# Patient Record
Sex: MALE | Race: WHITE | Employment: UNEMPLOYED | URBAN - METROPOLITAN AREA
[De-identification: names, ages, dates, MRNs, and addresses within clinical notes are randomized per-mention and may not be internally consistent; named-entity substitution may affect disease eponyms.]

---

## 2020-07-05 ENCOUNTER — HOSPITAL ENCOUNTER (EMERGENCY)
Age: 45
Discharge: HOME | End: 2020-07-05
Attending: EMERGENCY MEDICINE

## 2020-07-05 ENCOUNTER — APPOINTMENT (OUTPATIENT)
Dept: CT IMAGING | Age: 45
End: 2020-07-05

## 2020-07-05 VITALS
DIASTOLIC BLOOD PRESSURE: 94 MMHG | SYSTOLIC BLOOD PRESSURE: 149 MMHG | RESPIRATION RATE: 19 BRPM | OXYGEN SATURATION: 99 % | TEMPERATURE: 98.1 F | HEART RATE: 94 BPM

## 2020-07-05 LAB
ABSOLUTE EOS #: 0.28 K/UL (ref 0–0.44)
ABSOLUTE IMMATURE GRANULOCYTE: 0.07 K/UL (ref 0–0.3)
ABSOLUTE LYMPH #: 3.55 K/UL (ref 1.1–3.7)
ABSOLUTE MONO #: 0.68 K/UL (ref 0.1–1.2)
ACETAMINOPHEN LEVEL: <5 UG/ML (ref 10–30)
ANION GAP SERPL CALCULATED.3IONS-SCNC: 17 MMOL/L (ref 9–17)
BASOPHILS # BLD: 1 % (ref 0–2)
BASOPHILS ABSOLUTE: 0.07 K/UL (ref 0–0.2)
BUN BLDV-MCNC: 6 MG/DL (ref 6–20)
BUN/CREAT BLD: ABNORMAL (ref 9–20)
CALCIUM SERPL-MCNC: 8.1 MG/DL (ref 8.6–10.4)
CHLORIDE BLD-SCNC: 91 MMOL/L (ref 98–107)
CO2: 23 MMOL/L (ref 20–31)
CREAT SERPL-MCNC: 0.56 MG/DL (ref 0.7–1.2)
DIFFERENTIAL TYPE: ABNORMAL
EOSINOPHILS RELATIVE PERCENT: 3 % (ref 1–4)
ETHANOL PERCENT: 0.32 %
ETHANOL: 322 MG/DL
GFR AFRICAN AMERICAN: ABNORMAL ML/MIN
GFR NON-AFRICAN AMERICAN: ABNORMAL ML/MIN
GFR SERPL CREATININE-BSD FRML MDRD: ABNORMAL ML/MIN/{1.73_M2}
GFR SERPL CREATININE-BSD FRML MDRD: ABNORMAL ML/MIN/{1.73_M2}
GLUCOSE BLD-MCNC: 111 MG/DL (ref 70–99)
GLUCOSE BLD-MCNC: 119 MG/DL (ref 75–110)
HCT VFR BLD CALC: 49.2 % (ref 40.7–50.3)
HEMOGLOBIN: 17 G/DL (ref 13–17)
IMMATURE GRANULOCYTES: 1 %
LYMPHOCYTES # BLD: 35 % (ref 24–43)
MCH RBC QN AUTO: 33.2 PG (ref 25.2–33.5)
MCHC RBC AUTO-ENTMCNC: 34.6 G/DL (ref 28.4–34.8)
MCV RBC AUTO: 96.1 FL (ref 82.6–102.9)
MONOCYTES # BLD: 7 % (ref 3–12)
NRBC AUTOMATED: 0 PER 100 WBC
PDW BLD-RTO: 12.1 % (ref 11.8–14.4)
PLATELET # BLD: ABNORMAL K/UL (ref 138–453)
PLATELET ESTIMATE: ABNORMAL
PLATELET, FLUORESCENCE: NORMAL K/UL (ref 138–453)
PLATELET, IMMATURE FRACTION: NORMAL % (ref 1.1–10.3)
PMV BLD AUTO: ABNORMAL FL (ref 8.1–13.5)
POTASSIUM SERPL-SCNC: 4.1 MMOL/L (ref 3.7–5.3)
RBC # BLD: 5.12 M/UL (ref 4.21–5.77)
RBC # BLD: ABNORMAL 10*6/UL
SALICYLATE LEVEL: <1 MG/DL (ref 3–10)
SEG NEUTROPHILS: 54 % (ref 36–65)
SEGMENTED NEUTROPHILS ABSOLUTE COUNT: 5.44 K/UL (ref 1.5–8.1)
SODIUM BLD-SCNC: 131 MMOL/L (ref 135–144)
TOXIC TRICYCLIC SC,BLOOD: NEGATIVE
WBC # BLD: 10.1 K/UL (ref 3.5–11.3)
WBC # BLD: ABNORMAL 10*3/UL

## 2020-07-05 PROCEDURE — 72125 CT NECK SPINE W/O DYE: CPT

## 2020-07-05 PROCEDURE — 96375 TX/PRO/DX INJ NEW DRUG ADDON: CPT

## 2020-07-05 PROCEDURE — 80307 DRUG TEST PRSMV CHEM ANLYZR: CPT

## 2020-07-05 PROCEDURE — 70450 CT HEAD/BRAIN W/O DYE: CPT

## 2020-07-05 PROCEDURE — 6360000002 HC RX W HCPCS

## 2020-07-05 PROCEDURE — G0480 DRUG TEST DEF 1-7 CLASSES: HCPCS

## 2020-07-05 PROCEDURE — 2500000003 HC RX 250 WO HCPCS: Performed by: EMERGENCY MEDICINE

## 2020-07-05 PROCEDURE — 6360000002 HC RX W HCPCS: Performed by: EMERGENCY MEDICINE

## 2020-07-05 PROCEDURE — 99285 EMERGENCY DEPT VISIT HI MDM: CPT

## 2020-07-05 PROCEDURE — 85055 RETICULATED PLATELET ASSAY: CPT

## 2020-07-05 PROCEDURE — 96374 THER/PROPH/DIAG INJ IV PUSH: CPT

## 2020-07-05 PROCEDURE — 2580000003 HC RX 258: Performed by: EMERGENCY MEDICINE

## 2020-07-05 PROCEDURE — 80048 BASIC METABOLIC PNL TOTAL CA: CPT

## 2020-07-05 PROCEDURE — 82947 ASSAY GLUCOSE BLOOD QUANT: CPT

## 2020-07-05 PROCEDURE — 85025 COMPLETE CBC W/AUTO DIFF WBC: CPT

## 2020-07-05 RX ORDER — HYDRALAZINE HYDROCHLORIDE 20 MG/ML
20 INJECTION INTRAMUSCULAR; INTRAVENOUS ONCE
Status: COMPLETED | OUTPATIENT
Start: 2020-07-05 | End: 2020-07-05

## 2020-07-05 RX ORDER — DIPHENHYDRAMINE HYDROCHLORIDE 50 MG/ML
INJECTION INTRAMUSCULAR; INTRAVENOUS
Status: COMPLETED
Start: 2020-07-05 | End: 2020-07-05

## 2020-07-05 RX ORDER — DIPHENHYDRAMINE HYDROCHLORIDE 50 MG/ML
50 INJECTION INTRAMUSCULAR; INTRAVENOUS ONCE
Status: COMPLETED | OUTPATIENT
Start: 2020-07-05 | End: 2020-07-05

## 2020-07-05 RX ORDER — 0.9 % SODIUM CHLORIDE 0.9 %
1000 INTRAVENOUS SOLUTION INTRAVENOUS ONCE
Status: COMPLETED | OUTPATIENT
Start: 2020-07-05 | End: 2020-07-05

## 2020-07-05 RX ORDER — METOPROLOL TARTRATE 5 MG/5ML
5 INJECTION INTRAVENOUS ONCE
Status: COMPLETED | OUTPATIENT
Start: 2020-07-05 | End: 2020-07-05

## 2020-07-05 RX ORDER — LORAZEPAM 2 MG/ML
2 INJECTION INTRAMUSCULAR ONCE
Status: COMPLETED | OUTPATIENT
Start: 2020-07-05 | End: 2020-07-05

## 2020-07-05 RX ORDER — LORAZEPAM 2 MG/ML
INJECTION INTRAMUSCULAR
Status: COMPLETED
Start: 2020-07-05 | End: 2020-07-05

## 2020-07-05 RX ADMIN — HYDRALAZINE HYDROCHLORIDE 20 MG: 20 INJECTION INTRAMUSCULAR; INTRAVENOUS at 08:27

## 2020-07-05 RX ADMIN — SODIUM CHLORIDE 1000 ML: 9 INJECTION, SOLUTION INTRAVENOUS at 08:27

## 2020-07-05 RX ADMIN — LORAZEPAM 2 MG: 2 INJECTION INTRAMUSCULAR at 06:23

## 2020-07-05 RX ADMIN — DIPHENHYDRAMINE HYDROCHLORIDE 50 MG: 50 INJECTION INTRAMUSCULAR; INTRAVENOUS at 06:23

## 2020-07-05 RX ADMIN — METOPROLOL TARTRATE 5 MG: 5 INJECTION, SOLUTION INTRAVENOUS at 08:02

## 2020-07-05 NOTE — ED PROVIDER NOTES
[facility]  Emergency Department  Faculty Sign-Out Addendum     Care of Merlinda Copier was assumed from previous attending and is being seen for Alcohol Intoxication  . The patient's initial evaluation and plan have been discussed with the prior provider who initially evaluated the patient. Handoff taken on the following patient from prior Attending Physician:    Saskia Welch    I was available and discussed any additional care issues that arose and coordinated the management plans with the resident(s) caring for the patient during my duty period. Any areas of disagreement with residents documentation of care or procedures are noted on the chart. I was personally present for the key portions of any/all procedures during my duty period. I have documented in the chart those procedures where I was not present during the key portions.       EMERGENCY DEPARTMENT COURSE / MEDICAL DECISION MAKING:       MEDICATIONS GIVEN:  Orders Placed This Encounter   Medications    LORazepam (ATIVAN) 2 MG/ML injection     Gladieux, Jaimie: cabinet override    diphenhydrAMINE (BENADRYL) 50 MG/ML injection     Gladieux, Jaimie: cabinet override    LORazepam (ATIVAN) injection 2 mg    diphenhydrAMINE (BENADRYL) injection 50 mg    metoprolol (LOPRESSOR) injection 5 mg    0.9 % sodium chloride bolus    hydrALAZINE (APRESOLINE) injection 20 mg       LABS / RADIOLOGY:     Labs Reviewed   TOX SCR, BLD, ED - Abnormal; Notable for the following components:       Result Value    Acetaminophen Level <5 (*)     Ethanol 322 (*)     Ethanol percent 6.973 (*)     Salicylate Lvl <1 (*)     All other components within normal limits   BASIC METABOLIC PANEL W/ REFLEX TO MG FOR LOW K - Abnormal; Notable for the following components:    Glucose 111 (*)     BUN 6 (*)     CREATININE 0.56 (*)     Calcium 8.1 (*)     Sodium 131 (*)     Chloride 91 (*)     All other components within normal limits   CBC WITH AUTO DIFFERENTIAL

## 2020-07-05 NOTE — ED NOTES
Bed: 16  Expected date: 7/5/20  Expected time: 3:56 AM  Means of arrival:   Comments:  [id number] [facility] [address], RN  07/05/20 6209

## 2020-07-05 NOTE — ED NOTES
Pt remains on NRB mask, BP decreased at this time after IV hydralizine. Continue to monitor until sober time.       Priyank Cosby RN  07/05/20 9344

## 2020-07-05 NOTE — ED NOTES
met with patient at bedside. Patient reported he is not from this area. Patient was given information on substance abuse treatment programs. Patient continued to fall asleep during interactions; had to wake several times.        CARLOS ALBERTO Flowers, CHARBEL     [facility]  07/05/20 5077

## 2020-07-05 NOTE — ED PROVIDER NOTES
[facility]  Emergency Department  Emergency Medicine Resident Sign-out     Care of Richelle Boeck was assumed from Dr. Heaven Greene and is being seen for Alcohol Intoxication  . The patient's initial evaluation and plan have been discussed with the prior provider who initially evaluated the patient.      EMERGENCY DEPARTMENT COURSE / MEDICAL DECISION MAKING:       MEDICATIONS GIVEN:  Orders Placed This Encounter   Medications    LORazepam (ATIVAN) 2 MG/ML injection     Gladieux, Jaimie: cabinet override    diphenhydrAMINE (BENADRYL) 50 MG/ML injection     Gladieux, Jaimie: cabinet override    LORazepam (ATIVAN) injection 2 mg    diphenhydrAMINE (BENADRYL) injection 50 mg    metoprolol (LOPRESSOR) injection 5 mg    0.9 % sodium chloride bolus    hydrALAZINE (APRESOLINE) injection 20 mg       LABS / RADIOLOGY:     Labs Reviewed   TOX SCR, BLD, ED - Abnormal; Notable for the following components:       Result Value    Acetaminophen Level <5 (*)     Ethanol 322 (*)     Ethanol percent 5.051 (*)     Salicylate Lvl <1 (*)     All other components within normal limits   BASIC METABOLIC PANEL W/ REFLEX TO MG FOR LOW K - Abnormal; Notable for the following components:    Glucose 111 (*)     CREATININE 0.56 (*)     Calcium 8.1 (*)     Sodium 131 (*)     Chloride 91 (*)     All other components within normal limits   CBC WITH AUTO DIFFERENTIAL - Abnormal; Notable for the following components:    Immature Granulocytes 1 (*)     All other components within normal limits   POC GLUCOSE FINGERSTICK - Abnormal; Notable for the following components:    POC Glucose 119 (*)     All other components within normal limits   IMMATURE PLATELET FRACTION       Ct Head Wo Contrast    Result Date: 7/5/2020  EXAMINATION: CT OF THE HEAD WITHOUT CONTRAST  7/5/2020 5:10 am TECHNIQUE: CT of the head was performed without the administration of intravenous contrast. Dose modulation, iterative reconstruction, and/or weight based adjustment of the mA/kV was utilized to reduce the radiation dose to as low as reasonably achievable. COMPARISON: None. HISTORY: ORDERING SYSTEM PROVIDED HISTORY: found down TECHNOLOGIST PROVIDED HISTORY: found down Reason for Exam: found down Acuity: Unknown Type of Exam: Unknown FINDINGS: Image quality is degraded by motion and streak artifact. BRAIN/VENTRICLES: There is no acute intracranial hemorrhage, mass effect or midline shift. No abnormal extra-axial fluid collection. The gray-white differentiation is maintained without evidence of an acute infarct. There is no evidence of hydrocephalus. ORBITS: The visualized portion of the orbits demonstrate no acute abnormality. SINUSES: Mild scattered paranasal sinus mucosal thickening. The bilateral mastoid air cells are clear. SOFT TISSUES/SKULL:  No acute abnormality of the visualized skull or soft tissues. No acute intracranial abnormality given motion and streak artifact. Ct Cervical Spine Wo Contrast    Result Date: 7/5/2020  EXAMINATION: CT OF THE CERVICAL SPINE WITHOUT CONTRAST 7/5/2020 5:10 am TECHNIQUE: CT of the cervical spine was performed without the administration of intravenous contrast. Multiplanar reformatted images are provided for review. Dose modulation, iterative reconstruction, and/or weight based adjustment of the mA/kV was utilized to reduce the radiation dose to as low as reasonably achievable. COMPARISON: None. HISTORY: ORDERING SYSTEM PROVIDED HISTORY: found down TECHNOLOGIST PROVIDED HISTORY: found down Reason for Exam: found down Acuity: Unknown Type of Exam: Unknown FINDINGS: BONES/ALIGNMENT: There is no acute fracture or traumatic malalignment. DEGENERATIVE CHANGES: Mild multilevel degenerative disc disease. SOFT TISSUES: There is no prevertebral soft tissue swelling. No acute abnormality of the cervical spine.        RECENT VITALS:     Temp: 98.1 °F (36.7 °C),  Pulse: 94, Resp: 19, BP: (!) 149/94, SpO2: 99 %    This

## 2020-07-05 NOTE — ED PROVIDER NOTES
[address] [facility]     Emergency Department     Faculty Attestation    I performed a history and physical examination of the patient and discussed management with the resident. I have reviewed and agree with the residents findings including all diagnostic interpretations, and treatment plans as written at the time of my review. Any areas of disagreement are noted on the chart. I was personally present for the key portions of any procedures. I have documented in the chart those procedures where I was not present during the key portions. For Physician Assistant/ Nurse Practitioner cases/documentation I have personally evaluated this patient and have completed at least one if not all key elements of the E/M (history, physical exam, and MDM). Additional findings are as noted. This patient was evaluated in the Emergency Department for symptoms described in the history of present illness. The patient was evaluated in the context of the global COVID-19 pandemic, which necessitated consideration that the patient might be at risk for infection with the SARS-CoV-2 virus that causes COVID-19. Institutional protocols and algorithms that pertain to the evaluation of patients at risk for COVID-19 are in a state of rapid change based on information released by regulatory bodies including the CDC and federal and state organizations. These policies and algorithms were followed during the patient's care in the ED. Primary Care Physician: No primary care provider on file. History: This is a 78 y.o. male who presents to the Emergency Department with complaint of cute alcohol intoxication. The patient was found down and brought in by EMS. His glucose was over 100. Physical:   temperature is 98.1 °F (36.7 °C). His blood pressure is 135/97 (abnormal) and his pulse is 93. His respiration is 19 and oxygen saturation is 97%.   Is a very somnolent difficult to arise alcohol smell on his breath. No obvious evidence of trauma. Lungs are clear to auscultation, heart regular rate and rhythm    Impression: Acute alcohol intoxication    Plan: CT scan ethanol level and reassess      (Please note that portions of this note were completed with a voice recognition program.  Efforts were made to edit the dictations but occasionally words are mis-transcribed.)    Claudene Guernsey.  Frandy Vidal MD, [facility]  Attending Emergency Medicine Physician        Abril Shoemaker MD  07/05/20 3212

## 2020-07-05 NOTE — ED PROVIDER NOTES
[facility] ED  Emergency Department Encounter  EmergencyMedicine Resident     Pt Balaji Harris  MRN: [id number]  [place] 1975  Date of evaluation: 7/5/20  PCP:  No primary care provider on file. CHIEF COMPLAINT       Chief Complaint   Patient presents with    Alcohol Intoxication       HISTORY OF PRESENT ILLNESS  (Location/Symptom, Timing/Onset, Context/Setting, Quality, Duration, Modifying Factors, Severity.)      Dennis Pisano is a 50 y.o. male who presents with altered mental status, patient was found outside of a local bar, according EMS he been drinking a large amount of pitchers of beer. No signs of trauma, patient did have desaturations was given a nasal trumpet and started on O2 satting 98% on 2 L nasal cannula. PAST MEDICAL / SURGICAL / SOCIAL / FAMILY HISTORY      has no past medical history on file. has no past surgical history on file.     Social History     Socioeconomic History    Marital status: Single     Spouse name: Not on file    Number of children: Not on file    Years of education: Not on file    Highest education level: Not on file   Occupational History    Not on file   Social Needs    Financial resource strain: Not on file    Food insecurity     Worry: Not on file     Inability: Not on file    Transportation needs     Medical: Not on file     Non-medical: Not on file   Tobacco Use    Smoking status: Not on file   Substance and Sexual Activity    Alcohol use: Not on file    Drug use: Not on file    Sexual activity: Not on file   Lifestyle    Physical activity     Days per week: Not on file     Minutes per session: Not on file    Stress: Not on file   Relationships    Social connections     Talks on phone: Not on file     Gets together: Not on file     Attends Zoroastrianism service: Not on file     Active member of club or organization: Not on file     Attends meetings of clubs or organizations: Not on file     Relationship status: Not on file    Intimate partner violence     Fear of current or ex partner: Not on file     Emotionally abused: Not on file     Physically abused: Not on file     Forced sexual activity: Not on file   Other Topics Concern    Not on file   Social History Narrative    Not on file       No family history on file. Allergies:  Patient has no allergy information on record. Home Medications:  Prior to Admission medications    Not on File       REVIEW OF SYSTEMS    (2-9 systems for level 4, 10 or more for level 5)      Review of Systems   Unable to perform ROS: Mental status change   Constitutional:        Patient appears intoxicated smells of alcohol       PHYSICAL EXAM   (up to 7 for level 4, 8 or more for level 5)      INITIAL VITALS:   BP (!) 149/94   Pulse 94   Temp 98.1 °F (36.7 °C)   Resp 19   SpO2 99%     Physical Exam  Constitutional:       Comments: Patient is somnolent, responds to noxious stimuli   HENT:      Head: Normocephalic and atraumatic. Nose: Nose normal.      Mouth/Throat:      Mouth: Mucous membranes are moist.   Eyes:      Pupils: Pupils are equal, round, and reactive to light. Comments: Sclerae injected bilaterally   Neck:      Musculoskeletal: No neck rigidity. Cardiovascular:      Rate and Rhythm: Normal rate. Pulses: Normal pulses. Heart sounds: Normal heart sounds. Pulmonary:      Effort: Pulmonary effort is normal.      Breath sounds: Normal breath sounds. Comments: Patient is resting comfortably on his back, does have periods of sleep apnea  Abdominal:      General: Abdomen is flat. Palpations: Abdomen is soft. Tenderness: There is no abdominal tenderness. There is no guarding. Skin:     General: Skin is warm and dry.          DIFFERENTIAL  DIAGNOSIS     PLAN (LABS / IMAGING / EKG):  Orders Placed This Encounter   Procedures    CT HEAD WO CONTRAST    CT Cervical Spine WO Contrast    TOX SCR, BLD, ED    Basic Metabolic Panel w/ Reflex to MG    CBC Auto Differential    Immature Platelet Fraction    POC Glucose Fingerstick       MEDICATIONS ORDERED:  Orders Placed This Encounter   Medications    LORazepam (ATIVAN) 2 MG/ML injection     Gladieux, Jaimie: cabinet override    diphenhydrAMINE (BENADRYL) 50 MG/ML injection     Gladieux, Jaimie: cabinet override    LORazepam (ATIVAN) injection 2 mg    diphenhydrAMINE (BENADRYL) injection 50 mg    metoprolol (LOPRESSOR) injection 5 mg    0.9 % sodium chloride bolus    hydrALAZINE (APRESOLINE) injection 20 mg       DDX: Intoxication    DIAGNOSTIC RESULTS / EMERGENCY DEPARTMENT COURSE / MDM   :  Results for orders placed or performed during the hospital encounter of 07/05/20   TOX SCR, BLD, ED   Result Value Ref Range    Acetaminophen Level <5 (L) 10 - 30 ug/mL    Ethanol 322 (HH) <10 mg/dL    Ethanol percent 0.322 (H) <8.301 %    Salicylate Lvl <1 (L) 3 - 10 mg/dL    Toxic Tricyclic Sc,Blood NEGATIVE NEGATIVE   Basic Metabolic Panel w/ Reflex to MG   Result Value Ref Range    Glucose 111 (H) 70 - 99 mg/dL    BUN 6 6 - 20 mg/dL    CREATININE 0.56 (L) 0.70 - 1.20 mg/dL    Bun/Cre Ratio NOT REPORTED 9 - 20    Calcium 8.1 (L) 8.6 - 10.4 mg/dL    Sodium 131 (L) 135 - 144 mmol/L    Potassium 4.1 3.7 - 5.3 mmol/L    Chloride 91 (L) 98 - 107 mmol/L    CO2 23 20 - 31 mmol/L    Anion Gap 17 9 - 17 mmol/L    GFR Non- NOT REPORTED >60 mL/min    GFR  NOT REPORTED >60 mL/min    GFR Comment NOT REPORTED     GFR Staging NOT REPORTED    CBC Auto Differential   Result Value Ref Range    WBC 10.1 3.5 - 11.3 k/uL    RBC 5.12 4.21 - 5.77 m/uL    Hemoglobin 17.0 13.0 - 17.0 g/dL    Hematocrit 49.2 40.7 - 50.3 %    MCV 96.1 82.6 - 102.9 fL    MCH 33.2 25.2 - 33.5 pg    MCHC 34.6 28.4 - 34.8 g/dL    RDW 12.1 11.8 - 14.4 %    Platelets See Reflexed IPF Result 138 - 453 k/uL    MPV NOT REPORTED 8.1 - 13.5 fL    NRBC Automated 0.0 0.0 per 100 WBC    Differential Type NOT REPORTED     WBC Morphology NOT REPORTED     RBC Morphology NOT REPORTED     Platelet Estimate NOT REPORTED     Seg Neutrophils 54 36 - 65 %    Lymphocytes 35 24 - 43 %    Monocytes 7 3 - 12 %    Eosinophils % 3 1 - 4 %    Basophils 1 0 - 2 %    Immature Granulocytes 1 (H) 0 %    Segs Absolute 5.44 1.50 - 8.10 k/uL    Absolute Lymph # 3.55 1.10 - 3.70 k/uL    Absolute Mono # 0.68 0.10 - 1.20 k/uL    Absolute Eos # 0.28 0.00 - 0.44 k/uL    Basophils Absolute 0.07 0.00 - 0.20 k/uL    Absolute Immature Granulocyte 0.07 0.00 - 0.30 k/uL   Immature Platelet Fraction   Result Value Ref Range    Platelet, Immature Fraction NOT REPORTED 1.1 - 10.3 %    Platelet, Fluorescence Platelet clumps present, count appears adequate. 138 - 453 k/uL   POC Glucose Fingerstick   Result Value Ref Range    POC Glucose 119 (H) 75 - 110 mg/dL       IMPRESSION:    RADIOLOGY:  No acute intracranial or cervical abnormality on CT    EKG  None    All EKG's are interpreted by the Emergency Department Physician who either signs or Co-signs this chart in the absence of a cardiologist.    EMERGENCY DEPARTMENT COURSE:  Patient brought in via EMS after being found at a local bar, concern for acute alcohol intoxication, patient's alcohol level was 322, nasal trumpet was placed, due to patient not being responsive and being found down no signs of trauma however unsure if intracranial pathology was taking place CT of head neck was obtained which was negative. Patient urinated on himself during the stay woke up and was belligerent yelling at nurses walked out of his room threatening staff. Patient was instructed to go back to his room attempted to fight myself, security was called, patient was given 50 of Benadryl and 2 mg of Ativan. Patient was signed out to Dr. Tia Carmona awaiting sober time and reevaluation    PROCEDURES:  None    CONSULTS:  None    CRITICAL CARE:  None    FINAL IMPRESSION      1. Acute alcoholic intoxication without complication ([address]    2.  Combative behavior 3. Chronic alcohol abuse    4. Sleep apnea, unspecified type          DISPOSITION / PLAN     DISPOSITION Decision To Discharge 07/05/2020 12:47:34 PM      PATIENT REFERRED TO:  [facility] [address]  [phone]  Call in 2 days  To establish a new PCP and follow-up to have an outpatient sleep study performed to confirm likely obstructive sleep apnea. DISCHARGE MEDICATIONS:  There are no discharge medications for this patient.       Sharon Marks DO  Emergency Medicine Resident    (Please note that portions of thisnote were completed with a voice recognition program.  Efforts were made to edit the dictations but occasionally words are mis-transcribed.)     Sharon Marks DO  Resident  07/05/20 8298

## 2020-07-05 NOTE — ED NOTES
Pt presented to ED via [facility] following being found passed outside [address] bar. Pt was seen earlier today chugging pitchers of beers. Pt only responsive to painful stimuli. Pt presents with no ID, $15, a lottery ticket and an uncharged phone. Pt presents with no injuries noted. Pt placed on cardiac monitor.       Valerie Georges RN  07/05/20 8018

## 2020-07-05 NOTE — ED NOTES
Pt ambulated to restroom with steady gait. Pt provided paper pants to wear. Pt is alert, oriented, speaking in full, complete sentences.       Mayda Frank RN  07/05/20 4924

## 2021-10-15 ENCOUNTER — APPOINTMENT (OUTPATIENT)
Dept: GENERAL RADIOLOGY | Age: 46
DRG: 246 | End: 2021-10-15

## 2021-10-15 ENCOUNTER — HOSPITAL ENCOUNTER (INPATIENT)
Age: 46
LOS: 3 days | Discharge: HOME | DRG: 246 | End: 2021-10-18
Attending: EMERGENCY MEDICINE | Admitting: INTERNAL MEDICINE

## 2021-10-15 DIAGNOSIS — I21.3 ST ELEVATION MYOCARDIAL INFARCTION (STEMI), UNSPECIFIED ARTERY (HCC): Primary | ICD-10-CM

## 2021-10-15 DIAGNOSIS — I50.42 CHRONIC COMBINED SYSTOLIC AND DIASTOLIC CHF (CONGESTIVE HEART FAILURE) (HCC): ICD-10-CM

## 2021-10-15 LAB
ABSOLUTE EOS #: 0.09 K/UL (ref 0–0.44)
ABSOLUTE IMMATURE GRANULOCYTE: 0.09 K/UL (ref 0–0.3)
ABSOLUTE LYMPH #: 3.24 K/UL (ref 1.1–3.7)
ABSOLUTE MONO #: 1.09 K/UL (ref 0.1–1.2)
ANION GAP SERPL CALCULATED.3IONS-SCNC: 14 MMOL/L (ref 9–17)
ANION GAP SERPL CALCULATED.3IONS-SCNC: 15 MMOL/L (ref 9–17)
BASOPHILS # BLD: 1 % (ref 0–2)
BASOPHILS ABSOLUTE: 0.1 K/UL (ref 0–0.2)
BNP INTERPRETATION: ABNORMAL
BUN BLDV-MCNC: 4 MG/DL (ref 6–20)
BUN BLDV-MCNC: 5 MG/DL (ref 6–20)
BUN/CREAT BLD: ABNORMAL (ref 9–20)
BUN/CREAT BLD: ABNORMAL (ref 9–20)
CALCIUM IONIZED: 0.98 MMOL/L (ref 1.13–1.33)
CALCIUM SERPL-MCNC: 8.7 MG/DL (ref 8.6–10.4)
CALCIUM SERPL-MCNC: 8.7 MG/DL (ref 8.6–10.4)
CHLORIDE BLD-SCNC: 88 MMOL/L (ref 98–107)
CHLORIDE BLD-SCNC: 88 MMOL/L (ref 98–107)
CO2: 20 MMOL/L (ref 20–31)
CO2: 23 MMOL/L (ref 20–31)
CREAT SERPL-MCNC: 0.47 MG/DL (ref 0.7–1.2)
CREAT SERPL-MCNC: 0.62 MG/DL (ref 0.7–1.2)
DIFFERENTIAL TYPE: ABNORMAL
EOSINOPHILS RELATIVE PERCENT: 1 % (ref 1–4)
GFR AFRICAN AMERICAN: >60 ML/MIN
GFR AFRICAN AMERICAN: >60 ML/MIN
GFR NON-AFRICAN AMERICAN: >60 ML/MIN
GFR NON-AFRICAN AMERICAN: >60 ML/MIN
GFR SERPL CREATININE-BSD FRML MDRD: ABNORMAL ML/MIN/{1.73_M2}
GLUCOSE BLD-MCNC: 110 MG/DL (ref 70–99)
GLUCOSE BLD-MCNC: 120 MG/DL (ref 70–99)
HCT VFR BLD CALC: 48.7 % (ref 40.7–50.3)
HEMOGLOBIN: 17 G/DL (ref 13–17)
IMMATURE GRANULOCYTES: 1 %
LV EF: 25 %
LVEF MODALITY: NORMAL
LYMPHOCYTES # BLD: 16 % (ref 24–43)
MAGNESIUM: 1.7 MG/DL (ref 1.6–2.6)
MCH RBC QN AUTO: 33.7 PG (ref 25.2–33.5)
MCHC RBC AUTO-ENTMCNC: 34.9 G/DL (ref 28.4–34.8)
MCV RBC AUTO: 96.4 FL (ref 82.6–102.9)
MONOCYTES # BLD: 6 % (ref 3–12)
NRBC AUTOMATED: 0 PER 100 WBC
PDW BLD-RTO: 12.1 % (ref 11.8–14.4)
PLATELET # BLD: 314 K/UL (ref 138–453)
PLATELET ESTIMATE: ABNORMAL
PMV BLD AUTO: 10.3 FL (ref 8.1–13.5)
POTASSIUM SERPL-SCNC: 3.6 MMOL/L (ref 3.7–5.3)
POTASSIUM SERPL-SCNC: 3.8 MMOL/L (ref 3.7–5.3)
PRO-BNP: 6545 PG/ML
RBC # BLD: 5.05 M/UL (ref 4.21–5.77)
RBC # BLD: ABNORMAL 10*6/UL
SARS-COV-2, RAPID: NOT DETECTED
SEG NEUTROPHILS: 75 % (ref 36–65)
SEGMENTED NEUTROPHILS ABSOLUTE COUNT: 15.14 K/UL (ref 1.5–8.1)
SODIUM BLD-SCNC: 123 MMOL/L (ref 135–144)
SODIUM BLD-SCNC: 125 MMOL/L (ref 135–144)
SPECIMEN DESCRIPTION: NORMAL
TROPONIN INTERP: ABNORMAL
TROPONIN T: ABNORMAL NG/ML
TROPONIN, HIGH SENSITIVITY: 206 NG/L (ref 0–22)
WBC # BLD: 19.8 K/UL (ref 3.5–11.3)
WBC # BLD: ABNORMAL 10*3/UL

## 2021-10-15 PROCEDURE — 6360000002 HC RX W HCPCS: Performed by: STUDENT IN AN ORGANIZED HEALTH CARE EDUCATION/TRAINING PROGRAM

## 2021-10-15 PROCEDURE — B2151ZZ FLUOROSCOPY OF LEFT HEART USING LOW OSMOLAR CONTRAST: ICD-10-PCS | Performed by: INTERNAL MEDICINE

## 2021-10-15 PROCEDURE — B2111ZZ FLUOROSCOPY OF MULTIPLE CORONARY ARTERIES USING LOW OSMOLAR CONTRAST: ICD-10-PCS | Performed by: INTERNAL MEDICINE

## 2021-10-15 PROCEDURE — 71045 X-RAY EXAM CHEST 1 VIEW: CPT

## 2021-10-15 PROCEDURE — C1874 STENT, COATED/COV W/DEL SYS: HCPCS

## 2021-10-15 PROCEDURE — 83735 ASSAY OF MAGNESIUM: CPT

## 2021-10-15 PROCEDURE — 93005 ELECTROCARDIOGRAM TRACING: CPT | Performed by: STUDENT IN AN ORGANIZED HEALTH CARE EDUCATION/TRAINING PROGRAM

## 2021-10-15 PROCEDURE — 2709999900 HC NON-CHARGEABLE SUPPLY

## 2021-10-15 PROCEDURE — 87635 SARS-COV-2 COVID-19 AMP PRB: CPT

## 2021-10-15 PROCEDURE — 82330 ASSAY OF CALCIUM: CPT

## 2021-10-15 PROCEDURE — B2131ZZ FLUOROSCOPY OF MULTIPLE CORONARY ARTERY BYPASS GRAFTS USING LOW OSMOLAR CONTRAST: ICD-10-PCS | Performed by: INTERNAL MEDICINE

## 2021-10-15 PROCEDURE — 99284 EMERGENCY DEPT VISIT MOD MDM: CPT

## 2021-10-15 PROCEDURE — 6360000002 HC RX W HCPCS

## 2021-10-15 PROCEDURE — 6370000000 HC RX 637 (ALT 250 FOR IP): Performed by: STUDENT IN AN ORGANIZED HEALTH CARE EDUCATION/TRAINING PROGRAM

## 2021-10-15 PROCEDURE — C1725 CATH, TRANSLUMIN NON-LASER: HCPCS

## 2021-10-15 PROCEDURE — 83880 ASSAY OF NATRIURETIC PEPTIDE: CPT

## 2021-10-15 PROCEDURE — C1760 CLOSURE DEV, VASC: HCPCS

## 2021-10-15 PROCEDURE — 93458 L HRT ARTERY/VENTRICLE ANGIO: CPT | Performed by: INTERNAL MEDICINE

## 2021-10-15 PROCEDURE — 85025 COMPLETE CBC W/AUTO DIFF WBC: CPT

## 2021-10-15 PROCEDURE — 4A023N7 MEASUREMENT OF CARDIAC SAMPLING AND PRESSURE, LEFT HEART, PERCUTANEOUS APPROACH: ICD-10-PCS | Performed by: INTERNAL MEDICINE

## 2021-10-15 PROCEDURE — 2000000000 HC ICU R&B

## 2021-10-15 PROCEDURE — 93005 ELECTROCARDIOGRAM TRACING: CPT | Performed by: INTERNAL MEDICINE

## 2021-10-15 PROCEDURE — 2500000003 HC RX 250 WO HCPCS: Performed by: STUDENT IN AN ORGANIZED HEALTH CARE EDUCATION/TRAINING PROGRAM

## 2021-10-15 PROCEDURE — 6360000004 HC RX CONTRAST MEDICATION

## 2021-10-15 PROCEDURE — 36415 COLL VENOUS BLD VENIPUNCTURE: CPT

## 2021-10-15 PROCEDURE — 027035Z DILATION OF CORONARY ARTERY, ONE ARTERY WITH TWO DRUG-ELUTING INTRALUMINAL DEVICES, PERCUTANEOUS APPROACH: ICD-10-PCS | Performed by: INTERNAL MEDICINE

## 2021-10-15 PROCEDURE — 84484 ASSAY OF TROPONIN QUANT: CPT

## 2021-10-15 PROCEDURE — C1894 INTRO/SHEATH, NON-LASER: HCPCS

## 2021-10-15 PROCEDURE — 80048 BASIC METABOLIC PNL TOTAL CA: CPT

## 2021-10-15 PROCEDURE — C1887 CATHETER, GUIDING: HCPCS

## 2021-10-15 PROCEDURE — 92941 PRQ TRLML REVSC TOT OCCL AMI: CPT | Performed by: INTERNAL MEDICINE

## 2021-10-15 PROCEDURE — 96374 THER/PROPH/DIAG INJ IV PUSH: CPT

## 2021-10-15 PROCEDURE — C1769 GUIDE WIRE: HCPCS

## 2021-10-15 RX ORDER — LABETALOL HYDROCHLORIDE 5 MG/ML
10 INJECTION, SOLUTION INTRAVENOUS EVERY 30 MIN PRN
Status: DISCONTINUED | OUTPATIENT
Start: 2021-10-15 | End: 2021-10-18 | Stop reason: HOSPADM

## 2021-10-15 RX ORDER — SODIUM CHLORIDE 0.9 % (FLUSH) 0.9 %
5-40 SYRINGE (ML) INJECTION PRN
Status: DISCONTINUED | OUTPATIENT
Start: 2021-10-15 | End: 2021-10-18 | Stop reason: HOSPADM

## 2021-10-15 RX ORDER — CLOPIDOGREL BISULFATE 75 MG/1
75 TABLET ORAL DAILY
Status: DISCONTINUED | OUTPATIENT
Start: 2021-10-16 | End: 2021-10-15

## 2021-10-15 RX ORDER — POTASSIUM CHLORIDE 20 MEQ/1
20 TABLET, EXTENDED RELEASE ORAL ONCE
Status: COMPLETED | OUTPATIENT
Start: 2021-10-15 | End: 2021-10-15

## 2021-10-15 RX ORDER — ASPIRIN 81 MG/1
81 TABLET, CHEWABLE ORAL DAILY
Status: DISCONTINUED | OUTPATIENT
Start: 2021-10-16 | End: 2021-10-18 | Stop reason: HOSPADM

## 2021-10-15 RX ORDER — ACETAMINOPHEN 325 MG/1
650 TABLET ORAL EVERY 4 HOURS PRN
Status: DISCONTINUED | OUTPATIENT
Start: 2021-10-15 | End: 2021-10-18 | Stop reason: HOSPADM

## 2021-10-15 RX ORDER — HEPARIN SODIUM 1000 [USP'U]/ML
4000 INJECTION, SOLUTION INTRAVENOUS; SUBCUTANEOUS ONCE
Status: COMPLETED | OUTPATIENT
Start: 2021-10-15 | End: 2021-10-15

## 2021-10-15 RX ORDER — SODIUM CHLORIDE 0.9 % (FLUSH) 0.9 %
5-40 SYRINGE (ML) INJECTION EVERY 12 HOURS SCHEDULED
Status: DISCONTINUED | OUTPATIENT
Start: 2021-10-15 | End: 2021-10-18 | Stop reason: HOSPADM

## 2021-10-15 RX ORDER — ATORVASTATIN CALCIUM 80 MG/1
80 TABLET, FILM COATED ORAL NIGHTLY
Status: DISCONTINUED | OUTPATIENT
Start: 2021-10-15 | End: 2021-10-18 | Stop reason: HOSPADM

## 2021-10-15 RX ORDER — METOPROLOL TARTRATE 50 MG/1
25 TABLET, FILM COATED ORAL 2 TIMES DAILY
Status: DISCONTINUED | OUTPATIENT
Start: 2021-10-15 | End: 2021-10-15

## 2021-10-15 RX ORDER — ENALAPRIL MALEATE 10 MG/1
10 TABLET ORAL 2 TIMES DAILY
Status: ON HOLD | COMMUNITY
End: 2021-10-18 | Stop reason: HOSPADM

## 2021-10-15 RX ORDER — FUROSEMIDE 10 MG/ML
20 INJECTION INTRAMUSCULAR; INTRAVENOUS 2 TIMES DAILY
Status: DISCONTINUED | OUTPATIENT
Start: 2021-10-15 | End: 2021-10-18 | Stop reason: HOSPADM

## 2021-10-15 RX ORDER — FUROSEMIDE 10 MG/ML
40 INJECTION INTRAMUSCULAR; INTRAVENOUS 2 TIMES DAILY
Status: DISCONTINUED | OUTPATIENT
Start: 2021-10-15 | End: 2021-10-15

## 2021-10-15 RX ORDER — POTASSIUM CHLORIDE 7.45 MG/ML
10 INJECTION INTRAVENOUS PRN
Status: DISCONTINUED | OUTPATIENT
Start: 2021-10-15 | End: 2021-10-18 | Stop reason: HOSPADM

## 2021-10-15 RX ORDER — LISINOPRIL 10 MG/1
5 TABLET ORAL DAILY
Status: DISCONTINUED | OUTPATIENT
Start: 2021-10-15 | End: 2021-10-16

## 2021-10-15 RX ORDER — POTASSIUM CHLORIDE 20 MEQ/1
40 TABLET, EXTENDED RELEASE ORAL PRN
Status: DISCONTINUED | OUTPATIENT
Start: 2021-10-15 | End: 2021-10-18 | Stop reason: HOSPADM

## 2021-10-15 RX ORDER — HYDRALAZINE HYDROCHLORIDE 20 MG/ML
10 INJECTION INTRAMUSCULAR; INTRAVENOUS EVERY 10 MIN PRN
Status: DISCONTINUED | OUTPATIENT
Start: 2021-10-15 | End: 2021-10-18 | Stop reason: HOSPADM

## 2021-10-15 RX ORDER — ONDANSETRON 2 MG/ML
4 INJECTION INTRAMUSCULAR; INTRAVENOUS EVERY 6 HOURS PRN
Status: DISCONTINUED | OUTPATIENT
Start: 2021-10-15 | End: 2021-10-18 | Stop reason: HOSPADM

## 2021-10-15 RX ORDER — NICOTINE 21 MG/24HR
1 PATCH, TRANSDERMAL 24 HOURS TRANSDERMAL DAILY
Status: DISCONTINUED | OUTPATIENT
Start: 2021-10-15 | End: 2021-10-18 | Stop reason: HOSPADM

## 2021-10-15 RX ORDER — CALCIUM GLUCONATE 20 MG/ML
1000 INJECTION, SOLUTION INTRAVENOUS ONCE
Status: COMPLETED | OUTPATIENT
Start: 2021-10-15 | End: 2021-10-15

## 2021-10-15 RX ORDER — SODIUM CHLORIDE 9 MG/ML
25 INJECTION, SOLUTION INTRAVENOUS PRN
Status: DISCONTINUED | OUTPATIENT
Start: 2021-10-15 | End: 2021-10-18 | Stop reason: HOSPADM

## 2021-10-15 RX ORDER — MAGNESIUM SULFATE IN WATER 40 MG/ML
2000 INJECTION, SOLUTION INTRAVENOUS ONCE
Status: COMPLETED | OUTPATIENT
Start: 2021-10-15 | End: 2021-10-15

## 2021-10-15 RX ORDER — CARVEDILOL 3.12 MG/1
3.12 TABLET ORAL 2 TIMES DAILY WITH MEALS
Status: DISCONTINUED | OUTPATIENT
Start: 2021-10-15 | End: 2021-10-18

## 2021-10-15 RX ADMIN — HYDRALAZINE HYDROCHLORIDE 10 MG: 20 INJECTION INTRAMUSCULAR; INTRAVENOUS at 21:48

## 2021-10-15 RX ADMIN — FUROSEMIDE 20 MG: 10 INJECTION, SOLUTION INTRAMUSCULAR; INTRAVENOUS at 20:24

## 2021-10-15 RX ADMIN — POTASSIUM CHLORIDE 20 MEQ: 1500 TABLET, EXTENDED RELEASE ORAL at 20:58

## 2021-10-15 RX ADMIN — ONDANSETRON 4 MG: 2 INJECTION INTRAMUSCULAR; INTRAVENOUS at 19:12

## 2021-10-15 RX ADMIN — LISINOPRIL 5 MG: 10 TABLET ORAL at 20:24

## 2021-10-15 RX ADMIN — HEPARIN SODIUM 4000 UNITS: 1000 INJECTION, SOLUTION INTRAVENOUS; SUBCUTANEOUS at 17:42

## 2021-10-15 RX ADMIN — CALCIUM GLUCONATE 1000 MG: 20 INJECTION, SOLUTION INTRAVENOUS at 20:58

## 2021-10-15 RX ADMIN — ATORVASTATIN CALCIUM 80 MG: 80 TABLET, FILM COATED ORAL at 20:23

## 2021-10-15 RX ADMIN — CARVEDILOL 3.12 MG: 3.12 TABLET, FILM COATED ORAL at 20:24

## 2021-10-15 RX ADMIN — MAGNESIUM SULFATE 2000 MG: 2 INJECTION INTRAVENOUS at 20:58

## 2021-10-15 ASSESSMENT — ENCOUNTER SYMPTOMS
CHEST TIGHTNESS: 0
VOMITING: 0
COLOR CHANGE: 0
BACK PAIN: 0
CONSTIPATION: 0
PHOTOPHOBIA: 0
ABDOMINAL PAIN: 0
COUGH: 0
DIARRHEA: 0
RHINORRHEA: 0
WHEEZING: 0
NAUSEA: 0
SHORTNESS OF BREATH: 1

## 2021-10-15 ASSESSMENT — PAIN SCALES - GENERAL: PAINLEVEL_OUTOF10: 0

## 2021-10-15 NOTE — FLOWSHEET NOTE
Completed Yes   Crisis   Type Trauma   Assessment Approachable; Anxious; Hopeful;Coping   Intervention Active listening;Prayer;[place];Sustaining presence/ Ministry of presence; Explored feelings, thoughts, concerns   Outcome Expressed gratitude;Comfort

## 2021-10-15 NOTE — ED NOTES
Pt to the ER via [facility] with possible STEMI. Pt reports sudden onset midsternal chest pressure/tightness that started this morning. Pt stated he took 2 325mg of ASA and was given 1 Nitro SL PTA. Pt denies SOB, nausea and did not become diaphoretic when chest pain started. Pt stated he took half of his girlfriend's Metoprolol to relief the pain. Placed on full cardiac monitor, no acute distress noted. Fast patches  Applied to pt.  Dr. Michael Reddy and Dr. Helen Lozada at the bedside to evaluate the pt      Berna Salgado RN  10/15/21 2574

## 2021-10-15 NOTE — ED NOTES
Bed: 19  Expected date:   Expected time:   Means of arrival:   Comments:  [address] Edi Villela RN  10/15/21 3107

## 2021-10-15 NOTE — OP NOTE
[facility]    CARDIAC CATHETERIZATION    Date:   10/15/2021  Patient name:  Meenakshi Marcos  Date of admission:  10/15/2021  5:20 PM  MRN:   [id number]  YOB: 1975    Operators:    Primary:   Dr Mary Pyle (Attending Physician)      Procedure performed:     [x] Left Heart Catheterization. [] Graft Angiography. [x] Left Ventriculography. [] Right Heart Catheterization. [x] Coronary Angiography. [] Aortic Valve Studies. [x] PCI: LAD     [] Other:       Pre Procedure Conscious Sedation Data:  ASA Class:    [] I [] II [] III [x] IV    Mallampati Class:  [] I [x] II [] III [] IV      Indication:  [] STEMI      [] + Stress test  [x] ACS      [] + EKG Changes  [] Non Q MI       [] Significant Risk Factors  [] Recurrent Angina             [] Diabetes Mellitus    [] New LBBB      [] Other.  [] CHF / Low EF changes     [] Abnormal CTA / Ca Score      Procedure:  Access:  [x] Femoral  [] Radial  artery       [x] Right  [] Left    Procedure: After informed consent was obtained with explanation of the risks and benefits, patient was brought to the cath lab. The access area was prepped and draped in sterile fashion. 1% lidocaine was used for local block. The artery was cannulated with 6  Fr sheath with brisk arterial blood return. The side port was frequently flushed and aspirated with normal saline. Estimated Blood Loss:  [x] Minimal < 25 cc [] Moderate 25-50 cc  [] >50 cc    Findings:          LMCA: Normal 0% stenosis. LAD: has mid 50% stenosis. The distal LAD is occluded. PCI was performed. The D1 has proximal 60% stenosis. The D2 has proximal 60% stenosis.       Lesion on Dist LAD: Distal subsection. 100% stenosis 38 mm length reduced     to 0%. Pre procedure LAINEY 0 flow was noted. Post Procedure LAINEY III flow     was present. Good runoff was present. The lesion was diagnosed as High     Risk (C). The lesion was diffuse and eccentric. The lesion showed with     irregular contour, mild angulation and mild tortuosity. Lesion plaque is     ruptured.         Comments:Two stents were placed in overlapping fashion due to segment     size mismatch     Devices used         - Resultly Prowater Flex 180 cm. Number of passes: 1.         - Mini Trek Balloon 2.0mm x 12mm. 4 inflation(s) to a max pressure of:     12 kelsea.         - Sapphire ll PRO 1.0mm x 15mm. 7 inflation(s) to a max pressure of: 15     kelsea.         - Resolute Beverly 2.0 x 26 MARY KATE. 1 inflation(s) to a max pressure of: 13     kelsea.         - Resolute Rafat 2.5 x 15 MARY KATE. 2 inflation(s) to a max pressure of: 13     kelsea.       LCx: has mid 50% stenosis. The OM1 has proximal 30% stenosis. The OM2 is   small and has 50% stenosis. RCA: has proximal 85% stenosis. The RPL is ostially occluded with left to   right collaterals. The LV gram was performed in the PAL 30 position. LVEF: 25 %. Conclusions:     Three vessel coronary artery disease.    Severe LV systolic function; LVEF 00%. LVEDP 35 mm Hg.    Occluded RPL with Left to right collaterals.    Proximal RCA 85% stenosis.    50% stenosis of LCX.    Distal LAD occlusion- culprit lesion. Successful PTCA/MARY KATE of distal LAD. Recommendations:     Medical therapy as needed.    Risk factor modification.    Routine Post Stent Orders.    Obtain TTE.    Staged PCI of proximal RCA in 4-6 weeks    Lifevest if LVEF < 35%. ____________________________________________________________________    History and Risk Factors    [x] Hypertension     [] Family history of CAD  [] Hyperlipidemia     [] Cerebrovascular Disease   [] Prior MI       [] Peripheral Vascular disease   [] Prior PCI              [] Diabetes Mellitus    [] Left Main PCI. [] Currently on Dialysis. [] Prior CABG. [x] Currently smoker. [] Cardiac Arrest outside of healthcare facility.  [] Yes    [] No        Witnessed     [] Yes   [] No     Arrest after arrival of EMS  [] Yes   [] No     [] Cardiac Arrest at other Facility. [] Yes   [] No    Pre-Procedure Information. Heart Failure       [] Yes    [x] No        Class  [] I      [] II  [] III    [] IV. New Diagnosis    [] Yes  [] No    HF Type      [] Systolic   [] Diastolic          [] Unknown. Diagnostic Test:   EKG       [] Normal   [x] Abnormal    New antiarrhythmia medications:    [] Yes   [] No   New onset atrial fibrillation / Flutter     [] Yes   [] No   ECG Abnormalities:      [] V. Fib   [] Alisa V. Tach           [] NS V. T   [] New LBBB           [] T. Inv  []  ST dev > 0.5 mm         [] PVC's freq  [] PVC's infrequent    Stress Test Performed:      [] Yes    [x] No     Type:     [] Stress Echo   [] Exercise Stress Test (no imaging)      [] Stress Nuclear  [] Stress Imaging     Results   [] Negative   [] Positive        [] Indeterminate  [] Unavailable     If Positive/ Risk / Extent of Ischemia:       [] Low  [] Intermediate         [] High  [] Unavailable      Cardiac CTA Performed:     [] Yes    [x] No      Results   [] CAD   [] Non obstructive CAD      [] No CAD   [] Uncertain      [] Unknown   [] Structural Disease. Pre Procedure Medications:   [x] Yes    [] No         [x] ASA   [x] Beta Blockers      [] Nitrate   [] Ca Channel Blockers      [] Ranolazine   [] Statin       [] Plavix/Others antiplatelets      Electronically signed on 10/15/2021 at 6:13 PM by:    Lizette Trinidad MD  Fellow, [address]    I was present during entire procedure and performed all critical elements of the procedure.     Junior Tasha MD

## 2021-10-15 NOTE — ED PROVIDER NOTES
[facility]     Emergency Department     Faculty Attestation    I performed a history and physical examination of the patient and discussed management with the resident. I reviewed the residents note and agree with the documented findings including all diagnostic interpretations and plan of care. Any areas of disagreement are noted on the chart. I was personally present for the key portions of any procedures. I have documented in the chart those procedures where I was not present during the key portions. I have reviewed the emergency nurses triage note. I agree with the chief complaint, past medical history, past surgical history, allergies, medications, social and family history as documented unless otherwise noted below. Documentation of the HPI, Physical Exam and Medical Decision Making performed by scribsilvana is based on my personal performance of the HPI, PE and MDM. For Physician Assistant/ Nurse Practitioner cases/documentation I have personally evaluated this patient and have completed at least one if not all key elements of the E/M (history, physical exam, and MDM). Additional findings are as noted. This patient was evaluated in the Emergency Department for symptoms described in the history of present illness. He/she was evaluated in the context of the global COVID-19 pandemic, which necessitated consideration that the patient might be at risk for infection with the SARS-CoV-2 virus that causes COVID-19. Institutional protocols and algorithms that pertain to the evaluation of patients at risk for COVID-19 are in a state of rapid change based on information released by regulatory bodies including the CDC and federal and state organizations. These policies and algorithms were followed during the patient's care in the ED. Primary Care Physician: No primary care provider on file. History:  This is a 48 y.o. male who presents to the Emergency Department with complaint of chest pain. Started by 5 hours ago. History of smoking and high blood pressure. Does not take any medications. No prior history of heart disease. EKG by EMS was concerning for STEMI. Physical:     height is 5' 11\" (1.803 m) and weight is 203 lb (92.1 kg). His blood pressure is 150/100 (abnormal) and his pulse is 74. His respiration is 16.    48 y.o. male ill but not acutely distressed appears uncomfortable, cardiac exam regular rate and rhythm no murmurs rubs gallops, pulmonary clear bilaterally abdomen soft nontender calves nontender nonswollen. Impression: STEMI    Plan: STEMI alert paged EKG transmitted via perfect serve to interventional cardiologist.  Shortly thereafter STEMI alert was confirmed by cardiologist and patient transported to Cath Lab for PCI    EKG Interpretation  EKG Interpretation    Interpreted by emergency department physician    Rhythm: normal sinus   Rate: normal  Axis: normal  Ectopy: none  Conduction: normal  ST Segments: elevation in  v2, v3 and v4  T Waves: Inverted in anterior leads and lateral leads  Q Waves: none    EKG  Impression: myocardial infarction  anterior    Briseida Anderson MD      Interpreted by me      CRITICAL CARE: There was a high probability of clinically significant/life threatening deterioration in this patient's condition which required my urgent intervention. Total critical care time was 32 minutes. This excludes any time for separately reportable procedures.      Adamaris Hernández MD, [facility] [place]  Attending Emergency Physician         Briseida Anderson MD  10/15/21 [id number]

## 2021-10-15 NOTE — H&P
[facility]   History & Physical               Today's Date: 10/15/2021  Patient Name: Elizabeth Sánchez  Date of admission: 10/15/2021  5:20 PM  Patient's age: 48 y.o., 1975  Admission Dx: No admission diagnoses are documented for this encounter. Reason for Admission:  Chest pain    CHIEF COMPLAINT:    Chief Complaint   Patient presents with    Chest Pain       History Obtained From:  patient, electronic medical record    HISTORY OF PRESENT ILLNESS:      The patient is a 48 y.o.  male who is admitted to the hospital for chest pain. He presented to the ER with Chest pain. On admission EKG showed ST elevation in the anterior leads, thus patient patient was transferred to cath lab directly. Past Medical History:   has no past medical history on file. Past Surgical History:   has no past surgical history on file. Home Medications:    Prior to Admission medications    Not on File        No current facility-administered medications for this encounter. Allergies:  Patient has no allergy information on record. Social History:   reports that he has been smoking. He does not have any smokeless tobacco history on file. Family History: family history is not on file. REVIEW OF SYSTEMS:      · Constitutional: there has been no unanticipated weight loss. · Eyes: No visual changes or diplopia. · ENT: No Headaches  · Cardiovascular:  Remaining as above  · Respiratory: No cough  · Gastrointestinal: No abdominal pain. No change in bowel or bladder habits. · Genitourinary: No dysuria, trouble voiding, or hematuria. · Neurological: No headache.         PHYSICAL EXAM:      BP (!) 150/100   Pulse 74   Temp 98.1 °F (36.7 °C)   Resp 17   Ht 5' 11\" (1.803 m)   Wt 203 lb (92.1 kg)   SpO2 92%   BMI 28.31 kg/m²    No intake or output data in the 24 hours ending 10/15/21 1809        Constitutional and General Appearance:   alert, cooperative, no distress and appears stated age  [de-identified]:  · PERRL, EOMI  Respiratory:  · Normal excursion and expansion without use of accessory muscles  · Resp Auscultation:  Good respiratory effort. No for increased work of breathing. On auscultation: clear to auscultation bilaterally  Cardiovascular:  · Regular rate and rhythm  · S1/S2  · No murmurs  · The apical impulse is not displaced  Abdomen:  · Soft  · Bowel sounds present  · Non-tender to palpation  Extremities:  · No cyanosis or clubbing  · Lower extremity edema: No  Skin:  · Warm and dry  Neurological:  · Alert and oriented. · Moves all extremities well        DATA:    Diagnostics:      ECHO:  Pending. Labs:     CBC:   Recent Labs     10/15/21  1733   WBC 19.8*   HGB 17.0   HCT 48.7        BMP:   Recent Labs     10/15/21  1733   *   K 3.6*   CO2 23   BUN 5*   CREATININE 0.62*   LABGLOM >60   GLUCOSE 120*     Pro-BNP:    Recent Labs     10/15/21  1733   PROBNP 6,545*       Recent Labs     10/15/21  1733   TROPONINT NOT REPORTED       Patient's Active Problem List  Active Problems:    * No active hospital problems. *  Resolved Problems:    * No resolved hospital problems. *        IMPRESSION:      1. Anterior STEMI  2. Acute heart failure. Pending echocardiogram.  3. Smoker   4. Hypertension  5. Hyponatremia  6. Hypokalemia    RECOMMENDATIONS:    1. Emergent Left heart cath       Discussed with patient and Nurse. Carie Charles MD, M.D. Fellow, [address]       Please note that part of this chart were generated using voice recognition  dictation software. Although every effort was made to ensure the accuracy of this automated transcription, some errors in transcription may have occurred. Attestation signed by      Attending Physician Statement:    I have discussed the care of  Mitesh Treadwell , including pertinent history and exam findings, with the Cardiology fellow/resident.      I have seen and examined the patient and the key elements of all parts of the encounter have been performed by me. I agree with the assessment, plan and orders as documented by the fellow/resident, after I modified exam findings and plan of treatments, and the final version is my approved version of the assessment. Additional Comments: Anterior STEMI.  Proceed with emergent cath    Ples MD Handy

## 2021-10-15 NOTE — ED PROVIDER NOTES
[facility]  Emergency Department Encounter  EmergencyMedicine Resident     Pt Sisi Piña  MRN: [id number]  [place] 1975  Date of evaluation: 10/15/21  PCP:  No primary care provider on file. CHIEF COMPLAINT       Chief Complaint   Patient presents with    Chest Pain       HISTORY OF PRESENT ILLNESS  (Location/Symptom, Timing/Onset, Context/Setting, Quality, Duration, Modifying Factors, Severity.)      Meenakshi Marcos is a 48 y.o. male who presents with pain that started at noon today. Patient states he woke up feeling a bit off, start having chest pain at noon today and started having worsening shortness of breath as well. Patient has a history, as well as hypertension but not on any medications at this time. Patient states he took 1 325 mg tablet of aspirin, and split a second 1-to 2 doses but then eventually today. Patient also took a half of his girlfriends Lopressor around that time as well. Total of 650mg aspirin taken at home. States the pain radiated to his left shoulder. It was pressure-like, severe, somewhat improving after nitro by EMS, but continues to have chest pain. PAST MEDICAL / SURGICAL / SOCIAL / FAMILY HISTORY      has a past medical history of Hypertension. has no past surgical history on file. Social History     Socioeconomic History    Marital status: Single     Spouse name: Not on file    Number of children: Not on file    Years of education: Not on file    Highest education level: Not on file   Occupational History    Not on file   Tobacco Use    Smoking status: Current Every Day Smoker     Packs/day: 1.00     Types: Cigarettes    Smokeless tobacco: Never Used   Substance and Sexual Activity    Alcohol use:  Yes     Alcohol/week: 2.0 standard drinks     Types: 2 Cans of beer per week     Comment: 2 beers per day    Drug use: Yes     Types: Marijuana    Sexual activity: Not on file   Other Topics Concern    Not on file   Social History Narrative    Not on file     Social Determinants of Health     Financial Resource Strain:     Difficulty of Paying Living Expenses:    Food Insecurity:     Worried About Running Out of Food in the Last Year:     [address] in the Last Year:    Transportation Needs:     Lack of Transportation (Medical):  Lack of Transportation (Non-Medical):    Physical Activity:     Days of Exercise per Week:     Minutes of Exercise per Session:    Stress:     Feeling of Stress :    Social Connections:     Frequency of Communication with Friends and Family:     Frequency of Social Gatherings with Friends and Family:     Attends Congregation Services:     Active Member of Clubs or Organizations:     Attends Club or Organization Meetings:     Marital Status:    Intimate Partner Violence:     Fear of Current or Ex-Partner:     Emotionally Abused:     Physically Abused:     Sexually Abused:        Family History   Problem Relation Age of Onset    High Blood Pressure Mother     High Blood Pressure Father     Heart Attack Father     High Blood Pressure Paternal Uncle     Heart Attack Paternal Uncle        Allergies:  Patient has no known allergies. Home Medications:  Prior to Admission medications    Medication Sig Start Date End Date Taking? Authorizing Provider   enalapril (VASOTEC) 10 MG tablet Take 10 mg by mouth 2 times daily    Historical Provider, MD       REVIEW OF SYSTEMS    (2-9 systems for level 4, 10 or more for level 5)      Review of Systems   Constitutional: Negative for chills, diaphoresis, fatigue and fever. HENT: Negative for congestion and rhinorrhea. Eyes: Negative for photophobia and visual disturbance. Respiratory: Positive for shortness of breath. Negative for cough, chest tightness and wheezing. Cardiovascular: Positive for chest pain. Negative for palpitations and leg swelling. Gastrointestinal: Negative for abdominal pain, constipation, diarrhea, nausea and vomiting. Endocrine: Negative for polydipsia, polyphagia and polyuria. Genitourinary: Negative for difficulty urinating, dysuria, flank pain and hematuria. Musculoskeletal: Negative for arthralgias, back pain, neck pain and neck stiffness. Skin: Negative for color change and rash. Neurological: Negative for dizziness, weakness, light-headedness, numbness and headaches. PHYSICAL EXAM   (up to 7 for level 4, 8 or more for level 5)      INITIAL VITALS:   BP (!) 146/78   Pulse 78   Temp 97.4 °F (36.3 °C) (Oral)   Resp 18   Ht 5' 11\" (1.803 m)   Wt 203 lb (92.1 kg)   SpO2 93%   BMI 28.31 kg/m²     Physical Exam  Vitals and nursing note reviewed. Constitutional:       General: He is in acute distress. Appearance: He is well-developed. He is ill-appearing. He is not diaphoretic. Comments: Skin cool to touch   HENT:      Head: Normocephalic and atraumatic. Nose: Nose normal. No congestion. Eyes:      Conjunctiva/sclera: Conjunctivae normal.      Pupils: Pupils are equal, round, and reactive to light. Neck:      Vascular: No JVD. Trachea: No tracheal deviation. Cardiovascular:      Rate and Rhythm: Normal rate and regular rhythm. Pulses: Normal pulses. Pulmonary:      Effort: Pulmonary effort is normal. No respiratory distress. Breath sounds: Normal breath sounds. No wheezing or rales. Chest:      Chest wall: No tenderness. Abdominal:      General: Bowel sounds are normal. There is no distension. Palpations: Abdomen is soft. Tenderness: There is no abdominal tenderness. There is no guarding. Musculoskeletal:      Cervical back: Normal range of motion and neck supple. Skin:     General: Skin is warm and dry. Capillary Refill: Capillary refill takes less than 2 seconds. Coloration: Skin is not pale. Findings: No erythema or rash. Neurological:      General: No focal deficit present.       Mental Status: He is alert and oriented to person, place, and time. Cranial Nerves: No cranial nerve deficit. Motor: No weakness.    Psychiatric:         Mood and Affect: Mood normal.         Behavior: Behavior normal.         DIFFERENTIAL  DIAGNOSIS     PLAN (LABS / IMAGING / EKG):  Orders Placed This Encounter   Procedures    COVID-19, Rapid    XR CHEST PORTABLE    CBC Auto Differential    Basic Metabolic Panel w/ Reflex to MG    Troponin    Brain Natriuretic Peptide    BASIC METABOLIC PANEL    MAGNESIUM    CALCIUM, IONIZED    CBC    Comprehensive Metabolic Panel w/ Reflex to MG    BASIC METABOLIC PANEL    MAGNESIUM    CALCIUM, IONIZED    MAGNESIUM    CALCIUM, IONIZED    Vital signs    Telemetry monitoring - continuous duration    Notify physician for   [employer] physician - hemoglobin    Strict Bedrest    Daily weights    Intake and output    Wound care    Tobacco cessation education    Check Cath Site and Distal Pulses    Puncture site care    Verify metformin (GLUCOPHAGE) Discontinued    Nursing communication for POCT - activated clotting time    If any sign of bleeding or hematoma at puncture site do the following:    Maintain Dry Sterile Dressing    Remove venous sheath from extremity as follows:    Remove arterial sheath from extremity as follows:    Remove Radial Band as follows:    Femoral Compression Device    Adv Diet as Tolerated (nurse communication)    Full code    Inpatient consult to Cardiac Rehab    Inpatient consult to Social Work    Inpatient Consult to Heart Failure Nurse/Coordinator    Initiate Oxygen Therapy Protocol    EKG 12 Lead    EKG 12 Lead    ECHO Complete 2D W Doppler W Color    PATIENT STATUS (FROM ED OR OR/PROCEDURAL) Inpatient       MEDICATIONS ORDERED:  Orders Placed This Encounter   Medications    heparin (porcine) injection 4,000 Units    sodium chloride flush 0.9 % injection 5-40 mL    sodium chloride flush 0.9 % injection 5-40 mL    0.9 % sodium chloride infusion    acetaminophen (TYLENOL) tablet 650 mg    ondansetron (ZOFRAN) injection 4 mg    hydrALAZINE (APRESOLINE) injection 10 mg    labetalol (NORMODYNE;TRANDATE) injection 10 mg    atorvastatin (LIPITOR) tablet 80 mg    aspirin chewable tablet 81 mg    DISCONTD: metoprolol tartrate (LOPRESSOR) tablet 25 mg    DISCONTD: clopidogrel (PLAVIX) tablet 75 mg    lisinopril (PRINIVIL;ZESTRIL) tablet 5 mg    OR Linked Order Group     potassium chloride (KLOR-CON M) extended release tablet 40 mEq     potassium bicarb-citric acid (EFFER-K) effervescent tablet 40 mEq     potassium chloride 10 mEq/100 mL IVPB (Peripheral Line)    DISCONTD: furosemide (LASIX) injection 40 mg    ticagrelor (BRILINTA) tablet 90 mg    furosemide (LASIX) injection 20 mg    nicotine (NICODERM CQ) 14 MG/24HR 1 patch    carvedilol (COREG) tablet 3.125 mg    COVID-19 Ad26 Vaccine ([employer]) injection 0.5 mL    potassium chloride (KLOR-CON M) extended release tablet 20 mEq    magnesium sulfate 2000 mg in 50 mL IVPB premix    calcium gluconate 1000 mg in sodium chloride 50 mL       DDX: ACS, CHF, COPD, pneumonia, COVID-19    MDM/IMPRESSION: This is a 43-year-old male presenting with chest pain that started around noon today. Has taken 650 mg of aspirin, half of his girlfriends Lopressor, and did receive nitro by EMS that somewhat improved his symptoms. Patient's pain is now a 3-4 out of 10. EKG concerning for LAD STEMI in addition to Wellens criteria. Plan for cardiac evaluation, admission    DIAGNOSTIC RESULTS / EMERGENCY DEPARTMENT COURSE / MDM   LAB RESULTS:  Results for orders placed or performed during the hospital encounter of 10/15/21   COVID-19, Rapid    Specimen: Nasopharyngeal Swab   Result Value Ref Range    Specimen Description . NASOPHARYNGEAL SWAB     SARS-CoV-2, Rapid Not Detected Not Detected   CBC Auto Differential   Result Value Ref Range    WBC 19.8 (H) 3.5 - 11.3 k/uL    RBC 5.05 4.21 - 5.77 m/uL    Hemoglobin 17.0 13.0 - 17.0 g/dL    Hematocrit 48.7 40.7 - 50.3 %    MCV 96.4 82.6 - 102.9 fL    MCH 33.7 (H) 25.2 - 33.5 pg    MCHC 34.9 (H) 28.4 - 34.8 g/dL    RDW 12.1 11.8 - 14.4 %    Platelets 137 050 - 015 k/uL    MPV 10.3 8.1 - 13.5 fL    NRBC Automated 0.0 0.0 per 100 WBC    Differential Type NOT REPORTED     Seg Neutrophils 75 (H) 36 - 65 %    Lymphocytes 16 (L) 24 - 43 %    Monocytes 6 3 - 12 %    Eosinophils % 1 1 - 4 %    Basophils 1 0 - 2 %    Immature Granulocytes 1 (H) 0 %    Segs Absolute 15.14 (H) 1.50 - 8.10 k/uL    Absolute Lymph # 3.24 1.10 - 3.70 k/uL    Absolute Mono # 1.09 0.10 - 1.20 k/uL    Absolute Eos # 0.09 0.00 - 0.44 k/uL    Basophils Absolute 0.10 0.00 - 0.20 k/uL    Absolute Immature Granulocyte 0.09 0.00 - 0.30 k/uL    WBC Morphology NOT REPORTED     RBC Morphology NOT REPORTED     Platelet Estimate NOT REPORTED    Basic Metabolic Panel w/ Reflex to MG   Result Value Ref Range    Glucose 120 (H) 70 - 99 mg/dL    BUN 5 (L) 6 - 20 mg/dL    CREATININE 0.62 (L) 0.70 - 1.20 mg/dL    Bun/Cre Ratio NOT REPORTED 9 - 20    Calcium 8.7 8.6 - 10.4 mg/dL    Sodium 125 (L) 135 - 144 mmol/L    Potassium 3.6 (L) 3.7 - 5.3 mmol/L    Chloride 88 (L) 98 - 107 mmol/L    CO2 23 20 - 31 mmol/L    Anion Gap 14 9 - 17 mmol/L    GFR Non-African American >60 >60 mL/min    GFR African American >60 >60 mL/min    GFR Comment          GFR Staging NOT REPORTED    Troponin   Result Value Ref Range    Troponin, High Sensitivity 206 (HH) 0 - 22 ng/L    Troponin T NOT REPORTED <0.03 ng/mL    Troponin Interp NOT REPORTED    Brain Natriuretic Peptide   Result Value Ref Range    Pro-BNP 6,545 (H) <300 pg/mL    BNP Interpretation Pro-BNP Reference Range:    BASIC METABOLIC PANEL   Result Value Ref Range    Glucose 110 (H) 70 - 99 mg/dL    BUN 4 (L) 6 - 20 mg/dL    CREATININE 0.47 (L) 0.70 - 1.20 mg/dL    Bun/Cre Ratio NOT REPORTED 9 - 20    Calcium 8.7 8.6 - 10.4 mg/dL    Sodium 123 (L) 135 - 144 mmol/L    Potassium 3.8 3.7 - 5.3 mmol/L Chloride 88 (L) 98 - 107 mmol/L    CO2 20 20 - 31 mmol/L    Anion Gap 15 9 - 17 mmol/L    GFR Non-African American >60 >60 mL/min    GFR African American >60 >60 mL/min    GFR Comment          GFR Staging NOT REPORTED    MAGNESIUM   Result Value Ref Range    Magnesium 1.7 1.6 - 2.6 mg/dL   CALCIUM, IONIZED   Result Value Ref Range    Calcium, Ion 0.98 (L) 1.13 - 1.33 mmol/L   EKG 12 Lead   Result Value Ref Range    Ventricular Rate 90 BPM    Atrial Rate 90 BPM    P-R Interval 148 ms    QRS Duration 120 ms    Q-T Interval 418 ms    QTc Calculation (Bazett) 511 ms    P Axis 34 degrees    R Axis 7 degrees    T Axis 115 degrees         RADIOLOGY:  XR CHEST PORTABLE   Final Result   Volume overload with right perihilar airspace opacities that may represent   developing alveolar edema, atelectasis, or infection. EKG  EKG Interpretation    Interpreted by emergency department physician    Rhythm: normal sinus   Rate: normal  Axis: normal  Ectopy: none  Conduction: normal  ST Segments: elevation in  v2, v3, v4 and v5, concerning for LAD lesion  T Waves: inversion in  anterior leads and lateral leads  Q Waves: none    Clinical Impression: myocardial infarction LAD and Wellens pattern V2 V3    Mehul Patel DO    All EKG's are interpreted by the Emergency Department Physician who either signs or Co-signs this chart in the absence of a cardiologist.    EMERGENCY DEPARTMENT COURSE:  ED Course as of Oct 15 1822   Fri Oct 15, 2021   1741 Initial EKG 17:21 STEMI   Cardiology paged 17:26 by Dr. Helen Lozada  17:27 returned page  Cath lab activated  Given 4000u heparin      [JG]   1812 Pro-BNP(!): 6,545 [JG]   1813 Troponin, High Sensitivity(!!): 206 [JG]   1813 Patient transported to Cath Lab.     [JG]   1818 SARS-CoV-2, Rapid: Not Detected [JG]      ED Course User Index  [JG] Mehul Patel DO        PROCEDURES:      CONSULTS:  IP CONSULT TO CARDIAC REHAB  IP CONSULT TO SOCIAL WORK  IP CONSULT TO HEART FAILURE

## 2021-10-16 LAB
ALBUMIN SERPL-MCNC: 3.8 G/DL (ref 3.5–5.2)
ALBUMIN/GLOBULIN RATIO: 1.3 (ref 1–2.5)
ALP BLD-CCNC: 74 U/L (ref 40–129)
ALT SERPL-CCNC: 44 U/L (ref 5–41)
ANION GAP SERPL CALCULATED.3IONS-SCNC: 13 MMOL/L (ref 9–17)
ANION GAP SERPL CALCULATED.3IONS-SCNC: 15 MMOL/L (ref 9–17)
AST SERPL-CCNC: 217 U/L
BILIRUB SERPL-MCNC: 1.07 MG/DL (ref 0.3–1.2)
BUN BLDV-MCNC: 3 MG/DL (ref 6–20)
BUN BLDV-MCNC: 5 MG/DL (ref 6–20)
BUN/CREAT BLD: ABNORMAL (ref 9–20)
BUN/CREAT BLD: ABNORMAL (ref 9–20)
CALCIUM IONIZED: 1.12 MMOL/L (ref 1.13–1.33)
CALCIUM IONIZED: 1.13 MMOL/L (ref 1.13–1.33)
CALCIUM SERPL-MCNC: 9 MG/DL (ref 8.6–10.4)
CALCIUM SERPL-MCNC: 9.1 MG/DL (ref 8.6–10.4)
CHLORIDE BLD-SCNC: 92 MMOL/L (ref 98–107)
CHLORIDE BLD-SCNC: 93 MMOL/L (ref 98–107)
CO2: 20 MMOL/L (ref 20–31)
CO2: 21 MMOL/L (ref 20–31)
CREAT SERPL-MCNC: 0.53 MG/DL (ref 0.7–1.2)
CREAT SERPL-MCNC: 0.56 MG/DL (ref 0.7–1.2)
EKG ATRIAL RATE: 80 BPM
EKG ATRIAL RATE: 90 BPM
EKG P AXIS: 34 DEGREES
EKG P AXIS: 34 DEGREES
EKG P-R INTERVAL: 148 MS
EKG P-R INTERVAL: 148 MS
EKG Q-T INTERVAL: 418 MS
EKG Q-T INTERVAL: 448 MS
EKG QRS DURATION: 116 MS
EKG QRS DURATION: 120 MS
EKG QTC CALCULATION (BAZETT): 511 MS
EKG QTC CALCULATION (BAZETT): 516 MS
EKG R AXIS: 2 DEGREES
EKG R AXIS: 7 DEGREES
EKG T AXIS: 115 DEGREES
EKG T AXIS: 118 DEGREES
EKG VENTRICULAR RATE: 80 BPM
EKG VENTRICULAR RATE: 90 BPM
GFR AFRICAN AMERICAN: >60 ML/MIN
GFR AFRICAN AMERICAN: >60 ML/MIN
GFR NON-AFRICAN AMERICAN: >60 ML/MIN
GFR NON-AFRICAN AMERICAN: >60 ML/MIN
GFR SERPL CREATININE-BSD FRML MDRD: ABNORMAL ML/MIN/{1.73_M2}
GLUCOSE BLD-MCNC: 107 MG/DL (ref 70–99)
GLUCOSE BLD-MCNC: 120 MG/DL (ref 70–99)
HCT VFR BLD CALC: 47.7 % (ref 40.7–50.3)
HEMOGLOBIN: 16.7 G/DL (ref 13–17)
MAGNESIUM: 2.2 MG/DL (ref 1.6–2.6)
MAGNESIUM: 2.4 MG/DL (ref 1.6–2.6)
MCH RBC QN AUTO: 33.7 PG (ref 25.2–33.5)
MCHC RBC AUTO-ENTMCNC: 35 G/DL (ref 28.4–34.8)
MCV RBC AUTO: 96.4 FL (ref 82.6–102.9)
NRBC AUTOMATED: 0 PER 100 WBC
PDW BLD-RTO: 12.2 % (ref 11.8–14.4)
PLATELET # BLD: 285 K/UL (ref 138–453)
PMV BLD AUTO: 10.4 FL (ref 8.1–13.5)
POTASSIUM SERPL-SCNC: 3.8 MMOL/L (ref 3.7–5.3)
POTASSIUM SERPL-SCNC: 4.1 MMOL/L (ref 3.7–5.3)
RBC # BLD: 4.95 M/UL (ref 4.21–5.77)
SODIUM BLD-SCNC: 127 MMOL/L (ref 135–144)
SODIUM BLD-SCNC: 127 MMOL/L (ref 135–144)
TOTAL PROTEIN: 6.8 G/DL (ref 6.4–8.3)
TROPONIN INTERP: ABNORMAL
TROPONIN T: ABNORMAL NG/ML
TROPONIN, HIGH SENSITIVITY: 3176 NG/L (ref 0–22)
WBC # BLD: 14.1 K/UL (ref 3.5–11.3)

## 2021-10-16 PROCEDURE — 80053 COMPREHEN METABOLIC PANEL: CPT

## 2021-10-16 PROCEDURE — 83735 ASSAY OF MAGNESIUM: CPT

## 2021-10-16 PROCEDURE — 2700000000 HC OXYGEN THERAPY PER DAY

## 2021-10-16 PROCEDURE — 36415 COLL VENOUS BLD VENIPUNCTURE: CPT

## 2021-10-16 PROCEDURE — 2060000000 HC ICU INTERMEDIATE R&B

## 2021-10-16 PROCEDURE — 2580000003 HC RX 258: Performed by: STUDENT IN AN ORGANIZED HEALTH CARE EDUCATION/TRAINING PROGRAM

## 2021-10-16 PROCEDURE — 6370000000 HC RX 637 (ALT 250 FOR IP): Performed by: STUDENT IN AN ORGANIZED HEALTH CARE EDUCATION/TRAINING PROGRAM

## 2021-10-16 PROCEDURE — 84484 ASSAY OF TROPONIN QUANT: CPT

## 2021-10-16 PROCEDURE — 82330 ASSAY OF CALCIUM: CPT

## 2021-10-16 PROCEDURE — 80048 BASIC METABOLIC PNL TOTAL CA: CPT

## 2021-10-16 PROCEDURE — 94761 N-INVAS EAR/PLS OXIMETRY MLT: CPT

## 2021-10-16 PROCEDURE — 6360000002 HC RX W HCPCS: Performed by: STUDENT IN AN ORGANIZED HEALTH CARE EDUCATION/TRAINING PROGRAM

## 2021-10-16 PROCEDURE — 93005 ELECTROCARDIOGRAM TRACING: CPT | Performed by: STUDENT IN AN ORGANIZED HEALTH CARE EDUCATION/TRAINING PROGRAM

## 2021-10-16 PROCEDURE — 85027 COMPLETE CBC AUTOMATED: CPT

## 2021-10-16 RX ADMIN — FUROSEMIDE 20 MG: 10 INJECTION, SOLUTION INTRAMUSCULAR; INTRAVENOUS at 18:23

## 2021-10-16 RX ADMIN — SODIUM CHLORIDE, PRESERVATIVE FREE 5 ML: 5 INJECTION INTRAVENOUS at 08:20

## 2021-10-16 RX ADMIN — CARVEDILOL 3.12 MG: 3.12 TABLET, FILM COATED ORAL at 18:23

## 2021-10-16 RX ADMIN — TICAGRELOR 90 MG: 90 TABLET ORAL at 20:19

## 2021-10-16 RX ADMIN — ATORVASTATIN CALCIUM 80 MG: 80 TABLET, FILM COATED ORAL at 20:19

## 2021-10-16 RX ADMIN — CARVEDILOL 3.12 MG: 3.12 TABLET, FILM COATED ORAL at 08:20

## 2021-10-16 RX ADMIN — TICAGRELOR 90 MG: 90 TABLET ORAL at 08:20

## 2021-10-16 RX ADMIN — FUROSEMIDE 20 MG: 10 INJECTION, SOLUTION INTRAMUSCULAR; INTRAVENOUS at 08:20

## 2021-10-16 RX ADMIN — ASPIRIN 81 MG: 81 TABLET, CHEWABLE ORAL at 08:20

## 2021-10-16 ASSESSMENT — PAIN SCALES - GENERAL
PAINLEVEL_OUTOF10: 0
PAINLEVEL_OUTOF10: 0

## 2021-10-16 NOTE — PROGRESS NOTES
Pt arrived to room 1029 via bed and cath lab staff. Pt connected to monitor and left groin assessed. Left groin soft to touch, no bruising or oozing present, band-aid left intact. Pt educated on the importance of laying flat and not using left leg. Pt understands, all questions answered.
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
## 2021-10-16 NOTE — PLAN OF CARE
Problem: Falls - Risk of:  Goal: Will remain free from falls  Description: Will remain free from falls  Outcome: Ongoing  Goal: Absence of physical injury  Description: Absence of physical injury  10/16/2021 1854 by Isaura Ham RN  Outcome: Ongoing  10/16/2021 0740 by Enma Quintanilla RN  Outcome: Ongoing     Problem: Discharge Planning:  Goal: Discharged to appropriate level of care  Description: Discharged to appropriate level of care  Outcome: Ongoing     Problem: Pain - Acute:  Goal: Pain level will decrease  Description: Pain level will decrease  10/16/2021 1854 by Isaura Ham RN  Outcome: Ongoing  10/16/2021 0740 by Enma Quintanilla RN  Outcome: Ongoing     Problem: Fluid Volume - Imbalance:  Goal: Will show no signs and symptoms of excessive bleeding  Description: Will show no signs and symptoms of excessive bleeding  Outcome: Ongoing  Goal: Absence of imbalanced fluid volume signs and symptoms  Description: Absence of imbalanced fluid volume signs and symptoms  Outcome: Ongoing     Problem: Anxiety:  Goal: Level of anxiety will decrease  Description: Level of anxiety will decrease  Outcome: Ongoing     Problem: Cardiac Output - Decreased:  Goal: Cardiac output within specified parameters  Description: Cardiac output within specified parameters  Outcome: Ongoing     Problem: Serum Glucose Level - Abnormal:  Goal: Ability to maintain appropriate glucose levels will improve  Description: Ability to maintain appropriate glucose levels will improve  Outcome: Ongoing     Problem: Tissue Perfusion - Cardiopulmonary, Altered:  Goal: Circulatory function within specified parameters  Description: Circulatory function within specified parameters  Outcome: Ongoing  Goal: Absence of angina  Description: Absence of angina  Outcome: Ongoing  Goal: Hemodynamic stability will improve  Description: Hemodynamic stability will improve  Outcome: Ongoing     Problem: Tissue Perfusion - Peripheral, Altered:  Goal: Absence of hematoma at arterial access site  Description: Absence of hematoma at arterial access site  Outcome: Ongoing  Goal: Circulatory function of lower extremities is within specified parameters  Description: Circulatory function of lower extremities is within specified parameters  Outcome: Ongoing     Problem: Tissue Perfusion - Renal, Altered:  Goal: Electrolytes within specified parameters  Description: Electrolytes within specified parameters  Outcome: Ongoing  Goal: Urine creatinine clearance will be within specified parameters  Description: Urine creatinine clearance will be within specified parameters  Outcome: Ongoing  Goal: Serum creatinine will be within specified parameters  Description: Serum creatinine will be within specified parameters  Outcome: Ongoing  Goal: Ability to achieve a balanced intake and output will improve  Description: Ability to achieve a balanced intake and output will improve  Outcome: Ongoing     Problem: Tobacco Use:  Goal: Will participate in inpatient tobacco-use cessation counseling  Description: Will participate in inpatient tobacco-use cessation counseling  Outcome: Ongoing

## 2021-10-16 NOTE — PLAN OF CARE
Problem: Falls - Risk of:  Goal: Will remain free from falls  Description: Will remain free from falls  Outcome: Ongoing  Goal: Absence of physical injury  Description: Absence of physical injury  Outcome: Ongoing     Problem: Discharge Planning:  Goal: Discharged to appropriate level of care  Description: Discharged to appropriate level of care  Outcome: Ongoing     Problem: Pain - Acute:  Goal: Pain level will decrease  Description: Pain level will decrease  Outcome: Ongoing     Problem: Fluid Volume - Imbalance:  Goal: Will show no signs and symptoms of excessive bleeding  Description: Will show no signs and symptoms of excessive bleeding  Outcome: Ongoing  Goal: Absence of imbalanced fluid volume signs and symptoms  Description: Absence of imbalanced fluid volume signs and symptoms  Outcome: Ongoing     Problem: Anxiety:  Goal: Level of anxiety will decrease  Description: Level of anxiety will decrease  Outcome: Ongoing     Problem: Cardiac Output - Decreased:  Goal: Cardiac output within specified parameters  Description: Cardiac output within specified parameters  Outcome: Ongoing     Problem: Serum Glucose Level - Abnormal:  Goal: Ability to maintain appropriate glucose levels will improve  Description: Ability to maintain appropriate glucose levels will improve  Outcome: Ongoing     Problem: Tissue Perfusion - Cardiopulmonary, Altered:  Goal: Circulatory function within specified parameters  Description: Circulatory function within specified parameters  Outcome: Ongoing  Goal: Absence of angina  Description: Absence of angina  Outcome: Ongoing  Goal: Hemodynamic stability will improve  Description: Hemodynamic stability will improve  Outcome: Ongoing     Problem: Tissue Perfusion - Peripheral, Altered:  Goal: Absence of hematoma at arterial access site  Description: Absence of hematoma at arterial access site  Outcome: Ongoing  Goal: Circulatory function of lower extremities is within specified parameters  Description: Circulatory function of lower extremities is within specified parameters  Outcome: Ongoing     Problem: Tissue Perfusion - Renal, Altered:  Goal: Electrolytes within specified parameters  Description: Electrolytes within specified parameters  Outcome: Ongoing  Goal: Urine creatinine clearance will be within specified parameters  Description: Urine creatinine clearance will be within specified parameters  Outcome: Ongoing  Goal: Serum creatinine will be within specified parameters  Description: Serum creatinine will be within specified parameters  Outcome: Ongoing  Goal: Ability to achieve a balanced intake and output will improve  Description: Ability to achieve a balanced intake and output will improve  Outcome: Ongoing     Problem: Tobacco Use:  Goal: Will participate in inpatient tobacco-use cessation counseling  Description: Will participate in inpatient tobacco-use cessation counseling  Outcome: Ongoing

## 2021-10-16 NOTE — PROGRESS NOTES
dose Entresto. TTE. Staged PCI of RCA in 4-6 weeks. Life vest based on results of echo. CHF education. IV lasix 20mg BID. Fluid restriction 1.2L. Counseled to quit smoking.     Washington Burnham MD

## 2021-10-16 NOTE — CARE COORDINATION
Case Management Initial Discharge Plan  Kit Bon,             Met with:patient to discuss discharge plans. Information verified: address, contacts, phone number, , insurance Yes  Insurance Provider: [place] Medicaid    Emergency Contact/Next of Kin name & number: Osorio Ruiz (mother) [phone]  Who are involved in patient's support system? family    PCP: No primary care provider on file. Date of last visit:       Discharge Planning    Living Arrangements:  Spouse/Significant Other     Home has 1 stories  4 stairs to climb to get into front door, stairs to climb to reach second floor  Location of bedroom/bathroom in home     Patient able to perform ADL's:Independent    Current Services (outpatient & in home)   DME equipment:   DME provider:     Is patient receiving oral anticoagulation therapy? No    If indicated:   Physician managing anticoagulation treatment:   Where does patient obtain lab work for ATC treatment? Potential Assistance Needed:  N/A    Patient agreeable to home care: No  [place] of choice provided:  n/a    Prior SNF/Rehab Placement and Facility:   Agreeable to SNF/Rehab: No  [place] of choice provided: n/a     Evaluation: no    Expected Discharge date:       Patient expects to be discharged to: If home: is the family and/or caregiver wiling & able to provide support at home? N/a, independent  Who will be providing this support? Follow Up Appointment: Best Day/ Time:      Transportation provider:   Transportation arrangements needed for discharge: Yes pt states he will walk or take bus    Readmission Risk              Risk of Unplanned Readmission:  8             Does patient have a readmission risk score greater than 14?: No  If yes, follow-up appointment must be made within 7 days of discharge. Goals of Care: comfort      Educated patient on transitional options, provided freedom of choice and are agreeable with plan      Discharge Plan: home independently.  Pt is current with [place] Medicaid.  Voicemail left for Abena Owens in HELP to assist pt in applying for [facility]          Electronically signed by Nura Long RN on 10/16/21 at 1:24 PM EDT

## 2021-10-16 NOTE — PROGRESS NOTES
Patient napping after morning breakfast. RN observed patient sleeping and noted episodes of apnea for about 15 seconds at a time. Patient also snoring. During these episodes of apnea patient desats on the monitor to 85%. Patient was placed on 2L NC while sleeping. Notified Dr. Ezra Ayala.

## 2021-10-17 LAB
-: NORMAL
ABSOLUTE EOS #: 0.18 K/UL (ref 0–0.44)
ABSOLUTE IMMATURE GRANULOCYTE: 0.05 K/UL (ref 0–0.3)
ABSOLUTE LYMPH #: 2.25 K/UL (ref 1.1–3.7)
ABSOLUTE MONO #: 0.93 K/UL (ref 0.1–1.2)
ANION GAP SERPL CALCULATED.3IONS-SCNC: 10 MMOL/L (ref 9–17)
BASOPHILS # BLD: 1 % (ref 0–2)
BASOPHILS ABSOLUTE: 0.06 K/UL (ref 0–0.2)
BUN BLDV-MCNC: 11 MG/DL (ref 6–20)
BUN/CREAT BLD: ABNORMAL (ref 9–20)
CALCIUM SERPL-MCNC: 8.7 MG/DL (ref 8.6–10.4)
CHLORIDE BLD-SCNC: 92 MMOL/L (ref 98–107)
CO2: 23 MMOL/L (ref 20–31)
CREAT SERPL-MCNC: 0.64 MG/DL (ref 0.7–1.2)
DIFFERENTIAL TYPE: ABNORMAL
EOSINOPHILS RELATIVE PERCENT: 2 % (ref 1–4)
GFR AFRICAN AMERICAN: >60 ML/MIN
GFR NON-AFRICAN AMERICAN: >60 ML/MIN
GFR SERPL CREATININE-BSD FRML MDRD: ABNORMAL ML/MIN/{1.73_M2}
GFR SERPL CREATININE-BSD FRML MDRD: ABNORMAL ML/MIN/{1.73_M2}
GLUCOSE BLD-MCNC: 107 MG/DL (ref 70–99)
HCT VFR BLD CALC: 49.8 % (ref 40.7–50.3)
HEMOGLOBIN: 17 G/DL (ref 13–17)
IMMATURE GRANULOCYTES: 0 %
LYMPHOCYTES # BLD: 19 % (ref 24–43)
MCH RBC QN AUTO: 33.9 PG (ref 25.2–33.5)
MCHC RBC AUTO-ENTMCNC: 34.1 G/DL (ref 28.4–34.8)
MCV RBC AUTO: 99.2 FL (ref 82.6–102.9)
MONOCYTES # BLD: 8 % (ref 3–12)
NRBC AUTOMATED: 0 PER 100 WBC
PDW BLD-RTO: 12.5 % (ref 11.8–14.4)
PLATELET # BLD: 265 K/UL (ref 138–453)
PLATELET ESTIMATE: ABNORMAL
PMV BLD AUTO: 10.6 FL (ref 8.1–13.5)
POTASSIUM SERPL-SCNC: 4.4 MMOL/L (ref 3.7–5.3)
RBC # BLD: 5.02 M/UL (ref 4.21–5.77)
RBC # BLD: ABNORMAL 10*6/UL
REASON FOR REJECTION: NORMAL
SEG NEUTROPHILS: 70 % (ref 36–65)
SEGMENTED NEUTROPHILS ABSOLUTE COUNT: 8.35 K/UL (ref 1.5–8.1)
SODIUM BLD-SCNC: 125 MMOL/L (ref 135–144)
TROPONIN INTERP: ABNORMAL
TROPONIN T: ABNORMAL NG/ML
TROPONIN, HIGH SENSITIVITY: 1543 NG/L (ref 0–22)
WBC # BLD: 11.8 K/UL (ref 3.5–11.3)
WBC # BLD: ABNORMAL 10*3/UL
ZZ NTE CLEAN UP: ORDERED TEST: NORMAL
ZZ NTE WITH NAME CLEAN UP: SPECIMEN SOURCE: NORMAL

## 2021-10-17 PROCEDURE — 36415 COLL VENOUS BLD VENIPUNCTURE: CPT

## 2021-10-17 PROCEDURE — 6370000000 HC RX 637 (ALT 250 FOR IP): Performed by: STUDENT IN AN ORGANIZED HEALTH CARE EDUCATION/TRAINING PROGRAM

## 2021-10-17 PROCEDURE — 80048 BASIC METABOLIC PNL TOTAL CA: CPT

## 2021-10-17 PROCEDURE — 2060000000 HC ICU INTERMEDIATE R&B

## 2021-10-17 PROCEDURE — 6360000002 HC RX W HCPCS: Performed by: STUDENT IN AN ORGANIZED HEALTH CARE EDUCATION/TRAINING PROGRAM

## 2021-10-17 PROCEDURE — 84484 ASSAY OF TROPONIN QUANT: CPT

## 2021-10-17 PROCEDURE — 2580000003 HC RX 258: Performed by: STUDENT IN AN ORGANIZED HEALTH CARE EDUCATION/TRAINING PROGRAM

## 2021-10-17 PROCEDURE — 85025 COMPLETE CBC W/AUTO DIFF WBC: CPT

## 2021-10-17 PROCEDURE — 6370000000 HC RX 637 (ALT 250 FOR IP): Performed by: INTERNAL MEDICINE

## 2021-10-17 RX ORDER — SPIRONOLACTONE 25 MG/1
25 TABLET ORAL DAILY
Status: DISCONTINUED | OUTPATIENT
Start: 2021-10-17 | End: 2021-10-18

## 2021-10-17 RX ORDER — FUROSEMIDE 10 MG/ML
20 INJECTION INTRAMUSCULAR; INTRAVENOUS ONCE
Status: COMPLETED | OUTPATIENT
Start: 2021-10-17 | End: 2021-10-17

## 2021-10-17 RX ADMIN — CARVEDILOL 3.12 MG: 3.12 TABLET, FILM COATED ORAL at 17:05

## 2021-10-17 RX ADMIN — SODIUM CHLORIDE, PRESERVATIVE FREE 10 ML: 5 INJECTION INTRAVENOUS at 08:27

## 2021-10-17 RX ADMIN — ATORVASTATIN CALCIUM 80 MG: 80 TABLET, FILM COATED ORAL at 21:05

## 2021-10-17 RX ADMIN — SPIRONOLACTONE 25 MG: 25 TABLET ORAL at 11:52

## 2021-10-17 RX ADMIN — ASPIRIN 81 MG: 81 TABLET, CHEWABLE ORAL at 08:27

## 2021-10-17 RX ADMIN — SACUBITRIL AND VALSARTAN 0.5 TABLET: 24; 26 TABLET, FILM COATED ORAL at 21:05

## 2021-10-17 RX ADMIN — TICAGRELOR 90 MG: 90 TABLET ORAL at 21:05

## 2021-10-17 RX ADMIN — TICAGRELOR 90 MG: 90 TABLET ORAL at 08:27

## 2021-10-17 RX ADMIN — CARVEDILOL 3.12 MG: 3.12 TABLET, FILM COATED ORAL at 08:27

## 2021-10-17 RX ADMIN — FUROSEMIDE 20 MG: 10 INJECTION, SOLUTION INTRAMUSCULAR; INTRAVENOUS at 17:05

## 2021-10-17 RX ADMIN — FUROSEMIDE 20 MG: 10 INJECTION, SOLUTION INTRAMUSCULAR; INTRAVENOUS at 11:52

## 2021-10-17 RX ADMIN — SODIUM CHLORIDE, PRESERVATIVE FREE 10 ML: 5 INJECTION INTRAVENOUS at 21:07

## 2021-10-17 RX ADMIN — SACUBITRIL AND VALSARTAN 0.5 TABLET: 24; 26 TABLET, FILM COATED ORAL at 08:27

## 2021-10-17 RX ADMIN — FUROSEMIDE 20 MG: 10 INJECTION, SOLUTION INTRAMUSCULAR; INTRAVENOUS at 08:27

## 2021-10-17 ASSESSMENT — PAIN SCALES - GENERAL
PAINLEVEL_OUTOF10: 0

## 2021-10-17 NOTE — PLAN OF CARE
Problem: Falls - Risk of:  Goal: Will remain free from falls  Description: Will remain free from falls  Outcome: Met This Shift  Goal: Absence of physical injury  Description: Absence of physical injury  Outcome: Met This Shift     Problem: Discharge Planning:  Goal: Discharged to appropriate level of care  Description: Discharged to appropriate level of care  Outcome: Ongoing     Problem: Pain - Acute:  Goal: Pain level will decrease  Description: Pain level will decrease  Outcome: Ongoing     Problem: Fluid Volume - Imbalance:  Goal: Will show no signs and symptoms of excessive bleeding  Description: Will show no signs and symptoms of excessive bleeding  Outcome: Ongoing  Goal: Absence of imbalanced fluid volume signs and symptoms  Description: Absence of imbalanced fluid volume signs and symptoms  Outcome: Ongoing     Problem: Anxiety:  Goal: Level of anxiety will decrease  Description: Level of anxiety will decrease  Outcome: Ongoing     Problem: Cardiac Output - Decreased:  Goal: Cardiac output within specified parameters  Description: Cardiac output within specified parameters  Outcome: Ongoing     Problem: Serum Glucose Level - Abnormal:  Goal: Ability to maintain appropriate glucose levels will improve  Description: Ability to maintain appropriate glucose levels will improve  Outcome: Ongoing     Problem: Tissue Perfusion - Cardiopulmonary, Altered:  Goal: Circulatory function within specified parameters  Description: Circulatory function within specified parameters  Outcome: Ongoing  Goal: Absence of angina  Description: Absence of angina  Outcome: Ongoing  Goal: Hemodynamic stability will improve  Description: Hemodynamic stability will improve  Outcome: Ongoing     Problem: Tissue Perfusion - Peripheral, Altered:  Goal: Absence of hematoma at arterial access site  Description: Absence of hematoma at arterial access site  Outcome: Met This Shift  Goal: Circulatory function of lower extremities is within specified parameters  Description: Circulatory function of lower extremities is within specified parameters  Outcome: Met This Shift     Problem: Tissue Perfusion - Renal, Altered:  Goal: Electrolytes within specified parameters  Description: Electrolytes within specified parameters  Outcome: Ongoing  Goal: Urine creatinine clearance will be within specified parameters  Description: Urine creatinine clearance will be within specified parameters  Outcome: Ongoing  Goal: Serum creatinine will be within specified parameters  Description: Serum creatinine will be within specified parameters  Outcome: Ongoing  Goal: Ability to achieve a balanced intake and output will improve  Description: Ability to achieve a balanced intake and output will improve  Outcome: Ongoing     Problem: Tobacco Use:  Goal: Will participate in inpatient tobacco-use cessation counseling  Description: Will participate in inpatient tobacco-use cessation counseling  Outcome: Ongoing

## 2021-10-17 NOTE — PROGRESS NOTES
[facility]   Progress Note                 Date:   10/17/2021  Patient name:  Meenakshi Marcos  Date of admission:  10/15/2021  5:20 PM  MRN:   [id number]  YOB: 1975  PCP:    No primary care provider on file. Reason for Admission: STEMI (ST elevation myocardial infarction) ([facility] [address] [I21.3]  ST elevation myocardial infarction (STEMI), unspecified artery ([facility] [address] [I21.3]      Subjective:       Clinical Changes / Abnormalities:     Patient seen and examined. No acute events overnight. Remained hemodynamically stable and afebrile. I/O last 3 completed shifts: In: 1200 [P.O.:1200]  Out: 2600 [Urine:2600]  No intake/output data recorded. Medications:   Scheduled Meds:    sacubitril-valsartan  0.5 tablet Oral BID    sodium chloride flush  5-40 mL IntraVENous 2 times per day    atorvastatin  80 mg Oral Nightly    aspirin  81 mg Oral Daily    ticagrelor  90 mg Oral BID    furosemide  20 mg IntraVENous BID    nicotine  1 patch TransDERmal Daily    carvedilol  3.125 mg Oral BID     covid-19 vaccine  1 Dose IntraMUSCular Prior to discharge     Continuous Infusions:    sodium chloride       CBC:   Recent Labs     10/15/21  1733 10/16/21  0409 10/17/21  0327   WBC 19.8* 14.1* 11.8*   HGB 17.0 16.7 17.0    285 265     BMP:    Recent Labs     10/16/21  0006 10/16/21  0409 10/17/21  0507   * 127* 125*   K 3.8 4.1 4.4   CL 92* 93* 92*   CO2 20 21 23   BUN 3* 5* 11   CREATININE 0.53* 0.56* 0.64*   GLUCOSE 120* 107* 107*     Hepatic:   Recent Labs     10/16/21  0409   *   ALT 44*   BILITOT 1.07   ALKPHOS 74     Troponin:   Recent Labs     10/15/21  1733 10/16/21  0409   TROPHS 206* 3,176*     BNP: No results for input(s): BNP in the last 72 hours. Lipids: No results for input(s): CHOL, HDL in the last 72 hours. Invalid input(s): LDLCALCU  INR: No results for input(s): INR in the last 72 hours.     Objective:   Vitals: BP (!) 120/97   Pulse 72   Temp 98.2 °F (36.8 °C) (Oral)   Resp 18   Ht 5' 11\" (1.803 m)   Wt 228 lb 11.2 oz (103.7 kg)   SpO2 98%   BMI 31.90 kg/m²   General appearance: Alert. No acute distress. HEENT: Head: Normal, normocephalic, atraumatic. Neck: no JVD, trachea midline  Lungs: Clear to auscultation bilaterally, no wheeze or crackles  Heart: Regular rate and rhythm, S1, S2 normal, no murmur  Abdomen: Soft, non-tender  Extremities: No edema  Neurologic: No focal neurologic deficits        ECHO: pending    CATH 10/15: Three vessel coronary artery disease. Severe LV systolic function; LVEF 16%. LVEDP 35 mm Hg. Occluded RPL with Left to right collaterals. Proximal RCA 85% stenosis. 50% stenosis of LCX. Distal LAD occlusion- culprit lesion. Successful PTCA/MARY KATE of distal LAD. Assessment:   1. Anterior STEMI s/p PTCA/MARY KATE to distal LAD  2. HTN  3. HL  4. LVEF 25%  5. Hyponatremia  6. Acute systolic CHF        Plan / Recommendations:   1. Continue ASA and brilinta  2. Continue high intensity statin  3. Continue coreg  4. Start entresto today  5. Follow up troponin today  6. Continue lasix 20 mg IV bid  7. Follow up TTE. Life Vest if EF < 35%  8. Staged PCI of proximal RCA in 4-6 weeks        Thank you for allowing us to participate in [facility] care. Electronically signed on 10/17/21 at 7:11 AM by:    Akshat Moralez MD, MD   Fellow, [address]    I performed a history and physical examination of the patient and discussed management with the resident. I reviewed the residents note and agree with the documented findings and plan of care. Any areas of disagreement are noted on the chart. I was personally present for the key portions of any procedures. I have documented in the chart those procedures where I was not present during the key portions.  I have personally evaluated this patient and have completed at least one if not all key elements of the E/M (history, physical exam, and MDM). Additional findings are as noted. Fluid restriction 1.2L. Increase IV lasix 40mg BID. Aldactone 25mg po qday.    Shaina Dowling MD

## 2021-10-18 VITALS
SYSTOLIC BLOOD PRESSURE: 131 MMHG | RESPIRATION RATE: 7 BRPM | BODY MASS INDEX: 29.97 KG/M2 | WEIGHT: 214.07 LBS | HEIGHT: 71 IN | DIASTOLIC BLOOD PRESSURE: 91 MMHG | TEMPERATURE: 97.7 F | OXYGEN SATURATION: 96 % | HEART RATE: 93 BPM

## 2021-10-18 PROBLEM — I50.42 CHRONIC COMBINED SYSTOLIC AND DIASTOLIC CHF (CONGESTIVE HEART FAILURE) (HCC): Status: ACTIVE | Noted: 2021-10-18

## 2021-10-18 LAB
ABSOLUTE EOS #: 0.22 K/UL (ref 0–0.44)
ABSOLUTE IMMATURE GRANULOCYTE: 0.1 K/UL (ref 0–0.3)
ABSOLUTE LYMPH #: 2.48 K/UL (ref 1.1–3.7)
ABSOLUTE MONO #: 0.94 K/UL (ref 0.1–1.2)
ANION GAP SERPL CALCULATED.3IONS-SCNC: 12 MMOL/L (ref 9–17)
BASOPHILS # BLD: 1 % (ref 0–2)
BASOPHILS ABSOLUTE: 0.1 K/UL (ref 0–0.2)
BUN BLDV-MCNC: 11 MG/DL (ref 6–20)
BUN/CREAT BLD: ABNORMAL (ref 9–20)
CALCIUM SERPL-MCNC: 9.5 MG/DL (ref 8.6–10.4)
CHLORIDE BLD-SCNC: 95 MMOL/L (ref 98–107)
CO2: 23 MMOL/L (ref 20–31)
CREAT SERPL-MCNC: 0.67 MG/DL (ref 0.7–1.2)
DIFFERENTIAL TYPE: ABNORMAL
EOSINOPHILS RELATIVE PERCENT: 2 % (ref 1–4)
GFR AFRICAN AMERICAN: >60 ML/MIN
GFR NON-AFRICAN AMERICAN: >60 ML/MIN
GFR SERPL CREATININE-BSD FRML MDRD: ABNORMAL ML/MIN/{1.73_M2}
GFR SERPL CREATININE-BSD FRML MDRD: ABNORMAL ML/MIN/{1.73_M2}
GLUCOSE BLD-MCNC: 88 MG/DL (ref 70–99)
HCT VFR BLD CALC: 55.6 % (ref 40.7–50.3)
HEMOGLOBIN: 19 G/DL (ref 13–17)
IMMATURE GRANULOCYTES: 1 %
LV EF: 38 %
LVEF MODALITY: NORMAL
LYMPHOCYTES # BLD: 18 % (ref 24–43)
MCH RBC QN AUTO: 33.3 PG (ref 25.2–33.5)
MCHC RBC AUTO-ENTMCNC: 34.2 G/DL (ref 28.4–34.8)
MCV RBC AUTO: 97.4 FL (ref 82.6–102.9)
MONOCYTES # BLD: 7 % (ref 3–12)
NRBC AUTOMATED: 0 PER 100 WBC
PDW BLD-RTO: 12.3 % (ref 11.8–14.4)
PLATELET # BLD: 373 K/UL (ref 138–453)
PLATELET ESTIMATE: ABNORMAL
PMV BLD AUTO: 10.1 FL (ref 8.1–13.5)
POTASSIUM SERPL-SCNC: 5.4 MMOL/L (ref 3.7–5.3)
RBC # BLD: 5.71 M/UL (ref 4.21–5.77)
RBC # BLD: ABNORMAL 10*6/UL
SEG NEUTROPHILS: 71 % (ref 36–65)
SEGMENTED NEUTROPHILS ABSOLUTE COUNT: 9.68 K/UL (ref 1.5–8.1)
SODIUM BLD-SCNC: 130 MMOL/L (ref 135–144)
WBC # BLD: 13.5 K/UL (ref 3.5–11.3)
WBC # BLD: ABNORMAL 10*3/UL

## 2021-10-18 PROCEDURE — 93306 TTE W/DOPPLER COMPLETE: CPT

## 2021-10-18 PROCEDURE — 6370000000 HC RX 637 (ALT 250 FOR IP): Performed by: STUDENT IN AN ORGANIZED HEALTH CARE EDUCATION/TRAINING PROGRAM

## 2021-10-18 PROCEDURE — 94761 N-INVAS EAR/PLS OXIMETRY MLT: CPT

## 2021-10-18 PROCEDURE — 80048 BASIC METABOLIC PNL TOTAL CA: CPT

## 2021-10-18 PROCEDURE — 6370000000 HC RX 637 (ALT 250 FOR IP): Performed by: NURSE PRACTITIONER

## 2021-10-18 PROCEDURE — 6360000002 HC RX W HCPCS: Performed by: STUDENT IN AN ORGANIZED HEALTH CARE EDUCATION/TRAINING PROGRAM

## 2021-10-18 PROCEDURE — 6370000000 HC RX 637 (ALT 250 FOR IP): Performed by: INTERNAL MEDICINE

## 2021-10-18 PROCEDURE — 85025 COMPLETE CBC W/AUTO DIFF WBC: CPT

## 2021-10-18 PROCEDURE — 36415 COLL VENOUS BLD VENIPUNCTURE: CPT

## 2021-10-18 PROCEDURE — 2580000003 HC RX 258: Performed by: STUDENT IN AN ORGANIZED HEALTH CARE EDUCATION/TRAINING PROGRAM

## 2021-10-18 RX ORDER — SPIRONOLACTONE 25 MG/1
12.5 TABLET ORAL DAILY
Status: DISCONTINUED | OUTPATIENT
Start: 2021-10-19 | End: 2021-10-18 | Stop reason: HOSPADM

## 2021-10-18 RX ORDER — ASPIRIN 81 MG/1
81 TABLET, CHEWABLE ORAL DAILY
Qty: 30 TABLET | Refills: 3 | Status: SHIPPED | OUTPATIENT
Start: 2021-10-19

## 2021-10-18 RX ORDER — ATORVASTATIN CALCIUM 80 MG/1
80 TABLET, FILM COATED ORAL NIGHTLY
Qty: 30 TABLET | Refills: 3 | Status: SHIPPED | OUTPATIENT
Start: 2021-10-18

## 2021-10-18 RX ORDER — SPIRONOLACTONE 25 MG/1
12.5 TABLET ORAL DAILY
Qty: 30 TABLET | Refills: 3 | Status: SHIPPED | OUTPATIENT
Start: 2021-10-19

## 2021-10-18 RX ORDER — CARVEDILOL 6.25 MG/1
6.25 TABLET ORAL 2 TIMES DAILY WITH MEALS
Qty: 60 TABLET | Refills: 3 | Status: SHIPPED | OUTPATIENT
Start: 2021-10-18

## 2021-10-18 RX ORDER — CARVEDILOL 6.25 MG/1
6.25 TABLET ORAL 2 TIMES DAILY WITH MEALS
Status: DISCONTINUED | OUTPATIENT
Start: 2021-10-18 | End: 2021-10-18 | Stop reason: HOSPADM

## 2021-10-18 RX ORDER — CARVEDILOL 3.12 MG/1
3.12 TABLET ORAL ONCE
Status: COMPLETED | OUTPATIENT
Start: 2021-10-18 | End: 2021-10-18

## 2021-10-18 RX ADMIN — SACUBITRIL AND VALSARTAN 0.5 TABLET: 24; 26 TABLET, FILM COATED ORAL at 09:38

## 2021-10-18 RX ADMIN — ASPIRIN 81 MG: 81 TABLET, CHEWABLE ORAL at 09:05

## 2021-10-18 RX ADMIN — CARVEDILOL 3.12 MG: 3.12 TABLET, FILM COATED ORAL at 09:04

## 2021-10-18 RX ADMIN — SPIRONOLACTONE 25 MG: 25 TABLET ORAL at 09:04

## 2021-10-18 RX ADMIN — FUROSEMIDE 20 MG: 10 INJECTION, SOLUTION INTRAMUSCULAR; INTRAVENOUS at 09:04

## 2021-10-18 RX ADMIN — TICAGRELOR 90 MG: 90 TABLET ORAL at 09:05

## 2021-10-18 RX ADMIN — CARVEDILOL 3.12 MG: 3.12 TABLET, FILM COATED ORAL at 11:29

## 2021-10-18 RX ADMIN — SODIUM CHLORIDE, PRESERVATIVE FREE 10 ML: 5 INJECTION INTRAVENOUS at 09:12

## 2021-10-18 ASSESSMENT — ENCOUNTER SYMPTOMS: TACHYPNEA: 1

## 2021-10-18 ASSESSMENT — PAIN SCALES - GENERAL
PAINLEVEL_OUTOF10: 0
PAINLEVEL_OUTOF10: 0

## 2021-10-18 NOTE — PLAN OF CARE
Problem: Falls - Risk of:  Goal: Will remain free from falls  Description: Will remain free from falls  10/18/2021 0535 by Ame Henry RN  Outcome: Met This Shift     Problem: Falls - Risk of:  Goal: Absence of physical injury  Description: Absence of physical injury  10/18/2021 0535 by Ame Henry RN  Outcome: Met This Shift     Problem: Pain - Acute:  Goal: Pain level will decrease  Description: Pain level will decrease  10/18/2021 0535 by Ame Henry RN  Outcome: Met This Shift     Problem: Fluid Volume - Imbalance:  Goal: Will show no signs and symptoms of excessive bleeding  Description: Will show no signs and symptoms of excessive bleeding  10/18/2021 0535 by Ame Henry RN  Outcome: Met This Shift     Problem: Cardiac Output - Decreased:  Goal: Cardiac output within specified parameters  Description: Cardiac output within specified parameters  10/18/2021 0535 by Ame Henry RN  Outcome: Met This Shift     Problem: Tissue Perfusion - Peripheral, Altered:  Goal: Absence of hematoma at arterial access site  Description: Absence of hematoma at arterial access site  10/18/2021 0535 by Ame Henry RN  Outcome: Met This Shift     Problem: Tissue Perfusion - Renal, Altered:  Goal: Serum creatinine will be within specified parameters  Description: Serum creatinine will be within specified parameters  10/18/2021 0535 by Ame Henry RN  Outcome: Ongoing     Problem: Tissue Perfusion - Renal, Altered:  Goal: Ability to achieve a balanced intake and output will improve  Description: Ability to achieve a balanced intake and output will improve  10/18/2021 0535 by Ame Henry RN  Outcome: Ongoing

## 2021-10-18 NOTE — CARE COORDINATION
Transitional Planning  Faxed DME for Life Vest face sheet echo report H&P, Cardiology progress note to Christine Velasco [phone]  Await call back from Lavon with Christine Velasco.

## 2021-10-18 NOTE — PLAN OF CARE
Problem: Falls - Risk of:  Goal: Will remain free from falls  Description: Will remain free from falls  10/18/2021 0535 by Skylar Garcia RN  Outcome: Met This Shift  Goal: Absence of physical injury  Description: Absence of physical injury  10/18/2021 0535 by Skylar Garcia RN  Outcome: Met This Shift     Problem: Pain - Acute:  Goal: Pain level will decrease  Description: Pain level will decrease  10/18/2021 0535 by Skylar Garcia RN  Outcome: Met This Shift     Problem: Fluid Volume - Imbalance:  Goal: Will show no signs and symptoms of excessive bleeding  Description: Will show no signs and symptoms of excessive bleeding  10/18/2021 0535 by Skylar Garcia RN  Outcome: Met This Shift     Problem: Cardiac Output - Decreased:  Goal: Cardiac output within specified parameters  Description: Cardiac output within specified parameters  10/18/2021 0535 by Skylar Garcia RN  Outcome: Met This Shift     Problem: Tissue Perfusion - Peripheral, Altered:  Goal: Absence of hematoma at arterial access site  Description: Absence of hematoma at arterial access site  10/18/2021 0535 by Skylar Garcia RN  Outcome: Met This Shift

## 2021-10-18 NOTE — DISCHARGE SUMMARY
[facility]  Discharge Note                 Name:  Jamison Hernandez  YOB: 1975  Social Security Number:  [id number]  Medical Record Number:  [id number]    Date of Admission:  10/15/2021  Date of Discharge:  10/18/2021    Admitting physician: Nayna Ernst MD    Discharge Attending: RAUL Anne CNP, CNP  Primary Care Physician: No primary care provider on file. Consultants: Cardiology  Discharge to home in stable condition    HOSPITAL ADMISSION PROBLEM LIST:  Patient Active Problem List   Diagnosis    STEMI (ST elevation myocardial infarction) [facility])         Procedures:cardiac catheterization    HOSPITAL COURSE :           The patient was admitted for: Anterior STEMI  Hospital Procedures if any: Cath w/ stent placement  Medications changes recommendation: See List  Follow Up Plan: F/U in office in 2 weeks      Discharge exam:   Vitals:    10/18/21 1004   BP: (!) 158/92   Pulse: 86   Resp: 16   Temp:    SpO2: 93%     Neuro: normal  Chest: Clear to ausculation. No wheezing. Cardiac: Regular rate. s1 and s2 auscultated. No murmur noted. Abdomen/groin: soft, non-tender, without masses or organomegaly  Lower extremity edema: none    Discharge Medications: [place]te Feli   Home Medication Instructions PBQ:[id number]    Printed on:10/18/21 1110   Medication Information                      enalapril (VASOTEC) 10 MG tablet  Take 10 mg by mouth 2 times daily                The patient is a 48 y.o.  male who is admitted to the hospital for chest pain. He presented to the ER with Chest pain. On admission EKG showed ST elevation in the anterior leads, thus patient patient was transferred to cath lab directly. Cardiac Cath 10/15/21  Findings:            LMCA: Normal 0% stenosis. LAD: has mid 50% stenosis. The distal LAD is occluded. PCI was performed. The D1 has proximal 60% stenosis. The D2 has proximal 60% stenosis.          Lesion on Dist LAD: Distal subsection. 100% stenosis 38 mm length reduced     to 0%. Pre procedure LAINEY 0 flow was noted. Post Procedure LAINEY III flow     was present. Good runoff was present. The lesion was diagnosed as High     Risk (C). The lesion was diffuse and eccentric. The lesion showed with     irregular contour, mild angulation and mild tortuosity. Lesion plaque is     ruptured.         Comments:Two stents were placed in overlapping fashion due to segment     size mismatch     Devices used         - MAKO Surgical [place] Flex 180 cm. Number of passes: 1.         - Mini Trek Balloon 2.0mm x 12mm. 4 inflation(s) to a max pressure of:     12 kelsea.         - Sapphire ll PRO 1.0mm x 15mm. 7 inflation(s) to a max pressure of: 15     kelsea.         - Resolute [place] 2.0 x 26 MARY KATE. 1 inflation(s) to a max pressure of: 13     kelsea.         - Resolute [place] 2.5 x 15 MARY KATE. 2 inflation(s) to a max pressure of: 13     kelsea.       LCx: has mid 50% stenosis. The OM1 has proximal 30% stenosis. The OM2 is   small and has 50% stenosis. RCA: has proximal 85% stenosis. The RPL is ostially occluded with left to   right collaterals.         The LV gram was performed in the PAL 30 position. LVEF: 25 %.       Conclusions:      Three vessel coronary artery disease.    Severe LV systolic function; LVEF 03%. LVEDP 35 mm Hg.    Occluded RPL with Left to right collaterals.    Proximal RCA 85% stenosis.    50% stenosis of LCX.    Distal LAD occlusion- culprit lesion. Successful PTCA/MARY KATE of distal LAD.         Recommendations:      Medical therapy as needed.    Risk factor modification.    Routine Post Stent Orders.    Obtain TTE.    Staged PCI of proximal RCA in 4-6 weeks    Lifevest if LVEF < 35%. Echo 10/18/21  Summary  Left ventricle is normal in size, global left ventricular systolic function  is severely reduced, calculated ejection fraction is 28%. Visually 35-40%. Consider MUGA scan if indicated.   Severe global dysfunction is noted with minor regional variation. Grade I (mild) left ventricular diastolic dysfunction. Trivial mitral regurgitation. Lifevest ordered    Coronary Discharge Core Measure: Please indicate the medication given by X, and if not the reasons not given:    Not Given Reason  Given      Beta Blockers x      ACE-I Entresto      Statins x      ASA x       OAP (Plavix/Effient/Brilinta) Brilinta    SL Nitro   x           Discussed with patient and nursing. Medications and discharge instructions reviewed with patient and nursing. Discussed in detail with patient post cath POC including but not limited to medications, diet, exercise, right femoral artery site care, and follow-up. Questions and concerns addressed. OK for discharge home today. F/U in office in 1-3 weeks.      Electronically signed by RAUL Anne CNP on 10/18/2021 at 11:10 [facility]      [phone]

## 2021-10-18 NOTE — PROGRESS NOTES
Congestive Heart Failure Education completed and charted. CHF booklet given. Patient was receptive to education. Discussed the  importance of medication compliance. Discussed the importance of a heart healthy diet. Discussed 2000 mg sodium-restricted daily diet. Patient instructed to limit fluid intake to  1.5 to 2 liters per day. Patient instructed to weigh self at the same time of each day each morning, reinforced teaching to monitor for 3-5 lb weight increase over 1-2 days notify physician if change noted. Signs and symptoms of CHF discussed with patient, such as feeling more tired than normal, feeling short of breath, coughing that increases when lying down, sudden weight gain, swelling of the feet, legs or belly. Patient verbalized understanding to notify physician office if these symptoms occur. EF 25%   Pt is agreeable to an outpatient CHF Clinic referral . Referral placed.

## 2021-10-20 LAB
EKG ATRIAL RATE: 61 BPM
EKG Q-T INTERVAL: 482 MS
EKG QRS DURATION: 120 MS
EKG QTC CALCULATION (BAZETT): 477 MS
EKG R AXIS: -23 DEGREES
EKG T AXIS: 106 DEGREES
EKG VENTRICULAR RATE: 59 BPM

## 2023-05-16 ENCOUNTER — HOSPITAL ENCOUNTER (EMERGENCY)
Age: 48
Discharge: HOME | End: 2023-05-16
Attending: EMERGENCY MEDICINE

## 2023-05-16 VITALS
WEIGHT: 198 LBS | DIASTOLIC BLOOD PRESSURE: 100 MMHG | HEIGHT: 72 IN | RESPIRATION RATE: 20 BRPM | TEMPERATURE: 97.5 F | BODY MASS INDEX: 26.82 KG/M2 | OXYGEN SATURATION: 91 % | HEART RATE: 87 BPM | SYSTOLIC BLOOD PRESSURE: 147 MMHG

## 2023-05-16 DIAGNOSIS — F69 BEHAVIOR PROBLEM, ADULT: ICD-10-CM

## 2023-05-16 DIAGNOSIS — F10.920 ACUTE ALCOHOLIC INTOXICATION WITHOUT COMPLICATION (HCC): Primary | ICD-10-CM

## 2023-05-16 LAB
ANION GAP SERPL CALCULATED.3IONS-SCNC: 18 MMOL/L (ref 9–17)
BASOPHILS # BLD: 0.08 K/UL (ref 0–0.2)
BASOPHILS NFR BLD: 1 % (ref 0–2)
BUN SERPL-MCNC: 14 MG/DL (ref 6–20)
BUN/CREAT SERPL: 12 (ref 9–20)
CALCIUM SERPL-MCNC: 9 MG/DL (ref 8.6–10.4)
CHLORIDE SERPL-SCNC: 96 MMOL/L (ref 98–107)
CO2 SERPL-SCNC: 21 MMOL/L (ref 20–31)
CREAT SERPL-MCNC: 1.2 MG/DL (ref 0.7–1.2)
EOSINOPHIL # BLD: 0.38 K/UL (ref 0–0.44)
EOSINOPHILS RELATIVE PERCENT: 3 % (ref 1–4)
ERYTHROCYTE [DISTWIDTH] IN BLOOD BY AUTOMATED COUNT: 12.7 % (ref 11.8–14.4)
ETHANOL PERCENT: 0.33 %
ETHANOLAMINE SERPL-MCNC: 333 MG/DL
GFR SERPL CREATININE-BSD FRML MDRD: >60 ML/MIN/1.73M2
GLUCOSE SERPL-MCNC: 113 MG/DL (ref 70–99)
HCT VFR BLD AUTO: 50.1 % (ref 40.7–50.3)
HGB BLD-MCNC: 17.2 G/DL (ref 13–17)
IMM GRANULOCYTES # BLD AUTO: 0.08 K/UL (ref 0–0.3)
IMM GRANULOCYTES NFR BLD: 1 %
LYMPHOCYTES # BLD: 26 % (ref 24–43)
LYMPHOCYTES NFR BLD: 3.56 K/UL (ref 1.1–3.7)
MCH RBC QN AUTO: 32.9 PG (ref 25.2–33.5)
MCHC RBC AUTO-ENTMCNC: 34.3 G/DL (ref 28.4–34.8)
MCV RBC AUTO: 95.8 FL (ref 82.6–102.9)
MONOCYTES NFR BLD: 0.9 K/UL (ref 0.1–1.2)
MONOCYTES NFR BLD: 7 % (ref 3–12)
NEUTROPHILS NFR BLD: 62 % (ref 36–65)
NEUTS SEG NFR BLD: 8.49 K/UL (ref 1.5–8.1)
NRBC AUTOMATED: 0 PER 100 WBC
PLATELET # BLD AUTO: 274 K/UL (ref 138–453)
PMV BLD AUTO: 10.6 FL (ref 8.1–13.5)
POTASSIUM SERPL-SCNC: 3.7 MMOL/L (ref 3.7–5.3)
RBC # BLD AUTO: 5.23 M/UL (ref 4.21–5.77)
SODIUM SERPL-SCNC: 135 MMOL/L (ref 135–144)
WBC OTHER # BLD: 13.5 K/UL (ref 3.5–11.3)

## 2023-05-16 PROCEDURE — G0480 DRUG TEST DEF 1-7 CLASSES: HCPCS

## 2023-05-16 PROCEDURE — 80048 BASIC METABOLIC PNL TOTAL CA: CPT

## 2023-05-16 PROCEDURE — 85025 COMPLETE CBC W/AUTO DIFF WBC: CPT

## 2023-05-16 PROCEDURE — 99284 EMERGENCY DEPT VISIT MOD MDM: CPT

## 2023-05-16 RX ORDER — SODIUM CHLORIDE 9 MG/ML
INJECTION, SOLUTION INTRAVENOUS CONTINUOUS
Status: DISCONTINUED | OUTPATIENT
Start: 2023-05-16 | End: 2023-05-16 | Stop reason: HOSPADM

## 2023-05-16 ASSESSMENT — ENCOUNTER SYMPTOMS
CONSTIPATION: 0
EYE DISCHARGE: 0
EYE REDNESS: 0
COUGH: 0
ABDOMINAL PAIN: 0
VOMITING: 0
SHORTNESS OF BREATH: 0
DIARRHEA: 0
COLOR CHANGE: 0
FACIAL SWELLING: 0

## 2023-05-16 ASSESSMENT — PAIN - FUNCTIONAL ASSESSMENT: PAIN_FUNCTIONAL_ASSESSMENT: NONE - DENIES PAIN

## 2023-05-17 LAB
EKG ATRIAL RATE: 83 BPM
EKG P AXIS: 59 DEGREES
EKG P-R INTERVAL: 174 MS
EKG Q-T INTERVAL: 394 MS
EKG QRS DURATION: 124 MS
EKG QTC CALCULATION (BAZETT): 462 MS
EKG R AXIS: -24 DEGREES
EKG T AXIS: 91 DEGREES
EKG VENTRICULAR RATE: 83 BPM

## 2023-05-17 NOTE — ED NOTES
Pt was taken into [employer] custody for making threats to RN and staff.       Ami Luque, RN  05/16/23 5219

## 2023-05-17 NOTE — ED NOTES
Pt ripping off tele leads and attempting to get out of bed. Redirection is ineffective. Pt becoming loud and belligerent, standing at foot of bed. Security is called for assistance. Pt resisting our [facility] , is forced back onto bed. Pt is then held down by officer and nursing staff until [employer] arrives. Dr Zhen Quintanilla at bedside and approves discharging pt in police custody.  Pt is cuffed and taken with [employer]      Yumiko Barrera RN  05/16/23 3843

## 2023-05-17 NOTE — ED NOTES
Pt arrived to ED by EMS. Pt was found face down in the mulch at [facility]. Pt admits to drinking 2 pints of vodka.  [employer] was with pt on arrival.      NAEEM SANTANA RN  05/16/23 2053

## 2024-04-29 NOTE — PLAN OF CARE
Cellulitis   AMBULATORY CARE:   Cellulitis  is a skin infection caused by bacteria. Cellulitis is common and can become severe. Cellulitis usually appears on the lower legs. It can also appear on the arms, face, and other areas. Cellulitis develops when bacteria enter a crack or break in your skin, such as a scratch, bite, or cut.       Common signs and symptoms:  Signs and symptoms usually appear on one side of your body. You may have any of the following:  A fever    A red, warm, swollen area on your skin    Pain when the area is touched    Red spots, bumps, or blisters    Bumpy, raised skin that feels like an orange peel    Seek care immediately if:   Your wound gets larger and more painful.    You feel a crackling under your skin when you touch it.    You have purple dots or bumps on your skin, or you see bleeding under your skin.    You see red streaks coming from the infected area.    Call your doctor if:   The red, warm, swollen area gets larger.    Your fever or pain does not go away or gets worse.    The area does not get smaller after 3 days of antibiotics.    You have questions or concerns about your condition or care.    Treatment:  You should start to see improvement in 3 days. If your cellulitis is severe, you may need IV antibiotics in the hospital. If cellulitis is not treated, the infection can spread through your body and become life-threatening. You may need any of the following medicines:  Antibiotics  help treat a bacterial infection.    Acetaminophen  decreases pain and fever. It is available without a doctor's order. Ask how much to take and how often to take it. Follow directions. Read the labels of all other medicines you are using to see if they also contain acetaminophen, or ask your doctor or pharmacist. Acetaminophen can cause liver damage if not taken correctly.    NSAIDs , such as ibuprofen, help decrease swelling, pain, and fever. This medicine is available with or without a doctor's  Problem: Falls - Risk of:  Goal: Absence of physical injury  Description: Absence of physical injury  10/16/2021 0740 by Eulogio Toribio RN  Outcome: Ongoing     Problem: Pain - Acute:  Goal: Pain level will decrease  Description: Pain level will decrease  10/16/2021 0740 by Eulogio Toribio RN  Outcome: Ongoing order. NSAIDs can cause stomach bleeding or kidney problems in certain people. If you take blood thinner medicine, always ask your healthcare provider if NSAIDs are safe for you. Always read the medicine label and follow directions.    Take your medicine as directed.  Contact your healthcare provider if you think your medicine is not helping or if you have side effects. Tell your provider if you are allergic to any medicine. Keep a list of the medicines, vitamins, and herbs you take. Include the amounts, and when and why you take them. Bring the list or the pill bottles to follow-up visits. Carry your medicine list with you in case of an emergency.    Self-care:   Wash the area with soap and water every day.  Gently pat dry. Use bandages if directed by your healthcare provider.    Apply cream or ointment as directed.  These help protect the area. Most over-the-counter products, such as petroleum jelly, are good to use. Ask your healthcare provider about specific creams or ointments you should use.    Place a cool, damp cloth on the area.  Use clean cloths and clean water. You can do this as often as you need to. Cool, damp cloths may help decrease pain.    Elevate the area above the level of your heart  as often as you can. This will help decrease swelling and pain. Prop the area on pillows or blankets to keep it elevated comfortably.       Prevent cellulitis:   Do not scratch bug bites or areas of injury.  You increase your risk for cellulitis by scratching these areas.    Do not share personal items, such as towels, clothing, and razors.    Clean exercise equipment  with germ-killing  before and after you use it.    Treat athlete's foot.  This can help prevent the spread of a bacterial skin infection.    Wash your hands often.  Use soap and water. Wash your hands after you use the bathroom, change a child's diapers, or sneeze. Wash your hands before you prepare or eat food. Use lotion to prevent dry,  cracked skin.       Follow up with your doctor within 3 days, or as directed:  He or she will check if your cellulitis is getting better. Write down your questions so you remember to ask them during your visits.  © Copyright [employer] 2023 Information is for End User's use only and may not be sold, redistributed or otherwise used for commercial purposes.  The above information is an  only. It is not intended as medical advice for individual conditions or treatments. Talk to your doctor, nurse or pharmacist before following any medical regimen to see if it is safe and effective for you.

## 2024-07-15 ENCOUNTER — HOSPITAL ENCOUNTER (INPATIENT)
Age: 49
LOS: 12 days | Discharge: ANOTHER HOSPITAL | DRG: 130 | End: 2024-07-27
Attending: EMERGENCY MEDICINE | Admitting: FAMILY MEDICINE
Payer: MEDICAID

## 2024-07-15 ENCOUNTER — APPOINTMENT (OUTPATIENT)
Dept: GENERAL RADIOLOGY | Age: 49
DRG: 130 | End: 2024-07-15
Payer: MEDICAID

## 2024-07-15 ENCOUNTER — APPOINTMENT (OUTPATIENT)
Dept: CT IMAGING | Age: 49
DRG: 130 | End: 2024-07-15
Payer: MEDICAID

## 2024-07-15 DIAGNOSIS — F10.929 ACUTE ALCOHOLIC INTOXICATION WITH COMPLICATION (HCC): ICD-10-CM

## 2024-07-15 DIAGNOSIS — G93.40 ACUTE ENCEPHALOPATHY: Primary | ICD-10-CM

## 2024-07-15 DIAGNOSIS — R60.9 SWELLING: ICD-10-CM

## 2024-07-15 DIAGNOSIS — F10.920 ACUTE ALCOHOLIC INTOXICATION WITHOUT COMPLICATION (HCC): ICD-10-CM

## 2024-07-15 DIAGNOSIS — J96.01 ACUTE HYPOXIC RESPIRATORY FAILURE (HCC): ICD-10-CM

## 2024-07-15 DIAGNOSIS — J96.01 ACUTE RESPIRATORY FAILURE WITH HYPOXIA (HCC): ICD-10-CM

## 2024-07-15 DIAGNOSIS — R06.02 SHORTNESS OF BREATH: ICD-10-CM

## 2024-07-15 PROBLEM — Z99.11 ON MECHANICALLY ASSISTED VENTILATION (HCC): Status: ACTIVE | Noted: 2024-07-15

## 2024-07-15 PROBLEM — F10.221: Status: ACTIVE | Noted: 2024-07-15

## 2024-07-15 PROBLEM — J69.0: Status: ACTIVE | Noted: 2024-07-15

## 2024-07-15 LAB
ALBUMIN SERPL-MCNC: 3.9 G/DL (ref 3.5–5.2)
ALBUMIN SERPL-MCNC: 3.9 G/DL (ref 3.5–5.2)
ALP SERPL-CCNC: 82 U/L (ref 40–129)
ALP SERPL-CCNC: 82 U/L (ref 40–129)
ALT SERPL-CCNC: 56 U/L (ref 5–41)
ALT SERPL-CCNC: 56 U/L (ref 5–41)
AMPHET UR QL SCN: NEGATIVE
AMPHET UR QL SCN: NEGATIVE
ANION GAP SERPL CALCULATED.3IONS-SCNC: 18 MMOL/L (ref 9–17)
ANION GAP SERPL CALCULATED.3IONS-SCNC: 18 MMOL/L (ref 9–17)
APAP SERPL-MCNC: <10 UG/ML (ref 10–30)
APAP SERPL-MCNC: <10 UG/ML (ref 10–30)
AST SERPL-CCNC: 123 U/L
AST SERPL-CCNC: 123 U/L
BARBITURATES UR QL SCN: NEGATIVE
BARBITURATES UR QL SCN: NEGATIVE
BASOPHILS # BLD: 0.08 K/UL (ref 0–0.2)
BASOPHILS # BLD: 0.08 K/UL (ref 0–0.2)
BASOPHILS NFR BLD: 1 % (ref 0–2)
BASOPHILS NFR BLD: 1 % (ref 0–2)
BENZODIAZ UR QL: NEGATIVE
BENZODIAZ UR QL: NEGATIVE
BILIRUB DIRECT SERPL-MCNC: 0.4 MG/DL
BILIRUB DIRECT SERPL-MCNC: 0.4 MG/DL
BILIRUB INDIRECT SERPL-MCNC: 0.4 MG/DL (ref 0–1)
BILIRUB INDIRECT SERPL-MCNC: 0.4 MG/DL (ref 0–1)
BILIRUB SERPL-MCNC: 0.8 MG/DL (ref 0.3–1.2)
BILIRUB SERPL-MCNC: 0.8 MG/DL (ref 0.3–1.2)
BUN SERPL-MCNC: 10 MG/DL (ref 6–20)
BUN SERPL-MCNC: 10 MG/DL (ref 6–20)
BUN/CREAT SERPL: 13 (ref 9–20)
BUN/CREAT SERPL: 13 (ref 9–20)
CALCIUM SERPL-MCNC: 8.7 MG/DL (ref 8.6–10.4)
CALCIUM SERPL-MCNC: 8.7 MG/DL (ref 8.6–10.4)
CANNABINOIDS UR QL SCN: POSITIVE
CANNABINOIDS UR QL SCN: POSITIVE
CHLORIDE SERPL-SCNC: 89 MMOL/L (ref 98–107)
CHLORIDE SERPL-SCNC: 89 MMOL/L (ref 98–107)
CK SERPL-CCNC: 203 U/L (ref 39–308)
CK SERPL-CCNC: 203 U/L (ref 39–308)
CK SERPL-CCNC: 330 U/L (ref 39–308)
CK SERPL-CCNC: 330 U/L (ref 39–308)
CO2 SERPL-SCNC: 26 MMOL/L (ref 20–31)
CO2 SERPL-SCNC: 26 MMOL/L (ref 20–31)
COCAINE UR QL SCN: POSITIVE
COCAINE UR QL SCN: POSITIVE
CREAT SERPL-MCNC: 0.8 MG/DL (ref 0.7–1.2)
CREAT SERPL-MCNC: 0.8 MG/DL (ref 0.7–1.2)
EOSINOPHIL # BLD: 0.34 K/UL (ref 0–0.44)
EOSINOPHIL # BLD: 0.34 K/UL (ref 0–0.44)
EOSINOPHILS RELATIVE PERCENT: 3 % (ref 1–4)
EOSINOPHILS RELATIVE PERCENT: 3 % (ref 1–4)
ERYTHROCYTE [DISTWIDTH] IN BLOOD BY AUTOMATED COUNT: 13.2 % (ref 11.8–14.4)
ERYTHROCYTE [DISTWIDTH] IN BLOOD BY AUTOMATED COUNT: 13.2 % (ref 11.8–14.4)
ETHANOL PERCENT: 0.42 %
ETHANOL PERCENT: 0.42 %
ETHANOLAMINE SERPL-MCNC: 423 MG/DL (ref 0–0.08)
ETHANOLAMINE SERPL-MCNC: 423 MG/DL (ref 0–0.08)
FENTANYL UR QL: NEGATIVE
FENTANYL UR QL: NEGATIVE
FIO2: 100
FIO2: 100
GFR, ESTIMATED: 68 ML/MIN/1.73M2
GFR, ESTIMATED: 68 ML/MIN/1.73M2
GLUCOSE BLD-MCNC: 113 MG/DL (ref 75–110)
GLUCOSE BLD-MCNC: 113 MG/DL (ref 75–110)
GLUCOSE SERPL-MCNC: 106 MG/DL (ref 70–99)
GLUCOSE SERPL-MCNC: 106 MG/DL (ref 70–99)
HCT VFR BLD AUTO: 42.4 % (ref 40.7–50.3)
HCT VFR BLD AUTO: 42.4 % (ref 40.7–50.3)
HGB BLD-MCNC: 14.8 G/DL (ref 13–17)
HGB BLD-MCNC: 14.8 G/DL (ref 13–17)
IMM GRANULOCYTES # BLD AUTO: 0.02 K/UL (ref 0–0.3)
IMM GRANULOCYTES # BLD AUTO: 0.02 K/UL (ref 0–0.3)
IMM GRANULOCYTES NFR BLD: 0 %
IMM GRANULOCYTES NFR BLD: 0 %
LACTATE BLDV-SCNC: 1.3 MMOL/L (ref 0.5–1.9)
LACTATE BLDV-SCNC: 1.3 MMOL/L (ref 0.5–1.9)
LACTATE BLDV-SCNC: 1.5 MMOL/L (ref 0.5–1.9)
LACTATE BLDV-SCNC: 1.5 MMOL/L (ref 0.5–1.9)
LYMPHOCYTES NFR BLD: 3.46 K/UL (ref 1.1–3.7)
LYMPHOCYTES NFR BLD: 3.46 K/UL (ref 1.1–3.7)
LYMPHOCYTES RELATIVE PERCENT: 34 % (ref 24–43)
LYMPHOCYTES RELATIVE PERCENT: 34 % (ref 24–43)
MAGNESIUM SERPL-MCNC: 1.9 MG/DL (ref 1.6–2.6)
MAGNESIUM SERPL-MCNC: 1.9 MG/DL (ref 1.6–2.6)
MCH RBC QN AUTO: 34.6 PG (ref 25.2–33.5)
MCH RBC QN AUTO: 34.6 PG (ref 25.2–33.5)
MCHC RBC AUTO-ENTMCNC: 34.9 G/DL (ref 28.4–34.8)
MCHC RBC AUTO-ENTMCNC: 34.9 G/DL (ref 28.4–34.8)
MCV RBC AUTO: 99.1 FL (ref 82.6–102.9)
MCV RBC AUTO: 99.1 FL (ref 82.6–102.9)
METHADONE UR QL: NEGATIVE
METHADONE UR QL: NEGATIVE
MODE: AC
MODE: AC
MONOCYTES NFR BLD: 0.58 K/UL (ref 0.1–1.2)
MONOCYTES NFR BLD: 0.58 K/UL (ref 0.1–1.2)
MONOCYTES NFR BLD: 6 % (ref 3–12)
MONOCYTES NFR BLD: 6 % (ref 3–12)
MYOGLOBIN SERPL-MCNC: 80 NG/ML (ref 28–72)
MYOGLOBIN SERPL-MCNC: 80 NG/ML (ref 28–72)
NEGATIVE BASE EXCESS, ART: 2 MMOL/L (ref 0–2)
NEGATIVE BASE EXCESS, ART: 2 MMOL/L (ref 0–2)
NEUTROPHILS NFR BLD: 56 % (ref 36–65)
NEUTROPHILS NFR BLD: 56 % (ref 36–65)
NEUTS SEG NFR BLD: 5.57 K/UL (ref 1.5–8.1)
NEUTS SEG NFR BLD: 5.57 K/UL (ref 1.5–8.1)
NRBC BLD-RTO: 0 PER 100 WBC
NRBC BLD-RTO: 0 PER 100 WBC
O2 DELIVERY DEVICE: ABNORMAL
O2 DELIVERY DEVICE: ABNORMAL
OPIATES UR QL SCN: NEGATIVE
OPIATES UR QL SCN: NEGATIVE
OXYCODONE UR QL SCN: NEGATIVE
OXYCODONE UR QL SCN: NEGATIVE
PCP UR QL SCN: NEGATIVE
PCP UR QL SCN: NEGATIVE
PLATELET # BLD AUTO: 190 K/UL (ref 138–453)
PLATELET # BLD AUTO: 190 K/UL (ref 138–453)
PMV BLD AUTO: 10.3 FL (ref 8.1–13.5)
PMV BLD AUTO: 10.3 FL (ref 8.1–13.5)
POC HCO3: 27.9 MMOL/L (ref 21–28)
POC HCO3: 27.9 MMOL/L (ref 21–28)
POC O2 SATURATION: 97.1 % (ref 94–98)
POC O2 SATURATION: 97.1 % (ref 94–98)
POC PCO2: 68.5 MM HG (ref 35–48)
POC PCO2: 68.5 MM HG (ref 35–48)
POC PH: 7.22 (ref 7.35–7.45)
POC PH: 7.22 (ref 7.35–7.45)
POC PO2: 113.5 MM HG (ref 83–108)
POC PO2: 113.5 MM HG (ref 83–108)
POTASSIUM SERPL-SCNC: 2.9 MMOL/L (ref 3.7–5.3)
POTASSIUM SERPL-SCNC: 2.9 MMOL/L (ref 3.7–5.3)
PROT SERPL-MCNC: 7.3 G/DL (ref 6.4–8.3)
PROT SERPL-MCNC: 7.3 G/DL (ref 6.4–8.3)
RBC # BLD AUTO: 4.28 M/UL (ref 4.21–5.77)
RBC # BLD AUTO: 4.28 M/UL (ref 4.21–5.77)
SALICYLATES SERPL-MCNC: <1 MG/DL (ref 3–10)
SALICYLATES SERPL-MCNC: <1 MG/DL (ref 3–10)
SODIUM SERPL-SCNC: 133 MMOL/L (ref 135–144)
SODIUM SERPL-SCNC: 133 MMOL/L (ref 135–144)
TEST INFORMATION: ABNORMAL
TEST INFORMATION: ABNORMAL
TROPONIN I SERPL HS-MCNC: 32 NG/L (ref 0–22)
TROPONIN I SERPL HS-MCNC: 32 NG/L (ref 0–22)
WBC OTHER # BLD: 10.1 K/UL (ref 3.5–11.3)
WBC OTHER # BLD: 10.1 K/UL (ref 3.5–11.3)

## 2024-07-15 PROCEDURE — 80179 DRUG ASSAY SALICYLATE: CPT

## 2024-07-15 PROCEDURE — 93005 ELECTROCARDIOGRAM TRACING: CPT | Performed by: EMERGENCY MEDICINE

## 2024-07-15 PROCEDURE — 80048 BASIC METABOLIC PNL TOTAL CA: CPT

## 2024-07-15 PROCEDURE — 71045 X-RAY EXAM CHEST 1 VIEW: CPT

## 2024-07-15 PROCEDURE — 94761 N-INVAS EAR/PLS OXIMETRY MLT: CPT

## 2024-07-15 PROCEDURE — 37799 UNLISTED PX VASCULAR SURGERY: CPT

## 2024-07-15 PROCEDURE — 3E043XZ INTRODUCTION OF VASOPRESSOR INTO CENTRAL VEIN, PERCUTANEOUS APPROACH: ICD-10-PCS | Performed by: INTERNAL MEDICINE

## 2024-07-15 PROCEDURE — 83605 ASSAY OF LACTIC ACID: CPT

## 2024-07-15 PROCEDURE — 82803 BLOOD GASES ANY COMBINATION: CPT

## 2024-07-15 PROCEDURE — 6360000002 HC RX W HCPCS: Performed by: EMERGENCY MEDICINE

## 2024-07-15 PROCEDURE — 96365 THER/PROPH/DIAG IV INF INIT: CPT

## 2024-07-15 PROCEDURE — 82550 ASSAY OF CK (CPK): CPT

## 2024-07-15 PROCEDURE — 2700000000 HC OXYGEN THERAPY PER DAY

## 2024-07-15 PROCEDURE — 5A1955Z RESPIRATORY VENTILATION, GREATER THAN 96 CONSECUTIVE HOURS: ICD-10-PCS | Performed by: INTERNAL MEDICINE

## 2024-07-15 PROCEDURE — 72125 CT NECK SPINE W/O DYE: CPT

## 2024-07-15 PROCEDURE — 80076 HEPATIC FUNCTION PANEL: CPT

## 2024-07-15 PROCEDURE — 82947 ASSAY GLUCOSE BLOOD QUANT: CPT

## 2024-07-15 PROCEDURE — 96366 THER/PROPH/DIAG IV INF ADDON: CPT

## 2024-07-15 PROCEDURE — 80307 DRUG TEST PRSMV CHEM ANLYZR: CPT

## 2024-07-15 PROCEDURE — 2580000003 HC RX 258: Performed by: EMERGENCY MEDICINE

## 2024-07-15 PROCEDURE — 83874 ASSAY OF MYOGLOBIN: CPT

## 2024-07-15 PROCEDURE — 96372 THER/PROPH/DIAG INJ SC/IM: CPT

## 2024-07-15 PROCEDURE — 2000000000 HC ICU R&B

## 2024-07-15 PROCEDURE — 02HV33Z INSERTION OF INFUSION DEVICE INTO SUPERIOR VENA CAVA, PERCUTANEOUS APPROACH: ICD-10-PCS | Performed by: INTERNAL MEDICINE

## 2024-07-15 PROCEDURE — G0480 DRUG TEST DEF 1-7 CLASSES: HCPCS

## 2024-07-15 PROCEDURE — 71250 CT THORAX DX C-: CPT

## 2024-07-15 PROCEDURE — 6360000002 HC RX W HCPCS

## 2024-07-15 PROCEDURE — 2500000003 HC RX 250 WO HCPCS

## 2024-07-15 PROCEDURE — 03HY32Z INSERTION OF MONITORING DEVICE INTO UPPER ARTERY, PERCUTANEOUS APPROACH: ICD-10-PCS | Performed by: INTERNAL MEDICINE

## 2024-07-15 PROCEDURE — 0BH17EZ INSERTION OF ENDOTRACHEAL AIRWAY INTO TRACHEA, VIA NATURAL OR ARTIFICIAL OPENING: ICD-10-PCS | Performed by: INTERNAL MEDICINE

## 2024-07-15 PROCEDURE — 70450 CT HEAD/BRAIN W/O DYE: CPT

## 2024-07-15 PROCEDURE — 94002 VENT MGMT INPAT INIT DAY: CPT

## 2024-07-15 PROCEDURE — 2500000003 HC RX 250 WO HCPCS: Performed by: EMERGENCY MEDICINE

## 2024-07-15 PROCEDURE — 87086 URINE CULTURE/COLONY COUNT: CPT

## 2024-07-15 PROCEDURE — 31500 INSERT EMERGENCY AIRWAY: CPT

## 2024-07-15 PROCEDURE — 36620 INSERTION CATHETER ARTERY: CPT

## 2024-07-15 PROCEDURE — 80143 DRUG ASSAY ACETAMINOPHEN: CPT

## 2024-07-15 PROCEDURE — 99223 1ST HOSP IP/OBS HIGH 75: CPT

## 2024-07-15 PROCEDURE — 83735 ASSAY OF MAGNESIUM: CPT

## 2024-07-15 PROCEDURE — 99285 EMERGENCY DEPT VISIT HI MDM: CPT

## 2024-07-15 PROCEDURE — 36556 INSERT NON-TUNNEL CV CATH: CPT

## 2024-07-15 PROCEDURE — 84484 ASSAY OF TROPONIN QUANT: CPT

## 2024-07-15 PROCEDURE — 85025 COMPLETE CBC W/AUTO DIFF WBC: CPT

## 2024-07-15 PROCEDURE — 87040 BLOOD CULTURE FOR BACTERIA: CPT

## 2024-07-15 RX ORDER — ONDANSETRON 2 MG/ML
4 INJECTION INTRAMUSCULAR; INTRAVENOUS EVERY 6 HOURS PRN
Status: DISCONTINUED | OUTPATIENT
Start: 2024-07-15 | End: 2024-07-27 | Stop reason: HOSPADM

## 2024-07-15 RX ORDER — NOREPINEPHRINE BITARTRATE 0.06 MG/ML
1-100 INJECTION, SOLUTION INTRAVENOUS CONTINUOUS
Status: DISCONTINUED | OUTPATIENT
Start: 2024-07-15 | End: 2024-07-19

## 2024-07-15 RX ORDER — ACETAMINOPHEN 325 MG/1
650 TABLET ORAL EVERY 6 HOURS PRN
Status: DISCONTINUED | OUTPATIENT
Start: 2024-07-15 | End: 2024-07-27 | Stop reason: HOSPADM

## 2024-07-15 RX ORDER — MIDAZOLAM HYDROCHLORIDE 1 MG/ML
2 INJECTION INTRAMUSCULAR; INTRAVENOUS ONCE
Status: COMPLETED | OUTPATIENT
Start: 2024-07-15 | End: 2024-07-15

## 2024-07-15 RX ORDER — SODIUM CHLORIDE 0.9 % (FLUSH) 0.9 %
5-40 SYRINGE (ML) INJECTION EVERY 12 HOURS SCHEDULED
Status: DISCONTINUED | OUTPATIENT
Start: 2024-07-16 | End: 2024-07-27 | Stop reason: HOSPADM

## 2024-07-15 RX ORDER — SODIUM CHLORIDE 0.9 % (FLUSH) 0.9 %
10 SYRINGE (ML) INJECTION PRN
Status: DISCONTINUED | OUTPATIENT
Start: 2024-07-15 | End: 2024-07-27 | Stop reason: HOSPADM

## 2024-07-15 RX ORDER — MIDAZOLAM HYDROCHLORIDE 1 MG/ML
1 INJECTION INTRAMUSCULAR; INTRAVENOUS EVERY 30 MIN PRN
Status: DISCONTINUED | OUTPATIENT
Start: 2024-07-15 | End: 2024-07-18

## 2024-07-15 RX ORDER — ACETAMINOPHEN 650 MG/1
650 SUPPOSITORY RECTAL EVERY 6 HOURS PRN
Status: DISCONTINUED | OUTPATIENT
Start: 2024-07-15 | End: 2024-07-27 | Stop reason: HOSPADM

## 2024-07-15 RX ORDER — ENOXAPARIN SODIUM 100 MG/ML
40 INJECTION SUBCUTANEOUS DAILY
Status: DISCONTINUED | OUTPATIENT
Start: 2024-07-16 | End: 2024-07-17

## 2024-07-15 RX ORDER — MIDAZOLAM HYDROCHLORIDE 1 MG/ML
4 INJECTION INTRAMUSCULAR; INTRAVENOUS ONCE
Status: COMPLETED | OUTPATIENT
Start: 2024-07-15 | End: 2024-07-15

## 2024-07-15 RX ORDER — SODIUM CHLORIDE 9 MG/ML
INJECTION, SOLUTION INTRAVENOUS PRN
Status: DISCONTINUED | OUTPATIENT
Start: 2024-07-15 | End: 2024-07-27 | Stop reason: HOSPADM

## 2024-07-15 RX ORDER — ALBUTEROL SULFATE 2.5 MG/3ML
2.5 SOLUTION RESPIRATORY (INHALATION) EVERY 6 HOURS PRN
Status: DISCONTINUED | OUTPATIENT
Start: 2024-07-15 | End: 2024-07-16

## 2024-07-15 RX ORDER — PROPOFOL 10 MG/ML
5-50 INJECTION, EMULSION INTRAVENOUS CONTINUOUS
Status: DISCONTINUED | OUTPATIENT
Start: 2024-07-15 | End: 2024-07-15

## 2024-07-15 RX ORDER — POLYETHYLENE GLYCOL 3350 17 G/17G
17 POWDER, FOR SOLUTION ORAL DAILY PRN
Status: DISCONTINUED | OUTPATIENT
Start: 2024-07-15 | End: 2024-07-27 | Stop reason: HOSPADM

## 2024-07-15 RX ORDER — POTASSIUM CHLORIDE 7.45 MG/ML
10 INJECTION INTRAVENOUS PRN
Status: DISCONTINUED | OUTPATIENT
Start: 2024-07-15 | End: 2024-07-27 | Stop reason: HOSPADM

## 2024-07-15 RX ORDER — DIPHENHYDRAMINE HYDROCHLORIDE 50 MG/ML
50 INJECTION INTRAMUSCULAR; INTRAVENOUS ONCE
Status: COMPLETED | OUTPATIENT
Start: 2024-07-15 | End: 2024-07-15

## 2024-07-15 RX ORDER — LORAZEPAM 2 MG/ML
2 INJECTION INTRAMUSCULAR ONCE
Status: COMPLETED | OUTPATIENT
Start: 2024-07-15 | End: 2024-07-15

## 2024-07-15 RX ORDER — POTASSIUM CHLORIDE 20 MEQ/1
40 TABLET, EXTENDED RELEASE ORAL PRN
Status: DISCONTINUED | OUTPATIENT
Start: 2024-07-15 | End: 2024-07-27 | Stop reason: HOSPADM

## 2024-07-15 RX ORDER — HALOPERIDOL 5 MG/ML
2 INJECTION INTRAMUSCULAR ONCE
Status: COMPLETED | OUTPATIENT
Start: 2024-07-15 | End: 2024-07-15

## 2024-07-15 RX ORDER — ONDANSETRON 4 MG/1
4 TABLET, ORALLY DISINTEGRATING ORAL EVERY 8 HOURS PRN
Status: DISCONTINUED | OUTPATIENT
Start: 2024-07-15 | End: 2024-07-27 | Stop reason: HOSPADM

## 2024-07-15 RX ORDER — 0.9 % SODIUM CHLORIDE 0.9 %
1000 INTRAVENOUS SOLUTION INTRAVENOUS ONCE
Status: COMPLETED | OUTPATIENT
Start: 2024-07-15 | End: 2024-07-15

## 2024-07-15 RX ORDER — MAGNESIUM SULFATE 1 G/100ML
1000 INJECTION INTRAVENOUS PRN
Status: DISCONTINUED | OUTPATIENT
Start: 2024-07-15 | End: 2024-07-27 | Stop reason: HOSPADM

## 2024-07-15 RX ADMIN — Medication 2 MG/HR: at 23:23

## 2024-07-15 RX ADMIN — SODIUM CHLORIDE 1000 ML: 9 INJECTION, SOLUTION INTRAVENOUS at 20:13

## 2024-07-15 RX ADMIN — PIPERACILLIN AND TAZOBACTAM 4500 MG: 4; .5 INJECTION, POWDER, LYOPHILIZED, FOR SOLUTION INTRAVENOUS at 20:24

## 2024-07-15 RX ADMIN — HALOPERIDOL LACTATE 2 MG: 5 INJECTION, SOLUTION INTRAMUSCULAR at 17:36

## 2024-07-15 RX ADMIN — MIDAZOLAM 2 MG: 1 INJECTION INTRAMUSCULAR; INTRAVENOUS at 21:03

## 2024-07-15 RX ADMIN — DIPHENHYDRAMINE HYDROCHLORIDE 50 MG: 50 INJECTION, SOLUTION INTRAMUSCULAR; INTRAVENOUS at 17:37

## 2024-07-15 RX ADMIN — SODIUM CHLORIDE 1000 ML: 9 INJECTION, SOLUTION INTRAVENOUS at 19:51

## 2024-07-15 RX ADMIN — PROPOFOL 20 MCG/KG/MIN: 10 INJECTION, EMULSION INTRAVENOUS at 19:50

## 2024-07-15 RX ADMIN — Medication 5 MCG/MIN: at 20:22

## 2024-07-15 RX ADMIN — LORAZEPAM 2 MG: 2 INJECTION INTRAMUSCULAR; INTRAVENOUS at 17:37

## 2024-07-15 RX ADMIN — MIDAZOLAM 4 MG: 1 INJECTION INTRAMUSCULAR; INTRAVENOUS at 23:20

## 2024-07-15 ASSESSMENT — PULMONARY FUNCTION TESTS
PIF_VALUE: 23
PIF_VALUE: 27

## 2024-07-15 ASSESSMENT — PAIN - FUNCTIONAL ASSESSMENT: PAIN_FUNCTIONAL_ASSESSMENT: NONE - DENIES PAIN

## 2024-07-15 NOTE — ED NOTES
Pt agitated and being aggressive with staff trying to leave the hospital intoxicated, security at bedside and Dr. Lagos notified.

## 2024-07-15 NOTE — ED NOTES
Pt desat to 70s on NRB. Dr Lagos and writer at bedside. IV established. Dr Lagos bagging pt with ambu. Resp at bedside to prep for intubation

## 2024-07-15 NOTE — ED PROVIDER NOTES
EMERGENCY DEPARTMENT ENCOUNTER    Pt Name: Burke Ruiz  MRN: [id number]  Birthdate 1/1/1902  Date of evaluation: 7/15/24  CHIEF COMPLAINT       Chief Complaint   Patient presents with   • Alcohol Intoxication     HISTORY OF PRESENT ILLNESS   HPI   Patient is an adult male who presented to the emergency department by EMS secondary to concern for alcohol intoxication.  Patient was found on the side of a [employer] EMS was subsequently called.  On arrival in the emergency department patient refuses to give his name or date of birth.  He admits to drinking half a fifth alcohol.  Initially patient is verbally abusive and aggressive with staff will not allow evaluation.  Patient would not answer questions.  He does state that he fell several days ago and state he was evaluated at this time but unsure where circumstances of the fall.  Patient denies denies chest pain and shortness of breath      REVIEW OF SYSTEMS     Review of Systems   Constitutional:  Negative for chills, diaphoresis and fever.   HENT:  Negative for congestion, ear pain and facial swelling.    Eyes:  Negative for pain, discharge and visual disturbance.   Respiratory:  Negative for chest tightness and shortness of breath.    Cardiovascular:  Negative for chest pain and palpitations.   Gastrointestinal:  Negative for abdominal distention and abdominal pain.   Genitourinary:  Negative for difficulty urinating and flank pain.   Musculoskeletal:  Negative for back pain.   Skin:  Negative for wound.   Neurological:  Negative for dizziness, light-headedness and headaches.     PASTMEDICAL HISTORY   No past medical history on file.  Past Problem List  There is no problem list on file for this patient.    SURGICAL HISTORY     No past surgical history on file.  CURRENT MEDICATIONS       Previous Medications    No medications on file     ALLERGIES     has No Known Allergies.  FAMILY HISTORY     has no family status information on file.      SOCIAL HISTORY       none    Code Status Discussion:  full code    CRITICAL CARE:   CRITICAL CARE TIME     Due to the high probability of sudden and clinically significant deterioration in the patient's condition he required highest level of my preparedness to intervene urgently. I provided critical care time including documentation time, medication orders and management, reevaluation, vital sign assessment, ordering and reviewing of of lab tests ordering and reviewing of x-ray studies, and admission orders. Aggregate critical care time is  45 minutes including only time during which I was engaged in work directly related to his care and did not include time spent treating other patients simultaneously.        PROCEDURES:    Intubation    Date/Time: 7/15/2024 7:34 PM    Performed by: Lulú Lagos MD  Authorized by: Lulú Lagos MD    Consent:     Consent obtained:  Emergent situation  Universal protocol:     Immediately prior to procedure, a time out was called: yes      Patient identity confirmed:  Hospital-assigned identification number and arm band  Pre-procedure details:     Indications: airway protection and altered consciousness      Mouth opening - incisor distance:  2 finger widths    Hyoid-mental distance: 2 finger widths      Hyoid-thyroid distance: 2 or more finger widths      Mallampati score:  I    Obstruction: none      Induction agents:  None    Paralytic: Vecuronium.  Procedure details:     Preoxygenation:  Bag valve mask    CPR in progress: no      Number of attempts:  1  Successful intubation attempt details:     Intubation technique: video assisted      Laryngoscope blade:  Mac 4    Tube size (mm):  7.0    Tube type:  Cuffed    Tube visualized through cords: yes    Placement assessment:     ETT at teeth/gumline (cm):  23    Tube secured with:  ETT finley    Breath sounds:  Equal    Placement verification: chest rise, colorimetric ETCO2 and direct visualization    Post-procedure details:     Procedure completion:

## 2024-07-15 NOTE — ED TRIAGE NOTES
Pt arrived via squad found down by [employer] after drinking a half bottle of Vodka. Pt unwilling to answer questions or give last name.

## 2024-07-16 ENCOUNTER — APPOINTMENT (OUTPATIENT)
Age: 49
DRG: 130 | End: 2024-07-16
Payer: MEDICAID

## 2024-07-16 ENCOUNTER — APPOINTMENT (OUTPATIENT)
Dept: VASCULAR LAB | Age: 49
DRG: 130 | End: 2024-07-16
Attending: INTERNAL MEDICINE
Payer: MEDICAID

## 2024-07-16 ENCOUNTER — APPOINTMENT (OUTPATIENT)
Dept: GENERAL RADIOLOGY | Age: 49
DRG: 130 | End: 2024-07-16
Payer: MEDICAID

## 2024-07-16 PROBLEM — G93.40 ACUTE ENCEPHALOPATHY: Status: ACTIVE | Noted: 2024-07-16

## 2024-07-16 LAB
ANION GAP SERPL CALCULATED.3IONS-SCNC: 19 MMOL/L (ref 9–17)
ANION GAP SERPL CALCULATED.3IONS-SCNC: 19 MMOL/L (ref 9–17)
BASOPHILS # BLD: 0.07 K/UL (ref 0–0.2)
BASOPHILS # BLD: 0.07 K/UL (ref 0–0.2)
BASOPHILS NFR BLD: 1 % (ref 0–2)
BASOPHILS NFR BLD: 1 % (ref 0–2)
BNP SERPL-MCNC: 543 PG/ML
BNP SERPL-MCNC: 543 PG/ML
BUN SERPL-MCNC: 12 MG/DL (ref 6–20)
BUN SERPL-MCNC: 12 MG/DL (ref 6–20)
BUN/CREAT SERPL: 15 (ref 9–20)
BUN/CREAT SERPL: 15 (ref 9–20)
CALCIUM SERPL-MCNC: 7.5 MG/DL (ref 8.6–10.4)
CALCIUM SERPL-MCNC: 7.5 MG/DL (ref 8.6–10.4)
CHLORIDE SERPL-SCNC: 99 MMOL/L (ref 98–107)
CHLORIDE SERPL-SCNC: 99 MMOL/L (ref 98–107)
CO2 SERPL-SCNC: 22 MMOL/L (ref 20–31)
CO2 SERPL-SCNC: 22 MMOL/L (ref 20–31)
CREAT SERPL-MCNC: 0.8 MG/DL (ref 0.7–1.2)
CREAT SERPL-MCNC: 0.8 MG/DL (ref 0.7–1.2)
ECHO AO ROOT DIAM: 3.8 CM
ECHO AO ROOT DIAM: 3.8 CM
ECHO AO ROOT INDEX: 1.8 CM/M2
ECHO AO ROOT INDEX: 1.8 CM/M2
ECHO AV MEAN GRADIENT: 2 MMHG
ECHO AV MEAN GRADIENT: 2 MMHG
ECHO AV MEAN VELOCITY: 0.7 M/S
ECHO AV MEAN VELOCITY: 0.7 M/S
ECHO AV PEAK GRADIENT: 5 MMHG
ECHO AV PEAK GRADIENT: 5 MMHG
ECHO AV PEAK VELOCITY: 1.2 M/S
ECHO AV PEAK VELOCITY: 1.2 M/S
ECHO AV VELOCITY RATIO: 0.67
ECHO AV VELOCITY RATIO: 0.67
ECHO AV VTI: 21.1 CM
ECHO AV VTI: 21.1 CM
ECHO BSA: 2.13 M2
ECHO BSA: 2.13 M2
ECHO LA DIAMETER INDEX: 1.61 CM/M2
ECHO LA DIAMETER INDEX: 1.61 CM/M2
ECHO LA DIAMETER: 3.4 CM
ECHO LA DIAMETER: 3.4 CM
ECHO LA TO AORTIC ROOT RATIO: 0.89
ECHO LA TO AORTIC ROOT RATIO: 0.89
ECHO LV E' LATERAL VELOCITY: 9 CM/S
ECHO LV E' LATERAL VELOCITY: 9 CM/S
ECHO LV E' SEPTAL VELOCITY: 5 CM/S
ECHO LV E' SEPTAL VELOCITY: 5 CM/S
ECHO LV FRACTIONAL SHORTENING: 29 % (ref 28–44)
ECHO LV FRACTIONAL SHORTENING: 29 % (ref 28–44)
ECHO LV INTERNAL DIMENSION DIASTOLE INDEX: 3.08 CM/M2
ECHO LV INTERNAL DIMENSION DIASTOLE INDEX: 3.08 CM/M2
ECHO LV INTERNAL DIMENSION DIASTOLIC: 6.5 CM (ref 4.2–5.9)
ECHO LV INTERNAL DIMENSION DIASTOLIC: 6.5 CM (ref 4.2–5.9)
ECHO LV INTERNAL DIMENSION SYSTOLIC INDEX: 2.18 CM/M2
ECHO LV INTERNAL DIMENSION SYSTOLIC INDEX: 2.18 CM/M2
ECHO LV INTERNAL DIMENSION SYSTOLIC: 4.6 CM
ECHO LV INTERNAL DIMENSION SYSTOLIC: 4.6 CM
ECHO LV IVSD: 1.2 CM (ref 0.6–1)
ECHO LV IVSD: 1.2 CM (ref 0.6–1)
ECHO LV MASS 2D: 358.6 G (ref 88–224)
ECHO LV MASS 2D: 358.6 G (ref 88–224)
ECHO LV MASS INDEX 2D: 170 G/M2 (ref 49–115)
ECHO LV MASS INDEX 2D: 170 G/M2 (ref 49–115)
ECHO LV POSTERIOR WALL DIASTOLIC: 1.2 CM (ref 0.6–1)
ECHO LV POSTERIOR WALL DIASTOLIC: 1.2 CM (ref 0.6–1)
ECHO LV RELATIVE WALL THICKNESS RATIO: 0.37
ECHO LV RELATIVE WALL THICKNESS RATIO: 0.37
ECHO LVOT PEAK GRADIENT: 3 MMHG
ECHO LVOT PEAK GRADIENT: 3 MMHG
ECHO LVOT PEAK VELOCITY: 0.8 M/S
ECHO LVOT PEAK VELOCITY: 0.8 M/S
ECHO MV A VELOCITY: 1.02 M/S
ECHO MV A VELOCITY: 1.02 M/S
ECHO MV E DECELERATION TIME (DT): 239 MS
ECHO MV E DECELERATION TIME (DT): 239 MS
ECHO MV E VELOCITY: 0.63 M/S
ECHO MV E VELOCITY: 0.63 M/S
ECHO MV E/A RATIO: 0.62
ECHO MV E/A RATIO: 0.62
ECHO MV E/E' LATERAL: 7
ECHO MV E/E' LATERAL: 7
ECHO MV E/E' RATIO (AVERAGED): 9.8
ECHO MV E/E' RATIO (AVERAGED): 9.8
ECHO MV E/E' SEPTAL: 12.6
ECHO MV E/E' SEPTAL: 12.6
EKG ATRIAL RATE: 55 BPM
EKG ATRIAL RATE: 55 BPM
EKG ATRIAL RATE: 76 BPM
EKG ATRIAL RATE: 76 BPM
EKG ATRIAL RATE: 80 BPM
EKG ATRIAL RATE: 80 BPM
EKG P AXIS: 62 DEGREES
EKG P AXIS: 62 DEGREES
EKG P AXIS: 66 DEGREES
EKG P AXIS: 66 DEGREES
EKG P AXIS: 68 DEGREES
EKG P AXIS: 68 DEGREES
EKG P-R INTERVAL: 142 MS
EKG P-R INTERVAL: 142 MS
EKG P-R INTERVAL: 166 MS
EKG P-R INTERVAL: 166 MS
EKG P-R INTERVAL: 172 MS
EKG P-R INTERVAL: 172 MS
EKG Q-T INTERVAL: 438 MS
EKG Q-T INTERVAL: 438 MS
EKG Q-T INTERVAL: 454 MS
EKG Q-T INTERVAL: 454 MS
EKG Q-T INTERVAL: 504 MS
EKG Q-T INTERVAL: 504 MS
EKG QRS DURATION: 120 MS
EKG QRS DURATION: 120 MS
EKG QRS DURATION: 132 MS
EKG QRS DURATION: 132 MS
EKG QRS DURATION: 136 MS
EKG QRS DURATION: 136 MS
EKG QTC CALCULATION (BAZETT): 482 MS
EKG QTC CALCULATION (BAZETT): 482 MS
EKG QTC CALCULATION (BAZETT): 505 MS
EKG QTC CALCULATION (BAZETT): 505 MS
EKG QTC CALCULATION (BAZETT): 510 MS
EKG QTC CALCULATION (BAZETT): 510 MS
EKG R AXIS: -6 DEGREES
EKG R AXIS: -6 DEGREES
EKG R AXIS: 10 DEGREES
EKG R AXIS: 10 DEGREES
EKG R AXIS: 64 DEGREES
EKG R AXIS: 64 DEGREES
EKG T AXIS: 49 DEGREES
EKG T AXIS: 49 DEGREES
EKG T AXIS: 66 DEGREES
EKG T AXIS: 66 DEGREES
EKG T AXIS: 78 DEGREES
EKG T AXIS: 78 DEGREES
EKG VENTRICULAR RATE: 55 BPM
EKG VENTRICULAR RATE: 55 BPM
EKG VENTRICULAR RATE: 76 BPM
EKG VENTRICULAR RATE: 76 BPM
EKG VENTRICULAR RATE: 80 BPM
EKG VENTRICULAR RATE: 80 BPM
EOSINOPHIL # BLD: 0.2 K/UL (ref 0–0.44)
EOSINOPHIL # BLD: 0.2 K/UL (ref 0–0.44)
EOSINOPHILS RELATIVE PERCENT: 2 % (ref 1–4)
EOSINOPHILS RELATIVE PERCENT: 2 % (ref 1–4)
ERYTHROCYTE [DISTWIDTH] IN BLOOD BY AUTOMATED COUNT: 13.1 % (ref 11.8–14.4)
ERYTHROCYTE [DISTWIDTH] IN BLOOD BY AUTOMATED COUNT: 13.1 % (ref 11.8–14.4)
FIO2: 50
FIO2: 50
GFR, ESTIMATED: 68 ML/MIN/1.73M2
GFR, ESTIMATED: 68 ML/MIN/1.73M2
GLUCOSE BLD-MCNC: 111 MG/DL (ref 75–110)
GLUCOSE BLD-MCNC: 111 MG/DL (ref 75–110)
GLUCOSE BLD-MCNC: 70 MG/DL (ref 75–110)
GLUCOSE BLD-MCNC: 70 MG/DL (ref 75–110)
GLUCOSE BLD-MCNC: 72 MG/DL (ref 75–110)
GLUCOSE BLD-MCNC: 72 MG/DL (ref 75–110)
GLUCOSE BLD-MCNC: 75 MG/DL (ref 75–110)
GLUCOSE BLD-MCNC: 75 MG/DL (ref 75–110)
GLUCOSE BLD-MCNC: 77 MG/DL (ref 75–110)
GLUCOSE BLD-MCNC: 77 MG/DL (ref 75–110)
GLUCOSE BLD-MCNC: 84 MG/DL (ref 75–110)
GLUCOSE BLD-MCNC: 84 MG/DL (ref 75–110)
GLUCOSE SERPL-MCNC: 80 MG/DL (ref 70–99)
GLUCOSE SERPL-MCNC: 80 MG/DL (ref 70–99)
HCT VFR BLD AUTO: 39.7 % (ref 40.7–50.3)
HCT VFR BLD AUTO: 39.7 % (ref 40.7–50.3)
HGB BLD-MCNC: 13.9 G/DL (ref 13–17)
HGB BLD-MCNC: 13.9 G/DL (ref 13–17)
IMM GRANULOCYTES # BLD AUTO: 0.07 K/UL (ref 0–0.3)
IMM GRANULOCYTES # BLD AUTO: 0.07 K/UL (ref 0–0.3)
IMM GRANULOCYTES NFR BLD: 1 %
IMM GRANULOCYTES NFR BLD: 1 %
LACTATE BLDV-SCNC: 1.2 MMOL/L (ref 0.5–1.9)
LACTATE BLDV-SCNC: 1.2 MMOL/L (ref 0.5–1.9)
LYMPHOCYTES NFR BLD: 3.9 K/UL (ref 1.1–3.7)
LYMPHOCYTES NFR BLD: 3.9 K/UL (ref 1.1–3.7)
LYMPHOCYTES RELATIVE PERCENT: 29 % (ref 24–43)
LYMPHOCYTES RELATIVE PERCENT: 29 % (ref 24–43)
MCH RBC QN AUTO: 34.8 PG (ref 25.2–33.5)
MCH RBC QN AUTO: 34.8 PG (ref 25.2–33.5)
MCHC RBC AUTO-ENTMCNC: 35 G/DL (ref 28.4–34.8)
MCHC RBC AUTO-ENTMCNC: 35 G/DL (ref 28.4–34.8)
MCV RBC AUTO: 99.5 FL (ref 82.6–102.9)
MCV RBC AUTO: 99.5 FL (ref 82.6–102.9)
MICROORGANISM SPEC CULT: NORMAL
MICROORGANISM SPEC CULT: NORMAL
MONOCYTES NFR BLD: 0.66 K/UL (ref 0.1–1.2)
MONOCYTES NFR BLD: 0.66 K/UL (ref 0.1–1.2)
MONOCYTES NFR BLD: 5 % (ref 3–12)
MONOCYTES NFR BLD: 5 % (ref 3–12)
NEGATIVE BASE EXCESS, ART: 0.2 MMOL/L (ref 0–2)
NEGATIVE BASE EXCESS, ART: 0.2 MMOL/L (ref 0–2)
NEUTROPHILS NFR BLD: 62 % (ref 36–65)
NEUTROPHILS NFR BLD: 62 % (ref 36–65)
NEUTS SEG NFR BLD: 8.45 K/UL (ref 1.5–8.1)
NEUTS SEG NFR BLD: 8.45 K/UL (ref 1.5–8.1)
NRBC BLD-RTO: 0 PER 100 WBC
NRBC BLD-RTO: 0 PER 100 WBC
PATIENT TEMP: 37
PATIENT TEMP: 37
PLATELET # BLD AUTO: 193 K/UL (ref 138–453)
PLATELET # BLD AUTO: 193 K/UL (ref 138–453)
PMV BLD AUTO: 10.3 FL (ref 8.1–13.5)
PMV BLD AUTO: 10.3 FL (ref 8.1–13.5)
POC HCO3: 23.9 MMOL/L (ref 21–28)
POC HCO3: 23.9 MMOL/L (ref 21–28)
POC O2 SATURATION: 98.6 % (ref 94–98)
POC O2 SATURATION: 98.6 % (ref 94–98)
POC PCO2: 36.6 MM HG (ref 35–48)
POC PCO2: 36.6 MM HG (ref 35–48)
POC PH: 7.42 (ref 7.35–7.45)
POC PH: 7.42 (ref 7.35–7.45)
POC PO2: 115.1 MM HG (ref 83–108)
POC PO2: 115.1 MM HG (ref 83–108)
POTASSIUM SERPL-SCNC: 3.1 MMOL/L (ref 3.7–5.3)
POTASSIUM SERPL-SCNC: 3.1 MMOL/L (ref 3.7–5.3)
POTASSIUM SERPL-SCNC: 3.4 MMOL/L (ref 3.7–5.3)
POTASSIUM SERPL-SCNC: 3.4 MMOL/L (ref 3.7–5.3)
RBC # BLD AUTO: 3.99 M/UL (ref 4.21–5.77)
RBC # BLD AUTO: 3.99 M/UL (ref 4.21–5.77)
SERVICE CMNT-IMP: NORMAL
SERVICE CMNT-IMP: NORMAL
SODIUM SERPL-SCNC: 140 MMOL/L (ref 135–144)
SODIUM SERPL-SCNC: 140 MMOL/L (ref 135–144)
SPECIMEN DESCRIPTION: NORMAL
SPECIMEN DESCRIPTION: NORMAL
WBC OTHER # BLD: 13.4 K/UL (ref 3.5–11.3)
WBC OTHER # BLD: 13.4 K/UL (ref 3.5–11.3)

## 2024-07-16 PROCEDURE — 94761 N-INVAS EAR/PLS OXIMETRY MLT: CPT

## 2024-07-16 PROCEDURE — 82947 ASSAY GLUCOSE BLOOD QUANT: CPT

## 2024-07-16 PROCEDURE — 2000000000 HC ICU R&B

## 2024-07-16 PROCEDURE — 2700000000 HC OXYGEN THERAPY PER DAY

## 2024-07-16 PROCEDURE — 6360000002 HC RX W HCPCS: Performed by: INTERNAL MEDICINE

## 2024-07-16 PROCEDURE — 84132 ASSAY OF SERUM POTASSIUM: CPT

## 2024-07-16 PROCEDURE — 6370000000 HC RX 637 (ALT 250 FOR IP)

## 2024-07-16 PROCEDURE — 93010 ELECTROCARDIOGRAM REPORT: CPT | Performed by: INTERNAL MEDICINE

## 2024-07-16 PROCEDURE — 2580000003 HC RX 258

## 2024-07-16 PROCEDURE — 83880 ASSAY OF NATRIURETIC PEPTIDE: CPT

## 2024-07-16 PROCEDURE — 71045 X-RAY EXAM CHEST 1 VIEW: CPT

## 2024-07-16 PROCEDURE — 94003 VENT MGMT INPAT SUBQ DAY: CPT

## 2024-07-16 PROCEDURE — 2580000003 HC RX 258: Performed by: INTERNAL MEDICINE

## 2024-07-16 PROCEDURE — 37799 UNLISTED PX VASCULAR SURGERY: CPT

## 2024-07-16 PROCEDURE — 99232 SBSQ HOSP IP/OBS MODERATE 35: CPT | Performed by: INTERNAL MEDICINE

## 2024-07-16 PROCEDURE — 93306 TTE W/DOPPLER COMPLETE: CPT

## 2024-07-16 PROCEDURE — 80048 BASIC METABOLIC PNL TOTAL CA: CPT

## 2024-07-16 PROCEDURE — 93005 ELECTROCARDIOGRAM TRACING: CPT | Performed by: NURSE PRACTITIONER

## 2024-07-16 PROCEDURE — 6360000002 HC RX W HCPCS

## 2024-07-16 PROCEDURE — 85025 COMPLETE CBC W/AUTO DIFF WBC: CPT

## 2024-07-16 PROCEDURE — 36620 INSERTION CATHETER ARTERY: CPT

## 2024-07-16 PROCEDURE — 6370000000 HC RX 637 (ALT 250 FOR IP): Performed by: INTERNAL MEDICINE

## 2024-07-16 PROCEDURE — 2500000003 HC RX 250 WO HCPCS: Performed by: INTERNAL MEDICINE

## 2024-07-16 PROCEDURE — 87641 MR-STAPH DNA AMP PROBE: CPT

## 2024-07-16 PROCEDURE — 93926 LOWER EXTREMITY STUDY: CPT

## 2024-07-16 PROCEDURE — 83605 ASSAY OF LACTIC ACID: CPT

## 2024-07-16 PROCEDURE — 82803 BLOOD GASES ANY COMBINATION: CPT

## 2024-07-16 RX ORDER — CLONAZEPAM 0.5 MG/1
1 TABLET ORAL EVERY 12 HOURS
Status: DISCONTINUED | OUTPATIENT
Start: 2024-07-16 | End: 2024-07-17

## 2024-07-16 RX ORDER — MIDAZOLAM HYDROCHLORIDE 1 MG/ML
4 INJECTION INTRAMUSCULAR; INTRAVENOUS ONCE
Status: COMPLETED | OUTPATIENT
Start: 2024-07-16 | End: 2024-07-16

## 2024-07-16 RX ORDER — QUETIAPINE FUMARATE 25 MG/1
25 TABLET, FILM COATED ORAL 2 TIMES DAILY
Status: DISCONTINUED | OUTPATIENT
Start: 2024-07-16 | End: 2024-07-18

## 2024-07-16 RX ORDER — DEXTROSE MONOHYDRATE 100 MG/ML
INJECTION, SOLUTION INTRAVENOUS CONTINUOUS PRN
Status: DISCONTINUED | OUTPATIENT
Start: 2024-07-16 | End: 2024-07-27 | Stop reason: HOSPADM

## 2024-07-16 RX ORDER — HYDRALAZINE HYDROCHLORIDE 20 MG/ML
10 INJECTION INTRAMUSCULAR; INTRAVENOUS EVERY 6 HOURS PRN
Status: DISCONTINUED | OUTPATIENT
Start: 2024-07-16 | End: 2024-07-27 | Stop reason: HOSPADM

## 2024-07-16 RX ORDER — 0.9 % SODIUM CHLORIDE 0.9 %
1000 INTRAVENOUS SOLUTION INTRAVENOUS ONCE
Status: COMPLETED | OUTPATIENT
Start: 2024-07-16 | End: 2024-07-16

## 2024-07-16 RX ORDER — PROPOFOL 10 MG/ML
5-50 INJECTION, EMULSION INTRAVENOUS CONTINUOUS
Status: DISCONTINUED | OUTPATIENT
Start: 2024-07-16 | End: 2024-07-27 | Stop reason: HOSPADM

## 2024-07-16 RX ADMIN — SODIUM CHLORIDE: 9 INJECTION, SOLUTION INTRAVENOUS at 20:23

## 2024-07-16 RX ADMIN — PIPERACILLIN AND TAZOBACTAM 3375 MG: 3; .375 INJECTION, POWDER, LYOPHILIZED, FOR SOLUTION INTRAVENOUS at 10:03

## 2024-07-16 RX ADMIN — MIDAZOLAM 1 MG: 1 INJECTION INTRAMUSCULAR; INTRAVENOUS at 00:09

## 2024-07-16 RX ADMIN — POTASSIUM BICARBONATE 40 MEQ: 782 TABLET, EFFERVESCENT ORAL at 10:02

## 2024-07-16 RX ADMIN — POTASSIUM CHLORIDE 10 MEQ: 7.46 INJECTION, SOLUTION INTRAVENOUS at 01:38

## 2024-07-16 RX ADMIN — Medication 10 MG/HR: at 13:16

## 2024-07-16 RX ADMIN — SODIUM CHLORIDE 1000 ML: 9 INJECTION, SOLUTION INTRAVENOUS at 17:58

## 2024-07-16 RX ADMIN — ENOXAPARIN SODIUM 40 MG: 100 INJECTION SUBCUTANEOUS at 08:09

## 2024-07-16 RX ADMIN — SODIUM CHLORIDE, PRESERVATIVE FREE 10 ML: 5 INJECTION INTRAVENOUS at 21:34

## 2024-07-16 RX ADMIN — POTASSIUM CHLORIDE 10 MEQ: 7.46 INJECTION, SOLUTION INTRAVENOUS at 06:44

## 2024-07-16 RX ADMIN — SODIUM CHLORIDE, PRESERVATIVE FREE 10 ML: 5 INJECTION INTRAVENOUS at 08:09

## 2024-07-16 RX ADMIN — Medication 50 MCG/HR: at 05:59

## 2024-07-16 RX ADMIN — HYDRALAZINE HYDROCHLORIDE 10 MG: 20 INJECTION INTRAMUSCULAR; INTRAVENOUS at 16:15

## 2024-07-16 RX ADMIN — PROPOFOL 25 MCG/KG/MIN: 10 INJECTION, EMULSION INTRAVENOUS at 18:01

## 2024-07-16 RX ADMIN — PROPOFOL 20 MCG/KG/MIN: 10 INJECTION, EMULSION INTRAVENOUS at 12:04

## 2024-07-16 RX ADMIN — CLONAZEPAM 1 MG: 0.5 TABLET ORAL at 13:07

## 2024-07-16 RX ADMIN — PIPERACILLIN AND TAZOBACTAM 3375 MG: 3; .375 INJECTION, POWDER, LYOPHILIZED, FOR SOLUTION INTRAVENOUS at 03:37

## 2024-07-16 RX ADMIN — VANCOMYCIN HYDROCHLORIDE 2500 MG: 5 INJECTION, POWDER, LYOPHILIZED, FOR SOLUTION INTRAVENOUS at 00:51

## 2024-07-16 RX ADMIN — POTASSIUM CHLORIDE 10 MEQ: 7.46 INJECTION, SOLUTION INTRAVENOUS at 02:45

## 2024-07-16 RX ADMIN — SODIUM CHLORIDE, PRESERVATIVE FREE 40 MG: 5 INJECTION INTRAVENOUS at 00:59

## 2024-07-16 RX ADMIN — PIPERACILLIN AND TAZOBACTAM 3375 MG: 3; .375 INJECTION, POWDER, LYOPHILIZED, FOR SOLUTION INTRAVENOUS at 18:00

## 2024-07-16 RX ADMIN — MIDAZOLAM 1 MG: 1 INJECTION INTRAMUSCULAR; INTRAVENOUS at 02:45

## 2024-07-16 RX ADMIN — VANCOMYCIN HYDROCHLORIDE 1500 MG: 5 INJECTION, POWDER, LYOPHILIZED, FOR SOLUTION INTRAVENOUS at 13:09

## 2024-07-16 RX ADMIN — MIDAZOLAM 4 MG: 1 INJECTION INTRAMUSCULAR; INTRAVENOUS at 12:04

## 2024-07-16 RX ADMIN — POTASSIUM CHLORIDE 10 MEQ: 7.46 INJECTION, SOLUTION INTRAVENOUS at 04:10

## 2024-07-16 RX ADMIN — POTASSIUM CHLORIDE 10 MEQ: 7.46 INJECTION, SOLUTION INTRAVENOUS at 05:37

## 2024-07-16 RX ADMIN — SODIUM CHLORIDE, PRESERVATIVE FREE 40 MG: 5 INJECTION INTRAVENOUS at 21:33

## 2024-07-16 RX ADMIN — POTASSIUM CHLORIDE 10 MEQ: 7.46 INJECTION, SOLUTION INTRAVENOUS at 00:30

## 2024-07-16 ASSESSMENT — PULMONARY FUNCTION TESTS
PIF_VALUE: 25
PIF_VALUE: 19
PIF_VALUE: 27
PIF_VALUE: 28
PIF_VALUE: 26
PIF_VALUE: 26
PIF_VALUE: 30
PIF_VALUE: 26
PIF_VALUE: 23
PIF_VALUE: 24
PIF_VALUE: 24
PIF_VALUE: 28
PIF_VALUE: 31
PIF_VALUE: 27
PIF_VALUE: 26
PIF_VALUE: 27
PIF_VALUE: 24
PIF_VALUE: 27
PIF_VALUE: 27
PIF_VALUE: 31
PIF_VALUE: 24
PIF_VALUE: 26
PIF_VALUE: 28
PIF_VALUE: 29
PIF_VALUE: 26
PIF_VALUE: 28
PIF_VALUE: 26
PIF_VALUE: 29
PIF_VALUE: 27
PIF_VALUE: 29
PIF_VALUE: 28
PIF_VALUE: 27
PIF_VALUE: 27
PIF_VALUE: 31
PIF_VALUE: 23
PIF_VALUE: 23
PIF_VALUE: 46

## 2024-07-16 ASSESSMENT — PAIN SCALES - GENERAL
PAINLEVEL_OUTOF10: 0

## 2024-07-16 NOTE — CONSULTS
Comprehensive Nutrition Assessment    Type and Reason for Visit:  Initial, Consult    Nutrition Recommendations/Plan:   Consult for TF   Start Vital 1.2 Mihai at initial rate of 15mL/hour (360mL total volume) with 30mL flushes Q4; advance to goal rate of 60mL/hour (1440mL total volume)   Continue to monitor TF rate, tolerance, weights and labs      Malnutrition Assessment:  Malnutrition Status:  Insufficient data (07/16/24 1306)        Nutrition Assessment:    Natew patient presents with acute respiratory failure, alcohol intoxication, encephoalopathy, and aspiration pneumonia. Pt is intubated. [facility] after drinking 1/2 bottle of vodka. Suspected the patient is homeless. Consult order for TF. Start patient on Vital 1.2 Mihai at initial rate of 15mL/hour (360mL total volume) and advance to goal rate of 60mL/hour (1440mL total volume) with 30mL flushes Q4. Final TF provides 1728kcals, 108g protein and 1168 mL free water (not including flushes). His weight and height were estimated, TF order may need adjusted down the line. Continue to monitor TF rate and tolerance, weight and labs. (Simultaneous filing. User may not have seen previous data.)    Nutrition Related Findings:    /89, blood work showed 42% ethanol and positive for cocaine and marijuana Wound Type: None       Current Nutrition Intake & Therapies:    Average Meal Intake: NPO  Average Supplements Intake: NPO  Diet NPO  ADULT TUBE FEEDING; Nasogastric; Peptide Based; Continuous; 15; Yes; 15; Q 4 hours; 70; 30; Q 4 hours    Anthropometric Measures:  Height: 180.3 cm (5' 11\")  Ideal Body Weight (IBW): 172 lbs (78 kg)       Current Body Weight: 90.7 kg (199 lb 15.3 oz), 116.3 % IBW. Weight Source: Other (Comment) (estimated)  Current BMI (kg/m2): 27.9        Weight Adjustment For: No Adjustment                 BMI Categories: Overweight (BMI 25.0-29.9)    Estimated Daily Nutrient Needs:  Energy Requirements Based On: Kcal/kg  Weight Used

## 2024-07-16 NOTE — PROGRESS NOTES
[facility]  Office: [phone]  Klever Dickens DO, Eulogio Sr DO, Jerome Worley DO, Warren Jovel DO, Vaughn Jefferson MD, Krista Andrews MD, Ana M Cooley MD, Nia Wright MD,  Doc Metz MD, Mario Alberto Lizama MD, Shahla Dutta MD,  Dolly Wu DO, Nancy Mata MD, James Boston MD, Trey Dickens DO, Adriana Spencer MD,  Luis Alberto Thornton DO, Yanet Horan MD, Renee King MD, Fely Gomez MD, Kady Macias MD,  Kayden Murillo MD, Ramsey Valentine MD, Airam Gonsales MD, Angelina Cruz MD, Devyn Gates MD, Neil Looney MD, Navin King DO, Tera Prajapati DO, Julieta Gomez MD,  Kael Farias MD, Shirley Waterhouse, CNP,  Valencia Regalado CNP, Lobo Rojas, CNP,  Karen Mascorro, DNP, Ania Oro, CNP, Kyra Isbell, CNP, Shayla Song CNP, Jackelin Woody, CNP, Hodan Cruz, PA-C, Kim Woodard PA-C, Destiney Manriquez, CNP, Vania Silva, CNP, Hailey Carson, CNP, Brooke Olvera, CNP, Arabella Rincon, CNP, Rebecca Zavala, CNS, Laurie Crandall, CNP, Melissa Pat CNP, Tracy Schwab, CNP         [facility]   IN-PATIENT SERVICE   [facility]    Progress Note    7/16/2024    8:32 AM    Name:   Burke Ruiz  MRN:     [id number]     Acct:      [id number]   Room:   [facility]/1104-01   Day:  1  Admit Date:  7/15/2024  5:08 PM    PCP:   No primary care provider on file.  Code Status:  Full Code    Subjective:     C/C:   Chief Complaint   Patient presents with    Alcohol Intoxication     Interval History Status: not changed.     Patient is intubated and sedated.  No acute issues overnight.    Brief History:     This is an unidentified male that presents via squad after drinking a large volume of vodka.  Upon evaluation he was found to have a significant alcohol level of greater than 0.4 with positive testing for cocaine and cannabis.  To a level of consciousness he was intubated admitted to the intensive care unit.    Review of Systems:     Cannot be obtained due  infiltration of the liver. 6. No evidence of pneumothorax or pneumomediastinum.     CT Head W/O Contrast    Result Date: 7/15/2024  1. No acute intracranial abnormality. 2. No acute osseous abnormality of the cervical spine. Multilevel degenerative disc disease and facet arthrosis.     CT CSpine W/O Contrast    Result Date: 7/15/2024  1. No acute intracranial abnormality. 2. No acute osseous abnormality of the cervical spine. Multilevel degenerative disc disease and facet arthrosis.     XR CHEST PORTABLE    Result Date: 7/15/2024  1. Endotracheal tube terminating approximately 9.5 cm above the patricia, recommend advancement by approximately 4 cm. 2. Enteric tube in the midline with side port and tip over the gastric fundus, appropriate in location. 3. Airspace opacities throughout the right lung and retrocardiac left lung, concerning for atelectasis, aspiration or developing pneumonia, new since the prior study.     XR CHEST PORTABLE    Result Date: 7/15/2024  Mild cardiomegaly. No acute cardiopulmonary process.       Physical Examination:        General appearance: Intubated and sedated  Mental Status: Cannot be assessed due to respiratory failure  Lungs:  clear to auscultation bilaterally, vented  Heart:  regular rate and rhythm, no murmur  Abdomen:  soft, nontender, nondistended, normal bowel sounds, no masses, hepatomegaly, splenomegaly  Extremities:  no edema, redness, tenderness in the calves  Skin:  no gross lesions, rashes, induration    Assessment:        Hospital Problems             Last Modified POA    * (Principal) Acute alcoholic intoxication with complication (HCC) 7/15/2024 Yes    Alcohol intoxication in alcoholism with blood level over 0.3 with delirium (HCC) 7/15/2024 Yes    Aspiration pneumonia of both upper lobes due to vomit (HCC) 7/15/2024 Yes    Acute hypoxic respiratory failure (HCC) 7/15/2024 Yes    On mechanically assisted ventilation (HCC) 7/15/2024 Yes       Plan:        Continue vent

## 2024-07-16 NOTE — PROGRESS NOTES
0115 contacted On call NP Shayla Song d/t patient Qt 544 and Qtc 526 per telemetry monitor.   Given orders for EKG  EKG  /Qtc 518    1141 Contacted Dr. Paiz d/t patient on Versed 6mg/ml and received IVP versed PRNx2.   Given orders for Fentanyl gtt RASS -2 to -3

## 2024-07-16 NOTE — PROGRESS NOTES
Insert Arterial Line  Date/Time:  07/15/24, 8:07 PM  Performed by: LUIZA ORTIZ JR, RCP    Patient identity confirmed: arm band and provided demographic data   Time out: Immediately prior to procedure a \"time out\" was called to verify the correct patient, procedure, equipment, support staff.    Preparation: Patient was prepped and draped in the usual sterile fashion.    Location:left radial    Getachew's test normal: yes  Needle gauge: 20     Number of attempts: 1  Post-procedure: transparent dressing applied and line secured    Patient tolerance: well

## 2024-07-16 NOTE — PROGRESS NOTES
At this time we are unable to answer admission questions. Patient is intubated and homeless. No emergency contact or patient name available. No identification in patient belongings.

## 2024-07-16 NOTE — CONSULTS
Pulmonary Medicine and Critical Care Consult    Patient - Burke Ruiz   MRN -  [id number]   [facility] [id number]   - 1902      Date of Admission -  7/15/2024  5:08 PM  Date of evaluation -  2024  Room - [address] [place] [address]   Hospital Day - 1  Consulting - Jerome Worley DO Primary Care Physician - No primary care provider on file.     Reason for Consult      ICU management  Assessment/recommendations     Acute hypoxic respiratory failure requiring intubation  Continue assist-control ventilation  Sedation add propofol drip wean last RASS -2  Continue fentanyl drip RASS -2  Versed drip RASS -2 wean first  Start tube feeds as tolerated    Aspiration pneumonia/smoker/possible COPD/mild pulmonary edema  DuoNeb by nebulizer  Zosyn and vancomycin  Sputum culture  Will consider echocardiogram  Smoking/THC cessation    alcohol intoxication/fatty liver with increased liver function/alcohol withdrawal   Alcohol cessation  Start Klonopin 1 mg twice daily and Seroquel 25 twice daily  Monitor LFTs    Hypertension poor control  Increase sedation to control BP    Right inguinal hernia   discussed with RN RT  Discussed with clinical pharmacist  Peptic ulcer disease prophylaxis  DVT prophylaxis  Problem List      Patient Active Problem List   Diagnosis    Acute alcoholic intoxication with complication (HCC)    Alcohol intoxication in alcoholism with blood level over 0.3 with delirium (HCC)    Aspiration pneumonia of both upper lobes due to vomit (HCC)    Acute hypoxic respiratory failure (HCC)    On mechanically assisted ventilation (HCC)       HPI     Burke Ruiz is 89 y.o.,  male, previous medical history of alcohol abuse presented to the hospital with alcohol intoxication.  He was brought by squad was found to drinking half a bottle of vodka.  He refused to identify himself.  He was hypoxic on presentation on 2 L nasal cannula.  He had desaturation requiring intubation.  Workup  revealed ethanol 42%, potassium 2.9,  posterior portion of right middle  lobe. Mild dense atelectatic changes, and/or infiltrates at the posterior  aspects of rest of bilateral pulmonary upper lobes.  2. Mild-to-moderate pulmonary vascular congestion. Mild interstitial  pulmonary edema is probably present.  3. Endotracheal tube in position. NG tube in position.  4. Marked coronary artery calcifications.  5. Moderate to marked diffuse fatty infiltration of the liver.  6. No evidence of pneumothorax or pneumomediastinum    (See actual reports for details)    \"Thank you for asking us to see this patient\"    Case discussed with nurse and patient/family.  Questions and concerns addressed.    Electronically signed by     Koko Paiz MD on 7/16/2024 at 12:58 PM   Cc35 min  This patient with acute illness required highest level of physician preparedness to intervene urgently.  I managed life/organ supporting measures reviewed imaging and lab made decision accordingly

## 2024-07-16 NOTE — PROGRESS NOTES
Patient's groin area about cental line is swollen and no blood return noted when attempting to pull back. Dr. Worley notified and assessed area. Orders placed for a venous ultrasound of the right groin. Central line was removed per Dr. Worley.

## 2024-07-16 NOTE — PLAN OF CARE
Problem: Safety - Medical Restraint  Goal: Remains free of injury from restraints (Restraint for Interference with Medical Device)  Description: INTERVENTIONS:  1. Determine that other, less restrictive measures have been tried or would not be effective before applying the restraint  2. Evaluate the patient's condition at the time of restraint application  3. Inform patient/family regarding the reason for restraint  4. Q2H: Monitor safety, psychosocial status, comfort, nutrition and hydration  Outcome: Progressing  Flowsheets (Taken 7/16/2024 0000)  Remains free of injury from restraints (restraint for interference with medical device):   Determine that other, less restrictive measures have been tried or would not be effective before applying the restraint   Evaluate the patient's condition at the time of restraint application   Inform patient/family regarding the reason for restraint   Every 2 hours: Monitor safety, psychosocial status, comfort, nutrition and hydration     Problem: Discharge Planning  Goal: Discharge to home or other facility with appropriate resources  Outcome: Progressing  Flowsheets (Taken 7/16/2024 0000)  Discharge to home or other facility with appropriate resources: Identify barriers to discharge with patient and caregiver     Problem: Respiratory - Adult  Goal: Achieves optimal ventilation and oxygenation  7/16/2024 0223 by Rosette Hurd, RN  Outcome: Progressing  7/16/2024 0003 by Annie Haney RCP  Outcome: Progressing  Goal: Adequate oxygenation  7/16/2024 0003 by Annie Haney RCP  Outcome: Progressing  Goal: Will be able to breathe spontaneously, without ventilator support  Description: Will be able to breathe spontaneously, without ventilator support  7/16/2024 0003 by Annie Haney RCP  Outcome: Progressing     Problem: Skin/Tissue Integrity  Goal: Absence of new skin breakdown  Description: 1.  Monitor for areas of redness and/or skin breakdown  2.  Assess vascular

## 2024-07-16 NOTE — ED PROVIDER NOTES
EMERGENCY DEPARTMENT ENCOUNTER    Pt Name: Burke Ruiz  MRN: [id number]  Birthdate 1/1/1902  Date of evaluation: 7/15/24      Patient sign out from Dr. Lagos.    Treatment and Disposition    Patient repeat assessment: 25-year-old Burke Ruiz alcohol intoxication, encephalopathy, aspiration pneumonia, intubated for airway protection, on Levophed, Versed.  Started on Zosyn.  Triple-lumen catheter placed by me and right femoral.    Case discussed with consulting clinician:  Critical Care, Dr. Paiz, no specific recommendations at this time.  Will see patient in the ICU.        Disposition discussion with patient/family: Patient aware and agrees with disposition plan.    MIPS:  N/A    Social determinants of health impacting treatment or disposition:  None    Shared Decision Making completed with patient regarding risks and benefits of admission versus discharge.  Patient decides to be discharged home.    Code Status Discussion:  Not discussed    \"ED Course\" Notes From Epic Narrator:     Discussed case with Davina carolina, who accepts patient for admission to hospital.    CRITICAL CARE:   N/A    PROCEDURES:  Central Line    Date/Time: 7/15/2024 11:03 PM    Performed by: John Blanchard MD  Authorized by: Warren Jovel DO    Consent:     Consent obtained:  Emergent situation  Universal protocol:     Imaging studies available: yes      Required blood products, implants, devices, and special equipment available: yes      Patient identity confirmed:  Hospital-assigned identification number and arm band  Pre-procedure details:     Indication(s): central venous access and hemodynamic monitoring      Hand hygiene: Hand hygiene performed prior to insertion      Sterile barrier technique: All elements of maximal sterile technique followed      Skin preparation:  Chlorhexidine    Skin preparation agent: Skin preparation agent completely dried prior to procedure    Sedation:     Sedation type:  Deep  Anesthesia:     Anesthesia  method:  None  Procedure details:     Location:  R femoral    Patient position:  Supine    Procedural supplies:  Triple lumen    Catheter size:  7 Fr    Landmarks identified: yes      Ultrasound guidance: yes      Ultrasound guidance timing: real time      Sterile ultrasound techniques: Sterile gel and sterile probe covers were used      Number of attempts:  1  Post-procedure details:     Post-procedure:  Dressing applied and line sutured    Assessment:  Blood return through all ports and free fluid flow    Procedure completion:  Tolerated well, no immediate complications        DATA FOR LAB AND RADIOLOGY TESTS ORDERED BELOW ARE REVIEWED BY THE ED CLINICIAN:    RADIOLOGY: All x-rays, CT, MRI, and formal ultrasound images (except ED bedside ultrasound) are read by the radiologist, see reports below, unless otherwise noted in MDM or here.  Reports below are reviewed by myself.  CT CHEST WO CONTRAST   Final Result   1. Dense pneumonic consolidations with air bronchograms in major portions of   bilateral pulmonary lower lobes except in the anterior segment. Mild   infiltrate or atelectatic change in the posterior portion of right middle   lobe. Mild dense atelectatic changes, and/or infiltrates at the posterior   aspects of rest of bilateral pulmonary upper lobes.   2. Mild-to-moderate pulmonary vascular congestion. Mild interstitial   pulmonary edema is probably present.   3. Endotracheal tube in position. NG tube in position.   4. Marked coronary artery calcifications.   5. Moderate to marked diffuse fatty infiltration of the liver.   6. No evidence of pneumothorax or pneumomediastinum.         CT CSpine W/O Contrast   Final Result   1. No acute intracranial abnormality.   2. No acute osseous abnormality of the cervical spine. Multilevel   degenerative disc disease and facet arthrosis.         CT Head W/O Contrast   Final Result   1. No acute intracranial abnormality.   2. No acute osseous abnormality of the cervical

## 2024-07-16 NOTE — PROGRESS NOTES
SPIRITUAL CARE DEPARTMENT [facility]  PROGRESS NOTE    Room # 1104/1104-01   Name: Burke Ruiz           Reason for visit: Routine    I visited the patient.    Admit Date & Time: 7/15/2024  5:08 PM    Assessment:  Burke Ruiz is a 89 y.o. male in the hospital because of an acute resp failure. Upon entering the room the patient is currently intubated.      Intervention:  The writer provided a silent prayer of healing, comfort, rest, inner peace and strength. Writer also left a prayer card as additional support.    Plan:  Chaplains will remain available to offer spiritual and emotional support as needed.    Electronically signed by Laura Sheffield Jrlain, on 7/16/2024 at 10:36 AM.  Spiritual Care Department  [facility]     07/16/24 1034   Encounter Summary   Encounter Overview/Reason Initial Encounter   Service Provided For Patient   Referral/Consult From Rounding   Support System Unknown   Last Encounter  07/16/24   Complexity of Encounter Low   Begin Time 1000   End Time  1005   Total Time Calculated 5 min   Assessment/Intervention/Outcome   Assessment Unable to assess  (Intubated)   Intervention Prayer (assurance of)/Allison;Read/Provided Scripture   Outcome Did not respond   Plan and Referrals   Plan/Referrals Continue to visit, (comment);Continue Support (comment)

## 2024-07-16 NOTE — PROGRESS NOTES
Occupational Therapy  [facility]  Occupational Therapy Not Seen Note    Patient not available for Occupational Therapy due to:    [] Testing:    [] Hemodialysis    [] Cancelled by RN:    []Refusal by Patient:    [] Surgery:     [x] Intubation: Hold OT eval, pt intubated. Will continue to follow and check back as appropriate.    [] Pain Medication:    [] Sedation:     [] Spine Precautions :    [] Medical Instability:    [] Other:      Katy GOMEZ, OT

## 2024-07-16 NOTE — PLAN OF CARE
Problem: Respiratory - Adult  Goal: Achieves optimal ventilation and oxygenation  Outcome: Progressing     Problem: Respiratory - Adult  Goal: Adequate oxygenation  Outcome: Progressing     Problem: Respiratory - Adult  Goal: Will be able to breathe spontaneously, without ventilator support  Description: Will be able to breathe spontaneously, without ventilator support  Outcome: Progressing

## 2024-07-16 NOTE — H&P
[facility]  Office: [phone]  Klever Dickens DO, Eulogio Sr DO, Jerome Worley DO, Warren Jovel DO, Vaughn Jefferson MD, Krista Andrews MD, Ana M Cooley MD, Nia Wright MD,  Doc Metz MD, Mario Alberto Lizama MD, Shahla Dutta MD,  Dolly Wu DO, Nancy Mtaa MD, James Boston MD, Trey Dickens DO, Adriana Spencer MD,  Luis Alberto Thornton DO, Yanet Horan MD, Renee King MD, Fely Gmoez MD, Kady Macias MD,  Kayden Murillo MD, Ramsey Valentine MD, Airam Gonsales MD, Angelina Cruz MD, Devyn Gates MD, Neil Looney MD, Navin King DO, Tera Prajapati DO, Julieta Gomez MD,  Kael Farias MD, Shirley Waterhouse, CNP,  Valencia Regalado CNP, Lobo Rojas, CNP,  Karen Mascorro, NELIA, Ania Oro, CNP, Kyra Isbell, CNP, Arabella Rincon CNP, Shayla Song CNP, Jackelin Woody, CNP, Hodan Cruz, PA-C, Kim Woodard PA-C, Destiney Manriquez, CNP, Vania Silva, CNP, Brooke Olvera, CNP, Rebecca Zavala, CNS, Laurie Crandall, CNP, Melissa Pat, CNP, Tracy Schwab, CNP         [facility]   IN-PATIENT SERVICE   [facility]    HISTORY AND PHYSICAL EXAMINATION            Date:   7/15/2024  Patient name:  Burke Ruiz  Date of admission:  7/15/2024  5:08 PM  MRN:   [id number]  Account:  [id number]  YOB: 1902  PCP:    No primary care provider on file.  Room:   [place] [address]  Code Status:    No Order    Chief Complaint:     Chief Complaint   Patient presents with    Alcohol Intoxication       History Obtained From:     electronic medical record    History of Present Illness:     Burke Ruiz is a 89 y.o. Non- / non  male who presents with Alcohol Intoxication   and is admitted to the hospital for the management of Acute alcoholic intoxication with complication (HCC).    Patient arrived via squad by [employer] after he was found drinking half a bottle of vodka. Patient refuses to properly identify himself however did share with staff that his  7/15/2024  1. No acute intracranial abnormality. 2. No acute osseous abnormality of the cervical spine. Multilevel degenerative disc disease and facet arthrosis.     CT CSpine W/O Contrast    Result Date: 7/15/2024  1. No acute intracranial abnormality. 2. No acute osseous abnormality of the cervical spine. Multilevel degenerative disc disease and facet arthrosis.     XR CHEST PORTABLE    Result Date: 7/15/2024  1. Endotracheal tube terminating approximately 9.5 cm above the patricia, recommend advancement by approximately 4 cm. 2. Enteric tube in the midline with side port and tip over the gastric fundus, appropriate in location. 3. Airspace opacities throughout the right lung and retrocardiac left lung, concerning for atelectasis, aspiration or developing pneumonia, new since the prior study.     XR CHEST PORTABLE    Result Date: 7/15/2024  Mild cardiomegaly. No acute cardiopulmonary process.       Assessment :      Hospital Problems             Last Modified POA    * (Principal) Acute alcoholic intoxication with complication ([facility]) 7/15/2024 Yes    Alcohol intoxication in alcoholism with blood level over 0.3 with delirium ([facility]) 7/15/2024 Yes    Aspiration pneumonia of both upper lobes due to vomit ([facility]) 7/15/2024 Yes    Acute hypoxic respiratory failure ([facility]) 7/15/2024 Yes    On mechanically assisted ventilation ([facility]) 7/15/2024 Yes     Plan:     Patient status inpatient in the  Medical ICU    Acute hypoxic respiratory failure requiring mechanical ventilation secondary to aspiration pneumonia in the setting of acute alcohol intoxication   Admit to medical ICU, critical care services on consult  Levophed to maintain MAP greater than 65 and Versed to maintain RASS 0 to -1  Repeat chest x-ray in the morning, Zosyn and vancomycin for antimicrobial coverage  Check proBNP in the morning as I anticipate there is some element of possible heart failure- avoid continuous IV hydration for this reason  Check echocardiogram to

## 2024-07-16 NOTE — CONSULTS
[facility]   Pharmacy Pharmacokinetic Monitoring Service - Vancomycin     Burke Ruiz is approximately a 48 year old male starting on vancomycin therapy for CAP.   Pharmacy consulted by Hailey Carson CNP for monitoring and adjustment.    Target Concentration: Goal AUC/AIDA 400-600 mg*hr/L    Additional Antimicrobials: Zosyn    Pertinent Laboratory Values:   Wt Readings from Last 1 Encounters:   07/15/24 90.7 kg (200 lb)     Temp Readings from Last 1 Encounters:   07/16/24 98.1 °F (36.7 °C) (Axillary)     Estimated Creatinine Clearance: 23 mL/min (based on SCr of 0.8 mg/dL).  Recent Labs     07/15/24  1715 07/16/24  0302   CREATININE 0.8 0.8   BUN 10 12   WBC 10.1 13.4*     Procalcitonin: none    Pertinent Cultures:    MRSA Nasal Swab: not ordered. Order placed by pharmacy.    Plan:  Dosing recommendations based on Bayesian software  Start vancomycin 2500mg loading dose (given 7/16/24 at 0051), followed by 1500mg Q12H  Anticipated AUC of 505 and trough concentration of 12.9 at steady state  Renal labs as indicated   Vancomycin concentration ordered for 7/17/24 @ 1000   Pharmacy will continue to monitor patient and adjust therapy as indicated    Thank you for the consult,  Radha Griffith [facility]  7/16/2024 9:42 AM

## 2024-07-16 NOTE — PROGRESS NOTES
End Of Shift Note  [place] ICU  Summary of shift: patient transfer to ICU from ER at 2330. Patient was found outside a [employer] with sign saying will work for money. Patient had 375 out in cath  color, NG set to intermediate suction. Replaced potassium put in order to have rechecked at 0830 am. Patient on versed 8 mcg, Levo off, Fentanyl 50 mcg.     Vitals:    Vitals:    07/16/24 0545 07/16/24 0559 07/16/24 0600 07/16/24 0615   BP: 123/81 136/80 136/80 126/81   Pulse: 64 69 66 65   Resp: 22 22 22 22   Temp:       TempSrc:       SpO2: 100% 100% 100% 99%   Weight:       Height:            I&O:   Intake/Output Summary (Last 24 hours) at 7/16/2024 0653  Last data filed at 7/16/2024 0624  Gross per 24 hour   Intake 2190.31 ml   Output 375 ml   Net 1815.31 ml       Resp Status: PEEP     Ventilator Settings:  Vent Mode: AC/PRVC Resp Rate (Set): 22 bpm/Vt (Set, mL): 600 mL/ /FiO2 : 50 %    Critical Care IV infusions:   fentaNYL 50 mcg/hr (07/16/24 0624)    midazolam 8 mg/hr (07/16/24 0533)    norepinephrine Stopped (07/16/24 0534)    sodium chloride          LDA:   CVC  07/15/24 Right Femoral (Active)   Number of days: 0       Peripheral IV 07/15/24 Posterior;Right Hand (Active)   Number of days: 0       Peripheral IV 07/15/24 Proximal;Right;Anterior Forearm (Active)   Number of days: 0       NG/OG/NJ/NE Tube 16 fr Left mouth (Active)   Number of days: 0       Urinary Catheter 07/15/24 (Active)   Number of days: 0       ETT  (Active)   Number of days: 0       Arterial Line 07/15/24 Left Radial (Active)   Number of days: 0

## 2024-07-16 NOTE — CARE COORDINATION
Discharge planning    Unable to assess pt. Is intubated with FIO2 at 50%. Homeless man found down at [employer] with a bottle of Vodka. Positive for cocaine and weed. Refused to provide any information about hisself to ED and was intubated shortly after arrival.

## 2024-07-17 ENCOUNTER — APPOINTMENT (OUTPATIENT)
Dept: ULTRASOUND IMAGING | Age: 49
DRG: 130 | End: 2024-07-17
Payer: MEDICAID

## 2024-07-17 ENCOUNTER — APPOINTMENT (OUTPATIENT)
Dept: GENERAL RADIOLOGY | Age: 49
DRG: 130 | End: 2024-07-17
Payer: MEDICAID

## 2024-07-17 PROBLEM — N17.9 AKI (ACUTE KIDNEY INJURY) (HCC): Status: ACTIVE | Noted: 2024-07-17

## 2024-07-17 LAB
ALLEN TEST: ABNORMAL
ALLEN TEST: ABNORMAL
ANION GAP SERPL CALCULATED.3IONS-SCNC: 12 MMOL/L (ref 9–17)
ANION GAP SERPL CALCULATED.3IONS-SCNC: 12 MMOL/L (ref 9–17)
ANION GAP SERPL CALCULATED.3IONS-SCNC: 13 MMOL/L (ref 9–17)
ANION GAP SERPL CALCULATED.3IONS-SCNC: 13 MMOL/L (ref 9–17)
BASOPHILS # BLD: 0.06 K/UL (ref 0–0.2)
BASOPHILS # BLD: 0.06 K/UL (ref 0–0.2)
BASOPHILS NFR BLD: 1 % (ref 0–2)
BASOPHILS NFR BLD: 1 % (ref 0–2)
BUN SERPL-MCNC: 26 MG/DL (ref 6–20)
BUN SERPL-MCNC: 26 MG/DL (ref 6–20)
BUN SERPL-MCNC: 27 MG/DL (ref 6–20)
BUN SERPL-MCNC: 27 MG/DL (ref 6–20)
BUN/CREAT SERPL: 14 (ref 9–20)
BUN/CREAT SERPL: 14 (ref 9–20)
BUN/CREAT SERPL: 15 (ref 9–20)
BUN/CREAT SERPL: 15 (ref 9–20)
CALCIUM SERPL-MCNC: 7.2 MG/DL (ref 8.6–10.4)
CALCIUM SERPL-MCNC: 7.2 MG/DL (ref 8.6–10.4)
CALCIUM SERPL-MCNC: 7.3 MG/DL (ref 8.6–10.4)
CALCIUM SERPL-MCNC: 7.3 MG/DL (ref 8.6–10.4)
CHLORIDE SERPL-SCNC: 101 MMOL/L (ref 98–107)
CHLORIDE SERPL-SCNC: 101 MMOL/L (ref 98–107)
CHLORIDE SERPL-SCNC: 103 MMOL/L (ref 98–107)
CHLORIDE SERPL-SCNC: 103 MMOL/L (ref 98–107)
CO2 SERPL-SCNC: 24 MMOL/L (ref 20–31)
CO2 SERPL-SCNC: 24 MMOL/L (ref 20–31)
CO2 SERPL-SCNC: 25 MMOL/L (ref 20–31)
CO2 SERPL-SCNC: 25 MMOL/L (ref 20–31)
CREAT SERPL-MCNC: 1.7 MG/DL (ref 0.7–1.2)
CREAT SERPL-MCNC: 1.7 MG/DL (ref 0.7–1.2)
CREAT SERPL-MCNC: 2 MG/DL (ref 0.7–1.2)
CREAT SERPL-MCNC: 2 MG/DL (ref 0.7–1.2)
EOSINOPHIL # BLD: 0.36 K/UL (ref 0–0.44)
EOSINOPHIL # BLD: 0.36 K/UL (ref 0–0.44)
EOSINOPHILS RELATIVE PERCENT: 3 % (ref 1–4)
EOSINOPHILS RELATIVE PERCENT: 3 % (ref 1–4)
ERYTHROCYTE [DISTWIDTH] IN BLOOD BY AUTOMATED COUNT: 13.5 % (ref 11.8–14.4)
ERYTHROCYTE [DISTWIDTH] IN BLOOD BY AUTOMATED COUNT: 13.5 % (ref 11.8–14.4)
FIO2: 35
FIO2: 35
GFR, ESTIMATED: 25 ML/MIN/1.73M2
GFR, ESTIMATED: 25 ML/MIN/1.73M2
GFR, ESTIMATED: 31 ML/MIN/1.73M2
GFR, ESTIMATED: 31 ML/MIN/1.73M2
GLUCOSE BLD-MCNC: 111 MG/DL (ref 75–110)
GLUCOSE BLD-MCNC: 111 MG/DL (ref 75–110)
GLUCOSE BLD-MCNC: 115 MG/DL (ref 75–110)
GLUCOSE BLD-MCNC: 115 MG/DL (ref 75–110)
GLUCOSE BLD-MCNC: 155 MG/DL (ref 75–110)
GLUCOSE BLD-MCNC: 155 MG/DL (ref 75–110)
GLUCOSE BLD-MCNC: 81 MG/DL (ref 75–110)
GLUCOSE BLD-MCNC: 81 MG/DL (ref 75–110)
GLUCOSE BLD-MCNC: 90 MG/DL (ref 75–110)
GLUCOSE BLD-MCNC: 90 MG/DL (ref 75–110)
GLUCOSE BLD-MCNC: 94 MG/DL (ref 75–110)
GLUCOSE BLD-MCNC: 94 MG/DL (ref 75–110)
GLUCOSE SERPL-MCNC: 104 MG/DL (ref 70–99)
GLUCOSE SERPL-MCNC: 104 MG/DL (ref 70–99)
GLUCOSE SERPL-MCNC: 112 MG/DL (ref 70–99)
GLUCOSE SERPL-MCNC: 112 MG/DL (ref 70–99)
HCT VFR BLD AUTO: 40.2 % (ref 40.7–50.3)
HCT VFR BLD AUTO: 40.2 % (ref 40.7–50.3)
HGB BLD-MCNC: 13.8 G/DL (ref 13–17)
HGB BLD-MCNC: 13.8 G/DL (ref 13–17)
IMM GRANULOCYTES # BLD AUTO: 0.06 K/UL (ref 0–0.3)
IMM GRANULOCYTES # BLD AUTO: 0.06 K/UL (ref 0–0.3)
IMM GRANULOCYTES NFR BLD: 1 %
IMM GRANULOCYTES NFR BLD: 1 %
LYMPHOCYTES NFR BLD: 1.23 K/UL (ref 1.1–3.7)
LYMPHOCYTES NFR BLD: 1.23 K/UL (ref 1.1–3.7)
LYMPHOCYTES RELATIVE PERCENT: 11 % (ref 24–43)
LYMPHOCYTES RELATIVE PERCENT: 11 % (ref 24–43)
MCH RBC QN AUTO: 34.8 PG (ref 25.2–33.5)
MCH RBC QN AUTO: 34.8 PG (ref 25.2–33.5)
MCHC RBC AUTO-ENTMCNC: 34.3 G/DL (ref 28.4–34.8)
MCHC RBC AUTO-ENTMCNC: 34.3 G/DL (ref 28.4–34.8)
MCV RBC AUTO: 101.5 FL (ref 82.6–102.9)
MCV RBC AUTO: 101.5 FL (ref 82.6–102.9)
MODE: ABNORMAL
MODE: ABNORMAL
MONOCYTES NFR BLD: 0.63 K/UL (ref 0.1–1.2)
MONOCYTES NFR BLD: 0.63 K/UL (ref 0.1–1.2)
MONOCYTES NFR BLD: 6 % (ref 3–12)
MONOCYTES NFR BLD: 6 % (ref 3–12)
MRSA, DNA, NASAL: ABNORMAL
MRSA, DNA, NASAL: ABNORMAL
NEUTROPHILS NFR BLD: 80 % (ref 36–65)
NEUTROPHILS NFR BLD: 80 % (ref 36–65)
NEUTS SEG NFR BLD: 9.21 K/UL (ref 1.5–8.1)
NEUTS SEG NFR BLD: 9.21 K/UL (ref 1.5–8.1)
NRBC BLD-RTO: 0 PER 100 WBC
NRBC BLD-RTO: 0 PER 100 WBC
O2 DELIVERY DEVICE: ABNORMAL
O2 DELIVERY DEVICE: ABNORMAL
PLATELET # BLD AUTO: 125 K/UL (ref 138–453)
PLATELET # BLD AUTO: 125 K/UL (ref 138–453)
PMV BLD AUTO: 10.4 FL (ref 8.1–13.5)
PMV BLD AUTO: 10.4 FL (ref 8.1–13.5)
POC HCO3: 26.8 MMOL/L (ref 21–28)
POC HCO3: 26.8 MMOL/L (ref 21–28)
POC O2 SATURATION: 89.8 % (ref 94–98)
POC O2 SATURATION: 89.8 % (ref 94–98)
POC PCO2: 38.7 MM HG (ref 35–48)
POC PCO2: 38.7 MM HG (ref 35–48)
POC PH: 7.45 (ref 7.35–7.45)
POC PH: 7.45 (ref 7.35–7.45)
POC PO2: 55.5 MM HG (ref 83–108)
POC PO2: 55.5 MM HG (ref 83–108)
POSITIVE BASE EXCESS, ART: 2.7 MMOL/L (ref 0–3)
POSITIVE BASE EXCESS, ART: 2.7 MMOL/L (ref 0–3)
POTASSIUM SERPL-SCNC: 3.5 MMOL/L (ref 3.7–5.3)
POTASSIUM SERPL-SCNC: 3.5 MMOL/L (ref 3.7–5.3)
POTASSIUM SERPL-SCNC: 3.6 MMOL/L (ref 3.7–5.3)
POTASSIUM SERPL-SCNC: 3.6 MMOL/L (ref 3.7–5.3)
RBC # BLD AUTO: 3.96 M/UL (ref 4.21–5.77)
RBC # BLD AUTO: 3.96 M/UL (ref 4.21–5.77)
SAMPLE SITE: ABNORMAL
SAMPLE SITE: ABNORMAL
SODIUM SERPL-SCNC: 138 MMOL/L (ref 135–144)
SODIUM SERPL-SCNC: 138 MMOL/L (ref 135–144)
SODIUM SERPL-SCNC: 140 MMOL/L (ref 135–144)
SODIUM SERPL-SCNC: 140 MMOL/L (ref 135–144)
SPECIMEN DESCRIPTION: ABNORMAL
SPECIMEN DESCRIPTION: ABNORMAL
TRIGL SERPL-MCNC: 235 MG/DL
TRIGL SERPL-MCNC: 235 MG/DL
VANCOMYCIN SERPL-MCNC: 32.6 UG/ML
VANCOMYCIN SERPL-MCNC: 32.6 UG/ML
VAS RIGHT CFA PROX AP DIAM: 0.68 CM
VAS RIGHT CFA PROX AP DIAM: 0.68 CM
VAS RIGHT CFA PROX PSV: 91.1 CM/S
VAS RIGHT CFA PROX PSV: 91.1 CM/S
VAS RIGHT SFA PROX AP DIAM: 0.94 CM
VAS RIGHT SFA PROX AP DIAM: 0.94 CM
VAS RIGHT SFA PROX PSV: 116.7 CM/S
VAS RIGHT SFA PROX PSV: 116.7 CM/S
VAS RIGHT SFA PROX VEL RATIO: 1.3
VAS RIGHT SFA PROX VEL RATIO: 1.3
WBC OTHER # BLD: 11.6 K/UL (ref 3.5–11.3)
WBC OTHER # BLD: 11.6 K/UL (ref 3.5–11.3)

## 2024-07-17 PROCEDURE — 03HY32Z INSERTION OF MONITORING DEVICE INTO UPPER ARTERY, PERCUTANEOUS APPROACH: ICD-10-PCS | Performed by: INTERNAL MEDICINE

## 2024-07-17 PROCEDURE — 93926 LOWER EXTREMITY STUDY: CPT | Performed by: SURGERY

## 2024-07-17 PROCEDURE — 36620 INSERTION CATHETER ARTERY: CPT

## 2024-07-17 PROCEDURE — 99233 SBSQ HOSP IP/OBS HIGH 50: CPT | Performed by: INTERNAL MEDICINE

## 2024-07-17 PROCEDURE — 2580000003 HC RX 258: Performed by: INTERNAL MEDICINE

## 2024-07-17 PROCEDURE — 71045 X-RAY EXAM CHEST 1 VIEW: CPT

## 2024-07-17 PROCEDURE — 6370000000 HC RX 637 (ALT 250 FOR IP): Performed by: INTERNAL MEDICINE

## 2024-07-17 PROCEDURE — 2000000000 HC ICU R&B

## 2024-07-17 PROCEDURE — 37799 UNLISTED PX VASCULAR SURGERY: CPT

## 2024-07-17 PROCEDURE — 76770 US EXAM ABDO BACK WALL COMP: CPT

## 2024-07-17 PROCEDURE — 6360000002 HC RX W HCPCS: Performed by: INTERNAL MEDICINE

## 2024-07-17 PROCEDURE — 80048 BASIC METABOLIC PNL TOTAL CA: CPT

## 2024-07-17 PROCEDURE — 82803 BLOOD GASES ANY COMBINATION: CPT

## 2024-07-17 PROCEDURE — 2700000000 HC OXYGEN THERAPY PER DAY

## 2024-07-17 PROCEDURE — 6370000000 HC RX 637 (ALT 250 FOR IP)

## 2024-07-17 PROCEDURE — 84478 ASSAY OF TRIGLYCERIDES: CPT

## 2024-07-17 PROCEDURE — 80202 ASSAY OF VANCOMYCIN: CPT

## 2024-07-17 PROCEDURE — 94761 N-INVAS EAR/PLS OXIMETRY MLT: CPT

## 2024-07-17 PROCEDURE — 2580000003 HC RX 258

## 2024-07-17 PROCEDURE — 82947 ASSAY GLUCOSE BLOOD QUANT: CPT

## 2024-07-17 PROCEDURE — 2500000003 HC RX 250 WO HCPCS: Performed by: INTERNAL MEDICINE

## 2024-07-17 PROCEDURE — 94003 VENT MGMT INPAT SUBQ DAY: CPT

## 2024-07-17 PROCEDURE — 85025 COMPLETE CBC W/AUTO DIFF WBC: CPT

## 2024-07-17 PROCEDURE — 6360000002 HC RX W HCPCS

## 2024-07-17 RX ORDER — 0.9 % SODIUM CHLORIDE 0.9 %
1000 INTRAVENOUS SOLUTION INTRAVENOUS ONCE
Status: COMPLETED | OUTPATIENT
Start: 2024-07-17 | End: 2024-07-17

## 2024-07-17 RX ORDER — HEPARIN SODIUM 5000 [USP'U]/ML
5000 INJECTION, SOLUTION INTRAVENOUS; SUBCUTANEOUS EVERY 8 HOURS SCHEDULED
Status: DISCONTINUED | OUTPATIENT
Start: 2024-07-17 | End: 2024-07-22

## 2024-07-17 RX ORDER — CLONAZEPAM 0.5 MG/1
0.5 TABLET ORAL ONCE
Status: COMPLETED | OUTPATIENT
Start: 2024-07-17 | End: 2024-07-17

## 2024-07-17 RX ORDER — MIDAZOLAM HYDROCHLORIDE 1 MG/ML
4 INJECTION INTRAMUSCULAR; INTRAVENOUS ONCE
Status: COMPLETED | OUTPATIENT
Start: 2024-07-17 | End: 2024-07-17

## 2024-07-17 RX ORDER — FENTANYL CITRATE 0.05 MG/ML
50 INJECTION, SOLUTION INTRAMUSCULAR; INTRAVENOUS ONCE
Status: COMPLETED | OUTPATIENT
Start: 2024-07-17 | End: 2024-07-17

## 2024-07-17 RX ORDER — SODIUM CHLORIDE 9 MG/ML
INJECTION, SOLUTION INTRAVENOUS CONTINUOUS
Status: DISCONTINUED | OUTPATIENT
Start: 2024-07-17 | End: 2024-07-18

## 2024-07-17 RX ORDER — MULTIVITAMIN WITH IRON
1 TABLET ORAL DAILY
Status: DISCONTINUED | OUTPATIENT
Start: 2024-07-17 | End: 2024-07-27 | Stop reason: HOSPADM

## 2024-07-17 RX ORDER — FOLIC ACID 1 MG/1
1 TABLET ORAL DAILY
Status: DISCONTINUED | OUTPATIENT
Start: 2024-07-17 | End: 2024-07-27 | Stop reason: HOSPADM

## 2024-07-17 RX ORDER — GAUZE BANDAGE 2" X 2"
100 BANDAGE TOPICAL DAILY
Status: DISCONTINUED | OUTPATIENT
Start: 2024-07-17 | End: 2024-07-27 | Stop reason: HOSPADM

## 2024-07-17 RX ORDER — CLONAZEPAM 0.5 MG/1
1.5 TABLET ORAL EVERY 12 HOURS
Status: DISCONTINUED | OUTPATIENT
Start: 2024-07-17 | End: 2024-07-18

## 2024-07-17 RX ADMIN — MIDAZOLAM 4 MG: 1 INJECTION INTRAMUSCULAR; INTRAVENOUS at 10:55

## 2024-07-17 RX ADMIN — SODIUM CHLORIDE, PRESERVATIVE FREE 40 MG: 5 INJECTION INTRAVENOUS at 19:43

## 2024-07-17 RX ADMIN — Medication 10 MG/HR: at 23:52

## 2024-07-17 RX ADMIN — PROPOFOL 25 MCG/KG/MIN: 10 INJECTION, EMULSION INTRAVENOUS at 13:28

## 2024-07-17 RX ADMIN — VANCOMYCIN HYDROCHLORIDE 1500 MG: 5 INJECTION, POWDER, LYOPHILIZED, FOR SOLUTION INTRAVENOUS at 00:58

## 2024-07-17 RX ADMIN — PROPOFOL 40 MCG/KG/MIN: 10 INJECTION, EMULSION INTRAVENOUS at 21:45

## 2024-07-17 RX ADMIN — HEPARIN SODIUM 5000 UNITS: 5000 INJECTION INTRAVENOUS; SUBCUTANEOUS at 21:44

## 2024-07-17 RX ADMIN — PROPOFOL 25 MCG/KG/MIN: 10 INJECTION, EMULSION INTRAVENOUS at 06:09

## 2024-07-17 RX ADMIN — SODIUM CHLORIDE, PRESERVATIVE FREE 10 ML: 5 INJECTION INTRAVENOUS at 19:44

## 2024-07-17 RX ADMIN — CLONAZEPAM 0.5 MG: 0.5 TABLET ORAL at 00:48

## 2024-07-17 RX ADMIN — HYDRALAZINE HYDROCHLORIDE 10 MG: 20 INJECTION INTRAMUSCULAR; INTRAVENOUS at 02:23

## 2024-07-17 RX ADMIN — SODIUM CHLORIDE, PRESERVATIVE FREE 10 ML: 5 INJECTION INTRAVENOUS at 09:00

## 2024-07-17 RX ADMIN — CLONAZEPAM 1 MG: 0.5 TABLET ORAL at 00:39

## 2024-07-17 RX ADMIN — ACETAMINOPHEN 650 MG: 325 TABLET ORAL at 17:54

## 2024-07-17 RX ADMIN — CLONAZEPAM 1.5 MG: 0.5 TABLET ORAL at 12:59

## 2024-07-17 RX ADMIN — Medication 10 MG/HR: at 13:40

## 2024-07-17 RX ADMIN — SODIUM CHLORIDE 5 MG/HR: 9 INJECTION, SOLUTION INTRAVENOUS at 04:09

## 2024-07-17 RX ADMIN — POTASSIUM BICARBONATE 40 MEQ: 782 TABLET, EFFERVESCENT ORAL at 19:43

## 2024-07-17 RX ADMIN — Medication 100 MG: at 12:59

## 2024-07-17 RX ADMIN — ENOXAPARIN SODIUM 40 MG: 100 INJECTION SUBCUTANEOUS at 09:35

## 2024-07-17 RX ADMIN — MULTIVITAMIN TABLET 1 TABLET: TABLET at 13:00

## 2024-07-17 RX ADMIN — SODIUM CHLORIDE: 9 INJECTION, SOLUTION INTRAVENOUS at 13:58

## 2024-07-17 RX ADMIN — Medication 75 MCG/HR: at 01:01

## 2024-07-17 RX ADMIN — PIPERACILLIN AND TAZOBACTAM 3375 MG: 3; .375 INJECTION, POWDER, LYOPHILIZED, FOR SOLUTION INTRAVENOUS at 15:59

## 2024-07-17 RX ADMIN — Medication 100 MCG/HR: at 19:54

## 2024-07-17 RX ADMIN — FENTANYL CITRATE 50 MCG: 50 INJECTION INTRAMUSCULAR; INTRAVENOUS at 10:56

## 2024-07-17 RX ADMIN — FOLIC ACID 1 MG: 1 TABLET ORAL at 12:59

## 2024-07-17 RX ADMIN — PIPERACILLIN AND TAZOBACTAM 3375 MG: 3; .375 INJECTION, POWDER, LYOPHILIZED, FOR SOLUTION INTRAVENOUS at 03:25

## 2024-07-17 RX ADMIN — QUETIAPINE FUMARATE 25 MG: 25 TABLET ORAL at 09:35

## 2024-07-17 RX ADMIN — Medication 10 MG/HR: at 00:38

## 2024-07-17 RX ADMIN — QUETIAPINE FUMARATE 25 MG: 25 TABLET ORAL at 19:43

## 2024-07-17 RX ADMIN — SODIUM CHLORIDE 1000 ML: 9 INJECTION, SOLUTION INTRAVENOUS at 18:19

## 2024-07-17 RX ADMIN — HYDRALAZINE HYDROCHLORIDE 10 MG: 20 INJECTION INTRAMUSCULAR; INTRAVENOUS at 13:45

## 2024-07-17 RX ADMIN — PROPOFOL 25 MCG/KG/MIN: 10 INJECTION, EMULSION INTRAVENOUS at 00:34

## 2024-07-17 RX ADMIN — CLONAZEPAM 1.5 MG: 0.5 TABLET ORAL at 23:51

## 2024-07-17 RX ADMIN — PROPOFOL 35 MCG/KG/MIN: 10 INJECTION, EMULSION INTRAVENOUS at 17:51

## 2024-07-17 ASSESSMENT — PULMONARY FUNCTION TESTS
PIF_VALUE: 27
PIF_VALUE: 25
PIF_VALUE: 26
PIF_VALUE: 34
PIF_VALUE: 28
PIF_VALUE: 28
PIF_VALUE: 29
PIF_VALUE: 25
PIF_VALUE: 31
PIF_VALUE: 24
PIF_VALUE: 44
PIF_VALUE: 24
PIF_VALUE: 24
PIF_VALUE: 25
PIF_VALUE: 28
PIF_VALUE: 24
PIF_VALUE: 31
PIF_VALUE: 37
PIF_VALUE: 24
PIF_VALUE: 27
PIF_VALUE: 24
PIF_VALUE: 31
PIF_VALUE: 31
PIF_VALUE: 24
PIF_VALUE: 29

## 2024-07-17 ASSESSMENT — PAIN SCALES - GENERAL
PAINLEVEL_OUTOF10: 0
PAINLEVEL_OUTOF10: 1

## 2024-07-17 NOTE — PROGRESS NOTES
Pulmonary Critical Care Progress Note    Patient seen for the follow up of Acute alcoholic intoxication with complication (HCC)     Subjective:    He had significant agitation yesterday.  Sedation was increased now he is on 50 of fentanyl in addition to Versed and propofol drip.  He had increased blood pressure overnight I was contacted ordered Cardene drip since he did not response to hydralazine.  He is still off tube feeds.    Examination:    Vitals: BP (!) 147/92   Pulse 91   Temp 97.1 °F (36.2 °C) (Temporal)   Resp 22   Ht 1.803 m (5' 11\")   Wt 90.7 kg (200 lb)   SpO2 95%   BMI 27.89 kg/m²   SpO2  Av.7 %  Min: 92 %  Max: 100 %  General appearance: Intubated sedated  Neck: No JVD  Lungs: Mild decreased breath sound no crackles or wheezes  Heart: regular rate and rhythm, S1, S2 normal, no gallop  Abdomen: Soft, non tender, + BS  Extremities: no cyanosis or clubbing. No significant edema    LABs:    CBC:   Recent Labs     24  0302 24  0445   WBC 13.4* 11.6*   HGB 13.9 13.8   HCT 39.7* 40.2*    125*     BMP:   Recent Labs     24  0302 24  0905 24  0445     --  140   K 3.1* 3.4* 3.6*   CO2 22  --  25   BUN 12  --  26*   CREATININE 0.8  --  1.7*   LABGLOM 68  --  31*   GLUCOSE 80  --  104*     PT/INR: No results for input(s): \"PROTIME\", \"INR\" in the last 72 hours.  APTT:No results for input(s): \"APTT\" in the last 72 hours.  LIVER PROFILE:  Recent Labs     07/15/24  1715   *   ALT 56*       Radiology:    Chest x-ray 7/15  1. Interval resolution of previously demonstrated pulmonary vascular  congestion and interstitial pulmonary edema.  2. Previously demonstrated asymmetric opacity in the right lung has resolved.  3. ET tube and NG tube in satisfactory position.  4. No acute cardiopulmonary process except for minimal right basilar  atelectasis.  Impression/recommendations:    Acute hypoxic respiratory failure requiring intubation  Continue assist-control  ventilation  Sedation add propofol drip wean last RASS -2  Continue fentanyl drip RASS -2  Versed drip RASS -2 wean first  Start tube feeds as tolerated     Aspiration pneumonia/smoker/possible COPD/mild pulmonary edema  DuoNeb by nebulizer  Zosyn and vancomycin  Sputum culture  Will consider echocardiogram  Smoking/THC cessation     alcohol intoxication/fatty liver with increased liver function/alcohol withdrawal   Alcohol cessation  Increase Klonopin 1.5 mg twice daily and Seroquel 25 twice daily  Monitor LFTs     Hypertension poor control  Start Cardene drip to keep systolic around 150/monitor BP     Right inguinal hernia   discussed with RN RT  Discussed with clinical pharmacist  Peptic ulcer disease prophylaxis  DVT prophylaxis      Koko Paiz MD, MD, [facility]  Pulmonary Critical Care and Sleep Medicine,  [facility]  Cell: [phone]  Office: [phone]  Cc35 min    This patient with acute illness required highest level of physician preparedness to intervene urgently.  I managed life/organ supporting measures reviewed imaging and lab made decision accordingly

## 2024-07-17 NOTE — PROGRESS NOTES
Writer was preparing 0015 dose of Klonopin for patient when 1- 0.5mg pill fell onto floor.  Writer disposed of dose, called pharmacist about what to do, writer unsure if omnicell would administer another pill. Pharmacist put in a one time dose for Klonopin 0.5mg to take place of pill that fell on floor.   Writer then administered 1mg in total to patient.  See MAR.

## 2024-07-17 NOTE — PROGRESS NOTES
[facility]   Pharmacy Pharmacokinetic Monitoring Service - Vancomycin    Consulting Provider: Hailey Carson CNP    Indication: CAP  Target Concentration: Goal AUC/AIDA 400-600 mg*hr/L  Day of Therapy: 2  Additional Antimicrobials: Zosyn    Pertinent Laboratory Values:   Wt Readings from Last 1 Encounters:   07/16/24 90.7 kg (200 lb)     Temp Readings from Last 1 Encounters:   07/17/24 99 °F (37.2 °C) (Axillary)     Estimated Creatinine Clearance: 11 mL/min (A) (based on SCr of 1.7 mg/dL (H)).  Recent Labs     07/16/24  0302 07/17/24  0445   CREATININE 0.8 1.7*   BUN 12 26*   WBC 13.4* 11.6*     Procalcitonin: none      MRSA Nasal Swab: not ordered. Order placed by pharmacy.    Recent vancomycin administrations                     vancomycin (VANCOCIN) 1500 mg in sodium chloride 0.9 % 250 mL IVPB (mg) 1,500 mg New Bag 07/17/24 0058     1,500 mg New Bag 07/16/24 1309    vancomycin (VANCOCIN) 2500 mg in sodium chloride 0.9 % 500 mL IVPB (mg) 2,500 mg New Bag 07/16/24 0051                    Assessment:  Date/Time Current Dose Concentration Timing of Concentration (h) AUC   7/17 09:53 1500mg IV q 12 hrs 32.6 ug/ml 7 hr 30 min NA   Note: Serum concentrations collected for AUC dosing may appear elevated if collected in close proximity to the dose administered, this is not necessarily an indication of toxicity    Plan:  Patient has had a dramatic change in renal function today, with Scr increased from 0.8 yesterday to 1.7 today. Therefore we will change to MAIK dosing for his vancomycin.   Level today of 32.6 mg/L does not warrant another dose of vancomycin today.  Repeat vancomycin concentration ordered for 7/18 @ 0600   Pharmacy will continue to monitor patient and adjust therapy as indicated    Thank you for the consult,  Laurie Smith RPH  7/17/2024 10:36 AM

## 2024-07-17 NOTE — PROGRESS NOTES
[facility]  Office: [phone]  Klever Dickens DO, Eulogio Sr DO, Jerome Worley DO, Warren Jovel DO, Vaughn Jefferson MD, Krista Andrews MD, Ana M Cooley MD, Nia Wrgiht MD,  Doc Metz MD, Mario Alberto Lizama MD, Shahla Dutta MD,  Dolly Wu DO, Nancy Mata MD, James Boston MD, Trey Dickens DO, Adriana Spencer MD,  Luis Alberto Thornton DO, Yanet Horan MD, Renee King MD, Fely Gomez MD, Kady Macias MD,  Kayden Murillo MD, Ramsey Valentine MD, Airam Gonsales MD, Angelina Cruz MD, Devyn Gates MD, Neil Looney MD, Navin King DO, Tera Prajapati DO, Julieta Gomez MD,  Kael Farias MD, Shirley Waterhouse, CNP,  Valencia Regalado CNP, Lobo Rojas, CNP,  Karen Mascorro, DNP, Ania Oro, CNP, Kyra Isbell, CNP, Shayla Song CNP, Jackelin Woody, CNP, Hodan Cruz, PA-C, Kim Woodard PA-C, Destiney Manriquez, CNP, Vania Silva, CNP, Hailey Carson, CNP, Brooke Olvera, CNP, Arabella Rincon, CNP, Rebecca Zavala, CNS, Laurie Crandall, CNP, Melissa Pat CNP, Tracy Schwab, CNP         [facility]   IN-PATIENT SERVICE   [facility]    Progress Note    7/17/2024    1:13 PM    Name:   Burke Ruiz  MRN:     [id number]     Acct:      [id number]   Room:   [facility] Day:  2  Admit Date:  7/15/2024  5:08 PM    PCP:   No primary care provider on file.  Code Status:  Full Code    Subjective:     C/C:   Chief Complaint   Patient presents with    Alcohol Intoxication     Interval History Status: not changed.     Patient remains intubated and sedated.  Off pressors.  Continues on fentanyl, propofol and Versed drips for sedation.    Brief History:     This is an unidentified male that presents via squad after drinking a large volume of vodka.  Upon evaluation he was found to have a significant alcohol level of greater than 0.4 with positive testing for cocaine and cannabis.  To a level of consciousness he was intubated admitted to the intensive care  unit.    Review of Systems:     Cannot be obtained due to respiratory failure    Medications:     Allergies:  No Known Allergies    Current Meds:   Scheduled Meds:    piperacillin-tazobactam  3,375 mg IntraVENous Q12H    thiamine mononitrate  100 mg Orogastric Daily    folic acid  1 mg Orogastric Daily    multivitamin  1 tablet Orogastric Daily    clonazePAM  1.5 mg Per NG tube Q12H    heparin (porcine)  5,000 Units SubCUTAneous 3 times per day    vancomycin (VANCOCIN) intermittent dosing (placeholder)   Other RX Placeholder    QUEtiapine  25 mg Oral BID    sodium chloride flush  5-40 mL IntraVENous 2 times per day    pantoprazole (PROTONIX) 40 mg in sodium chloride (PF) 0.9 % 10 mL injection  40 mg IntraVENous Nightly     Continuous Infusions:    niCARdipine Stopped (24)    sodium chloride      fentaNYL 50 mcg/hr (24)    midazolam 10 mg/hr (24)    propofol 25 mcg/kg/min (24)    dextrose      norepinephrine Stopped (24 0534)    sodium chloride 10 mL/hr at 24 075     PRN Meds: hydrALAZINE, glucose, dextrose bolus **OR** dextrose bolus, glucagon (rDNA), dextrose, midazolam, sodium chloride flush, sodium chloride, potassium chloride **OR** potassium alternative oral replacement **OR** potassium chloride, magnesium sulfate, ondansetron **OR** ondansetron, polyethylene glycol, acetaminophen **OR** acetaminophen    Data:     Past Medical History:   has no past medical history on file.    Social History:        Family History: No family history on file.    Vitals:  /81   Pulse 81   Temp 97.1 °F (36.2 °C) (Temporal)   Resp 22   Ht 1.803 m (5' 11\")   Wt 90.7 kg (200 lb)   SpO2 97%   BMI 27.89 kg/m²   Temp (24hrs), Av.7 °F (37.1 °C), Min:97.1 °F (36.2 °C), Max:99.6 °F (37.6 °C)    Recent Labs     24  0001 24  0311 24  0751 24  1219   POCGLU 81 90 155* 94       I/O (24Hr):    Intake/Output Summary (Last 24 hours) at 2024  since the prior study.     XR CHEST PORTABLE    Result Date: 7/15/2024  Mild cardiomegaly. No acute cardiopulmonary process.       Physical Examination:        General appearance: Intubated and sedated  Mental Status: Cannot be assessed due to respiratory failure  Lungs:  clear to auscultation bilaterally, vented  Heart:  regular rate and rhythm, no murmur  Abdomen:  soft, nontender, nondistended, normal bowel sounds, no masses, hepatomegaly, splenomegaly  Extremities:  no edema, redness, tenderness in the calves  Skin:  no gross lesions, rashes, induration    Assessment:        Hospital Problems             Last Modified POA    * (Principal) Acute alcoholic intoxication with complication ([facility]) 7/15/2024 Yes    Alcohol intoxication in alcoholism with blood level over 0.3 with delirium ([facility]) 7/15/2024 Yes    Aspiration pneumonia of both upper lobes due to vomit ([facility]) 7/15/2024 Yes    Acute hypoxic respiratory failure ([facility]) 7/15/2024 Yes    On mechanically assisted ventilation ([facility]) 7/15/2024 Yes    Acute encephalopathy 7/16/2024 Yes    MAIK (acute kidney injury) ([facility]) 7/17/2024 No       Plan:        Continue vent support  Check renal ultrasound due to MAIK  IV hydration due to MAIK, low urine output  Transition to heparin for DVT prophylaxis pending renal recovery  Continue present antibiotics  GI prophylaxis  Tube feeding as ordered  Monitor for signs and symptoms of withdrawal  Continue thiamine and folic acid  See orders for details    Jerome Worley,   7/17/2024  1:13 PM

## 2024-07-17 NOTE — PLAN OF CARE
Problem: Safety - Medical Restraint  Goal: Remains free of injury from restraints (Restraint for Interference with Medical Device)  Description: INTERVENTIONS:  1. Determine that other, less restrictive measures have been tried or would not be effective before applying the restraint  2. Evaluate the patient's condition at the time of restraint application  3. Inform patient/family regarding the reason for restraint  4. Q2H: Monitor safety, psychosocial status, comfort, nutrition and hydration  Outcome: Progressing  Flowsheets (Taken 7/16/2024 2000)  Remains free of injury from restraints (restraint for interference with medical device):   Determine that other, less restrictive measures have been tried or would not be effective before applying the restraint   Evaluate the patient's condition at the time of restraint application   Inform patient/family regarding the reason for restraint   Every 2 hours: Monitor safety, psychosocial status, comfort, nutrition and hydration

## 2024-07-17 NOTE — PLAN OF CARE
Problem: Respiratory - Adult  Goal: Achieves optimal ventilation and oxygenation  Outcome: Progressing  Flowsheets (Taken 7/16/2024 1928 by Jorden Malik, DION)  Achieves optimal ventilation and oxygenation:   Assess for changes in respiratory status   Assess for changes in mentation and behavior   Position to facilitate oxygenation and minimize respiratory effort   Oxygen supplementation based on oxygen saturation or arterial blood gases   Encourage broncho-pulmonary hygiene including cough, deep breathe, incentive spirometry   Respiratory therapy support as indicated  Goal: Adequate oxygenation  Outcome: Progressing  Goal: Will be able to breathe spontaneously, without ventilator support  Description: Will be able to breathe spontaneously, without ventilator support  Outcome: Progressing

## 2024-07-17 NOTE — PLAN OF CARE
Problem: Safety - Medical Restraint  Goal: Remains free of injury from restraints (Restraint for Interference with Medical Device)  Description: INTERVENTIONS:  1. Determine that other, less restrictive measures have been tried or would not be effective before applying the restraint  2. Evaluate the patient's condition at the time of restraint application  3. Inform patient/family regarding the reason for restraint  4. Q2H: Monitor safety, psychosocial status, comfort, nutrition and hydration  7/17/2024 1349 by Yvonne Lion RN  Outcome: Progressing  Flowsheets (Taken 7/17/2024 1000)  Remains free of injury from restraints (restraint for interference with medical device): Determine that other, less restrictive measures have been tried or would not be effective before applying the restraint  7/17/2024 0822 by Hodan Valladares RN  Outcome: Progressing  Flowsheets  Taken 7/17/2024 0800 by Yvonne Lion RN  Remains free of injury from restraints (restraint for interference with medical device): Determine that other, less restrictive measures have been tried or would not be effective before applying the restraint  Taken 7/16/2024 2000 by Hodan Valladares RN  Remains free of injury from restraints (restraint for interference with medical device):   Determine that other, less restrictive measures have been tried or would not be effective before applying the restraint   Evaluate the patient's condition at the time of restraint application   Inform patient/family regarding the reason for restraint   Every 2 hours: Monitor safety, psychosocial status, comfort, nutrition and hydration     Problem: Discharge Planning  Goal: Discharge to home or other facility with appropriate resources  Outcome: Progressing     Problem: Respiratory - Adult  Goal: Achieves optimal ventilation and oxygenation  7/17/2024 1349 by Yvonne Lion RN  Outcome: Progressing  7/17/2024 0806 by Shyann Whaley ANUJA  Outcome:  Progressing  Flowsheets (Taken 7/16/2024 1928 by Jorden Malik, P)  Achieves optimal ventilation and oxygenation:   Assess for changes in respiratory status   Assess for changes in mentation and behavior   Position to facilitate oxygenation and minimize respiratory effort   Oxygen supplementation based on oxygen saturation or arterial blood gases   Encourage broncho-pulmonary hygiene including cough, deep breathe, incentive spirometry   Respiratory therapy support as indicated  Goal: Adequate oxygenation  7/17/2024 0806 by Shyann Whaley, P  Outcome: Progressing  Goal: Will be able to breathe spontaneously, without ventilator support  Description: Will be able to breathe spontaneously, without ventilator support  7/17/2024 0806 by Shyann Whaley, P  Outcome: Progressing     Problem: Skin/Tissue Integrity  Goal: Absence of new skin breakdown  Description: 1.  Monitor for areas of redness and/or skin breakdown  2.  Assess vascular access sites hourly  3.  Every 4-6 hours minimum:  Change oxygen saturation probe site  4.  Every 4-6 hours:  If on nasal continuous positive airway pressure, respiratory therapy assess nares and determine need for appliance change or resting period.  Outcome: Progressing     Problem: Safety - Adult  Goal: Free from fall injury  Outcome: Progressing     Problem: Pain  Goal: Verbalizes/displays adequate comfort level or baseline comfort level  Outcome: Progressing     Problem: Nutrition Deficit:  Goal: Optimize nutritional status  Outcome: Progressing

## 2024-07-17 NOTE — PROGRESS NOTES
End Of Shift Note  [place] [facility]  Summary of shift: Continues on Fentanyl drip as well as Versed and Propofol. Hydralazine X 1 for elevated BP. On full vent support. Only had 50 ml of urine this shift. Dr Worley informed. Liter bolus of NS begun. Cardene not resumed Low grade fever 100.7. Given Tylenol    Vitals:    Vitals:    07/17/24 1600 07/17/24 1635 07/17/24 1700 07/17/24 1800   BP: (!) 160/101  (!) 156/92 (!) 155/98   Pulse: (!) 111 (!) 108 (!) 109 (!) 107   Resp: 22 22 22 22   Temp:  (!) 100.7 °F (38.2 °C)     TempSrc:  Axillary     SpO2: 94% 95% 95% 95%   Weight:       Height:            I&O:   Intake/Output Summary (Last 24 hours) at 7/17/2024 1825  Last data filed at 7/17/2024 1805  Gross per 24 hour   Intake 3832.92 ml   Output 308 ml   Net 3524.92 ml       Resp Status: Vent settings as below.    Ventilator Settings:  Vent Mode: AC/PRVC Resp Rate (Set): 22 bpm/Vt (Set, mL): 600 mL/ /FiO2 : 40 %    Critical Care IV infusions:   niCARdipine Stopped (07/17/24 0620)    sodium chloride 75 mL/hr at 07/17/24 1805    fentaNYL 75 mcg/hr (07/17/24 1805)    midazolam 10 mg/hr (07/17/24 1805)    propofol 35 mcg/kg/min (07/17/24 1805)    dextrose      norepinephrine Stopped (07/16/24 0534)    sodium chloride Stopped (07/17/24 1559)        LDA:   Peripheral IV 07/15/24 Posterior;Right Hand (Active)   Number of days: 1       Peripheral IV 07/15/24 Proximal;Right;Anterior Forearm (Active)   Number of days: 1       Peripheral IV 07/16/24 Left;Proximal Forearm (Active)   Number of days: 1       Peripheral IV 07/16/24 Right Forearm (Active)   Number of days: 1       NG/OG/NJ/NE Tube 16 fr Left mouth (Active)   Number of days: 1       Urinary Catheter 07/15/24 (Active)   Number of days: 1       ETT  (Active)   Number of days: 1       Arterial Line 07/16/24 Right Radial (Active)   Number of days: 0

## 2024-07-17 NOTE — PLAN OF CARE
Problem: Respiratory - Adult  Goal: Achieves optimal ventilation and oxygenation  7/17/2024 1922 by Annie Haney RCP  Outcome: Progressing     Problem: Respiratory - Adult  Goal: Adequate oxygenation  7/17/2024 1922 by Annie Haney RCP  Outcome: Progressing     Problem: Respiratory - Adult  Goal: Will be able to breathe spontaneously, without ventilator support  Description: Will be able to breathe spontaneously, without ventilator support  7/17/2024 1922 by Annie Haney RCP  Outcome: Progressing

## 2024-07-17 NOTE — PROGRESS NOTES
[facility]  Occupational Therapy Not Seen Note    Patient not available for Occupational Therapy due to:    [] Testing:    [] Hemodialysis    [] Cancelled by RN:    []Refusal by Patient:    [] Surgery:     [] Intubation:     [] Pain Medication:    [] Sedation:     [] Spine Precautions :    [] Medical Instability:    [x] Other:7/17: (cx) eval as pt is intubated; not appopriate for skilled OT

## 2024-07-17 NOTE — PROGRESS NOTES
End Of Shift Note  [facility]  Summary of shift: Patient's initial left radial ART line became dampened and very positional, and unable to give accurate BP's on patient.  Respiratory was able to place new ART line in Right radial of patient.  Patient received 1x dose of hydralazine overnight with no relief. Orders we placed to start patient on cardene gtt, which was only on momentarily and turned off by shift change.  Patient had 258 in urine and no bowel movements this shift.  Minimal residuals of 40, and 50 from OG. Tube feed running at 45.     Vitals:    Vitals:    07/17/24 0715 07/17/24 0730 07/17/24 0745 07/17/24 0800   BP:       Pulse: 76 77 82 80   Resp: 22 22 22 22   Temp: 99 °F (37.2 °C)      TempSrc: Axillary      SpO2: 97% 97% 97% 96%   Weight:       Height:            I&O:   Intake/Output Summary (Last 24 hours) at 7/17/2024 0841  Last data filed at 7/17/2024 0754  Gross per 24 hour   Intake 2830.62 ml   Output 758 ml   Net 2072.62 ml       Resp Status: Ventilator    Ventilator Settings:  Vent Mode: AC/PRVC Resp Rate (Set): 22 bpm/Vt (Set, mL): 600 mL/ /FiO2 : 40 %    Critical Care IV infusions:   niCARdipine Stopped (07/17/24 0620)    fentaNYL 50 mcg/hr (07/17/24 0754)    midazolam 10 mg/hr (07/17/24 0754)    propofol 25 mcg/kg/min (07/17/24 0754)    dextrose      norepinephrine Stopped (07/16/24 0534)    sodium chloride 10 mL/hr at 07/17/24 0754        LDA:   Peripheral IV 07/15/24 Posterior;Right Hand (Active)   Number of days: 1       Peripheral IV 07/15/24 Proximal;Right;Anterior Forearm (Active)   Number of days: 1       Peripheral IV 07/16/24 Left;Proximal Forearm (Active)   Number of days: 0       Peripheral IV 07/16/24 Right Forearm (Active)   Number of days: 0       NG/OG/NJ/NE Tube 16 fr Left mouth (Active)   Number of days: 1       Urinary Catheter 07/15/24 (Active)   Number of days: 1       ETT  (Active)   Number of days: 1       Arterial Line 07/16/24 Right Radial (Active)   Number of

## 2024-07-17 NOTE — PROGRESS NOTES
Insert Arterial Line  Date/Time:  07/17/24, 12:08 AM  Performed by: LUIZA ORTIZ JR, RCP    Patient identity confirmed: arm band and provided demographic data   Time out: Immediately prior to procedure a \"time out\" was called to verify the correct patient, procedure, equipment, support staff.    Preparation: Patient was prepped and draped in the usual sterile fashion.    Location:right radial    Getachew's test normal: yes  Needle gauge: 20     Number of attempts: 1  Post-procedure: transparent dressing applied and line secured    Patient tolerance: well

## 2024-07-18 ENCOUNTER — APPOINTMENT (OUTPATIENT)
Dept: GENERAL RADIOLOGY | Age: 49
DRG: 130 | End: 2024-07-18
Payer: MEDICAID

## 2024-07-18 LAB
ALBUMIN SERPL-MCNC: 3.1 G/DL (ref 3.5–5.2)
ALBUMIN SERPL-MCNC: 3.1 G/DL (ref 3.5–5.2)
ALP SERPL-CCNC: 74 U/L (ref 40–129)
ALP SERPL-CCNC: 74 U/L (ref 40–129)
ALT SERPL-CCNC: 36 U/L (ref 5–41)
ALT SERPL-CCNC: 36 U/L (ref 5–41)
ANION GAP SERPL CALCULATED.3IONS-SCNC: 13 MMOL/L (ref 9–17)
ANION GAP SERPL CALCULATED.3IONS-SCNC: 13 MMOL/L (ref 9–17)
AST SERPL-CCNC: 61 U/L
AST SERPL-CCNC: 61 U/L
BASOPHILS # BLD: 0.03 K/UL (ref 0–0.2)
BASOPHILS # BLD: 0.03 K/UL (ref 0–0.2)
BASOPHILS NFR BLD: 0 % (ref 0–2)
BASOPHILS NFR BLD: 0 % (ref 0–2)
BILIRUB SERPL-MCNC: 0.9 MG/DL (ref 0.3–1.2)
BILIRUB SERPL-MCNC: 0.9 MG/DL (ref 0.3–1.2)
BUN SERPL-MCNC: 27 MG/DL (ref 6–20)
BUN SERPL-MCNC: 27 MG/DL (ref 6–20)
BUN/CREAT SERPL: 14 (ref 9–20)
BUN/CREAT SERPL: 14 (ref 9–20)
CALCIUM SERPL-MCNC: 7 MG/DL (ref 8.6–10.4)
CALCIUM SERPL-MCNC: 7 MG/DL (ref 8.6–10.4)
CHLORIDE SERPL-SCNC: 104 MMOL/L (ref 98–107)
CHLORIDE SERPL-SCNC: 104 MMOL/L (ref 98–107)
CO2 SERPL-SCNC: 23 MMOL/L (ref 20–31)
CO2 SERPL-SCNC: 23 MMOL/L (ref 20–31)
CREAT SERPL-MCNC: 1.9 MG/DL (ref 0.7–1.2)
CREAT SERPL-MCNC: 1.9 MG/DL (ref 0.7–1.2)
EOSINOPHIL # BLD: 0.29 K/UL (ref 0–0.44)
EOSINOPHIL # BLD: 0.29 K/UL (ref 0–0.44)
EOSINOPHILS RELATIVE PERCENT: 3 % (ref 1–4)
EOSINOPHILS RELATIVE PERCENT: 3 % (ref 1–4)
ERYTHROCYTE [DISTWIDTH] IN BLOOD BY AUTOMATED COUNT: 14.1 % (ref 11.8–14.4)
ERYTHROCYTE [DISTWIDTH] IN BLOOD BY AUTOMATED COUNT: 14.1 % (ref 11.8–14.4)
FIO2: 40
FIO2: 40
GFR, ESTIMATED: 27 ML/MIN/1.73M2
GFR, ESTIMATED: 27 ML/MIN/1.73M2
GLUCOSE BLD-MCNC: 103 MG/DL (ref 75–110)
GLUCOSE BLD-MCNC: 103 MG/DL (ref 75–110)
GLUCOSE BLD-MCNC: 110 MG/DL (ref 75–110)
GLUCOSE BLD-MCNC: 110 MG/DL (ref 75–110)
GLUCOSE BLD-MCNC: 113 MG/DL (ref 75–110)
GLUCOSE BLD-MCNC: 113 MG/DL (ref 75–110)
GLUCOSE BLD-MCNC: 116 MG/DL (ref 75–110)
GLUCOSE BLD-MCNC: 116 MG/DL (ref 75–110)
GLUCOSE BLD-MCNC: 117 MG/DL (ref 75–110)
GLUCOSE BLD-MCNC: 117 MG/DL (ref 75–110)
GLUCOSE BLD-MCNC: 120 MG/DL (ref 75–110)
GLUCOSE SERPL-MCNC: 106 MG/DL (ref 70–99)
GLUCOSE SERPL-MCNC: 106 MG/DL (ref 70–99)
HCT VFR BLD AUTO: 38 % (ref 40.7–50.3)
HCT VFR BLD AUTO: 38 % (ref 40.7–50.3)
HGB BLD-MCNC: 13 G/DL (ref 13–17)
HGB BLD-MCNC: 13 G/DL (ref 13–17)
IMM GRANULOCYTES # BLD AUTO: 0.04 K/UL (ref 0–0.3)
IMM GRANULOCYTES # BLD AUTO: 0.04 K/UL (ref 0–0.3)
IMM GRANULOCYTES NFR BLD: 0 %
IMM GRANULOCYTES NFR BLD: 0 %
LYMPHOCYTES NFR BLD: 1.23 K/UL (ref 1.1–3.7)
LYMPHOCYTES NFR BLD: 1.23 K/UL (ref 1.1–3.7)
LYMPHOCYTES RELATIVE PERCENT: 12 % (ref 24–43)
LYMPHOCYTES RELATIVE PERCENT: 12 % (ref 24–43)
MAGNESIUM SERPL-MCNC: 1.3 MG/DL (ref 1.6–2.6)
MAGNESIUM SERPL-MCNC: 1.3 MG/DL (ref 1.6–2.6)
MAGNESIUM SERPL-MCNC: 2.4 MG/DL (ref 1.6–2.6)
MAGNESIUM SERPL-MCNC: 2.4 MG/DL (ref 1.6–2.6)
MCH RBC QN AUTO: 35.6 PG (ref 25.2–33.5)
MCH RBC QN AUTO: 35.6 PG (ref 25.2–33.5)
MCHC RBC AUTO-ENTMCNC: 34.2 G/DL (ref 28.4–34.8)
MCHC RBC AUTO-ENTMCNC: 34.2 G/DL (ref 28.4–34.8)
MCV RBC AUTO: 104.1 FL (ref 82.6–102.9)
MCV RBC AUTO: 104.1 FL (ref 82.6–102.9)
MONOCYTES NFR BLD: 0.46 K/UL (ref 0.1–1.2)
MONOCYTES NFR BLD: 0.46 K/UL (ref 0.1–1.2)
MONOCYTES NFR BLD: 4 % (ref 3–12)
MONOCYTES NFR BLD: 4 % (ref 3–12)
NEUTROPHILS NFR BLD: 81 % (ref 36–65)
NEUTROPHILS NFR BLD: 81 % (ref 36–65)
NEUTS SEG NFR BLD: 8.59 K/UL (ref 1.5–8.1)
NEUTS SEG NFR BLD: 8.59 K/UL (ref 1.5–8.1)
NRBC BLD-RTO: 0 PER 100 WBC
NRBC BLD-RTO: 0 PER 100 WBC
PATIENT TEMP: 37
PATIENT TEMP: 37
PLATELET # BLD AUTO: 102 K/UL (ref 138–453)
PLATELET # BLD AUTO: 102 K/UL (ref 138–453)
PMV BLD AUTO: 11 FL (ref 8.1–13.5)
PMV BLD AUTO: 11 FL (ref 8.1–13.5)
POC HCO3: 24.6 MMOL/L (ref 21–28)
POC HCO3: 24.6 MMOL/L (ref 21–28)
POC O2 SATURATION: 96.2 % (ref 94–98)
POC O2 SATURATION: 96.2 % (ref 94–98)
POC PCO2: 38.7 MM HG (ref 35–48)
POC PCO2: 38.7 MM HG (ref 35–48)
POC PH: 7.41 (ref 7.35–7.45)
POC PH: 7.41 (ref 7.35–7.45)
POC PO2: 82 MM HG (ref 83–108)
POC PO2: 82 MM HG (ref 83–108)
POSITIVE BASE EXCESS, ART: 0.1 MMOL/L (ref 0–3)
POSITIVE BASE EXCESS, ART: 0.1 MMOL/L (ref 0–3)
POTASSIUM SERPL-SCNC: 3.4 MMOL/L (ref 3.7–5.3)
POTASSIUM SERPL-SCNC: 3.4 MMOL/L (ref 3.7–5.3)
POTASSIUM SERPL-SCNC: 3.9 MMOL/L (ref 3.7–5.3)
POTASSIUM SERPL-SCNC: 3.9 MMOL/L (ref 3.7–5.3)
PROT SERPL-MCNC: 6.2 G/DL (ref 6.4–8.3)
PROT SERPL-MCNC: 6.2 G/DL (ref 6.4–8.3)
RBC # BLD AUTO: 3.65 M/UL (ref 4.21–5.77)
RBC # BLD AUTO: 3.65 M/UL (ref 4.21–5.77)
RBC # BLD: ABNORMAL 10*6/UL
RBC # BLD: ABNORMAL 10*6/UL
SODIUM SERPL-SCNC: 140 MMOL/L (ref 135–144)
SODIUM SERPL-SCNC: 140 MMOL/L (ref 135–144)
TRIGL SERPL-MCNC: 182 MG/DL
TRIGL SERPL-MCNC: 182 MG/DL
VANCOMYCIN SERPL-MCNC: 18 UG/ML
VANCOMYCIN SERPL-MCNC: 18 UG/ML
WBC OTHER # BLD: 10.6 K/UL (ref 3.5–11.3)
WBC OTHER # BLD: 10.6 K/UL (ref 3.5–11.3)

## 2024-07-18 PROCEDURE — 6370000000 HC RX 637 (ALT 250 FOR IP): Performed by: INTERNAL MEDICINE

## 2024-07-18 PROCEDURE — 94761 N-INVAS EAR/PLS OXIMETRY MLT: CPT

## 2024-07-18 PROCEDURE — 80202 ASSAY OF VANCOMYCIN: CPT

## 2024-07-18 PROCEDURE — 2580000003 HC RX 258: Performed by: NURSE PRACTITIONER

## 2024-07-18 PROCEDURE — 6360000002 HC RX W HCPCS: Performed by: INTERNAL MEDICINE

## 2024-07-18 PROCEDURE — 84478 ASSAY OF TRIGLYCERIDES: CPT

## 2024-07-18 PROCEDURE — 82803 BLOOD GASES ANY COMBINATION: CPT

## 2024-07-18 PROCEDURE — 85025 COMPLETE CBC W/AUTO DIFF WBC: CPT

## 2024-07-18 PROCEDURE — 2000000000 HC ICU R&B

## 2024-07-18 PROCEDURE — 6370000000 HC RX 637 (ALT 250 FOR IP)

## 2024-07-18 PROCEDURE — 84132 ASSAY OF SERUM POTASSIUM: CPT

## 2024-07-18 PROCEDURE — 71045 X-RAY EXAM CHEST 1 VIEW: CPT

## 2024-07-18 PROCEDURE — 80053 COMPREHEN METABOLIC PANEL: CPT

## 2024-07-18 PROCEDURE — 2580000003 HC RX 258

## 2024-07-18 PROCEDURE — 2580000003 HC RX 258: Performed by: INTERNAL MEDICINE

## 2024-07-18 PROCEDURE — 94003 VENT MGMT INPAT SUBQ DAY: CPT

## 2024-07-18 PROCEDURE — 99232 SBSQ HOSP IP/OBS MODERATE 35: CPT | Performed by: INTERNAL MEDICINE

## 2024-07-18 PROCEDURE — 83735 ASSAY OF MAGNESIUM: CPT

## 2024-07-18 PROCEDURE — 37799 UNLISTED PX VASCULAR SURGERY: CPT

## 2024-07-18 PROCEDURE — 6360000002 HC RX W HCPCS

## 2024-07-18 PROCEDURE — 82947 ASSAY GLUCOSE BLOOD QUANT: CPT

## 2024-07-18 PROCEDURE — 2700000000 HC OXYGEN THERAPY PER DAY

## 2024-07-18 PROCEDURE — 2500000003 HC RX 250 WO HCPCS: Performed by: INTERNAL MEDICINE

## 2024-07-18 RX ORDER — CLONAZEPAM 0.5 MG/1
2 TABLET ORAL EVERY 12 HOURS
Status: DISCONTINUED | OUTPATIENT
Start: 2024-07-19 | End: 2024-07-27 | Stop reason: HOSPADM

## 2024-07-18 RX ORDER — MIDAZOLAM HYDROCHLORIDE 1 MG/ML
2 INJECTION INTRAMUSCULAR; INTRAVENOUS
Status: DISCONTINUED | OUTPATIENT
Start: 2024-07-18 | End: 2024-07-19

## 2024-07-18 RX ORDER — QUETIAPINE FUMARATE 25 MG/1
50 TABLET, FILM COATED ORAL 2 TIMES DAILY
Status: DISCONTINUED | OUTPATIENT
Start: 2024-07-18 | End: 2024-07-19

## 2024-07-18 RX ORDER — 0.9 % SODIUM CHLORIDE 0.9 %
500 INTRAVENOUS SOLUTION INTRAVENOUS ONCE
Status: COMPLETED | OUTPATIENT
Start: 2024-07-18 | End: 2024-07-18

## 2024-07-18 RX ADMIN — PROPOFOL 35 MCG/KG/MIN: 10 INJECTION, EMULSION INTRAVENOUS at 04:53

## 2024-07-18 RX ADMIN — MAGNESIUM SULFATE HEPTAHYDRATE 1000 MG: 1 INJECTION, SOLUTION INTRAVENOUS at 09:42

## 2024-07-18 RX ADMIN — PIPERACILLIN AND TAZOBACTAM 3375 MG: 3; .375 INJECTION, POWDER, LYOPHILIZED, FOR SOLUTION INTRAVENOUS at 15:08

## 2024-07-18 RX ADMIN — MIDAZOLAM 2 MG: 1 INJECTION INTRAMUSCULAR; INTRAVENOUS at 19:24

## 2024-07-18 RX ADMIN — PROPOFOL 50 MCG/KG/MIN: 10 INJECTION, EMULSION INTRAVENOUS at 19:50

## 2024-07-18 RX ADMIN — SODIUM CHLORIDE, PRESERVATIVE FREE 40 MG: 5 INJECTION INTRAVENOUS at 22:33

## 2024-07-18 RX ADMIN — FOLIC ACID 1 MG: 1 TABLET ORAL at 08:37

## 2024-07-18 RX ADMIN — MULTIVITAMIN TABLET 1 TABLET: TABLET at 08:37

## 2024-07-18 RX ADMIN — Medication 4 MG/HR: at 13:48

## 2024-07-18 RX ADMIN — Medication 1500 MG: at 09:46

## 2024-07-18 RX ADMIN — QUETIAPINE FUMARATE 25 MG: 25 TABLET ORAL at 08:37

## 2024-07-18 RX ADMIN — SODIUM CHLORIDE 500 ML: 9 INJECTION, SOLUTION INTRAVENOUS at 00:34

## 2024-07-18 RX ADMIN — MAGNESIUM SULFATE HEPTAHYDRATE 1000 MG: 1 INJECTION, SOLUTION INTRAVENOUS at 05:37

## 2024-07-18 RX ADMIN — SODIUM CHLORIDE, PRESERVATIVE FREE 10 ML: 5 INJECTION INTRAVENOUS at 08:52

## 2024-07-18 RX ADMIN — Medication 100 MG: at 08:37

## 2024-07-18 RX ADMIN — Medication 125 MCG/HR: at 15:11

## 2024-07-18 RX ADMIN — PROPOFOL 50 MCG/KG/MIN: 10 INJECTION, EMULSION INTRAVENOUS at 01:47

## 2024-07-18 RX ADMIN — QUETIAPINE FUMARATE 50 MG: 25 TABLET ORAL at 22:32

## 2024-07-18 RX ADMIN — SODIUM CHLORIDE, PRESERVATIVE FREE 10 ML: 5 INJECTION INTRAVENOUS at 22:33

## 2024-07-18 RX ADMIN — PIPERACILLIN AND TAZOBACTAM 3375 MG: 3; .375 INJECTION, POWDER, LYOPHILIZED, FOR SOLUTION INTRAVENOUS at 03:51

## 2024-07-18 RX ADMIN — MAGNESIUM SULFATE HEPTAHYDRATE 1000 MG: 1 INJECTION, SOLUTION INTRAVENOUS at 06:40

## 2024-07-18 RX ADMIN — PROPOFOL 35 MCG/KG/MIN: 10 INJECTION, EMULSION INTRAVENOUS at 09:41

## 2024-07-18 RX ADMIN — HEPARIN SODIUM 5000 UNITS: 5000 INJECTION INTRAVENOUS; SUBCUTANEOUS at 05:31

## 2024-07-18 RX ADMIN — HEPARIN SODIUM 5000 UNITS: 5000 INJECTION INTRAVENOUS; SUBCUTANEOUS at 22:33

## 2024-07-18 RX ADMIN — SODIUM CHLORIDE 5 MG/HR: 9 INJECTION, SOLUTION INTRAVENOUS at 02:43

## 2024-07-18 RX ADMIN — PROPOFOL 35 MCG/KG/MIN: 10 INJECTION, EMULSION INTRAVENOUS at 15:52

## 2024-07-18 RX ADMIN — HEPARIN SODIUM 5000 UNITS: 5000 INJECTION INTRAVENOUS; SUBCUTANEOUS at 14:02

## 2024-07-18 RX ADMIN — POTASSIUM BICARBONATE 40 MEQ: 782 TABLET, EFFERVESCENT ORAL at 05:42

## 2024-07-18 RX ADMIN — MAGNESIUM SULFATE HEPTAHYDRATE 1000 MG: 1 INJECTION, SOLUTION INTRAVENOUS at 08:37

## 2024-07-18 RX ADMIN — CLONAZEPAM 1.5 MG: 0.5 TABLET ORAL at 12:15

## 2024-07-18 RX ADMIN — MIDAZOLAM 2 MG: 1 INJECTION INTRAMUSCULAR; INTRAVENOUS at 18:21

## 2024-07-18 RX ADMIN — Medication 125 MCG/HR: at 04:56

## 2024-07-18 ASSESSMENT — PULMONARY FUNCTION TESTS
PIF_VALUE: 44
PIF_VALUE: 48
PIF_VALUE: 44
PIF_VALUE: 26
PIF_VALUE: 35
PIF_VALUE: 28
PIF_VALUE: 27
PIF_VALUE: 34
PIF_VALUE: 29
PIF_VALUE: 32
PIF_VALUE: 28
PIF_VALUE: 28
PIF_VALUE: 29
PIF_VALUE: 26
PIF_VALUE: 28
PIF_VALUE: 32
PIF_VALUE: 38
PIF_VALUE: 29
PIF_VALUE: 30
PIF_VALUE: 30
PIF_VALUE: 33
PIF_VALUE: 31
PIF_VALUE: 39
PIF_VALUE: 27
PIF_VALUE: 29

## 2024-07-18 NOTE — PROGRESS NOTES
[facility]  Office: [phone]  Klever Dickens DO, Eulogio Sr DO, Jerome Worley DO, Warren Jovel DO, Vaughn Jefferson MD, Krista Andrews MD, Ana M Cooley MD, Nia Wright MD,  Doc Metz MD, Mario Alberto Lizama MD, Shahla Dutta MD,  Dolly Wu DO, Nancy Mata MD, James Boston MD, Trey Dickens DO, Adriana Spencer MD,  Luis Alberto Thornton DO, Yanet Horan MD, Renee King MD, eFly Gomez MD, Kady Macias MD,  Kayden Murillo MD, Ramsey Valentine MD, Airam Gonsales MD, Angelina Cruz MD, Devyn Gates MD, Neil Looney MD, Navin King DO, Tera Prajapati DO, Julieta Gomez MD,  Kael Farias MD, Shirley Waterhouse, CNP,  Valencia Regalado CNP, Lobo Rojas, CNP,  Karen Mascorro, DNP, Ania Oro, CNP, Kyra Isbell, CNP, Shayla Song CNP, Jackelin Woody, CNP, Hodan Cruz, PA-C, Kim Woodard PA-C, Destiney Manriquez, CNP, Vania Silva, CNP, Hailey Carson, CNP, Brooke Olvera, CNP, Arabella Rincon, CNP, Rebecca Zavala, CNS, Laurie Crandall, CNP, Melissa Pat CNP, Tracy Schwab, CNP         [facility]   IN-PATIENT SERVICE   [place]    Progress Note    7/18/2024    11:14 AM    Name:   Burke Ruiz  MRN:     [id number]     Acct:      [id number]   Room:   [facility]/1104-[facility] Day:  3  Admit Date:  7/15/2024  5:08 PM    PCP:   No primary care provider on file.  Code Status:  Full Code    Subjective:     C/C:   Chief Complaint   Patient presents with    Alcohol Intoxication     Interval History Status: not changed.     Patient remains intubated and sedated with fentanyl, Versed and propofol.  Remains off pressors..  No acute issues overnight per staff    Brief History:     This is an unidentified male that presents via squad after drinking a large volume of vodka.  Upon evaluation he was found to have a significant alcohol level of greater than 0.4 with positive testing for cocaine and cannabis.  To a level of consciousness he was intubated admitted to  DO  7/18/2024  11:14 AM

## 2024-07-18 NOTE — CARE COORDINATION
Discharge planning    Security working on identity. We have no information on patient. Intubated since admit. On versed, fent, & propofol. FIO2 40%. Homeless man found down with a bottle of vodka. Positive for cocaine and weed.

## 2024-07-18 NOTE — PLAN OF CARE
Problem: Respiratory - Adult  Goal: Achieves optimal ventilation and oxygenation  7/18/2024 0721 by Jorden Malik RCP  Outcome: Progressing  7/17/2024 2116 by Shirley Hammer RN  Outcome: Progressing  7/17/2024 1922 by Annie Haney RCP  Outcome: Progressing     Problem: Respiratory - Adult  Goal: Adequate oxygenation  7/18/2024 0721 by Jorden Malik RCP  Outcome: Progressing  7/17/2024 1922 by Annie Haney RCP  Outcome: Progressing     Problem: Respiratory - Adult  Goal: Will be able to breathe spontaneously, without ventilator support  Description: Will be able to breathe spontaneously, without ventilator support  7/18/2024 0721 by Jorden Malik RCP  Outcome: Progressing  7/17/2024 1922 by Annie Haney RCP  Outcome: Progressing      66

## 2024-07-18 NOTE — PROGRESS NOTES
DENNIS Song NP notified of patients urine output of 100 ml over the last 4 hours. Awaiting response

## 2024-07-18 NOTE — PLAN OF CARE
Problem: Respiratory - Adult  Goal: Achieves optimal ventilation and oxygenation  7/18/2024 1910 by Annie Haney RCP  Outcome: Progressing     Problem: Respiratory - Adult  Goal: Adequate oxygenation  7/18/2024 1910 by Annie Haney RCP  Outcome: Progressing     Problem: Respiratory - Adult  Goal: Will be able to breathe spontaneously, without ventilator support  Description: Will be able to breathe spontaneously, without ventilator support  7/18/2024 1910 by Annie Haney RCP  Outcome: Progressing

## 2024-07-18 NOTE — PLAN OF CARE
Problem: Safety - Medical Restraint  Goal: Remains free of injury from restraints (Restraint for Interference with Medical Device)  Description: INTERVENTIONS:  1. Determine that other, less restrictive measures have been tried or would not be effective before applying the restraint  2. Evaluate the patient's condition at the time of restraint application  3. Inform patient/family regarding the reason for restraint  4. Q2H: Monitor safety, psychosocial status, comfort, nutrition and hydration  7/17/2024 2116 by Shirley Hammer RN  Outcome: Progressing  Flowsheets  Taken 7/17/2024 2000 by Shirley Hammer RN  Remains free of injury from restraints (restraint for interference with medical device): Determine that other, less restrictive measures have been tried or would not be effective before applying the restraint  Problem: Discharge Planning  Goal: Discharge to home or other facility with appropriate resources  7/17/2024 2116 by Shirley Hammer RN  Outcome: Progressing     Problem: Respiratory - Adult  Goal: Achieves optimal ventilation and oxygenation  7/17/2024 2116 by Shirley Hammer RN  Outcome: Progressing     Problem: Safety - Adult  Goal: Free from fall injury  7/17/2024 2116 by Shirley Hammer RN  Outcome: Progressing     Problem: Pain  Goal: Verbalizes/displays adequate comfort level or baseline comfort level  7/17/2024 2116 by Shirley Hammer RN  Outcome: Progressing     Problem: Nutrition Deficit:  Goal: Optimize nutritional status  7/17/2024 2116 by Shirley Hammer RN  Outcome: Progressing

## 2024-07-18 NOTE — PROGRESS NOTES
[facility]   Pharmacy Pharmacokinetic Monitoring Service - Vancomycin    Consulting Provider: Hailey Carson CNP   Indication: CAP  Target Concentration: Goal AUC/AIDA 400-600 mg*hr/L  Day of Therapy: 3  Additional Antimicrobials: Zosyn    Pertinent Laboratory Values:   Wt Readings from Last 1 Encounters:   07/18/24 91.7 kg (202 lb 3.2 oz)     Temp Readings from Last 1 Encounters:   07/18/24 98.2 °F (36.8 °C) (Axillary)     Estimated Creatinine Clearance: 10 mL/min (A) (based on SCr of 1.9 mg/dL (H)).  Recent Labs     07/17/24  0445 07/17/24  1815 07/18/24  0410   CREATININE 1.7* 2.0* 1.9*   BUN 26* 27* 27*   WBC 11.6*  --  10.6     Procalcitonin: none    MRSA Nasal Swab: showed MRSA positive result on 7/18    Recent vancomycin administrations                     vancomycin (VANCOCIN) 1500 mg in sodium chloride 0.9 % 250 mL IVPB (mg) 1,500 mg New Bag 07/17/24 0058     1,500 mg New Bag 07/16/24 1309    vancomycin (VANCOCIN) 2500 mg in sodium chloride 0.9 % 500 mL IVPB (mg) 2,500 mg New Bag 07/16/24 0051                    Assessment:  Date/Time Current Dose Concentration Timing of Concentration (h) AUC   7/18 7/16 2500mg  7/17 1500mg 18.0 ug/ml 32 hrs from last dose  NA   Note: Serum concentrations collected for AUC dosing may appear elevated if collected in close proximity to the dose administered, this is not necessarily an indication of toxicity    Plan:  Vancomycin conc has fallen in desirable range 32 hrs after last vancomycin dose of 1500mg  Repeat vancomycin 1500mg x 1 today   Repeat vancomycin concentration ordered for 7/19 @ 1800   Pharmacy will continue to monitor patient and adjust therapy as indicated    Thank you for the consult,  Laurie Smith RPH  7/18/2024 8:19 AM

## 2024-07-18 NOTE — PROGRESS NOTES
Patient is resting comfortably. RN started Cardene for a brief time (See Mar). Patients langston had sediment inside, writer flushed langston and 1500mL of urine released

## 2024-07-18 NOTE — PROGRESS NOTES
SPIRITUAL CARE DEPARTMENT [facility]  PROGRESS NOTE    Room # 1104/1104-01   Name: Burke Ruiz            Tenriism: None Listed     Reason for visit:  Rounds    I visited the patient.    Admit Date & Time: 7/15/2024  5:08 PM    Assessment:  Burke Ruiz is a 89 y.o. male in the hospital because Acute Alcoholic Intoxication with Complication. Upon entering the room Pt. Was asleep      Intervention:  I introduced myself and my title as    As Pt. Was sleeping, Writer dropped off Prayer Card and engaged in Silent Prayer Outside Hospital Room.    Outcome:  Pt. asleep    Plan:  Chaplains will remain available to offer spiritual and emotional support as needed.    Electronically signed by Chaplain Travis, on 7/18/2024 at 6:40 PM.  Spiritual Care Department  [facility]       07/18/24 1836   Encounter Summary   Encounter Overview/Reason Spiritual/Emotional Needs   Service Provided For Patient   Referral/Consult From [facility]   Support System Unknown   Last Encounter  07/18/24   Complexity of Encounter Low   Begin Time 1935   End Time  1937   Total Time Calculated 2 min   Spiritual/Emotional needs   Type Spiritual Support   Assessment/Intervention/Outcome   Assessment Unable to assess;Other (Comment)  (Pt. Sleeping)   Intervention Prayer (assurance of)/Lewis;Other (Comment)  (Writer left Prayer card for Pt.)   Outcome Other (comment)  (Pt. sleeping)   Plan and Referrals   Plan/Referrals Continue to visit, (comment);Continue Support (comment)

## 2024-07-18 NOTE — PROGRESS NOTES
Pulmonary Critical Care Progress Note    Patient seen for the follow up of Acute alcoholic intoxication with complication (HCC)     Subjective:    He is still sedated on 125 of fentanyl 7 of Versed and propofol at 35.  Triglycerides were increased.  He has improved blood pressure.  He tolerates tube feeds at goal at 55 an hour.  He has good urine output.      Examination:    Vitals: /70   Pulse 63   Temp 98.1 °F (36.7 °C) (Axillary)   Resp 22   Ht 1.803 m (5' 11\")   Wt 91.7 kg (202 lb 3.2 oz)   SpO2 99%   BMI 28.20 kg/m²   SpO2  Av.1 %  Min: 93 %  Max: 99 %  General appearance: Intubated sedated  Neck: No JVD  Lungs: Mild decreased breath sound no crackles or wheezes  Heart: regular rate and rhythm, S1, S2 normal, no gallop  Abdomen: Soft, non tender, + BS  Extremities: no cyanosis or clubbing. No significant edema    LABs:    CBC:   Recent Labs     24  0445 24  0410   WBC 11.6* 10.6   HGB 13.8 13.0   HCT 40.2* 38.0*   * 102*       BMP:   Recent Labs     24  1815 24  0410 24  1208    140  --    K 3.5* 3.4* 3.9   CO2   --    BUN 27* 27*  --    CREATININE 2.0* 1.9*  --    LABGLOM 25* 27*  --    GLUCOSE 112* 106*  --        PT/INR: No results for input(s): \"PROTIME\", \"INR\" in the last 72 hours.  APTT:No results for input(s): \"APTT\" in the last 72 hours.  LIVER PROFILE:  Recent Labs     07/15/24  1715 24  0410   * 61*   ALT 56* 36         Radiology:  Chest x-ray   Reviewed  Right no acute cardiopulmonary process.     Support tubes as described above.    Chest x-ray 7/15  1. Interval resolution of previously demonstrated pulmonary vascular  congestion and interstitial pulmonary edema.  2. Previously demonstrated asymmetric opacity in the right lung has resolved.  3. ET tube and NG tube in satisfactory position.  4. No acute cardiopulmonary process except for minimal right basilar  atelectasis.      Impression/recommendations:    Acute

## 2024-07-19 ENCOUNTER — APPOINTMENT (OUTPATIENT)
Dept: GENERAL RADIOLOGY | Age: 49
DRG: 130 | End: 2024-07-19
Payer: MEDICAID

## 2024-07-19 LAB
ANION GAP SERPL CALCULATED.3IONS-SCNC: 10 MMOL/L (ref 9–17)
ANION GAP SERPL CALCULATED.3IONS-SCNC: 10 MMOL/L (ref 9–17)
ANION GAP SERPL CALCULATED.3IONS-SCNC: 9 MMOL/L (ref 9–17)
ANION GAP SERPL CALCULATED.3IONS-SCNC: 9 MMOL/L (ref 9–17)
BASOPHILS # BLD: <0.03 K/UL (ref 0–0.2)
BASOPHILS # BLD: <0.03 K/UL (ref 0–0.2)
BASOPHILS NFR BLD: 0 % (ref 0–2)
BASOPHILS NFR BLD: 0 % (ref 0–2)
BUN SERPL-MCNC: 21 MG/DL (ref 6–20)
BUN SERPL-MCNC: 21 MG/DL (ref 6–20)
BUN SERPL-MCNC: 24 MG/DL (ref 6–20)
BUN SERPL-MCNC: 24 MG/DL (ref 6–20)
BUN/CREAT SERPL: 13 (ref 9–20)
CALCIUM SERPL-MCNC: 7.4 MG/DL (ref 8.6–10.4)
CALCIUM SERPL-MCNC: 7.4 MG/DL (ref 8.6–10.4)
CALCIUM SERPL-MCNC: 7.9 MG/DL (ref 8.6–10.4)
CALCIUM SERPL-MCNC: 7.9 MG/DL (ref 8.6–10.4)
CHLORIDE SERPL-SCNC: 106 MMOL/L (ref 98–107)
CHLORIDE SERPL-SCNC: 106 MMOL/L (ref 98–107)
CHLORIDE SERPL-SCNC: 110 MMOL/L (ref 98–107)
CHLORIDE SERPL-SCNC: 110 MMOL/L (ref 98–107)
CO2 SERPL-SCNC: 24 MMOL/L (ref 20–31)
CO2 SERPL-SCNC: 24 MMOL/L (ref 20–31)
CO2 SERPL-SCNC: 25 MMOL/L (ref 20–31)
CO2 SERPL-SCNC: 25 MMOL/L (ref 20–31)
CREAT SERPL-MCNC: 1.6 MG/DL (ref 0.7–1.2)
CREAT SERPL-MCNC: 1.6 MG/DL (ref 0.7–1.2)
CREAT SERPL-MCNC: 1.9 MG/DL (ref 0.7–1.2)
CREAT SERPL-MCNC: 1.9 MG/DL (ref 0.7–1.2)
EOSINOPHIL # BLD: 0.27 K/UL (ref 0–0.44)
EOSINOPHIL # BLD: 0.27 K/UL (ref 0–0.44)
EOSINOPHILS RELATIVE PERCENT: 3 % (ref 1–4)
EOSINOPHILS RELATIVE PERCENT: 3 % (ref 1–4)
ERYTHROCYTE [DISTWIDTH] IN BLOOD BY AUTOMATED COUNT: 14.2 % (ref 11.8–14.4)
ERYTHROCYTE [DISTWIDTH] IN BLOOD BY AUTOMATED COUNT: 14.2 % (ref 11.8–14.4)
FIO2: 40
FIO2: 40
GFR, ESTIMATED: 27 ML/MIN/1.73M2
GFR, ESTIMATED: 27 ML/MIN/1.73M2
GFR, ESTIMATED: 33 ML/MIN/1.73M2
GFR, ESTIMATED: 33 ML/MIN/1.73M2
GLUCOSE BLD-MCNC: 105 MG/DL (ref 75–110)
GLUCOSE BLD-MCNC: 105 MG/DL (ref 75–110)
GLUCOSE BLD-MCNC: 107 MG/DL (ref 75–110)
GLUCOSE BLD-MCNC: 107 MG/DL (ref 75–110)
GLUCOSE BLD-MCNC: 112 MG/DL (ref 75–110)
GLUCOSE BLD-MCNC: 112 MG/DL (ref 75–110)
GLUCOSE BLD-MCNC: 119 MG/DL (ref 75–110)
GLUCOSE BLD-MCNC: 119 MG/DL (ref 75–110)
GLUCOSE BLD-MCNC: 126 MG/DL (ref 75–110)
GLUCOSE BLD-MCNC: 126 MG/DL (ref 75–110)
GLUCOSE BLD-MCNC: 94 MG/DL (ref 75–110)
GLUCOSE BLD-MCNC: 94 MG/DL (ref 75–110)
GLUCOSE BLD-MCNC: 96 MG/DL (ref 75–110)
GLUCOSE BLD-MCNC: 96 MG/DL (ref 75–110)
GLUCOSE SERPL-MCNC: 101 MG/DL (ref 70–99)
GLUCOSE SERPL-MCNC: 101 MG/DL (ref 70–99)
GLUCOSE SERPL-MCNC: 102 MG/DL (ref 70–99)
GLUCOSE SERPL-MCNC: 102 MG/DL (ref 70–99)
HCT VFR BLD AUTO: 36.6 % (ref 40.7–50.3)
HCT VFR BLD AUTO: 36.6 % (ref 40.7–50.3)
HGB BLD-MCNC: 12.3 G/DL (ref 13–17)
HGB BLD-MCNC: 12.3 G/DL (ref 13–17)
IMM GRANULOCYTES # BLD AUTO: 0.02 K/UL (ref 0–0.3)
IMM GRANULOCYTES # BLD AUTO: 0.02 K/UL (ref 0–0.3)
IMM GRANULOCYTES NFR BLD: 0 %
IMM GRANULOCYTES NFR BLD: 0 %
LYMPHOCYTES NFR BLD: 1.31 K/UL (ref 1.1–3.7)
LYMPHOCYTES NFR BLD: 1.31 K/UL (ref 1.1–3.7)
LYMPHOCYTES RELATIVE PERCENT: 15 % (ref 24–43)
LYMPHOCYTES RELATIVE PERCENT: 15 % (ref 24–43)
MAGNESIUM SERPL-MCNC: 2.1 MG/DL (ref 1.6–2.6)
MAGNESIUM SERPL-MCNC: 2.1 MG/DL (ref 1.6–2.6)
MCH RBC QN AUTO: 35.8 PG (ref 25.2–33.5)
MCH RBC QN AUTO: 35.8 PG (ref 25.2–33.5)
MCHC RBC AUTO-ENTMCNC: 33.6 G/DL (ref 28–38)
MCHC RBC AUTO-ENTMCNC: 33.6 G/DL (ref 28–38)
MCV RBC AUTO: 106.4 FL (ref 82.6–102.9)
MCV RBC AUTO: 106.4 FL (ref 82.6–102.9)
MONOCYTES NFR BLD: 0.56 K/UL (ref 0.1–1.2)
MONOCYTES NFR BLD: 0.56 K/UL (ref 0.1–1.2)
MONOCYTES NFR BLD: 6 % (ref 3–12)
MONOCYTES NFR BLD: 6 % (ref 3–12)
NEUTROPHILS NFR BLD: 75 % (ref 36–65)
NEUTROPHILS NFR BLD: 75 % (ref 36–65)
NEUTS SEG NFR BLD: 6.56 K/UL (ref 1.5–8.1)
NEUTS SEG NFR BLD: 6.56 K/UL (ref 1.5–8.1)
PATIENT TEMP: 37
PATIENT TEMP: 37
PLATELET # BLD AUTO: 124 K/UL (ref 138–453)
PLATELET # BLD AUTO: 124 K/UL (ref 138–453)
PMV BLD AUTO: 11.3 FL (ref 8.1–13.5)
PMV BLD AUTO: 11.3 FL (ref 8.1–13.5)
POC HCO3: 26.4 MMOL/L (ref 21–28)
POC HCO3: 26.4 MMOL/L (ref 21–28)
POC O2 SATURATION: 94.4 % (ref 94–98)
POC O2 SATURATION: 94.4 % (ref 94–98)
POC PCO2: 41.1 MM HG (ref 35–48)
POC PCO2: 41.1 MM HG (ref 35–48)
POC PH: 7.42 (ref 7.35–7.45)
POC PH: 7.42 (ref 7.35–7.45)
POC PO2: 71.8 MM HG (ref 83–108)
POC PO2: 71.8 MM HG (ref 83–108)
POSITIVE BASE EXCESS, ART: 1.6 MMOL/L (ref 0–3)
POSITIVE BASE EXCESS, ART: 1.6 MMOL/L (ref 0–3)
POTASSIUM SERPL-SCNC: 3.8 MMOL/L (ref 3.7–5.3)
POTASSIUM SERPL-SCNC: 3.8 MMOL/L (ref 3.7–5.3)
POTASSIUM SERPL-SCNC: 4.1 MMOL/L (ref 3.7–5.3)
POTASSIUM SERPL-SCNC: 4.1 MMOL/L (ref 3.7–5.3)
RBC # BLD AUTO: 3.44 M/UL (ref 4.21–5.77)
RBC # BLD AUTO: 3.44 M/UL (ref 4.21–5.77)
RBC # BLD: ABNORMAL 10*6/UL
RBC # BLD: ABNORMAL 10*6/UL
SODIUM SERPL-SCNC: 140 MMOL/L (ref 135–144)
SODIUM SERPL-SCNC: 140 MMOL/L (ref 135–144)
SODIUM SERPL-SCNC: 144 MMOL/L (ref 135–144)
SODIUM SERPL-SCNC: 144 MMOL/L (ref 135–144)
TROPONIN I SERPL HS-MCNC: 24 NG/L (ref 0–22)
TROPONIN I SERPL HS-MCNC: 24 NG/L (ref 0–22)
WBC OTHER # BLD: 8.7 K/UL (ref 3.5–11.3)
WBC OTHER # BLD: 8.7 K/UL (ref 3.5–11.3)

## 2024-07-19 PROCEDURE — 83735 ASSAY OF MAGNESIUM: CPT

## 2024-07-19 PROCEDURE — 94003 VENT MGMT INPAT SUBQ DAY: CPT

## 2024-07-19 PROCEDURE — 82803 BLOOD GASES ANY COMBINATION: CPT

## 2024-07-19 PROCEDURE — 2500000003 HC RX 250 WO HCPCS: Performed by: INTERNAL MEDICINE

## 2024-07-19 PROCEDURE — 84484 ASSAY OF TROPONIN QUANT: CPT

## 2024-07-19 PROCEDURE — 2000000000 HC ICU R&B

## 2024-07-19 PROCEDURE — 82947 ASSAY GLUCOSE BLOOD QUANT: CPT

## 2024-07-19 PROCEDURE — 6360000002 HC RX W HCPCS

## 2024-07-19 PROCEDURE — 37799 UNLISTED PX VASCULAR SURGERY: CPT

## 2024-07-19 PROCEDURE — 80048 BASIC METABOLIC PNL TOTAL CA: CPT

## 2024-07-19 PROCEDURE — 2580000003 HC RX 258: Performed by: INTERNAL MEDICINE

## 2024-07-19 PROCEDURE — 85025 COMPLETE CBC W/AUTO DIFF WBC: CPT

## 2024-07-19 PROCEDURE — 2580000003 HC RX 258

## 2024-07-19 PROCEDURE — 71045 X-RAY EXAM CHEST 1 VIEW: CPT

## 2024-07-19 PROCEDURE — 99232 SBSQ HOSP IP/OBS MODERATE 35: CPT | Performed by: INTERNAL MEDICINE

## 2024-07-19 PROCEDURE — 6370000000 HC RX 637 (ALT 250 FOR IP): Performed by: INTERNAL MEDICINE

## 2024-07-19 PROCEDURE — 2700000000 HC OXYGEN THERAPY PER DAY

## 2024-07-19 PROCEDURE — 99213 OFFICE O/P EST LOW 20 MIN: CPT

## 2024-07-19 PROCEDURE — 99291 CRITICAL CARE FIRST HOUR: CPT

## 2024-07-19 PROCEDURE — 94761 N-INVAS EAR/PLS OXIMETRY MLT: CPT

## 2024-07-19 PROCEDURE — 6360000002 HC RX W HCPCS: Performed by: INTERNAL MEDICINE

## 2024-07-19 PROCEDURE — 36415 COLL VENOUS BLD VENIPUNCTURE: CPT

## 2024-07-19 RX ORDER — LORAZEPAM 2 MG/ML
2 INJECTION INTRAMUSCULAR EVERY 4 HOURS
Status: DISCONTINUED | OUTPATIENT
Start: 2024-07-19 | End: 2024-07-19

## 2024-07-19 RX ORDER — LORAZEPAM 2 MG/ML
INJECTION INTRAMUSCULAR
Status: COMPLETED
Start: 2024-07-19 | End: 2024-07-19

## 2024-07-19 RX ORDER — SODIUM CHLORIDE, SODIUM LACTATE, POTASSIUM CHLORIDE, CALCIUM CHLORIDE 600; 310; 30; 20 MG/100ML; MG/100ML; MG/100ML; MG/100ML
INJECTION, SOLUTION INTRAVENOUS CONTINUOUS
Status: DISCONTINUED | OUTPATIENT
Start: 2024-07-19 | End: 2024-07-21

## 2024-07-19 RX ORDER — PHENOBARBITAL SODIUM 65 MG/ML
16.2 INJECTION, SOLUTION INTRAMUSCULAR; INTRAVENOUS EVERY 12 HOURS
Status: COMPLETED | OUTPATIENT
Start: 2024-07-24 | End: 2024-07-24

## 2024-07-19 RX ORDER — SODIUM CHLORIDE 9 MG/ML
INJECTION, SOLUTION INTRAVENOUS PRN
Status: DISCONTINUED | OUTPATIENT
Start: 2024-07-19 | End: 2024-07-27 | Stop reason: HOSPADM

## 2024-07-19 RX ORDER — PHENOBARBITAL SODIUM 65 MG/ML
65 INJECTION, SOLUTION INTRAMUSCULAR; INTRAVENOUS EVERY 6 HOURS
Status: COMPLETED | OUTPATIENT
Start: 2024-07-21 | End: 2024-07-21

## 2024-07-19 RX ORDER — FENTANYL CITRATE 0.05 MG/ML
50 INJECTION, SOLUTION INTRAMUSCULAR; INTRAVENOUS ONCE
Status: COMPLETED | OUTPATIENT
Start: 2024-07-19 | End: 2024-07-19

## 2024-07-19 RX ORDER — SODIUM CHLORIDE 0.9 % (FLUSH) 0.9 %
5-40 SYRINGE (ML) INJECTION PRN
Status: DISCONTINUED | OUTPATIENT
Start: 2024-07-19 | End: 2024-07-27 | Stop reason: HOSPADM

## 2024-07-19 RX ORDER — QUETIAPINE FUMARATE 25 MG/1
75 TABLET, FILM COATED ORAL 2 TIMES DAILY
Status: DISCONTINUED | OUTPATIENT
Start: 2024-07-19 | End: 2024-07-20

## 2024-07-19 RX ORDER — MIDAZOLAM HYDROCHLORIDE 1 MG/ML
4 INJECTION INTRAMUSCULAR; INTRAVENOUS PRN
Status: DISCONTINUED | OUTPATIENT
Start: 2024-07-19 | End: 2024-07-27 | Stop reason: HOSPADM

## 2024-07-19 RX ORDER — PHENOBARBITAL SODIUM 65 MG/ML
32.5 INJECTION, SOLUTION INTRAMUSCULAR; INTRAVENOUS EVERY 6 HOURS
Status: COMPLETED | OUTPATIENT
Start: 2024-07-22 | End: 2024-07-22

## 2024-07-19 RX ORDER — LORAZEPAM 2 MG/ML
2 INJECTION INTRAMUSCULAR ONCE
Status: DISCONTINUED | OUTPATIENT
Start: 2024-07-19 | End: 2024-07-27 | Stop reason: HOSPADM

## 2024-07-19 RX ORDER — LORAZEPAM 2 MG/ML
4 INJECTION INTRAMUSCULAR EVERY 5 MIN PRN
Status: DISCONTINUED | OUTPATIENT
Start: 2024-07-19 | End: 2024-07-27 | Stop reason: HOSPADM

## 2024-07-19 RX ORDER — LEVETIRACETAM 500 MG/5ML
500 INJECTION, SOLUTION, CONCENTRATE INTRAVENOUS ONCE
Status: DISCONTINUED | OUTPATIENT
Start: 2024-07-19 | End: 2024-07-19

## 2024-07-19 RX ORDER — PHENOBARBITAL SODIUM 65 MG/ML
130 INJECTION, SOLUTION INTRAMUSCULAR; INTRAVENOUS EVERY 6 HOURS
Status: COMPLETED | OUTPATIENT
Start: 2024-07-20 | End: 2024-07-20

## 2024-07-19 RX ORDER — PHENOBARBITAL SODIUM 65 MG/ML
260 INJECTION, SOLUTION INTRAMUSCULAR; INTRAVENOUS ONCE
Status: COMPLETED | OUTPATIENT
Start: 2024-07-19 | End: 2024-07-19

## 2024-07-19 RX ORDER — LABETALOL HYDROCHLORIDE 5 MG/ML
INJECTION, SOLUTION INTRAVENOUS
Status: COMPLETED
Start: 2024-07-19 | End: 2024-07-19

## 2024-07-19 RX ORDER — LABETALOL HYDROCHLORIDE 5 MG/ML
10 INJECTION, SOLUTION INTRAVENOUS ONCE
Status: DISCONTINUED | OUTPATIENT
Start: 2024-07-19 | End: 2024-07-27 | Stop reason: HOSPADM

## 2024-07-19 RX ORDER — SODIUM CHLORIDE 0.9 % (FLUSH) 0.9 %
5-40 SYRINGE (ML) INJECTION EVERY 12 HOURS SCHEDULED
Status: DISCONTINUED | OUTPATIENT
Start: 2024-07-19 | End: 2024-07-27 | Stop reason: HOSPADM

## 2024-07-19 RX ORDER — MIDAZOLAM HYDROCHLORIDE 1 MG/ML
4 INJECTION INTRAMUSCULAR; INTRAVENOUS
Status: DISCONTINUED | OUTPATIENT
Start: 2024-07-19 | End: 2024-07-19

## 2024-07-19 RX ORDER — LEVETIRACETAM 500 MG/5ML
1500 INJECTION, SOLUTION, CONCENTRATE INTRAVENOUS ONCE
Status: COMPLETED | OUTPATIENT
Start: 2024-07-19 | End: 2024-07-19

## 2024-07-19 RX ORDER — PHENOBARBITAL SODIUM 65 MG/ML
32.5 INJECTION, SOLUTION INTRAMUSCULAR; INTRAVENOUS EVERY 12 HOURS
Status: COMPLETED | OUTPATIENT
Start: 2024-07-23 | End: 2024-07-23

## 2024-07-19 RX ADMIN — SODIUM CHLORIDE, PRESERVATIVE FREE 40 MG: 5 INJECTION INTRAVENOUS at 22:16

## 2024-07-19 RX ADMIN — Medication 125 MCG/HR: at 01:15

## 2024-07-19 RX ADMIN — Medication 200 MCG/HR: at 22:11

## 2024-07-19 RX ADMIN — LORAZEPAM 2 MG: 2 INJECTION INTRAMUSCULAR; INTRAVENOUS at 21:41

## 2024-07-19 RX ADMIN — MULTIVITAMIN TABLET 1 TABLET: TABLET at 08:41

## 2024-07-19 RX ADMIN — PHENOBARBITAL SODIUM 260 MG: 65 INJECTION, SOLUTION INTRAMUSCULAR; INTRAVENOUS at 22:54

## 2024-07-19 RX ADMIN — PROPOFOL 40 MCG/KG/MIN: 10 INJECTION, EMULSION INTRAVENOUS at 18:14

## 2024-07-19 RX ADMIN — PROPOFOL 40 MCG/KG/MIN: 10 INJECTION, EMULSION INTRAVENOUS at 13:46

## 2024-07-19 RX ADMIN — FENTANYL CITRATE 50 MCG: 0.05 INJECTION, SOLUTION INTRAMUSCULAR; INTRAVENOUS at 21:30

## 2024-07-19 RX ADMIN — CLONAZEPAM 2 MG: 0.5 TABLET ORAL at 12:42

## 2024-07-19 RX ADMIN — PIPERACILLIN AND TAZOBACTAM 3375 MG: 3; .375 INJECTION, POWDER, LYOPHILIZED, FOR SOLUTION INTRAVENOUS at 16:17

## 2024-07-19 RX ADMIN — PROPOFOL 40 MCG/KG/MIN: 10 INJECTION, EMULSION INTRAVENOUS at 10:00

## 2024-07-19 RX ADMIN — PROPOFOL 50 MCG/KG/MIN: 10 INJECTION, EMULSION INTRAVENOUS at 22:57

## 2024-07-19 RX ADMIN — LEVETIRACETAM 1500 MG: 100 INJECTION, SOLUTION INTRAVENOUS at 22:15

## 2024-07-19 RX ADMIN — SODIUM CHLORIDE, PRESERVATIVE FREE 10 ML: 5 INJECTION INTRAVENOUS at 08:41

## 2024-07-19 RX ADMIN — MIDAZOLAM 4 MG: 1 INJECTION INTRAMUSCULAR; INTRAVENOUS at 22:40

## 2024-07-19 RX ADMIN — SODIUM CHLORIDE, POTASSIUM CHLORIDE, SODIUM LACTATE AND CALCIUM CHLORIDE: 600; 310; 30; 20 INJECTION, SOLUTION INTRAVENOUS at 23:10

## 2024-07-19 RX ADMIN — SODIUM CHLORIDE, POTASSIUM CHLORIDE, SODIUM LACTATE AND CALCIUM CHLORIDE: 600; 310; 30; 20 INJECTION, SOLUTION INTRAVENOUS at 14:18

## 2024-07-19 RX ADMIN — PROPOFOL 40 MCG/KG/MIN: 10 INJECTION, EMULSION INTRAVENOUS at 01:04

## 2024-07-19 RX ADMIN — PHENYLEPHRINE HYDROCHLORIDE 30 MCG/MIN: 10 INJECTION INTRAVENOUS at 23:48

## 2024-07-19 RX ADMIN — Medication 5 MG/HR: at 00:22

## 2024-07-19 RX ADMIN — LABETALOL HYDROCHLORIDE 10 MG: 5 INJECTION, SOLUTION INTRAVENOUS at 21:43

## 2024-07-19 RX ADMIN — FOLIC ACID 1 MG: 1 TABLET ORAL at 08:41

## 2024-07-19 RX ADMIN — MIDAZOLAM 2 MG: 1 INJECTION INTRAMUSCULAR; INTRAVENOUS at 20:58

## 2024-07-19 RX ADMIN — QUETIAPINE FUMARATE 75 MG: 25 TABLET ORAL at 22:17

## 2024-07-19 RX ADMIN — SODIUM CHLORIDE, PRESERVATIVE FREE 10 ML: 5 INJECTION INTRAVENOUS at 22:19

## 2024-07-19 RX ADMIN — PIPERACILLIN AND TAZOBACTAM 3375 MG: 3; .375 INJECTION, POWDER, LYOPHILIZED, FOR SOLUTION INTRAVENOUS at 04:26

## 2024-07-19 RX ADMIN — QUETIAPINE FUMARATE 50 MG: 25 TABLET ORAL at 08:41

## 2024-07-19 RX ADMIN — Medication 100 MG: at 08:41

## 2024-07-19 RX ADMIN — HEPARIN SODIUM 5000 UNITS: 5000 INJECTION INTRAVENOUS; SUBCUTANEOUS at 06:36

## 2024-07-19 RX ADMIN — CLONAZEPAM 2 MG: 0.5 TABLET ORAL at 00:10

## 2024-07-19 RX ADMIN — PROPOFOL 40 MCG/KG/MIN: 10 INJECTION, EMULSION INTRAVENOUS at 04:37

## 2024-07-19 RX ADMIN — Medication 125 MCG/HR: at 11:54

## 2024-07-19 RX ADMIN — HEPARIN SODIUM 5000 UNITS: 5000 INJECTION INTRAVENOUS; SUBCUTANEOUS at 13:52

## 2024-07-19 RX ADMIN — HYDRALAZINE HYDROCHLORIDE 10 MG: 20 INJECTION INTRAMUSCULAR; INTRAVENOUS at 21:24

## 2024-07-19 ASSESSMENT — PULMONARY FUNCTION TESTS
PIF_VALUE: 26
PIF_VALUE: 27
PIF_VALUE: 25
PIF_VALUE: 26
PIF_VALUE: 27
PIF_VALUE: 30
PIF_VALUE: 25
PIF_VALUE: 33
PIF_VALUE: 27
PIF_VALUE: 29
PIF_VALUE: 27
PIF_VALUE: 26
PIF_VALUE: 31
PIF_VALUE: 26
PIF_VALUE: 29
PIF_VALUE: 27
PIF_VALUE: 29
PIF_VALUE: 29
PIF_VALUE: 27
PIF_VALUE: 26
PIF_VALUE: 30
PIF_VALUE: 34
PIF_VALUE: 27
PIF_VALUE: 41
PIF_VALUE: 28
PIF_VALUE: 26
PIF_VALUE: 26
PIF_VALUE: 34
PIF_VALUE: 25
PIF_VALUE: 26
PIF_VALUE: 38

## 2024-07-19 NOTE — PLAN OF CARE
Problem: Safety - Medical Restraint  Goal: Remains free of injury from restraints (Restraint for Interference with Medical Device)  Description: INTERVENTIONS:  1. Determine that other, less restrictive measures have been tried or would not be effective before applying the restraint  2. Evaluate the patient's condition at the time of restraint application  3. Inform patient/family regarding the reason for restraint  4. Q2H: Monitor safety, psychosocial status, comfort, nutrition and hydration  7/19/2024 0909 by Vijay Montgomery RN  Outcome: Progressing     Problem: Discharge Planning  Goal: Discharge to home or other facility with appropriate resources  7/19/2024 0909 by Vijay Montgomery RN  Outcome: Progressing     Problem: Respiratory - Adult  Goal: Achieves optimal ventilation and oxygenation  7/19/2024 0909 by Vijay Montgomery, RN  Outcome: Progressing

## 2024-07-19 NOTE — PROGRESS NOTES
Nutrition Assessment     Type and Reason for Visit: Reassess    Nutrition Recommendations/Plan:   NPO  Continue Vital 1.2 Mihai at initial rate goal rate of 60mL/hour (1440mL total volume) with 30mL flushes Q4  Continue to monitor TF rate, tolerance, weight and residuals      Malnutrition Assessment:  Malnutrition Status: Insufficient data    Nutrition Assessment:  Pt is intubated and sedated, recieving propfol at 21.8mL/hour (576kcals). Still unsure of patient identification. RN reports at rounds this morning (719) pt is tolerating TF with 120mL residuals this morning. Although propofol was increased, patients TF rate will remain the same. Noted a 20lb weight gain in the EMR. Still unsure if wt recordings are valid. Continue to monitor TF tolerance, rate, weight and residuals.    Estimated Daily Nutrient Needs:  Energy (kcal):  1814kcals - 2,267kcals a day (20-25kcal/kg) Weight Used for Energy Requirements: Current     Protein (g):  117g protein (1.5g/kg IBW) Weight Used for Protein Requirements: Other (Comment) (7/16 200lbs)        Fluid (ml/day):  2,267ml (1ml/kcal) Method Used for Fluid Requirements: Other (Comment) (7/16 200lbs)    Nutrition Related Findings:   /89, blood work showed 42% ethanol and positive for cocaine and marijuana Wound Type: None    Current Nutrition Therapies:    Diet NPO  ADULT TUBE FEEDING; Nasogastric; Peptide Based; Continuous; 15; Yes; 15; Q 4 hours; 60; 30; Q 4 hours    Anthropometric Measures:  Height: 180.3 cm (5' 11\")  Current Body Wt: 99.8 kg (220 lb)   BMI: 30.7    Nutrition Diagnosis:   Inadequate oral intake related to impaired respiratory function as evidenced by intubation, NPO or clear liquid status due to medical condition    Nutrition Interventions:   Food and/or Nutrient Delivery: Continue Current Tube Feeding  Nutrition Education/Counseling: Education not indicated  Coordination of Nutrition Care: Continue to monitor while inpatient       Goals:     Goals: Meet at

## 2024-07-19 NOTE — PLAN OF CARE
Problem: Safety - Medical Restraint  Goal: Remains free of injury from restraints (Restraint for Interference with Medical Device)  Description: INTERVENTIONS:  1. Determine that other, less restrictive measures have been tried or would not be effective before applying the restraint  2. Evaluate the patient's condition at the time of restraint application  3. Inform patient/family regarding the reason for restraint  4. Q2H: Monitor safety, psychosocial status, comfort, nutrition and hydration  7/19/2024 0909 by Vijay Montgomery, RN  Outcome: Progressing  Flowsheets (Taken 7/19/2024 0852)  Remains free of injury from restraints (restraint for interference with medical device): Determine that other, less restrictive measures have been tried or would not be effective before applying the restraint  7/19/2024 0107 by Billie Dominguez, RN  Outcome: Progressing  Flowsheets (Taken 7/18/2024 2000)  Remains free of injury from restraints (restraint for interference with medical device):   Determine that other, less restrictive measures have been tried or would not be effective before applying the restraint   Every 2 hours: Monitor safety, psychosocial status, comfort, nutrition and hydration

## 2024-07-19 NOTE — PLAN OF CARE
Problem: Safety - Medical Restraint  Goal: Remains free of injury from restraints (Restraint for Interference with Medical Device)  Description: INTERVENTIONS:  1. Determine that other, less restrictive measures have been tried or would not be effective before applying the restraint  2. Evaluate the patient's condition at the time of restraint application  3. Inform patient/family regarding the reason for restraint  4. Q2H: Monitor safety, psychosocial status, comfort, nutrition and hydration  Outcome: Progressing  Flowsheets (Taken 7/18/2024 2000)  Remains free of injury from restraints (restraint for interference with medical device):   Determine that other, less restrictive measures have been tried or would not be effective before applying the restraint   Every 2 hours: Monitor safety, psychosocial status, comfort, nutrition and hydration     Problem: Discharge Planning  Goal: Discharge to home or other facility with appropriate resources  Outcome: Progressing  Flowsheets (Taken 7/18/2024 2000)  Discharge to home or other facility with appropriate resources:   Identify barriers to discharge with patient and caregiver   Arrange for needed discharge resources and transportation as appropriate   Refer to discharge planning if patient needs post-hospital services based on physician order or complex needs related to functional status, cognitive ability or social support system     Problem: Respiratory - Adult  Goal: Achieves optimal ventilation and oxygenation  7/19/2024 0107 by Billie Dominguez, RN  Outcome: Progressing  Flowsheets (Taken 7/18/2024 2000)  Achieves optimal ventilation and oxygenation:   Assess for changes in respiratory status   Position to facilitate oxygenation and minimize respiratory effort   Oxygen supplementation based on oxygen saturation or arterial blood gases   Respiratory therapy support as indicated   Assess for changes in mentation and behavior  7/18/2024 1910 by Annie Haney,

## 2024-07-19 NOTE — PROGRESS NOTES
[facility]  Consult Note       NAME:  Burke Ruiz  MEDICAL RECORD NUMBER:  [id number]  AGE: 89 y.o.   GENDER: male  : 1902  TODAY'S DATE:  2024    Subjective:      Burke Ruiz is a 89 y.o. male with inpatient referral to [facility] for:  \"large blisters BLE\"      Wound Identification:  Wound Type: venous  Contributing Factors: edema, chronic pressure, and decreased mobility    Wound History: WOC consult for blisters to BLE  Current Wound Care Treatment:  foam dressing removed at time of WOC consult.    Patient Goal of Care:  [] Wound Healing  [] Odor Control  [] Palliative Care  [] Pain Control   [] Other:         PAST MEDICAL HISTORY    No past medical history on file.    PAST SURGICAL HISTORY    No past surgical history on file.    FAMILY HISTORY    No family history on file.    SOCIAL HISTORY           ALLERGIES    No Known Allergies    HOME MEDICATIONS  Prior to Admission medications    Not on File       CURRENT MEDICATIONS:  Current Facility-Administered Medications   Medication Dose Route Frequency Provider Last Rate Last Admin    QUEtiapine (SEROQUEL) tablet 75 mg  75 mg Oral BID Koko Paiz MD        [START ON 2024] naloxegol (MOVANTIK) tablet 12.5 mg  12.5 mg Oral QAM AC Koko Paiz MD        lactated ringers IV soln infusion   IntraVENous Continuous Koko Paiz  mL/hr at 24 1418 New Bag at 24 1418    midazolam (VERSED) injection 2 mg  2 mg IntraVENous Q1H PRN Koko Paiz MD   2 mg at 24 1924    clonazePAM (KLONOPIN) tablet 2 mg  2 mg Per NG tube Q12H Koko Paiz MD   2 mg at 24 1242    piperacillin-tazobactam (ZOSYN) 3,375 mg in sodium chloride 0.9 % 50 mL IVPB (mini-bag)  3,375 mg IntraVENous Q12H Jerome Worley DO   Stopped at 24 0826    thiamine mononitrate tablet 100 mg  100 mg Orogastric Daily Koko Paiz MD   100 mg at 24 0841    folic acid (FOLVITE) tablet

## 2024-07-19 NOTE — PLAN OF CARE
Problem: Respiratory - Adult  Goal: Achieves optimal ventilation and oxygenation  7/19/2024 0834 by Emperatriz Marie RCP  Outcome: Progressing  7/19/2024 0107 by Billie Dominguez RN  Outcome: Progressing  Flowsheets (Taken 7/18/2024 2000)  Achieves optimal ventilation and oxygenation:   Assess for changes in respiratory status   Position to facilitate oxygenation and minimize respiratory effort   Oxygen supplementation based on oxygen saturation or arterial blood gases   Respiratory therapy support as indicated   Assess for changes in mentation and behavior  7/18/2024 1910 by Annie Haney RCP  Outcome: Progressing  Goal: Adequate oxygenation  7/19/2024 0834 by Emperatriz Marie RCP  Outcome: Progressing  7/18/2024 1910 by Annie Haney RCP  Outcome: Progressing  Goal: Will be able to breathe spontaneously, without ventilator support  Description: Will be able to breathe spontaneously, without ventilator support  7/19/2024 0834 by Emperatriz Marie RCP  Outcome: Progressing  7/18/2024 1910 by Annie Haney RCP  Outcome: Progressing

## 2024-07-19 NOTE — PROGRESS NOTES
Pulmonary Critical Care Progress Note    Patient seen for the follow up of Acute alcoholic intoxication with complication (HCC)     Subjective:    He got agitated with increased BP on decreasing sedation . I was called ordered fentanyl and versed IVP advised to increase sedation / resume versed. T He is still sedated on 125 of fentanyl 3 of Versed and propofol at 40 .  Triglycerides decreased.  He has improved blood pressure.  He tolerates tube feeds at goal at 55 an hour.  He has good urine output.  He had not had BM     Examination:    Vitals: /69   Pulse 59   Temp 98.5 °F (36.9 °C) (Axillary)   Resp 22   Ht 1.803 m (5' 11\")   Wt 99.9 kg (220 lb 3.8 oz)   SpO2 96%   BMI 30.72 kg/m²   SpO2  Av.3 %  Min: 89 %  Max: 99 %  General appearance: Intubated sedated  Neck: No JVD  Lungs: Mild decreased breath sound no crackles or wheezes  Heart: regular rate and rhythm, S1, S2 normal, no gallop  Abdomen: Soft, non tender, + BS  Extremities: no cyanosis or clubbing. No significant edema    LABs:    CBC:   Recent Labs     24  0410 24  0420   WBC 10.6 8.7   HGB 13.0 12.3*   HCT 38.0* 36.6*   * 124*       BMP:   Recent Labs     24  0410 24  1208 24  0420     --  140   K 3.4* 3.9 3.8   CO2 23  --  24   BUN 27*  --  24*   CREATININE 1.9*  --  1.9*   LABGLOM 27*  --  27*   GLUCOSE 106*  --  102*       PT/INR: No results for input(s): \"PROTIME\", \"INR\" in the last 72 hours.  APTT:No results for input(s): \"APTT\" in the last 72 hours.  LIVER PROFILE:  Recent Labs     24  0410   AST 61*   ALT 36        Latest Reference Range & Units 24 03:02 24 04:45 24 12:08   Pro-BNP <300 pg/mL 543 (H)     Triglycerides <150 mg/dL  235 (H) 182 (H)   (H): Data is abnormally high   Latest Reference Range & Units 07/15/24 19:30   Amphetamine Screen, Ur NEGATIVE  NEGATIVE   Barbiturate Screen, Ur NEGATIVE  NEGATIVE   Benzodiazepine Screen, Urine NEGATIVE  NEGATIVE    Hypertension poor control/ Cocaine positive /mild increase troponin  Off Cardene drip / BP improved      MAIK   Start IV LR at 100 / hr / check BNP       Right inguinal hernia   discussed with RN  Discussed with clinical pharmacist  Peptic ulcer disease prophylaxis  DVT prophylaxis      Koko Paiz MD, MD, [facility]  Pulmonary Critical Care and Sleep Medicine,  [facility]  Cell: [phone]  Office: [phone]

## 2024-07-19 NOTE — PROGRESS NOTES
[facility]  Office: [phone]  Klever Dickens DO, Eulogio Sr DO, Jerome Worley DO, Warren Jovel DO, Vaughn Jefferson MD, Krista Andrews MD, Ana M Cooley MD, Nia Wright MD,  Doc Metz MD, Mario Alberto Lizama MD, Shahla Dutta MD,  Dolly Wu DO, Nancy Mata MD, James Boston MD, Trey Dickens DO, Adriana Spencer MD,  Luis Alberto Thornton DO, Yanet Hroan MD, Renee King MD, Fely Gomez MD, Kady Macias MD,  Kayden Murillo MD, Ramsey Valentine MD, Airam Gonsales MD, Angelina Cruz MD, Devyn Gates MD, Neil Looney MD, Navin King DO, Tera Prajapati DO, Julieta Gomez MD,  Kael Farias MD, Shirley Waterhouse, CNP,  Valencia Regalado CNP, Lobo Rojas, CNP,  Karen Mascorro, DNP, Ania Oro, CNP, Kyra Isbell, CNP, Shayla Song CNP, Jackelin Woody, CNP, Hodan Cruz, PA-C, Kim Woodard PA-C, Destiney Manriquez, CNP, Vania Silva, CNP, Hailey Carson, CNP, Brooke Olvera, CNP, Arabella Rincon, CNP, Rebecca Zavala, CNS, Laurie Crandall, CNP, Melissa Pat CNP, Tracy Schwab, CNP         [facility]   IN-PATIENT SERVICE   [facility]    Progress Note    7/19/2024    12:02 PM    Name:   Burke Ruiz  MRN:     [id number]     Acct:      [id number]   Room:   [facility] Day:  4  Admit Date:  7/15/2024  5:08 PM    PCP:   No primary care provider on file.  Code Status:  Full Code    Subjective:     C/C:   Chief Complaint   Patient presents with    Alcohol Intoxication     Interval History Status: not changed.     Patient remains intubated and sedated.  No acute events overnight.  Vented with a rate of 22, tidal volume 600, PEEP of 8 and FiO2 of 40%    Brief History:     This is an unidentified male that presents via squad after drinking a large volume of vodka.  Upon evaluation he was found to have a significant alcohol level of greater than 0.4 with positive testing for cocaine and cannabis.  To a level of consciousness he was intubated admitted  due to respiratory failure  Lungs:  clear to auscultation bilaterally, vented  Heart:  regular rate and rhythm, no murmur  Abdomen:  soft, nontender, nondistended, normal bowel sounds, no masses, hepatomegaly, splenomegaly  Extremities:  no edema, redness, tenderness in the calves  Skin:  no gross lesions, rashes, induration    Assessment:        Hospital Problems             Last Modified POA    * (Principal) Acute alcoholic intoxication with complication ([facility]) 7/15/2024 Yes    Alcohol intoxication in alcoholism with blood level over 0.3 with delirium ([facility]) 7/15/2024 Yes    Aspiration pneumonia of both upper lobes due to vomit ([facility]) 7/15/2024 Yes    Acute hypoxic respiratory failure ([facility]) 7/15/2024 Yes    On mechanically assisted ventilation ([facility]) 7/15/2024 Yes    Acute encephalopathy 7/16/2024 Yes    MAIK (acute kidney injury) ([facility]) 7/17/2024 No       Plan:        Continue vent support, wean as able  Monitor for signs and symptoms of withdrawal  Thiamine and folic acid as ordered  Zosyn for possible aspiration  Monitor renal function, avoid nephrotoxic agent  PT and OT  Monitor neurological status  Attempts being made to establish identity    Jerome Worley DO  7/19/2024  12:02 PM

## 2024-07-20 ENCOUNTER — APPOINTMENT (OUTPATIENT)
Dept: GENERAL RADIOLOGY | Age: 49
DRG: 130 | End: 2024-07-20
Payer: MEDICAID

## 2024-07-20 ENCOUNTER — APPOINTMENT (OUTPATIENT)
Dept: CT IMAGING | Age: 49
DRG: 130 | End: 2024-07-20
Payer: MEDICAID

## 2024-07-20 PROBLEM — F10.939 SEIZURE DUE TO ALCOHOL WITHDRAWAL, WITH UNSPECIFIED COMPLICATION (HCC): Status: ACTIVE | Noted: 2024-07-20

## 2024-07-20 PROBLEM — R56.9 SEIZURE DUE TO ALCOHOL WITHDRAWAL, WITH UNSPECIFIED COMPLICATION (HCC): Status: ACTIVE | Noted: 2024-07-20

## 2024-07-20 LAB
ANION GAP SERPL CALCULATED.3IONS-SCNC: 10 MMOL/L (ref 9–17)
ANION GAP SERPL CALCULATED.3IONS-SCNC: 10 MMOL/L (ref 9–17)
BASOPHILS # BLD: 0.04 K/UL (ref 0–0.2)
BASOPHILS # BLD: 0.04 K/UL (ref 0–0.2)
BASOPHILS NFR BLD: 0 % (ref 0–2)
BASOPHILS NFR BLD: 0 % (ref 0–2)
BUN SERPL-MCNC: 19 MG/DL (ref 6–20)
BUN SERPL-MCNC: 19 MG/DL (ref 6–20)
BUN/CREAT SERPL: 12 (ref 9–20)
BUN/CREAT SERPL: 12 (ref 9–20)
CA-I BLD-SCNC: 1.08 MMOL/L (ref 1.15–1.33)
CA-I BLD-SCNC: 1.08 MMOL/L (ref 1.15–1.33)
CALCIUM SERPL-MCNC: 8 MG/DL (ref 8.6–10.4)
CALCIUM SERPL-MCNC: 8 MG/DL (ref 8.6–10.4)
CHLORIDE SERPL-SCNC: 108 MMOL/L (ref 98–107)
CHLORIDE SERPL-SCNC: 108 MMOL/L (ref 98–107)
CO2 SERPL-SCNC: 24 MMOL/L (ref 20–31)
CO2 SERPL-SCNC: 24 MMOL/L (ref 20–31)
CREAT SERPL-MCNC: 1.6 MG/DL (ref 0.7–1.2)
CREAT SERPL-MCNC: 1.6 MG/DL (ref 0.7–1.2)
EOSINOPHIL # BLD: 0.23 K/UL (ref 0–0.44)
EOSINOPHIL # BLD: 0.23 K/UL (ref 0–0.44)
EOSINOPHILS RELATIVE PERCENT: 3 % (ref 1–4)
EOSINOPHILS RELATIVE PERCENT: 3 % (ref 1–4)
ERYTHROCYTE [DISTWIDTH] IN BLOOD BY AUTOMATED COUNT: 14.3 % (ref 11.8–14.4)
ERYTHROCYTE [DISTWIDTH] IN BLOOD BY AUTOMATED COUNT: 14.3 % (ref 11.8–14.4)
FIO2: 60
FIO2: 60
GFR, ESTIMATED: 33 ML/MIN/1.73M2
GFR, ESTIMATED: 33 ML/MIN/1.73M2
GLUCOSE BLD-MCNC: 102 MG/DL (ref 75–110)
GLUCOSE BLD-MCNC: 102 MG/DL (ref 75–110)
GLUCOSE BLD-MCNC: 105 MG/DL (ref 75–110)
GLUCOSE BLD-MCNC: 105 MG/DL (ref 75–110)
GLUCOSE BLD-MCNC: 106 MG/DL (ref 75–110)
GLUCOSE BLD-MCNC: 106 MG/DL (ref 75–110)
GLUCOSE BLD-MCNC: 108 MG/DL (ref 75–110)
GLUCOSE BLD-MCNC: 108 MG/DL (ref 75–110)
GLUCOSE BLD-MCNC: 94 MG/DL (ref 75–110)
GLUCOSE BLD-MCNC: 94 MG/DL (ref 75–110)
GLUCOSE SERPL-MCNC: 108 MG/DL (ref 70–99)
GLUCOSE SERPL-MCNC: 108 MG/DL (ref 70–99)
HCT VFR BLD AUTO: 38.5 % (ref 40.7–50.3)
HCT VFR BLD AUTO: 38.5 % (ref 40.7–50.3)
HGB BLD-MCNC: 12.7 G/DL (ref 13–17)
HGB BLD-MCNC: 12.7 G/DL (ref 13–17)
IMM GRANULOCYTES # BLD AUTO: 0.04 K/UL (ref 0–0.3)
IMM GRANULOCYTES # BLD AUTO: 0.04 K/UL (ref 0–0.3)
IMM GRANULOCYTES NFR BLD: 0 %
IMM GRANULOCYTES NFR BLD: 0 %
LYMPHOCYTES NFR BLD: 1.16 K/UL (ref 1.1–3.7)
LYMPHOCYTES NFR BLD: 1.16 K/UL (ref 1.1–3.7)
LYMPHOCYTES RELATIVE PERCENT: 13 % (ref 24–43)
LYMPHOCYTES RELATIVE PERCENT: 13 % (ref 24–43)
MCH RBC QN AUTO: 35.3 PG (ref 25.2–33.5)
MCH RBC QN AUTO: 35.3 PG (ref 25.2–33.5)
MCHC RBC AUTO-ENTMCNC: 33 G/DL (ref 28.4–34.8)
MCHC RBC AUTO-ENTMCNC: 33 G/DL (ref 28.4–34.8)
MCV RBC AUTO: 106.9 FL (ref 82.6–102.9)
MCV RBC AUTO: 106.9 FL (ref 82.6–102.9)
MICROORGANISM SPEC CULT: NORMAL
MICROORGANISM SPEC CULT: NORMAL
MODE: AC
MODE: AC
MONOCYTES NFR BLD: 0.78 K/UL (ref 0.1–1.2)
MONOCYTES NFR BLD: 0.78 K/UL (ref 0.1–1.2)
MONOCYTES NFR BLD: 9 % (ref 3–12)
MONOCYTES NFR BLD: 9 % (ref 3–12)
NEUTROPHILS NFR BLD: 75 % (ref 36–65)
NEUTROPHILS NFR BLD: 75 % (ref 36–65)
NEUTS SEG NFR BLD: 6.76 K/UL (ref 1.5–8.1)
NEUTS SEG NFR BLD: 6.76 K/UL (ref 1.5–8.1)
NRBC BLD-RTO: 0 PER 100 WBC
NRBC BLD-RTO: 0 PER 100 WBC
O2 DELIVERY DEVICE: ABNORMAL
O2 DELIVERY DEVICE: ABNORMAL
PLATELET # BLD AUTO: 158 K/UL (ref 138–453)
PLATELET # BLD AUTO: 158 K/UL (ref 138–453)
PMV BLD AUTO: 10.9 FL (ref 8.1–13.5)
PMV BLD AUTO: 10.9 FL (ref 8.1–13.5)
POC HCO3: 25.2 MMOL/L (ref 21–28)
POC HCO3: 25.2 MMOL/L (ref 21–28)
POC O2 SATURATION: 99 % (ref 94–98)
POC O2 SATURATION: 99 % (ref 94–98)
POC PCO2: 41.1 MM HG (ref 35–48)
POC PCO2: 41.1 MM HG (ref 35–48)
POC PH: 7.4 (ref 7.35–7.45)
POC PH: 7.4 (ref 7.35–7.45)
POC PO2: 131.9 MM HG (ref 83–108)
POC PO2: 131.9 MM HG (ref 83–108)
POSITIVE BASE EXCESS, ART: 0.2 MMOL/L (ref 0–3)
POSITIVE BASE EXCESS, ART: 0.2 MMOL/L (ref 0–3)
POTASSIUM SERPL-SCNC: 3.8 MMOL/L (ref 3.7–5.3)
POTASSIUM SERPL-SCNC: 3.8 MMOL/L (ref 3.7–5.3)
RBC # BLD AUTO: 3.6 M/UL (ref 4.21–5.77)
RBC # BLD AUTO: 3.6 M/UL (ref 4.21–5.77)
RBC # BLD: ABNORMAL 10*6/UL
RBC # BLD: ABNORMAL 10*6/UL
SERVICE CMNT-IMP: NORMAL
SERVICE CMNT-IMP: NORMAL
SODIUM SERPL-SCNC: 142 MMOL/L (ref 135–144)
SODIUM SERPL-SCNC: 142 MMOL/L (ref 135–144)
SPECIMEN DESCRIPTION: NORMAL
SPECIMEN DESCRIPTION: NORMAL
TRIGL SERPL-MCNC: 240 MG/DL
TRIGL SERPL-MCNC: 240 MG/DL
WBC OTHER # BLD: 9 K/UL (ref 3.5–11.3)
WBC OTHER # BLD: 9 K/UL (ref 3.5–11.3)

## 2024-07-20 PROCEDURE — 2500000003 HC RX 250 WO HCPCS: Performed by: NURSE PRACTITIONER

## 2024-07-20 PROCEDURE — 6360000002 HC RX W HCPCS: Performed by: INTERNAL MEDICINE

## 2024-07-20 PROCEDURE — 2000000000 HC ICU R&B

## 2024-07-20 PROCEDURE — 6370000000 HC RX 637 (ALT 250 FOR IP): Performed by: INTERNAL MEDICINE

## 2024-07-20 PROCEDURE — 95819 EEG AWAKE AND ASLEEP: CPT | Performed by: PSYCHIATRY & NEUROLOGY

## 2024-07-20 PROCEDURE — 6360000002 HC RX W HCPCS

## 2024-07-20 PROCEDURE — 85025 COMPLETE CBC W/AUTO DIFF WBC: CPT

## 2024-07-20 PROCEDURE — 99254 IP/OBS CNSLTJ NEW/EST MOD 60: CPT | Performed by: PSYCHIATRY & NEUROLOGY

## 2024-07-20 PROCEDURE — 2580000003 HC RX 258

## 2024-07-20 PROCEDURE — 82330 ASSAY OF CALCIUM: CPT

## 2024-07-20 PROCEDURE — 71045 X-RAY EXAM CHEST 1 VIEW: CPT

## 2024-07-20 PROCEDURE — 94003 VENT MGMT INPAT SUBQ DAY: CPT

## 2024-07-20 PROCEDURE — 94761 N-INVAS EAR/PLS OXIMETRY MLT: CPT

## 2024-07-20 PROCEDURE — 2700000000 HC OXYGEN THERAPY PER DAY

## 2024-07-20 PROCEDURE — 2580000003 HC RX 258: Performed by: INTERNAL MEDICINE

## 2024-07-20 PROCEDURE — 37799 UNLISTED PX VASCULAR SURGERY: CPT

## 2024-07-20 PROCEDURE — 82947 ASSAY GLUCOSE BLOOD QUANT: CPT

## 2024-07-20 PROCEDURE — 99232 SBSQ HOSP IP/OBS MODERATE 35: CPT | Performed by: INTERNAL MEDICINE

## 2024-07-20 PROCEDURE — 95819 EEG AWAKE AND ASLEEP: CPT

## 2024-07-20 PROCEDURE — 70450 CT HEAD/BRAIN W/O DYE: CPT

## 2024-07-20 PROCEDURE — 84478 ASSAY OF TRIGLYCERIDES: CPT

## 2024-07-20 PROCEDURE — 80048 BASIC METABOLIC PNL TOTAL CA: CPT

## 2024-07-20 PROCEDURE — 2500000003 HC RX 250 WO HCPCS: Performed by: INTERNAL MEDICINE

## 2024-07-20 PROCEDURE — 82803 BLOOD GASES ANY COMBINATION: CPT

## 2024-07-20 RX ORDER — CALCIUM GLUCONATE 10 MG/ML
1000 INJECTION, SOLUTION INTRAVENOUS ONCE
Status: COMPLETED | OUTPATIENT
Start: 2024-07-20 | End: 2024-07-20

## 2024-07-20 RX ORDER — QUETIAPINE FUMARATE 25 MG/1
100 TABLET, FILM COATED ORAL 2 TIMES DAILY
Status: DISCONTINUED | OUTPATIENT
Start: 2024-07-20 | End: 2024-07-22

## 2024-07-20 RX ADMIN — PHENOBARBITAL SODIUM 130 MG: 65 INJECTION, SOLUTION INTRAMUSCULAR; INTRAVENOUS at 10:19

## 2024-07-20 RX ADMIN — Medication 200 MCG/HR: at 17:33

## 2024-07-20 RX ADMIN — PIPERACILLIN AND TAZOBACTAM 3375 MG: 3; .375 INJECTION, POWDER, LYOPHILIZED, FOR SOLUTION INTRAVENOUS at 15:57

## 2024-07-20 RX ADMIN — QUETIAPINE FUMARATE 75 MG: 25 TABLET ORAL at 08:12

## 2024-07-20 RX ADMIN — PROPOFOL 40 MCG/KG/MIN: 10 INJECTION, EMULSION INTRAVENOUS at 23:02

## 2024-07-20 RX ADMIN — HEPARIN SODIUM 5000 UNITS: 5000 INJECTION INTRAVENOUS; SUBCUTANEOUS at 15:15

## 2024-07-20 RX ADMIN — Medication 200 MCG/HR: at 23:01

## 2024-07-20 RX ADMIN — CLONAZEPAM 2 MG: 0.5 TABLET ORAL at 00:29

## 2024-07-20 RX ADMIN — SODIUM CHLORIDE, POTASSIUM CHLORIDE, SODIUM LACTATE AND CALCIUM CHLORIDE: 600; 310; 30; 20 INJECTION, SOLUTION INTRAVENOUS at 09:10

## 2024-07-20 RX ADMIN — Medication 100 MG: at 08:12

## 2024-07-20 RX ADMIN — MULTIVITAMIN TABLET 1 TABLET: TABLET at 08:12

## 2024-07-20 RX ADMIN — Medication 10 MG/HR: at 02:04

## 2024-07-20 RX ADMIN — NALOXEGOL OXALATE 12.5 MG: 12.5 TABLET, FILM COATED ORAL at 08:12

## 2024-07-20 RX ADMIN — SODIUM CHLORIDE: 9 INJECTION, SOLUTION INTRAVENOUS at 04:22

## 2024-07-20 RX ADMIN — PIPERACILLIN AND TAZOBACTAM 3375 MG: 3; .375 INJECTION, POWDER, LYOPHILIZED, FOR SOLUTION INTRAVENOUS at 04:23

## 2024-07-20 RX ADMIN — QUETIAPINE FUMARATE 100 MG: 25 TABLET ORAL at 21:16

## 2024-07-20 RX ADMIN — SODIUM CHLORIDE, PRESERVATIVE FREE 10 ML: 5 INJECTION INTRAVENOUS at 21:17

## 2024-07-20 RX ADMIN — PHENOBARBITAL SODIUM 130 MG: 65 INJECTION, SOLUTION INTRAMUSCULAR; INTRAVENOUS at 15:37

## 2024-07-20 RX ADMIN — PHENOBARBITAL SODIUM 130 MG: 65 INJECTION, SOLUTION INTRAMUSCULAR; INTRAVENOUS at 22:56

## 2024-07-20 RX ADMIN — FOLIC ACID 1 MG: 1 TABLET ORAL at 08:12

## 2024-07-20 RX ADMIN — CALCIUM GLUCONATE 1000 MG: 10 INJECTION, SOLUTION INTRAVENOUS at 06:04

## 2024-07-20 RX ADMIN — Medication 200 MCG/HR: at 04:17

## 2024-07-20 RX ADMIN — PHENOBARBITAL SODIUM 130 MG: 65 INJECTION, SOLUTION INTRAMUSCULAR; INTRAVENOUS at 04:29

## 2024-07-20 RX ADMIN — PROPOFOL 40 MCG/KG/MIN: 10 INJECTION, EMULSION INTRAVENOUS at 16:01

## 2024-07-20 RX ADMIN — Medication 8 MG/HR: at 17:04

## 2024-07-20 RX ADMIN — PROPOFOL 50 MCG/KG/MIN: 10 INJECTION, EMULSION INTRAVENOUS at 04:47

## 2024-07-20 RX ADMIN — PROPOFOL 50 MCG/KG/MIN: 10 INJECTION, EMULSION INTRAVENOUS at 11:27

## 2024-07-20 RX ADMIN — SODIUM CHLORIDE, POTASSIUM CHLORIDE, SODIUM LACTATE AND CALCIUM CHLORIDE: 600; 310; 30; 20 INJECTION, SOLUTION INTRAVENOUS at 19:25

## 2024-07-20 RX ADMIN — CLONAZEPAM 2 MG: 0.5 TABLET ORAL at 12:06

## 2024-07-20 RX ADMIN — HEPARIN SODIUM 5000 UNITS: 5000 INJECTION INTRAVENOUS; SUBCUTANEOUS at 06:04

## 2024-07-20 RX ADMIN — PROPOFOL 50 MCG/KG/MIN: 10 INJECTION, EMULSION INTRAVENOUS at 08:08

## 2024-07-20 RX ADMIN — SODIUM CHLORIDE, PRESERVATIVE FREE 10 ML: 5 INJECTION INTRAVENOUS at 21:16

## 2024-07-20 RX ADMIN — PROPOFOL 50 MCG/KG/MIN: 10 INJECTION, EMULSION INTRAVENOUS at 02:08

## 2024-07-20 RX ADMIN — PROPOFOL 40 MCG/KG/MIN: 10 INJECTION, EMULSION INTRAVENOUS at 20:05

## 2024-07-20 RX ADMIN — Medication 200 MCG/HR: at 11:32

## 2024-07-20 RX ADMIN — HEPARIN SODIUM 5000 UNITS: 5000 INJECTION INTRAVENOUS; SUBCUTANEOUS at 21:16

## 2024-07-20 RX ADMIN — SODIUM CHLORIDE, PRESERVATIVE FREE 40 MG: 5 INJECTION INTRAVENOUS at 21:16

## 2024-07-20 ASSESSMENT — PAIN SCALES - GENERAL
PAINLEVEL_OUTOF10: 0

## 2024-07-20 ASSESSMENT — PULMONARY FUNCTION TESTS
PIF_VALUE: 26
PIF_VALUE: 31
PIF_VALUE: 27
PIF_VALUE: 26
PIF_VALUE: 25
PIF_VALUE: 26
PIF_VALUE: 28
PIF_VALUE: 25
PIF_VALUE: 27
PIF_VALUE: 29
PIF_VALUE: 25
PIF_VALUE: 26
PIF_VALUE: 27
PIF_VALUE: 26
PIF_VALUE: 25
PIF_VALUE: 25

## 2024-07-20 NOTE — CODE DOCUMENTATION
In house NP bedside. Pt seizing. Order for Ativan. Primary RN to continue documentation and ongoing care. Rapid over.

## 2024-07-20 NOTE — PROCEDURES
EEG REPORT       Patient: Burke Ruiz Age: 89 y.o.  MRN: [id number]      Referring Provider: No ref. provider found    History: This routine 30 minute scalp EEG was recorded with video- monitoring for a 89 y.o.. male who presented with encephalopathy. This EEG was performed to evaluate for focal and epileptiform abnormalities.     Burke Ruiz   Current Facility-Administered Medications   Medication Dose Route Frequency Provider Last Rate Last Admin    QUEtiapine (SEROQUEL) tablet 75 mg  75 mg Oral BID Koko Paiz MD   75 mg at 07/20/24 0812    naloxegol (MOVANTIK) tablet 12.5 mg  12.5 mg Oral QAM AC Koko Paiz MD   12.5 mg at 07/20/24 0812    lactated ringers IV soln infusion   IntraVENous Continuous Koko Paiz  mL/hr at 07/20/24 0910 New Bag at 07/20/24 0910    LORazepam (ATIVAN) injection 2 mg  2 mg IntraVENous Once Deisy Elmore APRN - CNP        labetalol (NORMODYNE;TRANDATE) injection 10 mg  10 mg IntraVENous Once Deisy Elmore APRN - CNP        sodium chloride flush 0.9 % injection 5-40 mL  5-40 mL IntraVENous 2 times per day Deisy Elmore APRN - CNP   10 mL at 07/19/24 2219    sodium chloride flush 0.9 % injection 5-40 mL  5-40 mL IntraVENous PRN Deisy Elmore APRN - CNP        0.9 % sodium chloride infusion   IntraVENous PRN Deisy Elmore APRN - CNP        PHENobarbital (LUMINAL) injection 130 mg  130 mg IntraVENous Q6H Deisy Elmore APRN - CNP   130 mg at 07/20/24 1019    Followed by    [START ON 7/21/2024] PHENobarbital (LUMINAL) injection 65 mg  65 mg IntraVENous Q6H Deisy Elmore APRN - CNP        Followed by    [START ON 7/22/2024] PHENobarbital (LUMINAL) injection 32.5 mg  32.5 mg IntraVENous Q6H Deisy Elmore APRN - CNP        Followed by    [START ON 7/23/2024] PHENobarbital (LUMINAL) injection 32.5 mg  32.5 mg IntraVENous Q12H Ramírez, Deisy M, APRN - CNP        Followed by    [START ON 7/24/2024] PHENobarbital (LUMINAL) injection 16.2 mg  16.2 mg  IntraVENous Q12H Deisy Elmore APRN - CNP        LORazepam (ATIVAN) injection 4 mg  4 mg IntraVENous Q5 Min PRN Deisy Elmore APRN - CNP        phenylephrine (JOLANTA-SYNEPHRINE) 50 mg in sodium chloride 0.9 % 250 mL infusion   mcg/min IntraVENous Continuous Koko Paiz MD 1.5 mL/hr at 07/20/24 0632 5 mcg/min at 07/20/24 0632    midazolam (VERSED) injection 4 mg  4 mg IntraVENous PRN Koko Paiz MD   4 mg at 07/19/24 2240    clonazePAM (KLONOPIN) tablet 2 mg  2 mg Per NG tube Q12H Koko Paiz MD   2 mg at 07/20/24 0029    piperacillin-tazobactam (ZOSYN) 3,375 mg in sodium chloride 0.9 % 50 mL IVPB (mini-bag)  3,375 mg IntraVENous Q12H Jerome Worley DO 12.5 mL/hr at 07/20/24 0632 Rate Verify at 07/20/24 0632    thiamine mononitrate tablet 100 mg  100 mg Orogastric Daily Koko Paiz MD   100 mg at 07/20/24 0812    folic acid (FOLVITE) tablet 1 mg  1 mg Orogastric Daily Koko Paiz MD   1 mg at 07/20/24 0812    multivitamin 1 tablet  1 tablet Orogastric Daily Koko Paiz MD   1 tablet at 07/20/24 0812    heparin (porcine) injection 5,000 Units  5,000 Units SubCUTAneous 3 times per day Jerome Worley DO   5,000 Units at 07/20/24 0604    fentaNYL 10 mcg/ml in 0.9%  ml infusion   mcg/hr IntraVENous Continuous Koko Paiz MD 20 mL/hr at 07/20/24 0632 200 mcg/hr at 07/20/24 0632    midazolam (VERSED) 1 mg/mL in NS infusion  1-10 mg/hr IntraVENous Continuous Koko Paiz MD 8 mL/hr at 07/20/24 0632 8 mg/hr at 07/20/24 0632    propofol infusion  5-50 mcg/kg/min IntraVENous Continuous Koko Paiz MD 27.2 mL/hr at 07/20/24 0808 50 mcg/kg/min at 07/20/24 0808    hydrALAZINE (APRESOLINE) injection 10 mg  10 mg IntraVENous Q6H PRN Jerome Worley DO   10 mg at 07/19/24 2124    glucose chewable tablet 16 g  4 tablet Oral PRN Jerome Worley DO        dextrose bolus 10% 125 mL  125 mL IntraVENous PRN Jerome Worley DO        Or

## 2024-07-20 NOTE — PROGRESS NOTES
2134: RR called d/t pt appearing to be seizure like symptoms. Pt began to shake and nystagmus noted: /115  despite hydralazine given at 2124 and sedation gtts maxed.     In-house NP at bedside and CC notified of change in status. BG 94.    Orders placed for 2mg Ativan IVP, labetalol 10mg IVP, and Keppra IVP. NP at bedside and spoke with neuro for new consult. Orders for phenobarbital and IVP PRN Ativan also placed.     Vitals normalized and convulsions/nystagmus stopped after ativan IVP given.    CC spoke with [facility] NP via phone as well.

## 2024-07-20 NOTE — SIGNIFICANT EVENT
[facility]  Office: [phone]  Klever Dickens DO, Eulogio Sr DO, Jerome Worley DO, Warren Jovel DO, Vaughn Jefferson MD, Krista Andrews MD, Ana M Cooley MD, Nia Wright MD,  Doc Metz MD, Mario Alberto Lizama MD, Shahla Dutta MD,  Dolly Wu DO, Nancy Mata MD, James Boston MD, Trey Dickens DO, Adriana Spencer MD,  Luis Alberto Thornton DO, Yanet Horan MD, Renee King MD, Fely Gomez MD, Kady Macias MD,  Kayden Murillo MD, Ramsey Valentine MD, Airam Gonsales MD, Angelina Cruz MD, Devyn Gates MD, Neil Looney MD, Navin King DO, Tera Prajapati DO, Julieta Gomez MD,  Kael Farias MD, Shirley Waterhouse, CNP,  Valencia Regalado CNP, Lobo Rojas, CNP,  Karen Mascorro, DNP, Ania rOo, CNP, Kyra Isbell, CNP, Shayla Song, CNP, Jackelin Woody, CNP, Hodan rCuz, PA-C, Kim Woodard PA-C, Destiney Manriquez, CNP, Vania Silva, CNP, Hailey Carson, CNP, Brooke Olvera, CNP, Araeblla Rincon, CNP, Rebecca Zavala, CNS, Laurie Crandall, CNP, Melissa Pat CNP, Tracy Schwab, CNP         [facility]   IN-PATIENT SERVICE   [facility]    Second Visit Note  For more detailed information please refer to the progress note of the day      7/19/2024    10:21 PM    Name:   Burke Ruiz  MRN:     [id number]     Acct:      [id number]   Room:   1104/1104-01  IP Day:  4  Admit Date:  7/15/2024  5:08 PM    PCP:   No primary care provider on file.  Code Status:  Full Code      Pt vitals were reviewed   New labs were reviewed   Patient was seen    Updated plan : Rapid response called around 10 PM.  Arrived to find patient in status epilepticus with tonic-clonic seizures and continuous horizontal nystagmus.  Patient is unresponsive.  Prior to arrival propofol and Versed continuous infusions are maxed.  Patient was also given hydralazine.    Ativan 2 mg IV given, Keppra and 1500 bolus, neurology called and consulted.  Per their recommendation phenobarbital

## 2024-07-20 NOTE — PLAN OF CARE
Problem: Respiratory - Adult  Goal: Achieves optimal ventilation and oxygenation  7/20/2024 1934 by Wendy Franz RCP  Outcome: Progressing     Problem: Respiratory - Adult  Goal: Adequate oxygenation  Outcome: Progressing     Problem: Respiratory - Adult  Goal: Will be able to breathe spontaneously, without ventilator support  Description: Will be able to breathe spontaneously, without ventilator support  Outcome: Progressing

## 2024-07-20 NOTE — PROGRESS NOTES
End Of Shift Note  [facility]  Summary of shift: End Of Shift Note  [facility]  Summary of shift: Pt had eventful shift. Remains intubated/sedated and unable to follow commands throughout shift. RR called d/t seizures noted despite being maxed on fentanyl, versed, and propofol gtts. Neuro consulted, phenobarbital IVP, and PRN ativan added. 1500mg Keppra IVP x1. CC notified of CTH results. Ion Ca: 1.08: 1g IVPB replaced. AM CXR not completed d/t EEG at the time. EEG pend. LR gtt and Joalnta cont. 1100mL u/o measured, no BM.     Vitals:    Vitals:    07/20/24 0515 07/20/24 0530 07/20/24 0545 07/20/24 0600   BP:    123/73   Pulse: 52 53 52 52   Resp: 22 22 22 22   Temp:       TempSrc:       SpO2: 96% 97% 97% 97%   Weight:       Height:            I&O:   Intake/Output Summary (Last 24 hours) at 7/20/2024 0624  Last data filed at 7/20/2024 0605  Gross per 24 hour   Intake 4944.11 ml   Output 2250 ml   Net 2694.11 ml       Resp Status: Vent settings below    Ventilator Settings:  Vent Mode: AC/PRVC Resp Rate (Set): 22 bpm/Vt (Set, mL): 600 mL/ /FiO2 : 40 %    Critical Care IV infusions:   lactated ringers IV soln 100 mL/hr at 07/20/24 0555    sodium chloride      phenylephrine (JOLANTA-SYNEPHRINE) 50 mg in sodium chloride 0.9 % 250 mL infusion 5 mcg/min (07/20/24 0555)    fentaNYL 200 mcg/hr (07/20/24 0555)    midazolam 8 mg/hr (07/20/24 0555)    propofol 50 mcg/kg/min (07/20/24 0555)    dextrose      sodium chloride Stopped (07/20/24 0423)        LDA:   Peripheral IV 07/15/24 Posterior;Right Hand (Active)   Number of days: 4       Peripheral IV 07/16/24 Left;Proximal Forearm (Active)   Number of days: 3       Peripheral IV 07/16/24 Right Forearm (Active)   Number of days: 3       Peripheral IV 07/19/24 Left (Active)   Number of days: 1       Peripheral IV 07/20/24 Distal;Left;Anterior Cephalic (Active)   Number of days: 0       NG/OG/NJ/NE Tube 16 fr Left mouth (Active)   Number of days: 4       Urinary Catheter 07/15/24

## 2024-07-20 NOTE — PROGRESS NOTES
Pt stacking breaths on ventilator and SBP >200 despite Versed gtt increase with bolus.   2112: Dr. Paiz notified and orders given to max fentanyl gtt to 200mcg and Versed to 10mg/hr with 50mcg fentanyl IVP x1

## 2024-07-20 NOTE — PROGRESS NOTES
Pulmonary Critical Care Progress Note    Patient seen for the follow up of Acute alcoholic intoxication with complication (HCC)     Subjective:    He developed significant agitation and had seizure episode yesterday 10 PM blood pressure going up to 280 according to RN after Versed drip was discontinued around 6 PM..  He had desaturation down to 78% on 30% FiO2.  He also was taking breath.  I was contacted advised to increase sedation increased FiO2 to 60% stat ABG advised to give Versed 4 mg and increase frequency for  agitation every 30 minutes as needed.  Neurology already have ordered phenobarbital and Keppra.  They also ordered clonidine and I started since sedation improved blood pressure I ordered David-Synephrine since blood pressure became borderline after sedation...  I ordered chest x-ray and CT brain.  He is still sedated on 200 of fentanyl 4 of Versed and propofol at 35 .   He has improved blood pressure.  He tolerates tube feeds at goal at 55 an hour.  He has good urine output.      Examination:    Vitals: /75   Pulse 55   Temp 99.1 °F (37.3 °C)   Resp 22   Ht 1.803 m (5' 11\")   Wt 99.8 kg (220 lb)   SpO2 97%   BMI 30.68 kg/m²   SpO2  Av %  Min: 91 %  Max: 100 %  General appearance: Intubated sedated  Neck: No JVD  Lungs: Mild decreased breath sound no crackles or wheezes  Heart: regular rate and rhythm, S1, S2 normal, no gallop  Abdomen: Soft, non tender, + BS  Extremities: no cyanosis or clubbing. No significant edema    LABs:    CBC:   Recent Labs     24  0420 24  0420   WBC 8.7 9.0   HGB 12.3* 12.7*   HCT 36.6* 38.5*   * 158       BMP:   Recent Labs     24  2205 24  0420    142   K 4.1 3.8   CO2 25 24   BUN 21 19   CREATININE 1.6* 1.6*   LABGLOM 33* 33*   GLUCOSE 101* 108*       PT/INR: No results for input(s): \"PROTIME\", \"INR\" in the last 72 hours.  APTT:No results for input(s): \"APTT\" in the last 72 hours.  LIVER PROFILE:  Recent Labs

## 2024-07-20 NOTE — PLAN OF CARE
Problem: Discharge Planning  Goal: Discharge to home or other facility with appropriate resources  7/20/2024 1009 by Yvonne Lion RN  Outcome: Not Progressing  Flowsheets (Taken 7/20/2024 0800)  Discharge to home or other facility with appropriate resources: Identify barriers to discharge with patient and caregiver  7/20/2024 0152 by Jodi Alvares RN  Outcome: Not Progressing     Problem: Safety - Medical Restraint  Goal: Remains free of injury from restraints (Restraint for Interference with Medical Device)  Description: INTERVENTIONS:  1. Determine that other, less restrictive measures have been tried or would not be effective before applying the restraint  2. Evaluate the patient's condition at the time of restraint application  3. Inform patient/family regarding the reason for restraint  4. Q2H: Monitor safety, psychosocial status, comfort, nutrition and hydration  7/20/2024 1009 by Yvonne Lion RN  Outcome: Progressing  Flowsheets  Taken 7/20/2024 0800 by Yvonne Lion RN  Remains free of injury from restraints (restraint for interference with medical device): Determine that other, less restrictive measures have been tried or would not be effective before applying the restraint  Taken 7/20/2024 0600 by Jodi Alvares RN  Remains free of injury from restraints (restraint for interference with medical device):   Determine that other, less restrictive measures have been tried or would not be effective before applying the restraint   Evaluate the patient's condition at the time of restraint application   Inform patient/family regarding the reason for restraint   Every 2 hours: Monitor safety, psychosocial status, comfort, nutrition and hydration  7/20/2024 0152 by Jodi Alvares RN  Outcome: Progressing  Flowsheets (Taken 7/19/2024 2200)  Remains free of injury from restraints (restraint for interference with medical device):   Determine that other, less restrictive measures have been tried  or would not be effective before applying the restraint   Evaluate the patient's condition at the time of restraint application   Inform patient/family regarding the reason for restraint   Every 2 hours: Monitor safety, psychosocial status, comfort, nutrition and hydration     Problem: Respiratory - Adult  Goal: Achieves optimal ventilation and oxygenation  7/20/2024 1009 by Yvonne Lion RN  Outcome: Progressing  Flowsheets (Taken 7/20/2024 0800)  Achieves optimal ventilation and oxygenation: Assess for changes in respiratory status  7/20/2024 0152 by Jodi Alvares RN  Outcome: Progressing     Problem: Skin/Tissue Integrity  Goal: Absence of new skin breakdown  Description: 1.  Monitor for areas of redness and/or skin breakdown  2.  Assess vascular access sites hourly  3.  Every 4-6 hours minimum:  Change oxygen saturation probe site  4.  Every 4-6 hours:  If on nasal continuous positive airway pressure, respiratory therapy assess nares and determine need for appliance change or resting period.  7/20/2024 1009 by Yvonne Lion RN  Outcome: Progressing  7/20/2024 0152 by Jodi Alvares RN  Outcome: Progressing     Problem: Safety - Adult  Goal: Free from fall injury  7/20/2024 0152 by Jodi Alvares RN  Outcome: Progressing     Problem: Pain  Goal: Verbalizes/displays adequate comfort level or baseline comfort level  7/20/2024 0152 by Jodi Alvares RN  Outcome: Progressing     Problem: Nutrition Deficit:  Goal: Optimize nutritional status  7/20/2024 0152 by Jodi Alvares RN  Outcome: Progressing     Problem: ABCDS Injury Assessment  Goal: Absence of physical injury  7/20/2024 0152 by Jodi Alvares RN  Outcome: Progressing     Problem: Discharge Planning  Goal: Discharge to home or other facility with appropriate resources  7/20/2024 1009 by Yvonne Lion RN  Outcome: Not Progressing  Flowsheets (Taken 7/20/2024 0800)  Discharge to home or other facility with appropriate resources: Identify  barriers to discharge with patient and caregiver  7/20/2024 0152 by Jodi Alvares, RN  Outcome: Not Progressing

## 2024-07-20 NOTE — PROGRESS NOTES
Pt transferred to CT brain w/o contrast with Singh RT, writer, and Mary Jane CALDERON.     Returned w/o complications.

## 2024-07-20 NOTE — CONSULTS
[facility]   IN-PATIENT SERVICE      NEUROLOGY CONSULT  NOTE            Date:   7/20/2024  Patient name:  Burke Ruiz  Date of admission:  7/15/2024  YOB: 1902      Chief Complaint:     Chief Complaint   Patient presents with    Alcohol Intoxication       Reason for Consult:          History of Present Illness:     The patient is a 89 y.o. male who presents with Alcohol Intoxication  . The patient was seen and examined and the chart was reviewed.     This is a unknown age male ( came in as Joseph)  previous medical history of alcohol abuse presented to the hospital with alcohol intoxication.  He was brought by squad was found to drinking half a bottle of vodka.  He refused to identify himself. Workup  revealed ethanol 42%, potassium 2.9, sodium 133, WBC 10.1. Urine tox screen returned positive for cannabinoid and cocaine.  CT head and CT cervical spine returned negative for acute intracranial abnormalities or acute osseous abnormality of cervical spine.     He was intubated and sedated and started on levophed for hypotension.      Rapid response called around 10 PM on 7/20/24.  Arrived to find patient with tonic-clonic activity and continuous horizontal nystagmus.  Patient was unresponsive per nursing staff.  Prior to arrival propofol and Versed continuous infusions are maxed.  Patient was also given hydralazine.     Ativan 2 mg IV given, Keppra and 1500 bolus, neurology called and consulted.  Phenobarbital continuous infusion started. Shortly after medication administration tremor subsided, heart rate and blood pressure normalized.  EEG completed and showed severe encephalopathy and no seizures.     Past Medical History:     No past medical history on file.     Past Surgical History:     No past surgical history on file.     Medications Prior to Admission:     Prior to Admission medications    Not on File        Allergies:     Patient has no known allergies.    Social History:     Tobacco:    has  no history on file for tobacco use.  Alcohol:      has no history on file for alcohol use.  Drug Use:  has no history on file for drug use.    Family History:     No family history on file.    Review of Systems:       Constitutional Negative for fever and chills   HEENT Negative for ear discharge, ear pain, nosebleed. Negative for photophobia, headache.    Musculoskeletal Negative for joint pain, negative for myalgia   Respiratory Negative for cough, dyspnea. Negative for hemoptysis and sputum.    Cardiovascular Negative for palpitations, chest pain. Negative for orthopnea, claudication.    Gastrointestinal Negative for nausea, vomiting. Negative for abdominal pain, diarrhea, blood in stool   Genitourinary  Negative for dysuria, hematuria. Negative for suprapubic pain. Negative for bladder incontinence.    Skin Negative for rash or itching   Hematology Negative for ecchymosis, anemia   Psychiatric Negative for anxiety, depression. Negative for suicidal ideation, hallucinations         Physical Exam:   /76   Pulse 52   Temp 97.7 °F (36.5 °C) (Axillary)   Resp 22   Ht 1.803 m (5' 11\")   Wt 99.8 kg (220 lb)   SpO2 99%   BMI 30.68 kg/m²   Temp (24hrs), Av.4 °F (36.9 °C), Min:97.3 °F (36.3 °C), Max:100.4 °F (38 °C)        Limited exam:  Maxed on sedation  Patient intubated and sedated  No gaze preference  Pinpoint pupils  Breathing above the vent  No withdrawal to noxious stimuli         Diagnostics:      Laboratory Testing:  CBC:   Recent Labs     24  0410 24  0420 24  0420   WBC 10.6 8.7 9.0   HGB 13.0 12.3* 12.7*   * 124* 158     BMP:    Recent Labs     24  0420 24  2205 24  042    144 142   K 3.8 4.1 3.8    110* 108*   CO2 24 25 24   BUN 24* 21 19   CREATININE 1.9* 1.6* 1.6*   GLUCOSE 102* 101* 108*         Lab Results   Component Value Date    TRIG 240 (H) 2024    ALT 36 2024    AST 61 (H) 2024    MG 2.1 2024       No  results found for: \"PHENYTOIN\", \"PHENOBARB\", \"VALPROATE\", \"CBMZ\"      Imaging/Diagnostics:        Results for orders placed during the hospital encounter of 07/15/24    CT HEAD WO CONTRAST    Narrative  EXAMINATION:  CT OF THE HEAD WITHOUT CONTRAST  7/19/2024 11:35 pm    TECHNIQUE:  CT of the head was performed without the administration of intravenous  contrast. Automated exposure control, iterative reconstruction, and/or weight  based adjustment of the mA/kV was utilized to reduce the radiation dose to as  low as reasonably achievable.    COMPARISON:  None.    HISTORY:  ORDERING SYSTEM PROVIDED HISTORY: concern for brain bleed  TECHNOLOGIST PROVIDED HISTORY:  concern for brain bleed    Reason for Exam: Pt intubated; r/o intracranial bleed    FINDINGS:  BRAIN/VENTRICLES: There is no acute intracranial hemorrhage, mass effect or  midline shift.  No abnormal extra-axial fluid collection.  The gray-white  differentiation is maintained without evidence of an acute infarct.  There is  no evidence of hydrocephalus.    ORBITS: The visualized portion of the orbits demonstrate no acute abnormality.    SINUSES: Mucosal thickening is seen in the paranasal sinuses more prominent  in the ethmoid air cells suggesting chronic sinusitis.    SOFT TISSUES/SKULL:  No acute abnormality of the visualized skull or soft  tissues.    Impression  1. No acute intracranial abnormality.  2. Chronic sinusitis.      I personally reviewed all of the above medications, clinical laboratory, imaging and other diagnostic tests.         Impression:      This is a unknown age male who presented after being found drinking half a bottle of vodka and refused to identify himself. UDS positive for cannabinoids and cocaine. Alcohol level 42% on admission. He was hypotensive initially and intubated and sedated for airway protection    Overnight RRT called for concern for seizure like activity. EEG EEG completed and showed severe encephalopathy and no

## 2024-07-20 NOTE — PROGRESS NOTES
Pt stacking breaths on ventilator, O2 78%. CC notified and spoke to via phone.     Orders given: Increase FiO2 to 60%, STAT CXR, STAT ABG, CT brain w/o contrast STAT, 4mg IVP Versed x1, and PRN 4mg Versed Q30, Neosynephrine gtt for SBP>90 and MAP >65, and no clonidine.     RT Singh notified of STAT ABG order and FiO2 increase.    3 = A little assistance

## 2024-07-20 NOTE — PROGRESS NOTES
End Of Shift Note  [place] [facility]  Summary of shift: No further seizure activity since episode last night. Has remained on heavy sedation as directed by Dr Paiz. Versed @ 8mg, Fentanyl @ 200mcg, and Propofol @ 40 mcg. VSS this shift. Jolanta has been off since this am. Tolerating tube feeds @60 ml/h. Passing flatus but no stools yet. Making good urine    Vitals:    Vitals:    07/20/24 1630 07/20/24 1700 07/20/24 1715 07/20/24 1730   BP:  114/70     Pulse: 61 60 61 61   Resp: 22 22 22 22   Temp: 98.8 °F (37.1 °C)      TempSrc: Axillary      SpO2: 97% 97% 96% 96%   Weight:       Height:            I&O:   Intake/Output Summary (Last 24 hours) at 7/20/2024 1752  Last data filed at 7/20/2024 1730  Gross per 24 hour   Intake 5391.35 ml   Output 2525 ml   Net 2866.35 ml       Resp Status: Full vent support as listed below.    Ventilator Settings:  Vent Mode: AC/PRVC Resp Rate (Set): 22 bpm/Vt (Set, mL): 600 mL/ /FiO2 : 40 %    Critical Care IV infusions:   lactated ringers IV soln 100 mL/hr at 07/20/24 0910    sodium chloride      phenylephrine (JOLANTA-SYNEPHRINE) 50 mg in sodium chloride 0.9 % 250 mL infusion 5 mcg/min (07/20/24 0632)    fentaNYL 200 mcg/hr (07/20/24 1733)    midazolam 8 mg/hr (07/20/24 1704)    propofol 40 mcg/kg/min (07/20/24 1601)    dextrose      sodium chloride Stopped (07/20/24 0423)        LDA:   Peripheral IV 07/15/24 Posterior;Right Hand (Active)   Number of days: 4       Peripheral IV 07/16/24 Left;Proximal Forearm (Active)   Number of days: 4       Peripheral IV 07/16/24 Right Forearm (Active)   Number of days: 3       Peripheral IV 07/19/24 Left (Active)   Number of days: 1       Peripheral IV 07/20/24 Distal;Left;Anterior Cephalic (Active)   Number of days: 0       NG/OG/NJ/NE Tube 16 fr Left mouth (Active)   Number of days: 4       Urinary Catheter 07/15/24 (Active)   Number of days: 4       ETT  (Active)   Number of days: 4       Arterial Line 07/16/24 Right Radial (Active)   Number of days: 3

## 2024-07-20 NOTE — PLAN OF CARE
Problem: Safety - Medical Restraint  Goal: Remains free of injury from restraints (Restraint for Interference with Medical Device)  Description: INTERVENTIONS:  1. Determine that other, less restrictive measures have been tried or would not be effective before applying the restraint  2. Evaluate the patient's condition at the time of restraint application  3. Inform patient/family regarding the reason for restraint  4. Q2H: Monitor safety, psychosocial status, comfort, nutrition and hydration  Outcome: Progressing  Flowsheets (Taken 7/19/2024 2200)  Remains free of injury from restraints (restraint for interference with medical device):   Determine that other, less restrictive measures have been tried or would not be effective before applying the restraint   Evaluate the patient's condition at the time of restraint application   Inform patient/family regarding the reason for restraint   Every 2 hours: Monitor safety, psychosocial status, comfort, nutrition and hydration     Problem: Respiratory - Adult  Goal: Achieves optimal ventilation and oxygenation  Outcome: Progressing     Problem: Skin/Tissue Integrity  Goal: Absence of new skin breakdown  Description: 1.  Monitor for areas of redness and/or skin breakdown  2.  Assess vascular access sites hourly  3.  Every 4-6 hours minimum:  Change oxygen saturation probe site  4.  Every 4-6 hours:  If on nasal continuous positive airway pressure, respiratory therapy assess nares and determine need for appliance change or resting period.  Outcome: Progressing     Problem: Safety - Adult  Goal: Free from fall injury  Outcome: Progressing     Problem: Pain  Goal: Verbalizes/displays adequate comfort level or baseline comfort level  Outcome: Progressing     Problem: Nutrition Deficit:  Goal: Optimize nutritional status  Outcome: Progressing     Problem: ABCDS Injury Assessment  Goal: Absence of physical injury  Outcome: Progressing     Problem: Discharge Planning  Goal:

## 2024-07-20 NOTE — PROGRESS NOTES
polyethylene glycol, acetaminophen **OR** acetaminophen    Data:     Past Medical History:   has no past medical history on file.    Social History:        Family History: No family history on file.    Vitals:  /76   Pulse 52   Temp 97.7 °F (36.5 °C) (Axillary)   Resp 22   Ht 1.803 m (5' 11\")   Wt 99.8 kg (220 lb)   SpO2 99%   BMI 30.68 kg/m²   Temp (24hrs), Av.4 °F (36.9 °C), Min:97.3 °F (36.3 °C), Max:100.4 °F (38 °C)    Recent Labs     24  2139 24  2328 24  0353 24  0754   POCGLU 94 126* 106 102       I/O (24Hr):    Intake/Output Summary (Last 24 hours) at 2024 0940  Last data filed at 2024 0800  Gross per 24 hour   Intake 5451.35 ml   Output 2250 ml   Net 3201.35 ml       Labs:  Hematology:  Recent Labs     24  0410 24  0420 24  0420   WBC 10.6 8.7 9.0   RBC 3.65* 3.44* 3.60*   HGB 13.0 12.3* 12.7*   HCT 38.0* 36.6* 38.5*   .1* 106.4* 106.9*   MCH 35.6* 35.8* 35.3*   MCHC 34.2 33.6 33.0   RDW 14.1 14.2 14.3   * 124* 158   MPV 11.0 11.3 10.9     Chemistry:  Recent Labs     24  0410 24  1208 24  0420 24  1422 24  2205 24  0420     --  140  --  144 142   K 3.4* 3.9 3.8  --  4.1 3.8     --  106  --  110* 108*   CO2 23  --  24  --  25 24   GLUCOSE 106*  --  102*  --  101* 108*   BUN 27*  --  24*  --  21 19   CREATININE 1.9*  --  1.9*  --  1.6* 1.6*   MG 1.3* 2.4  --   --  2.1  --    ANIONGAP 13  --  10  --  9 10   LABGLOM 27*  --  27*  --  33* 33*   CALCIUM 7.0*  --  7.4*  --  7.9* 8.0*   TROPHS  --   --   --  24*  --   --      Recent Labs     24  0410 24  0803 24  1208 24  1601 24  1502 24  1921 24  2139 24  2328 24  0353 24  0754   AST 61*  --   --   --   --   --   --   --   --   --    ALT 36  --   --   --   --   --   --   --   --   --    ALKPHOS 74  --   --   --   --   --   --   --   --   --    BILITOT 0.9  --   --   --   --    degenerative disc disease and facet arthrosis.     CT CSpine W/O Contrast    Result Date: 7/15/2024  1. No acute intracranial abnormality. 2. No acute osseous abnormality of the cervical spine. Multilevel degenerative disc disease and facet arthrosis.     XR CHEST PORTABLE    Result Date: 7/15/2024  1. Endotracheal tube terminating approximately 9.5 cm above the patricia, recommend advancement by approximately 4 cm. 2. Enteric tube in the midline with side port and tip over the gastric fundus, appropriate in location. 3. Airspace opacities throughout the right lung and retrocardiac left lung, concerning for atelectasis, aspiration or developing pneumonia, new since the prior study.     XR CHEST PORTABLE    Result Date: 7/15/2024  Mild cardiomegaly. No acute cardiopulmonary process.       Physical Examination:        General appearance: Intubated and sedated  Mental Status: Cannot be assessed due to respiratory failure  Lungs:  clear to auscultation bilaterally, vented  Heart:  regular rate and rhythm, no murmur  Abdomen:  soft, nontender, nondistended, normal bowel sounds, no masses, hepatomegaly, splenomegaly  Extremities:  no edema, redness, tenderness in the calves  Skin:  no gross lesions, rashes, induration    Assessment:        Hospital Problems             Last Modified POA    * (Principal) Acute alcoholic intoxication with complication ([facility]) 7/15/2024 Yes    Alcohol intoxication in alcoholism with blood level over 0.3 with delirium ([facility]) 7/15/2024 Yes    Aspiration pneumonia of both upper lobes due to vomit ([facility]) 7/15/2024 Yes    Acute hypoxic respiratory failure ([facility]) 7/15/2024 Yes    On mechanically assisted ventilation ([facility]) 7/15/2024 Yes    Acute encephalopathy 7/16/2024 Yes    MAIK (acute kidney injury) ([facility]) 7/17/2024 No    Seizure due to alcohol withdrawal, with unspecified complication ([facility]) 7/20/2024 Yes       Plan:        Continue vent support, wean as able  Seizure precautions  Loaded with Keppra

## 2024-07-20 NOTE — PROGRESS NOTES
Physical Therapy  DATE: 2024    NAME: Burke Ruiz  MRN: [id number]   : 1902    Patient not seen this date for Physical Therapy due to:      [] Cancel by RN or physician due to:    [] Hemodialysis    [] Critical Lab Value Level     [] Blood transfusion in progress    [] Acute or unstable cardiovascular status   _MAP < 55 or more than >115  _HR < 40 or > 130    [] Acute or unstable pulmonary status   -FiO2 > 60%   _RR < 5 or >40    _O2 sats < 85%    [] Strict Bedrest    [] Off Unit for surgery or procedure    [] Off Unit for testing       [] Pending imaging to R/O fracture    [] Refusal by Patient      [x] Other: Patient intubated, sedated, not following directions, Rapid response overnight for tonic clonic seizure      [] PT being discontinued at this time. Patient independent. No further needs.     [] PT being discontinued at this time as the patient has been transferred to hospice care. No further needs.      Katy Perera, PT

## 2024-07-20 NOTE — PROGRESS NOTES
[facility]  Occupational Therapy Not Seen Note    Patient not available for Occupational Therapy due to:    [] Testing:    [] Hemodialysis    [] Cancelled by RN:    []Refusal by Patient:    [] Surgery:     [] Intubation:     [] Pain Medication:    [] Sedation:     [] Spine Precautions :    [] Medical Instability:    [x] Other:7/20: (cx) eval as pt is intubated/not appropriate for skilled OT

## 2024-07-21 ENCOUNTER — APPOINTMENT (OUTPATIENT)
Dept: GENERAL RADIOLOGY | Age: 49
DRG: 130 | End: 2024-07-21
Payer: MEDICAID

## 2024-07-21 LAB
25(OH)D3 SERPL-MCNC: 26.7 NG/ML (ref 30–100)
25(OH)D3 SERPL-MCNC: 26.7 NG/ML (ref 30–100)
ALLEN TEST: ABNORMAL
ALLEN TEST: ABNORMAL
AMMONIA PLAS-SCNC: 40 UMOL/L (ref 16–60)
AMMONIA PLAS-SCNC: 40 UMOL/L (ref 16–60)
ANION GAP SERPL CALCULATED.3IONS-SCNC: 11 MMOL/L (ref 9–17)
ANION GAP SERPL CALCULATED.3IONS-SCNC: 11 MMOL/L (ref 9–17)
BASOPHILS # BLD: 0.03 K/UL (ref 0–0.2)
BASOPHILS # BLD: 0.03 K/UL (ref 0–0.2)
BASOPHILS NFR BLD: 0 % (ref 0–2)
BASOPHILS NFR BLD: 0 % (ref 0–2)
BNP SERPL-MCNC: 1414 PG/ML
BNP SERPL-MCNC: 1414 PG/ML
BUN SERPL-MCNC: 18 MG/DL (ref 6–20)
BUN SERPL-MCNC: 18 MG/DL (ref 6–20)
BUN/CREAT SERPL: 12 (ref 9–20)
BUN/CREAT SERPL: 12 (ref 9–20)
CALCIUM SERPL-MCNC: 8.5 MG/DL (ref 8.6–10.4)
CALCIUM SERPL-MCNC: 8.5 MG/DL (ref 8.6–10.4)
CHLORIDE SERPL-SCNC: 106 MMOL/L (ref 98–107)
CHLORIDE SERPL-SCNC: 106 MMOL/L (ref 98–107)
CO2 SERPL-SCNC: 23 MMOL/L (ref 20–31)
CO2 SERPL-SCNC: 23 MMOL/L (ref 20–31)
CREAT SERPL-MCNC: 1.5 MG/DL (ref 0.7–1.2)
CREAT SERPL-MCNC: 1.5 MG/DL (ref 0.7–1.2)
EOSINOPHIL # BLD: 0.28 K/UL (ref 0–0.44)
EOSINOPHIL # BLD: 0.28 K/UL (ref 0–0.44)
EOSINOPHILS RELATIVE PERCENT: 3 % (ref 1–4)
EOSINOPHILS RELATIVE PERCENT: 3 % (ref 1–4)
ERYTHROCYTE [DISTWIDTH] IN BLOOD BY AUTOMATED COUNT: 14.6 % (ref 11.8–14.4)
ERYTHROCYTE [DISTWIDTH] IN BLOOD BY AUTOMATED COUNT: 14.6 % (ref 11.8–14.4)
FIO2: 40
FIO2: 40
FOLATE SERPL-MCNC: >20 NG/ML (ref 4.8–24.2)
FOLATE SERPL-MCNC: >20 NG/ML (ref 4.8–24.2)
GFR, ESTIMATED: 36 ML/MIN/1.73M2
GFR, ESTIMATED: 36 ML/MIN/1.73M2
GLUCOSE BLD-MCNC: 101 MG/DL (ref 75–110)
GLUCOSE BLD-MCNC: 101 MG/DL (ref 75–110)
GLUCOSE BLD-MCNC: 104 MG/DL (ref 75–110)
GLUCOSE BLD-MCNC: 104 MG/DL (ref 75–110)
GLUCOSE BLD-MCNC: 105 MG/DL (ref 75–110)
GLUCOSE BLD-MCNC: 90 MG/DL (ref 75–110)
GLUCOSE BLD-MCNC: 90 MG/DL (ref 75–110)
GLUCOSE BLD-MCNC: 97 MG/DL (ref 75–110)
GLUCOSE BLD-MCNC: 97 MG/DL (ref 75–110)
GLUCOSE SERPL-MCNC: 101 MG/DL (ref 70–99)
GLUCOSE SERPL-MCNC: 101 MG/DL (ref 70–99)
HCT VFR BLD AUTO: 37.9 % (ref 40.7–50.3)
HCT VFR BLD AUTO: 37.9 % (ref 40.7–50.3)
HGB BLD-MCNC: 12.7 G/DL (ref 13–17)
HGB BLD-MCNC: 12.7 G/DL (ref 13–17)
IMM GRANULOCYTES # BLD AUTO: 0.08 K/UL (ref 0–0.3)
IMM GRANULOCYTES # BLD AUTO: 0.08 K/UL (ref 0–0.3)
IMM GRANULOCYTES NFR BLD: 1 %
IMM GRANULOCYTES NFR BLD: 1 %
LYMPHOCYTES NFR BLD: 1.26 K/UL (ref 1.1–3.7)
LYMPHOCYTES NFR BLD: 1.26 K/UL (ref 1.1–3.7)
LYMPHOCYTES RELATIVE PERCENT: 15 % (ref 24–43)
LYMPHOCYTES RELATIVE PERCENT: 15 % (ref 24–43)
MCH RBC QN AUTO: 35.7 PG (ref 25.2–33.5)
MCH RBC QN AUTO: 35.7 PG (ref 25.2–33.5)
MCHC RBC AUTO-ENTMCNC: 33.5 G/DL (ref 28.4–34.8)
MCHC RBC AUTO-ENTMCNC: 33.5 G/DL (ref 28.4–34.8)
MCV RBC AUTO: 106.5 FL (ref 82.6–102.9)
MCV RBC AUTO: 106.5 FL (ref 82.6–102.9)
MODE: ABNORMAL
MODE: ABNORMAL
MONOCYTES NFR BLD: 0.81 K/UL (ref 0.1–1.2)
MONOCYTES NFR BLD: 0.81 K/UL (ref 0.1–1.2)
MONOCYTES NFR BLD: 9 % (ref 3–12)
MONOCYTES NFR BLD: 9 % (ref 3–12)
NEUTROPHILS NFR BLD: 72 % (ref 36–65)
NEUTROPHILS NFR BLD: 72 % (ref 36–65)
NEUTS SEG NFR BLD: 6.23 K/UL (ref 1.5–8.1)
NEUTS SEG NFR BLD: 6.23 K/UL (ref 1.5–8.1)
NRBC BLD-RTO: 0 PER 100 WBC
NRBC BLD-RTO: 0 PER 100 WBC
O2 DELIVERY DEVICE: ABNORMAL
O2 DELIVERY DEVICE: ABNORMAL
PLATELET # BLD AUTO: 191 K/UL (ref 138–453)
PLATELET # BLD AUTO: 191 K/UL (ref 138–453)
PMV BLD AUTO: 10.8 FL (ref 8.1–13.5)
PMV BLD AUTO: 10.8 FL (ref 8.1–13.5)
POC HCO3: 24.9 MMOL/L (ref 21–28)
POC HCO3: 24.9 MMOL/L (ref 21–28)
POC O2 SATURATION: 90.7 % (ref 94–98)
POC O2 SATURATION: 90.7 % (ref 94–98)
POC PCO2: 37.6 MM HG (ref 35–48)
POC PCO2: 37.6 MM HG (ref 35–48)
POC PH: 7.43 (ref 7.35–7.45)
POC PH: 7.43 (ref 7.35–7.45)
POC PO2: 58.4 MM HG (ref 83–108)
POC PO2: 58.4 MM HG (ref 83–108)
POSITIVE BASE EXCESS, ART: 0.7 MMOL/L (ref 0–3)
POSITIVE BASE EXCESS, ART: 0.7 MMOL/L (ref 0–3)
POTASSIUM SERPL-SCNC: 4 MMOL/L (ref 3.7–5.3)
POTASSIUM SERPL-SCNC: 4 MMOL/L (ref 3.7–5.3)
RBC # BLD AUTO: 3.56 M/UL (ref 4.21–5.77)
RBC # BLD AUTO: 3.56 M/UL (ref 4.21–5.77)
RBC # BLD: ABNORMAL 10*6/UL
SAMPLE SITE: ABNORMAL
SAMPLE SITE: ABNORMAL
SODIUM SERPL-SCNC: 140 MMOL/L (ref 135–144)
SODIUM SERPL-SCNC: 140 MMOL/L (ref 135–144)
T PALLIDUM AB SER QL IA: NONREACTIVE
T PALLIDUM AB SER QL IA: NONREACTIVE
TRIGL SERPL-MCNC: 275 MG/DL
TRIGL SERPL-MCNC: 275 MG/DL
TSH SERPL DL<=0.05 MIU/L-ACNC: 1.47 UIU/ML (ref 0.3–5)
TSH SERPL DL<=0.05 MIU/L-ACNC: 1.47 UIU/ML (ref 0.3–5)
VIT B12 SERPL-MCNC: 709 PG/ML (ref 232–1245)
VIT B12 SERPL-MCNC: 709 PG/ML (ref 232–1245)
WBC OTHER # BLD: 8.7 K/UL (ref 3.5–11.3)
WBC OTHER # BLD: 8.7 K/UL (ref 3.5–11.3)

## 2024-07-21 PROCEDURE — 2500000003 HC RX 250 WO HCPCS: Performed by: INTERNAL MEDICINE

## 2024-07-21 PROCEDURE — 2000000000 HC ICU R&B

## 2024-07-21 PROCEDURE — 85025 COMPLETE CBC W/AUTO DIFF WBC: CPT

## 2024-07-21 PROCEDURE — 2700000000 HC OXYGEN THERAPY PER DAY

## 2024-07-21 PROCEDURE — 6370000000 HC RX 637 (ALT 250 FOR IP): Performed by: INTERNAL MEDICINE

## 2024-07-21 PROCEDURE — 87205 SMEAR GRAM STAIN: CPT

## 2024-07-21 PROCEDURE — 6360000002 HC RX W HCPCS

## 2024-07-21 PROCEDURE — 86780 TREPONEMA PALLIDUM: CPT

## 2024-07-21 PROCEDURE — 99232 SBSQ HOSP IP/OBS MODERATE 35: CPT | Performed by: INTERNAL MEDICINE

## 2024-07-21 PROCEDURE — 94003 VENT MGMT INPAT SUBQ DAY: CPT

## 2024-07-21 PROCEDURE — 2580000003 HC RX 258

## 2024-07-21 PROCEDURE — 99233 SBSQ HOSP IP/OBS HIGH 50: CPT | Performed by: PSYCHIATRY & NEUROLOGY

## 2024-07-21 PROCEDURE — 86403 PARTICLE AGGLUT ANTBDY SCRN: CPT

## 2024-07-21 PROCEDURE — 37799 UNLISTED PX VASCULAR SURGERY: CPT

## 2024-07-21 PROCEDURE — 80048 BASIC METABOLIC PNL TOTAL CA: CPT

## 2024-07-21 PROCEDURE — 71045 X-RAY EXAM CHEST 1 VIEW: CPT

## 2024-07-21 PROCEDURE — 83880 ASSAY OF NATRIURETIC PEPTIDE: CPT

## 2024-07-21 PROCEDURE — 87070 CULTURE OTHR SPECIMN AEROBIC: CPT

## 2024-07-21 PROCEDURE — 84425 ASSAY OF VITAMIN B-1: CPT

## 2024-07-21 PROCEDURE — 84443 ASSAY THYROID STIM HORMONE: CPT

## 2024-07-21 PROCEDURE — 82306 VITAMIN D 25 HYDROXY: CPT

## 2024-07-21 PROCEDURE — 84478 ASSAY OF TRIGLYCERIDES: CPT

## 2024-07-21 PROCEDURE — 2580000003 HC RX 258: Performed by: INTERNAL MEDICINE

## 2024-07-21 PROCEDURE — 6360000002 HC RX W HCPCS: Performed by: INTERNAL MEDICINE

## 2024-07-21 PROCEDURE — 82947 ASSAY GLUCOSE BLOOD QUANT: CPT

## 2024-07-21 PROCEDURE — 84207 ASSAY OF VITAMIN B-6: CPT

## 2024-07-21 PROCEDURE — 94761 N-INVAS EAR/PLS OXIMETRY MLT: CPT

## 2024-07-21 PROCEDURE — 87186 SC STD MICRODIL/AGAR DIL: CPT

## 2024-07-21 PROCEDURE — 82607 VITAMIN B-12: CPT

## 2024-07-21 PROCEDURE — 82140 ASSAY OF AMMONIA: CPT

## 2024-07-21 PROCEDURE — 82746 ASSAY OF FOLIC ACID SERUM: CPT

## 2024-07-21 PROCEDURE — 82803 BLOOD GASES ANY COMBINATION: CPT

## 2024-07-21 RX ADMIN — CLONAZEPAM 2 MG: 0.5 TABLET ORAL at 00:42

## 2024-07-21 RX ADMIN — MULTIVITAMIN TABLET 1 TABLET: TABLET at 07:56

## 2024-07-21 RX ADMIN — SODIUM CHLORIDE, PRESERVATIVE FREE 40 MG: 5 INJECTION INTRAVENOUS at 22:24

## 2024-07-21 RX ADMIN — Medication 200 MCG/HR: at 05:26

## 2024-07-21 RX ADMIN — PROPOFOL 40 MCG/KG/MIN: 10 INJECTION, EMULSION INTRAVENOUS at 17:22

## 2024-07-21 RX ADMIN — PIPERACILLIN AND TAZOBACTAM 3375 MG: 3; .375 INJECTION, POWDER, LYOPHILIZED, FOR SOLUTION INTRAVENOUS at 17:28

## 2024-07-21 RX ADMIN — QUETIAPINE FUMARATE 100 MG: 25 TABLET ORAL at 07:56

## 2024-07-21 RX ADMIN — SODIUM CHLORIDE, POTASSIUM CHLORIDE, SODIUM LACTATE AND CALCIUM CHLORIDE: 600; 310; 30; 20 INJECTION, SOLUTION INTRAVENOUS at 05:34

## 2024-07-21 RX ADMIN — PHENOBARBITAL SODIUM 65 MG: 65 INJECTION, SOLUTION INTRAMUSCULAR; INTRAVENOUS at 03:45

## 2024-07-21 RX ADMIN — Medication 200 MCG/HR: at 18:44

## 2024-07-21 RX ADMIN — HEPARIN SODIUM 5000 UNITS: 5000 INJECTION INTRAVENOUS; SUBCUTANEOUS at 22:25

## 2024-07-21 RX ADMIN — SODIUM CHLORIDE, PRESERVATIVE FREE 10 ML: 5 INJECTION INTRAVENOUS at 17:24

## 2024-07-21 RX ADMIN — Medication 100 MG: at 07:56

## 2024-07-21 RX ADMIN — HEPARIN SODIUM 5000 UNITS: 5000 INJECTION INTRAVENOUS; SUBCUTANEOUS at 13:34

## 2024-07-21 RX ADMIN — PHENOBARBITAL SODIUM 65 MG: 65 INJECTION, SOLUTION INTRAMUSCULAR; INTRAVENOUS at 17:24

## 2024-07-21 RX ADMIN — PROPOFOL 40 MCG/KG/MIN: 10 INJECTION, EMULSION INTRAVENOUS at 08:10

## 2024-07-21 RX ADMIN — PHENOBARBITAL SODIUM 65 MG: 65 INJECTION, SOLUTION INTRAMUSCULAR; INTRAVENOUS at 22:25

## 2024-07-21 RX ADMIN — CLONAZEPAM 2 MG: 0.5 TABLET ORAL at 11:55

## 2024-07-21 RX ADMIN — FOLIC ACID 1 MG: 1 TABLET ORAL at 07:56

## 2024-07-21 RX ADMIN — PROPOFOL 40 MCG/KG/MIN: 10 INJECTION, EMULSION INTRAVENOUS at 13:32

## 2024-07-21 RX ADMIN — PHENOBARBITAL SODIUM 65 MG: 65 INJECTION, SOLUTION INTRAMUSCULAR; INTRAVENOUS at 11:55

## 2024-07-21 RX ADMIN — NALOXEGOL OXALATE 12.5 MG: 12.5 TABLET, FILM COATED ORAL at 07:56

## 2024-07-21 RX ADMIN — HEPARIN SODIUM 5000 UNITS: 5000 INJECTION INTRAVENOUS; SUBCUTANEOUS at 05:27

## 2024-07-21 RX ADMIN — SODIUM CHLORIDE, PRESERVATIVE FREE 10 ML: 5 INJECTION INTRAVENOUS at 07:57

## 2024-07-21 RX ADMIN — PIPERACILLIN AND TAZOBACTAM 3375 MG: 3; .375 INJECTION, POWDER, LYOPHILIZED, FOR SOLUTION INTRAVENOUS at 03:48

## 2024-07-21 RX ADMIN — Medication 7 MG/HR: at 09:15

## 2024-07-21 RX ADMIN — Medication 200 MCG/HR: at 12:34

## 2024-07-21 RX ADMIN — QUETIAPINE FUMARATE 100 MG: 25 TABLET ORAL at 22:24

## 2024-07-21 RX ADMIN — PROPOFOL 40 MCG/KG/MIN: 10 INJECTION, EMULSION INTRAVENOUS at 03:43

## 2024-07-21 RX ADMIN — SODIUM CHLORIDE, PRESERVATIVE FREE 10 ML: 5 INJECTION INTRAVENOUS at 22:25

## 2024-07-21 ASSESSMENT — PULMONARY FUNCTION TESTS
PIF_VALUE: 32
PIF_VALUE: 25
PIF_VALUE: 41
PIF_VALUE: 28
PIF_VALUE: 31
PIF_VALUE: 30
PIF_VALUE: 28

## 2024-07-21 NOTE — PLAN OF CARE
Problem: Respiratory - Adult  Goal: Achieves optimal ventilation and oxygenation  7/21/2024 1910 by Wendy Franz RCP  Outcome: Progressing     Problem: Respiratory - Adult  Goal: Adequate oxygenation  Outcome: Progressing     Problem: Respiratory - Adult  Goal: Will be able to breathe spontaneously, without ventilator support  Description: Will be able to breathe spontaneously, without ventilator support  Outcome: Progressing

## 2024-07-21 NOTE — PROGRESS NOTES
[facility]  Office: [phone]  Klever Dickens DO, Eulogio Sr DO, Jerome Worley DO, Warren Jovel DO, Vaughn Jefferson MD, Krista Andrews MD, Ana M Cooley MD, Nia Wright MD,  Doc Metz MD, Mario Alberto Lizama MD, Shahla Dutta MD,  Dolly Wu DO, Nancy Mata MD, James Boston MD, Trey Dickens DO, Adriana Spencer MD,  Luis Alberto Thornton DO, Yanet Horan MD, Renee King MD, Fely Gomez MD, Kady Macias MD,  Kayden Murillo MD, Ramsey Valentine MD, Airam Gonsales MD, Aneglina Cruz MD, Devyn Gates MD, Neil Looney MD, Navin King DO, Tera Prajapati DO, Julieta Gomez MD,  Kael Farias MD, Shirley Waterhouse, CNP,  Valencia Regalado CNP, Lobo Rojas, CNP,  Karen Mascorro, DNP, Ania Oro, CNP, Kyra Isbell, CNP, Shayla Song CNP, Jackelin Woody, CNP, Hodan Cruz, PA-C, Kim Woodard PA-C, Destiney Manriquez, CNP, Vania Silva, CNP, Hailey Carson, CNP, Brooke Olvera, CNP, Arabella Rincon, CNP, Rebecca Zavala, CNS, Laurie Crandall, CNP, Melissa Pat CNP, Tracy Schwab, CNP         [facility]   IN-PATIENT SERVICE   [facility]    Progress Note    7/21/2024    11:35 AM    Name:   Burke Ruiz  MRN:     [id number]     Acct:      [id number]   Room:   [facility] Day:  6  Admit Date:  7/15/2024  5:08 PM    PCP:   No primary care provider on file.  Code Status:  Full Code    Subjective:     C/C:   Chief Complaint   Patient presents with    Alcohol Intoxication     Interval History Status: not changed.     Patient remains intubated and sedated.  Neurology investigation away from seizure-like activity.  No acute events per staff    Brief History:     This is an unidentified male that presents via squad after drinking a large volume of vodka.  Upon evaluation he was found to have a significant alcohol level of greater than 0.4 with positive testing for cocaine and cannabis.  To a level of consciousness he was intubated admitted to the  DO  7/21/2024  11:35 AM

## 2024-07-21 NOTE — PROGRESS NOTES
End Of Shift Note  [place] [facility]  Summary of shift: Pt identity still unknown at this time. VSS throughout shift; pt remains intubated and sedated. Dr. Paiz ordered to start going down on sedation to try for a sedation holiday. Dr. Paiz also d/c LR gtt. Tube feed put on hold @ 1600 d/t residual of 250mL. Remains free from seizure activity this shift. No BM but had 1,125mL urine from [place].     Vitals:    Vitals:    07/21/24 1629 07/21/24 1630 07/21/24 1645 07/21/24 1700   BP:    116/71   Pulse: 57 57 57 58   Resp: 20 22 22 22   Temp:       TempSrc:       SpO2: 93% 93% 94% 93%   Weight:       Height:            I&O:   Intake/Output Summary (Last 24 hours) at 7/21/2024 1736  Last data filed at 7/21/2024 1728  Gross per 24 hour   Intake 6401.71 ml   Output 2075 ml   Net 4326.71 ml       Resp Status: mechanical vent    Ventilator Settings:  Vent Mode: AC/PRVC Resp Rate (Set): 22 bpm/Vt (Set, mL): 600 mL/ /FiO2 : 60 %    Critical Care IV infusions:   sodium chloride      phenylephrine (JOLANTA-SYNEPHRINE) 50 mg in sodium chloride 0.9 % 250 mL infusion Stopped (07/20/24 0709)    fentaNYL 200 mcg/hr (07/21/24 1234)    midazolam 7 mg/hr (07/21/24 1211)    propofol 40 mcg/kg/min (07/21/24 1722)    dextrose      sodium chloride 5 mL/hr at 07/21/24 1211        LDA:   Peripheral IV 07/15/24 Posterior;Right Hand (Active)   Number of days: 5       Peripheral IV 07/16/24 Left;Proximal Forearm (Active)   Number of days: 5       Peripheral IV 07/16/24 Right Forearm (Active)   Number of days: 4       Peripheral IV 07/19/24 Left (Active)   Number of days: 2       Peripheral IV 07/20/24 Distal;Left;Anterior Cephalic (Active)   Number of days: 1       NG/OG/NJ/NE Tube 16 fr Left mouth (Active)   Number of days: 5       Urinary Catheter 07/15/24 (Active)   Number of days: 5       ETT  (Active)   Number of days: 5       Arterial Line 07/16/24 Right Radial (Active)   Number of days: 4       Wound Thigh Left (Active)   Number of days:

## 2024-07-21 NOTE — PROGRESS NOTES
End Of Shift Note  [place] ICU  Summary of shift: Pt remains intubated/ heavily sedated on propofol, versed, and fentanyl gtts per CC. Does not respond to commands or pain. No seizure activity. Residuals elevated at 2000 check: 255, TF held for 4 hours and restarted at 0000: running at 30mL/hr at time of note. BNP 1,414: on call NP notified: LR cont at 100mL/hr. 950mL u/o measured.  CXR pend.     Vitals:    Vitals:    07/21/24 0444 07/21/24 0500 07/21/24 0528 07/21/24 0600   BP:  130/74  114/67   Pulse: 59 59 60 62   Resp: 15 12 13 18   Temp:       TempSrc:       SpO2: 94% 95% 94% 96%   Weight:       Height:            I&O:   Intake/Output Summary (Last 24 hours) at 7/21/2024 0611  Last data filed at 7/21/2024 0536  Gross per 24 hour   Intake 5318.2 ml   Output 2275 ml   Net 3043.2 ml       Resp Status: Vent settings below    Ventilator Settings:  Vent Mode: AC/PRVC Resp Rate (Set): 22 bpm/Vt (Set, mL): 600 mL/ /FiO2 : (S) 50 %    Critical Care IV infusions:   lactated ringers IV soln 100 mL/hr at 07/21/24 0536    sodium chloride      phenylephrine (JOLANTA-SYNEPHRINE) 50 mg in sodium chloride 0.9 % 250 mL infusion Stopped (07/20/24 0709)    fentaNYL 200 mcg/hr (07/21/24 0536)    midazolam 7 mg/hr (07/21/24 0536)    propofol 40 mcg/kg/min (07/21/24 0536)    dextrose      sodium chloride 5 mL/hr at 07/21/24 0536        LDA:   Peripheral IV 07/15/24 Posterior;Right Hand (Active)   Number of days: 5       Peripheral IV 07/16/24 Left;Proximal Forearm (Active)   Number of days: 4       Peripheral IV 07/16/24 Right Forearm (Active)   Number of days: 4       Peripheral IV 07/19/24 Left (Active)   Number of days: 2       Peripheral IV 07/20/24 Distal;Left;Anterior Cephalic (Active)   Number of days: 1       NG/OG/NJ/NE Tube 16 fr Left mouth (Active)   Number of days: 5       Urinary Catheter 07/15/24 (Active)   Number of days: 5       ETT  (Active)   Number of days: 5       Arterial Line 07/16/24 Right Radial (Active)   Number

## 2024-07-21 NOTE — PLAN OF CARE
Problem: Safety - Medical Restraint  Goal: Remains free of injury from restraints (Restraint for Interference with Medical Device)  Description: INTERVENTIONS:  1. Determine that other, less restrictive measures have been tried or would not be effective before applying the restraint  2. Evaluate the patient's condition at the time of restraint application  3. Inform patient/family regarding the reason for restraint  4. Q2H: Monitor safety, psychosocial status, comfort, nutrition and hydration  7/20/2024 2034 by Jodi Alvares RN  Outcome: Progressing  Flowsheets  Taken 7/20/2024 2000 by Jodi Alvares RN  Remains free of injury from restraints (restraint for interference with medical device):   Determine that other, less restrictive measures have been tried or would not be effective before applying the restraint   Evaluate the patient's condition at the time of restraint application   Inform patient/family regarding the reason for restraint   Every 2 hours: Monitor safety, psychosocial status, comfort, nutrition and hydration  Taken 7/20/2024 1922 by Lara Maradiaga RN  Remains free of injury from restraints (restraint for interference with medical device):   Determine that other, less restrictive measures have been tried or would not be effective before applying the restraint   Evaluate the patient's condition at the time of restraint application   Inform patient/family regarding the reason for restraint   Every 2 hours: Monitor safety, psychosocial status, comfort, nutrition and hydration  Taken 7/20/2024 1800 by Yvonne Lion RN  Remains free of injury from restraints (restraint for interference with medical device): Determine that other, less restrictive measures have been tried or would not be effective before applying the restraint  Taken 7/20/2024 1600 by Yvonne Lion RN  Remains free of injury from restraints (restraint for interference with medical device): Determine that other, less  restrictive measures have been tried or would not be effective before applying the restraint  Taken 7/20/2024 1400 by Yvonne Lion RN  Remains free of injury from restraints (restraint for interference with medical device): Determine that other, less restrictive measures have been tried or would not be effective before applying the restraint  Taken 7/20/2024 1200 by Yvonne Lion RN  Remains free of injury from restraints (restraint for interference with medical device): Determine that other, less restrictive measures have been tried or would not be effective before applying the restraint  7/20/2024 1009 by Yvonne Lion RN  Outcome: Progressing  Flowsheets  Taken 7/20/2024 1000 by Yvonne Lion RN  Remains free of injury from restraints (restraint for interference with medical device): Determine that other, less restrictive measures have been tried or would not be effective before applying the restraint  Taken 7/20/2024 0800 by Yvonne Lion RN  Remains free of injury from restraints (restraint for interference with medical device): Determine that other, less restrictive measures have been tried or would not be effective before applying the restraint  Taken 7/20/2024 0600 by Jodi Alvares RN  Remains free of injury from restraints (restraint for interference with medical device):   Determine that other, less restrictive measures have been tried or would not be effective before applying the restraint   Evaluate the patient's condition at the time of restraint application   Inform patient/family regarding the reason for restraint   Every 2 hours: Monitor safety, psychosocial status, comfort, nutrition and hydration     Problem: Respiratory - Adult  Goal: Achieves optimal ventilation and oxygenation  7/20/2024 2034 by Jodi Alvares RN  Outcome: Progressing  Flowsheets (Taken 7/20/2024 1935 by Wendy Franz RCP)  Achieves optimal ventilation and oxygenation:   Assess for changes in

## 2024-07-21 NOTE — PROGRESS NOTES
Pulmonary Critical Care Progress Note    Patient seen for the follow up of Acute alcoholic intoxication with complication (HCC)     Subjective:    He has been sedated on the ventilator 7 mg of Versed at 200 fentanyl and 40 mcg/kg/min of propofol..  He had desaturation now requires 60% oxygen....  Tube feeds were held yesterday for increased residual and resumed tolerated well today.  He has improved blood pressure.  He has good urine output.      Examination:    Vitals: /77   Pulse 57   Temp 98.1 °F (36.7 °C) (Axillary)   Resp 25   Ht 1.803 m (5' 11\")   Wt 99.8 kg (220 lb)   SpO2 93%   BMI 30.68 kg/m²   SpO2  Av %  Min: 88 %  Max: 99 %  General appearance: Intubated sedated  Neck: No JVD  Lungs: Mild decreased breath sound no crackles or wheezes  Heart: regular rate and rhythm, S1, S2 normal, no gallop  Abdomen: Soft, non tender, + BS  Extremities: no cyanosis or clubbing. No significant edema    LABs:    CBC:   Recent Labs     24  0420 24  0350   WBC 9.0 8.7   HGB 12.7* 12.7*   HCT 38.5* 37.9*    191       BMP:   Recent Labs     24  0420 24  0350    140   K 3.8 4.0   CO2 24 23   BUN 19 18   CREATININE 1.6* 1.5*   LABGLOM 33* 36*   GLUCOSE 108* 101*       PT/INR: No results for input(s): \"PROTIME\", \"INR\" in the last 72 hours.  APTT:No results for input(s): \"APTT\" in the last 72 hours.  LIVER PROFILE:  No results for input(s): \"AST\", \"ALT\", \"LABALBU\" in the last 72 hours.     Latest Reference Range & Units 24 03:02 24 04:45 24 12:08   Pro-BNP <300 pg/mL 543 (H)     Triglycerides <150 mg/dL  235 (H) 182 (H)   (H): Data is abnormally high   Latest Reference Range & Units 07/15/24 19:30   Amphetamine Screen, Ur NEGATIVE  NEGATIVE   Barbiturate Screen, Ur NEGATIVE  NEGATIVE   Benzodiazepine Screen, Urine NEGATIVE  NEGATIVE   Cannabinoid Scrn, Ur NEGATIVE  POSITIVE !   Cocaine Metabolite, Urine NEGATIVE  POSITIVE !   Fentanyl, Ur NEGATIVE  NEGATIVE          Impression/recommendations:    Acute hypoxic respiratory failure requiring intubation  Continue assist-control ventilation  propofol drip RASS -2 wean first given increased triglyceride  Continue fentanyl drip RASS -2 wean second  Versed drip RASS -2 wean last  Daily sedation vacation attempts during the daytime mornings  Continue tube feeds as tolerated/  Movantik and laxatives      Aspiration pneumonia/possible COPD/THC / smoker   DuoNeb by nebulizer  Zosyn and vancomycin/MRSA positive  Sputum culture  Smoking/THC cessation     alcohol intoxication/fatty liver with increased liver function/alcohol withdrawal   Alcohol cessation  Klonopin 2 mg twice daily / increase Seroquel 100 twice daily  Monitor LFTs  Thiamine folate multivitamin     Seizures likely related to alcohol withdrawal   Continue sedation  Phenobarb per neurology  Neurology on consult/EEG showed moderate slowing    hypertension poor control/ Cocaine positive /mild increase troponin  Off Cardene drip  Improved on sedation     MAIK   Hold fluids/monitor urine output and BMP      Right inguinal hernia   discussed with RN    Peptic ulcer disease prophylaxis  DVT prophylaxis      Koko Paiz MD, MD, [facility]  Pulmonary Critical Care and Sleep Medicine,  [facility]  Cell: [phone]  Office: [phone]

## 2024-07-21 NOTE — PROGRESS NOTES
[facility] Neurology   IN-PATIENT SERVICE      NEUROLOGY CONSULT  NOTE            Date:   7/21/2024  Patient name:  Burke Ruiz  Date of admission:  7/15/2024  YOB: 1902      Chief Complaint:     Chief Complaint   Patient presents with    Alcohol Intoxication       Reason for Consult:      Altered mental status, myoclonic jerking movements    Interval history: No new episodes, patient is sedated on Versed, propofol, fentanyl and unable to do a complete neurological examination    History of Present Illness:     The patient is a 89 y.o. male who presents with Alcohol Intoxication  . The patient was seen and examined and the chart was reviewed.     This is a unknown age male ( came in as Joseph)  previous medical history of alcohol abuse presented to the hospital with alcohol intoxication.  He was brought by squad was found to drinking half a bottle of vodka.  He refused to identify himself. Workup  revealed ethanol 42%, potassium 2.9, sodium 133, WBC 10.1. Urine tox screen returned positive for cannabinoid and cocaine.  CT head and CT cervical spine returned negative for acute intracranial abnormalities or acute osseous abnormality of cervical spine.     He was intubated and sedated and started on levophed for hypotension.      Rapid response called around 10 PM on 7/20/24.  Arrived to find patient with tonic-clonic activity and continuous horizontal nystagmus.  Patient was unresponsive per nursing staff.  Prior to arrival propofol and Versed continuous infusions are maxed.  Patient was also given hydralazine.     Ativan 2 mg IV given, Keppra and 1500 bolus, neurology called and consulted.  Phenobarbital continuous infusion started. Shortly after medication administration tremor subsided, heart rate and blood pressure normalized.  EEG completed and showed severe encephalopathy and no seizures.     Past Medical History:     No past medical history on file.     Past Surgical History:     No past surgical history      07/19/24  2205 07/20/24  0420 07/21/24  0350    142 140   K 4.1 3.8 4.0   * 108* 106   CO2 25 24 23   BUN 21 19 18   CREATININE 1.6* 1.6* 1.5*   GLUCOSE 101* 108* 101*           Lab Results   Component Value Date    TRIG 275 (H) 07/21/2024    ALT 36 07/18/2024    AST 61 (H) 07/18/2024    MG 2.1 07/19/2024       No results found for: \"PHENYTOIN\", \"PHENOBARB\", \"VALPROATE\", \"CBMZ\"      Imaging/Diagnostics:        Results for orders placed during the hospital encounter of 07/15/24    CT HEAD WO CONTRAST    Narrative  EXAMINATION:  CT OF THE HEAD WITHOUT CONTRAST  7/19/2024 11:35 pm    TECHNIQUE:  CT of the head was performed without the administration of intravenous  contrast. Automated exposure control, iterative reconstruction, and/or weight  based adjustment of the mA/kV was utilized to reduce the radiation dose to as  low as reasonably achievable.    COMPARISON:  None.    HISTORY:  ORDERING SYSTEM PROVIDED HISTORY: concern for brain bleed  TECHNOLOGIST PROVIDED HISTORY:  concern for brain bleed    Reason for Exam: Pt intubated; r/o intracranial bleed    FINDINGS:  BRAIN/VENTRICLES: There is no acute intracranial hemorrhage, mass effect or  midline shift.  No abnormal extra-axial fluid collection.  The gray-white  differentiation is maintained without evidence of an acute infarct.  There is  no evidence of hydrocephalus.    ORBITS: The visualized portion of the orbits demonstrate no acute abnormality.    SINUSES: Mucosal thickening is seen in the paranasal sinuses more prominent  in the ethmoid air cells suggesting chronic sinusitis.    SOFT TISSUES/SKULL:  No acute abnormality of the visualized skull or soft  tissues.    Impression  1. No acute intracranial abnormality.  2. Chronic sinusitis.      I personally reviewed all of the above medications, clinical laboratory, imaging and other diagnostic tests.         Impression:      This is a unknown age male who presented after being found drinking half

## 2024-07-21 NOTE — PLAN OF CARE
Problem: Discharge Planning  Goal: Discharge to home or other facility with appropriate resources  Outcome: Not Progressing     Problem: Safety - Medical Restraint  Goal: Remains free of injury from restraints (Restraint for Interference with Medical Device)  Description: INTERVENTIONS:  1. Determine that other, less restrictive measures have been tried or would not be effective before applying the restraint  2. Evaluate the patient's condition at the time of restraint application  3. Inform patient/family regarding the reason for restraint  4. Q2H: Monitor safety, psychosocial status, comfort, nutrition and hydration  Outcome: Progressing  Flowsheets  Taken 7/21/2024 1400 by Lara Maradiaga RN  Remains free of injury from restraints (restraint for interference with medical device):   Determine that other, less restrictive measures have been tried or would not be effective before applying the restraint   Evaluate the patient's condition at the time of restraint application   Inform patient/family regarding the reason for restraint   Every 2 hours: Monitor safety, psychosocial status, comfort, nutrition and hydration  Taken 7/21/2024 1217 by Lara Maradiaga RN  Remains free of injury from restraints (restraint for interference with medical device):   Determine that other, less restrictive measures have been tried or would not be effective before applying the restraint   Evaluate the patient's condition at the time of restraint application   Inform patient/family regarding the reason for restraint   Every 2 hours: Monitor safety, psychosocial status, comfort, nutrition and hydration  Taken 7/21/2024 1200 by Lara Maradiaga RN  Remains free of injury from restraints (restraint for interference with medical device):   Determine that other, less restrictive measures have been tried or would not be effective before applying the restraint   Evaluate the patient's condition at the time of restraint application   Inform  place.     Problem: Skin/Tissue Integrity  Goal: Absence of new skin breakdown  Description: 1.  Monitor for areas of redness and/or skin breakdown  2.  Assess vascular access sites hourly  3.  Every 4-6 hours minimum:  Change oxygen saturation probe site  4.  Every 4-6 hours:  If on nasal continuous positive airway pressure, respiratory therapy assess nares and determine need for appliance change or resting period.  Outcome: Progressing   Continuing to monitor for skin integrity risks. Patient intervention includes turn and position every 2 hours. Turning/repositioning encouraged at least once every 2 hrs, and prn basis. Hygiene care being completed dependently per nursing staff. No evidence of any new skin integrity issues noted.     Problem: Nutrition Deficit:  Goal: Optimize nutritional status  Outcome: Progressing     Problem: ABCDS Injury Assessment  Goal: Absence of physical injury  Outcome: Progressing   Pt remains free from any physical injuries at this time. Will continue to provide a safe environment for pt. Will continue to assess and monitor pt with hourly rounds.

## 2024-07-22 ENCOUNTER — APPOINTMENT (OUTPATIENT)
Dept: GENERAL RADIOLOGY | Age: 49
DRG: 130 | End: 2024-07-22
Payer: MEDICAID

## 2024-07-22 ENCOUNTER — HOSPITAL ENCOUNTER (INPATIENT)
Dept: VASCULAR LAB | Age: 49
Discharge: HOME | DRG: 130 | End: 2024-07-24
Attending: INTERNAL MEDICINE
Payer: MEDICAID

## 2024-07-22 LAB
ALLEN TEST: ABNORMAL
ALLEN TEST: ABNORMAL
ANION GAP SERPL CALCULATED.3IONS-SCNC: 8 MMOL/L (ref 9–17)
ANION GAP SERPL CALCULATED.3IONS-SCNC: 8 MMOL/L (ref 9–17)
ANTI-XA UNFRAC HEPARIN: 0.32 IU/L (ref 0.3–0.7)
ANTI-XA UNFRAC HEPARIN: 0.32 IU/L (ref 0.3–0.7)
ANTI-XA UNFRAC HEPARIN: <0.1 IU/L (ref 0.3–0.7)
ANTI-XA UNFRAC HEPARIN: <0.1 IU/L (ref 0.3–0.7)
BASOPHILS # BLD: 0.06 K/UL (ref 0–0.2)
BASOPHILS # BLD: 0.06 K/UL (ref 0–0.2)
BASOPHILS NFR BLD: 1 % (ref 0–2)
BASOPHILS NFR BLD: 1 % (ref 0–2)
BUN SERPL-MCNC: 18 MG/DL (ref 6–20)
BUN SERPL-MCNC: 18 MG/DL (ref 6–20)
BUN/CREAT SERPL: 11 (ref 9–20)
BUN/CREAT SERPL: 11 (ref 9–20)
CALCIUM SERPL-MCNC: 8.8 MG/DL (ref 8.6–10.4)
CALCIUM SERPL-MCNC: 8.8 MG/DL (ref 8.6–10.4)
CHLORIDE SERPL-SCNC: 108 MMOL/L (ref 98–107)
CHLORIDE SERPL-SCNC: 108 MMOL/L (ref 98–107)
CO2 SERPL-SCNC: 25 MMOL/L (ref 20–31)
CO2 SERPL-SCNC: 25 MMOL/L (ref 20–31)
CREAT SERPL-MCNC: 1.6 MG/DL (ref 0.7–1.2)
CREAT SERPL-MCNC: 1.6 MG/DL (ref 0.7–1.2)
D DIMER PPP FEU-MCNC: 2.38 UG/ML FEU (ref 0–0.59)
D DIMER PPP FEU-MCNC: 2.38 UG/ML FEU (ref 0–0.59)
ECHO BSA: 2.24 M2
ECHO BSA: 2.24 M2
EOSINOPHIL # BLD: 0.33 K/UL (ref 0–0.44)
EOSINOPHIL # BLD: 0.33 K/UL (ref 0–0.44)
EOSINOPHILS RELATIVE PERCENT: 3 % (ref 1–4)
EOSINOPHILS RELATIVE PERCENT: 3 % (ref 1–4)
ERYTHROCYTE [DISTWIDTH] IN BLOOD BY AUTOMATED COUNT: 14.6 % (ref 11.8–14.4)
FIO2: 70
FIO2: 70
GFR, ESTIMATED: 33 ML/MIN/1.73M2
GFR, ESTIMATED: 33 ML/MIN/1.73M2
GLUCOSE BLD-MCNC: 100 MG/DL (ref 75–110)
GLUCOSE BLD-MCNC: 100 MG/DL (ref 75–110)
GLUCOSE BLD-MCNC: 104 MG/DL (ref 75–110)
GLUCOSE BLD-MCNC: 104 MG/DL (ref 75–110)
GLUCOSE BLD-MCNC: 107 MG/DL (ref 75–110)
GLUCOSE BLD-MCNC: 107 MG/DL (ref 75–110)
GLUCOSE BLD-MCNC: 112 MG/DL (ref 75–110)
GLUCOSE BLD-MCNC: 112 MG/DL (ref 75–110)
GLUCOSE BLD-MCNC: 76 MG/DL (ref 75–110)
GLUCOSE BLD-MCNC: 76 MG/DL (ref 75–110)
GLUCOSE BLD-MCNC: 90 MG/DL (ref 75–110)
GLUCOSE BLD-MCNC: 90 MG/DL (ref 75–110)
GLUCOSE SERPL-MCNC: 99 MG/DL (ref 70–99)
GLUCOSE SERPL-MCNC: 99 MG/DL (ref 70–99)
HCT VFR BLD AUTO: 39.4 % (ref 40.7–50.3)
HCT VFR BLD AUTO: 39.4 % (ref 40.7–50.3)
HCT VFR BLD AUTO: 40.1 % (ref 40.7–50.3)
HCT VFR BLD AUTO: 40.1 % (ref 40.7–50.3)
HGB BLD-MCNC: 13 G/DL (ref 13–17)
HGB BLD-MCNC: 13 G/DL (ref 13–17)
HGB BLD-MCNC: 13.4 G/DL (ref 13–17)
HGB BLD-MCNC: 13.4 G/DL (ref 13–17)
IMM GRANULOCYTES # BLD AUTO: 0.15 K/UL (ref 0–0.3)
IMM GRANULOCYTES # BLD AUTO: 0.15 K/UL (ref 0–0.3)
IMM GRANULOCYTES NFR BLD: 2 %
IMM GRANULOCYTES NFR BLD: 2 %
INR PPP: 1
INR PPP: 1
LYMPHOCYTES NFR BLD: 1.58 K/UL (ref 1.1–3.7)
LYMPHOCYTES NFR BLD: 1.58 K/UL (ref 1.1–3.7)
LYMPHOCYTES RELATIVE PERCENT: 15 % (ref 24–43)
LYMPHOCYTES RELATIVE PERCENT: 15 % (ref 24–43)
MCH RBC QN AUTO: 35.6 PG (ref 25.2–33.5)
MCHC RBC AUTO-ENTMCNC: 33 G/DL (ref 28.4–34.8)
MCHC RBC AUTO-ENTMCNC: 33 G/DL (ref 28.4–34.8)
MCHC RBC AUTO-ENTMCNC: 33.4 G/DL (ref 28.4–34.8)
MCHC RBC AUTO-ENTMCNC: 33.4 G/DL (ref 28.4–34.8)
MCV RBC AUTO: 106.6 FL (ref 82.6–102.9)
MCV RBC AUTO: 106.6 FL (ref 82.6–102.9)
MCV RBC AUTO: 107.9 FL (ref 82.6–102.9)
MCV RBC AUTO: 107.9 FL (ref 82.6–102.9)
MODE: ABNORMAL
MODE: ABNORMAL
MONOCYTES NFR BLD: 1.23 K/UL (ref 0.1–1.2)
MONOCYTES NFR BLD: 1.23 K/UL (ref 0.1–1.2)
MONOCYTES NFR BLD: 12 % (ref 3–12)
MONOCYTES NFR BLD: 12 % (ref 3–12)
NEUTROPHILS NFR BLD: 68 % (ref 36–65)
NEUTROPHILS NFR BLD: 68 % (ref 36–65)
NEUTS SEG NFR BLD: 6.98 K/UL (ref 1.5–8.1)
NEUTS SEG NFR BLD: 6.98 K/UL (ref 1.5–8.1)
NRBC BLD-RTO: 0 PER 100 WBC
O2 DELIVERY DEVICE: ABNORMAL
O2 DELIVERY DEVICE: ABNORMAL
PARTIAL THROMBOPLASTIN TIME: 30.5 SEC (ref 23.9–33.8)
PARTIAL THROMBOPLASTIN TIME: 30.5 SEC (ref 23.9–33.8)
PATIENT TEMP: 38.1
PATIENT TEMP: 38.1
PLATELET # BLD AUTO: 299 K/UL (ref 138–453)
PLATELET # BLD AUTO: 299 K/UL (ref 138–453)
PLATELET # BLD AUTO: 339 K/UL (ref 138–453)
PLATELET # BLD AUTO: 339 K/UL (ref 138–453)
PMV BLD AUTO: 10.2 FL (ref 8.1–13.5)
POC HCO3: 26.3 MMOL/L (ref 21–28)
POC HCO3: 26.3 MMOL/L (ref 21–28)
POC O2 SATURATION: 87.6 % (ref 94–98)
POC O2 SATURATION: 87.6 % (ref 94–98)
POC PCO2 TEMP: 47.7 MM HG
POC PCO2 TEMP: 47.7 MM HG
POC PCO2: 45.5 MM HG (ref 35–48)
POC PCO2: 45.5 MM HG (ref 35–48)
POC PH TEMP: 7.35
POC PH TEMP: 7.35
POC PH: 7.37 (ref 7.35–7.45)
POC PH: 7.37 (ref 7.35–7.45)
POC PO2 TEMP: 60.5 MM HG
POC PO2 TEMP: 60.5 MM HG
POC PO2: 56.1 MM HG (ref 83–108)
POC PO2: 56.1 MM HG (ref 83–108)
POSITIVE BASE EXCESS, ART: 0.6 MMOL/L (ref 0–3)
POSITIVE BASE EXCESS, ART: 0.6 MMOL/L (ref 0–3)
POTASSIUM SERPL-SCNC: 4 MMOL/L (ref 3.7–5.3)
POTASSIUM SERPL-SCNC: 4 MMOL/L (ref 3.7–5.3)
PROTHROMBIN TIME: 13.3 SEC (ref 11.5–14.2)
PROTHROMBIN TIME: 13.3 SEC (ref 11.5–14.2)
RBC # BLD AUTO: 3.65 M/UL (ref 4.21–5.77)
RBC # BLD AUTO: 3.65 M/UL (ref 4.21–5.77)
RBC # BLD AUTO: 3.76 M/UL (ref 4.21–5.77)
RBC # BLD AUTO: 3.76 M/UL (ref 4.21–5.77)
RBC # BLD: ABNORMAL 10*6/UL
SAMPLE SITE: ABNORMAL
SAMPLE SITE: ABNORMAL
SODIUM SERPL-SCNC: 141 MMOL/L (ref 135–144)
SODIUM SERPL-SCNC: 141 MMOL/L (ref 135–144)
TRIGL SERPL-MCNC: 323 MG/DL
TRIGL SERPL-MCNC: 323 MG/DL
WBC OTHER # BLD: 10.3 K/UL (ref 3.5–11.3)
WBC OTHER # BLD: 10.3 K/UL (ref 3.5–11.3)
WBC OTHER # BLD: 10.6 K/UL (ref 3.5–11.3)
WBC OTHER # BLD: 10.6 K/UL (ref 3.5–11.3)

## 2024-07-22 PROCEDURE — 2580000003 HC RX 258: Performed by: INTERNAL MEDICINE

## 2024-07-22 PROCEDURE — 2000000000 HC ICU R&B

## 2024-07-22 PROCEDURE — 85379 FIBRIN DEGRADATION QUANT: CPT

## 2024-07-22 PROCEDURE — 2500000003 HC RX 250 WO HCPCS: Performed by: INTERNAL MEDICINE

## 2024-07-22 PROCEDURE — 6360000002 HC RX W HCPCS: Performed by: INTERNAL MEDICINE

## 2024-07-22 PROCEDURE — 2580000003 HC RX 258

## 2024-07-22 PROCEDURE — 84478 ASSAY OF TRIGLYCERIDES: CPT

## 2024-07-22 PROCEDURE — 85730 THROMBOPLASTIN TIME PARTIAL: CPT

## 2024-07-22 PROCEDURE — 94640 AIRWAY INHALATION TREATMENT: CPT

## 2024-07-22 PROCEDURE — 93970 EXTREMITY STUDY: CPT

## 2024-07-22 PROCEDURE — 71045 X-RAY EXAM CHEST 1 VIEW: CPT

## 2024-07-22 PROCEDURE — 6360000002 HC RX W HCPCS

## 2024-07-22 PROCEDURE — 6370000000 HC RX 637 (ALT 250 FOR IP): Performed by: INTERNAL MEDICINE

## 2024-07-22 PROCEDURE — 82947 ASSAY GLUCOSE BLOOD QUANT: CPT

## 2024-07-22 PROCEDURE — 2700000000 HC OXYGEN THERAPY PER DAY

## 2024-07-22 PROCEDURE — 85610 PROTHROMBIN TIME: CPT

## 2024-07-22 PROCEDURE — 36415 COLL VENOUS BLD VENIPUNCTURE: CPT

## 2024-07-22 PROCEDURE — 82803 BLOOD GASES ANY COMBINATION: CPT

## 2024-07-22 PROCEDURE — 99232 SBSQ HOSP IP/OBS MODERATE 35: CPT | Performed by: INTERNAL MEDICINE

## 2024-07-22 PROCEDURE — 93970 EXTREMITY STUDY: CPT | Performed by: SURGERY

## 2024-07-22 PROCEDURE — 6360000002 HC RX W HCPCS: Performed by: NURSE PRACTITIONER

## 2024-07-22 PROCEDURE — 94003 VENT MGMT INPAT SUBQ DAY: CPT

## 2024-07-22 PROCEDURE — 85027 COMPLETE CBC AUTOMATED: CPT

## 2024-07-22 PROCEDURE — 37799 UNLISTED PX VASCULAR SURGERY: CPT

## 2024-07-22 PROCEDURE — 80048 BASIC METABOLIC PNL TOTAL CA: CPT

## 2024-07-22 PROCEDURE — 85520 HEPARIN ASSAY: CPT

## 2024-07-22 PROCEDURE — 87040 BLOOD CULTURE FOR BACTERIA: CPT

## 2024-07-22 PROCEDURE — 94761 N-INVAS EAR/PLS OXIMETRY MLT: CPT

## 2024-07-22 PROCEDURE — 6360000002 HC RX W HCPCS: Performed by: PSYCHIATRY & NEUROLOGY

## 2024-07-22 PROCEDURE — 89220 SPUTUM SPECIMEN COLLECTION: CPT

## 2024-07-22 PROCEDURE — 6370000000 HC RX 637 (ALT 250 FOR IP)

## 2024-07-22 PROCEDURE — 85025 COMPLETE CBC W/AUTO DIFF WBC: CPT

## 2024-07-22 RX ORDER — HEPARIN SODIUM 1000 [USP'U]/ML
80 INJECTION, SOLUTION INTRAVENOUS; SUBCUTANEOUS PRN
Status: DISCONTINUED | OUTPATIENT
Start: 2024-07-22 | End: 2024-07-27 | Stop reason: HOSPADM

## 2024-07-22 RX ORDER — FUROSEMIDE 10 MG/ML
40 INJECTION INTRAMUSCULAR; INTRAVENOUS ONCE
Status: COMPLETED | OUTPATIENT
Start: 2024-07-22 | End: 2024-07-22

## 2024-07-22 RX ORDER — HEPARIN SODIUM 1000 [USP'U]/ML
40 INJECTION, SOLUTION INTRAVENOUS; SUBCUTANEOUS PRN
Status: DISCONTINUED | OUTPATIENT
Start: 2024-07-22 | End: 2024-07-27 | Stop reason: HOSPADM

## 2024-07-22 RX ORDER — LEVETIRACETAM 500 MG/5ML
500 INJECTION, SOLUTION, CONCENTRATE INTRAVENOUS EVERY 12 HOURS
Status: DISCONTINUED | OUTPATIENT
Start: 2024-07-22 | End: 2024-07-27 | Stop reason: HOSPADM

## 2024-07-22 RX ORDER — HEPARIN SODIUM 10000 [USP'U]/100ML
5-30 INJECTION, SOLUTION INTRAVENOUS CONTINUOUS
Status: DISCONTINUED | OUTPATIENT
Start: 2024-07-22 | End: 2024-07-27 | Stop reason: HOSPADM

## 2024-07-22 RX ORDER — CISATRACURIUM BESYLATE 10 MG/ML
0.15 INJECTION, SOLUTION INTRAVENOUS ONCE
Status: DISCONTINUED | OUTPATIENT
Start: 2024-07-22 | End: 2024-07-22 | Stop reason: CLARIF

## 2024-07-22 RX ORDER — IPRATROPIUM BROMIDE AND ALBUTEROL SULFATE 2.5; .5 MG/3ML; MG/3ML
1 SOLUTION RESPIRATORY (INHALATION)
Status: DISCONTINUED | OUTPATIENT
Start: 2024-07-22 | End: 2024-07-27 | Stop reason: HOSPADM

## 2024-07-22 RX ORDER — CISATRACURIUM BESYLATE 10 MG/ML
0.1 INJECTION, SOLUTION INTRAVENOUS PRN
Status: DISCONTINUED | OUTPATIENT
Start: 2024-07-22 | End: 2024-07-24

## 2024-07-22 RX ORDER — HEPARIN SODIUM 1000 [USP'U]/ML
80 INJECTION, SOLUTION INTRAVENOUS; SUBCUTANEOUS ONCE
Status: COMPLETED | OUTPATIENT
Start: 2024-07-22 | End: 2024-07-22

## 2024-07-22 RX ORDER — QUETIAPINE FUMARATE 100 MG/1
100 TABLET, FILM COATED ORAL 2 TIMES DAILY
Status: DISCONTINUED | OUTPATIENT
Start: 2024-07-22 | End: 2024-07-22

## 2024-07-22 RX ORDER — LABETALOL HYDROCHLORIDE 5 MG/ML
20 INJECTION, SOLUTION INTRAVENOUS
Status: DISCONTINUED | OUTPATIENT
Start: 2024-07-22 | End: 2024-07-27 | Stop reason: HOSPADM

## 2024-07-22 RX ADMIN — SODIUM CHLORIDE, PRESERVATIVE FREE 10 ML: 5 INJECTION INTRAVENOUS at 19:47

## 2024-07-22 RX ADMIN — PHENOBARBITAL SODIUM 32.5 MG: 65 INJECTION, SOLUTION INTRAMUSCULAR; INTRAVENOUS at 04:27

## 2024-07-22 RX ADMIN — LABETALOL HYDROCHLORIDE 20 MG: 5 INJECTION, SOLUTION INTRAVENOUS at 10:37

## 2024-07-22 RX ADMIN — HEPARIN SODIUM 18 UNITS/KG/HR: 10000 INJECTION, SOLUTION INTRAVENOUS at 15:03

## 2024-07-22 RX ADMIN — Medication 200 MCG/HR: at 01:56

## 2024-07-22 RX ADMIN — LEVETIRACETAM 500 MG: 100 INJECTION, SOLUTION INTRAVENOUS at 17:26

## 2024-07-22 RX ADMIN — Medication 200 MCG/HR: at 19:53

## 2024-07-22 RX ADMIN — FOLIC ACID 1 MG: 1 TABLET ORAL at 09:14

## 2024-07-22 RX ADMIN — SODIUM CHLORIDE, PRESERVATIVE FREE 40 MG: 5 INJECTION INTRAVENOUS at 19:47

## 2024-07-22 RX ADMIN — Medication 5 MG/HR: at 05:31

## 2024-07-22 RX ADMIN — PROPOFOL 45 MCG/KG/MIN: 10 INJECTION, EMULSION INTRAVENOUS at 01:58

## 2024-07-22 RX ADMIN — SODIUM CHLORIDE: 9 INJECTION, SOLUTION INTRAVENOUS at 04:28

## 2024-07-22 RX ADMIN — PIPERACILLIN AND TAZOBACTAM 3375 MG: 3; .375 INJECTION, POWDER, LYOPHILIZED, FOR SOLUTION INTRAVENOUS at 16:46

## 2024-07-22 RX ADMIN — FUROSEMIDE 40 MG: 10 INJECTION, SOLUTION INTRAMUSCULAR; INTRAVENOUS at 12:44

## 2024-07-22 RX ADMIN — NALOXEGOL OXALATE 12.5 MG: 12.5 TABLET, FILM COATED ORAL at 09:14

## 2024-07-22 RX ADMIN — PROPOFOL 30 MCG/KG/MIN: 10 INJECTION, EMULSION INTRAVENOUS at 05:32

## 2024-07-22 RX ADMIN — SODIUM CHLORIDE, PRESERVATIVE FREE 10 ML: 5 INJECTION INTRAVENOUS at 09:15

## 2024-07-22 RX ADMIN — ACETAMINOPHEN 650 MG: 325 TABLET ORAL at 04:05

## 2024-07-22 RX ADMIN — IPRATROPIUM BROMIDE AND ALBUTEROL SULFATE 1 DOSE: 2.5; .5 SOLUTION RESPIRATORY (INHALATION) at 18:12

## 2024-07-22 RX ADMIN — PHENOBARBITAL SODIUM 32.5 MG: 65 INJECTION, SOLUTION INTRAMUSCULAR; INTRAVENOUS at 09:14

## 2024-07-22 RX ADMIN — LABETALOL HYDROCHLORIDE 20 MG: 5 INJECTION, SOLUTION INTRAVENOUS at 02:14

## 2024-07-22 RX ADMIN — CLONAZEPAM 2 MG: 0.5 TABLET ORAL at 12:44

## 2024-07-22 RX ADMIN — QUETIAPINE FUMARATE 100 MG: 25 TABLET ORAL at 09:13

## 2024-07-22 RX ADMIN — Medication 200 MCG/HR: at 07:58

## 2024-07-22 RX ADMIN — CLONAZEPAM 2 MG: 0.5 TABLET ORAL at 00:15

## 2024-07-22 RX ADMIN — IPRATROPIUM BROMIDE AND ALBUTEROL SULFATE 1 DOSE: 2.5; .5 SOLUTION RESPIRATORY (INHALATION) at 14:30

## 2024-07-22 RX ADMIN — Medication 100 MG: at 09:14

## 2024-07-22 RX ADMIN — PIPERACILLIN AND TAZOBACTAM 3375 MG: 3; .375 INJECTION, POWDER, LYOPHILIZED, FOR SOLUTION INTRAVENOUS at 04:32

## 2024-07-22 RX ADMIN — MIDAZOLAM 4 MG: 1 INJECTION INTRAMUSCULAR; INTRAVENOUS at 20:07

## 2024-07-22 RX ADMIN — HEPARIN SODIUM 5000 UNITS: 5000 INJECTION INTRAVENOUS; SUBCUTANEOUS at 05:32

## 2024-07-22 RX ADMIN — PHENOBARBITAL SODIUM 32.5 MG: 65 INJECTION, SOLUTION INTRAMUSCULAR; INTRAVENOUS at 16:06

## 2024-07-22 RX ADMIN — MIDAZOLAM 4 MG: 1 INJECTION INTRAMUSCULAR; INTRAVENOUS at 02:11

## 2024-07-22 RX ADMIN — VANCOMYCIN HYDROCHLORIDE 2500 MG: 5 INJECTION, POWDER, LYOPHILIZED, FOR SOLUTION INTRAVENOUS at 13:22

## 2024-07-22 RX ADMIN — QUETIAPINE FUMARATE 100 MG: 100 TABLET ORAL at 19:47

## 2024-07-22 RX ADMIN — Medication 10 MG/HR: at 17:34

## 2024-07-22 RX ADMIN — Medication 200 MCG/HR: at 13:16

## 2024-07-22 RX ADMIN — HEPARIN SODIUM 8000 UNITS: 1000 INJECTION INTRAVENOUS; SUBCUTANEOUS at 15:01

## 2024-07-22 RX ADMIN — PHENOBARBITAL SODIUM 32.5 MG: 65 INJECTION, SOLUTION INTRAMUSCULAR; INTRAVENOUS at 21:45

## 2024-07-22 RX ADMIN — MULTIVITAMIN TABLET 1 TABLET: TABLET at 09:14

## 2024-07-22 ASSESSMENT — PULMONARY FUNCTION TESTS
PIF_VALUE: 33
PIF_VALUE: 32
PIF_VALUE: 29
PIF_VALUE: 29
PIF_VALUE: 30
PIF_VALUE: 35
PIF_VALUE: 39

## 2024-07-22 ASSESSMENT — PAIN SCALES - GENERAL
PAINLEVEL_OUTOF10: 0

## 2024-07-22 NOTE — PLAN OF CARE
Problem: Respiratory - Adult  Goal: Achieves optimal ventilation and oxygenation  7/22/2024 1917 by Annie Haney RCP  Outcome: Progressing     Problem: Respiratory - Adult  Goal: Adequate oxygenation  7/22/2024 1917 by Annie Haney RCP  Outcome: Progressing     Problem: Respiratory - Adult  Goal: Will be able to breathe spontaneously, without ventilator support  Description: Will be able to breathe spontaneously, without ventilator support  7/22/2024 1917 by Annie Haney RCP  Outcome: Progressing

## 2024-07-22 NOTE — PROGRESS NOTES
End Of Shift Note  [facility]    Summary of shift: Patient was turned up to a PEEP of 10 today after weaning off propofol due to desaturation. Versed is at 10 mg/hr, Fentanyl remains at 200 mcg/hr. Heparin drip was started due to desaturation and elevated Ddimer. Doppler of lower extremities done. VQ scan not done today, will not be able to do ventilation portion due to patient being on vent. IV lasix given with good results, total output today of 3,325 ml. Patient's name and age still unknown at this time.     Vitals:    Vitals:    07/22/24 1500 07/22/24 1518 07/22/24 1600 07/22/24 1700   BP: 122/73  128/74 123/72   Pulse: 72  72 73   Resp: 22 22 22   Temp:  98.8 °F (37.1 °C) 98.8 °F (37.1 °C)    TempSrc:  Axillary Axillary    SpO2: 93%  95% 94%   Weight:       Height:            I&O:   Intake/Output Summary (Last 24 hours) at 7/22/2024 1803  Last data filed at 7/22/2024 1728  Gross per 24 hour   Intake 2327.81 ml   Output 4335 ml   Net -2007.19 ml       Resp Status: Vent    Ventilator Settings:  Vent Mode: AC/PRVC Resp Rate (Set): 22 bpm/Vt (Set, mL): 600 mL/ /FiO2 : 70 %    Critical Care IV infusions:   heparin (PORCINE) Infusion 18 Units/kg/hr (07/22/24 1503)    sodium chloride Stopped (07/22/24 0432)    phenylephrine (JOLANTA-SYNEPHRINE) 50 mg in sodium chloride 0.9 % 250 mL infusion Stopped (07/20/24 0709)    fentaNYL 200 mcg/hr (07/22/24 1316)    midazolam 10 mg/hr (07/22/24 1734)    propofol Stopped (07/22/24 1505)    dextrose      sodium chloride Stopped (07/22/24 0418)        LDA:   Peripheral IV 07/15/24 Posterior;Right Hand (Active)   Number of days: 6       Peripheral IV 07/16/24 Left;Proximal Forearm (Active)   Number of days: 6       Peripheral IV 07/16/24 Right Forearm (Active)   Number of days: 6       Peripheral IV 07/19/24 Left (Active)   Number of days: 3       Peripheral IV 07/20/24 Distal;Left;Anterior Cephalic (Active)   Number of days: 2       NG/OG/NJ/NE Tube 16 fr Left mouth (Active)

## 2024-07-22 NOTE — CARE COORDINATION
DC Planning    CHART REVIEWED.     Discussed with manager and LSW. No identity yet. Pt may need fingerprints to help with identification-case has been escalated. Cont on vent (7/15) w 70% Fi02. Repeat BC in process.

## 2024-07-22 NOTE — PROGRESS NOTES
End Of Shift Note  [facility]  Summary of shift: Pt intubated/sedated on propofol, versed, and fentanyl gtts. Propofol weaned down to 15 mcg/kg/min. Flexion to pain noted in BLE. Temp 100.6: PRN tylenol given: recheck 98.8 axillary. TF residuals 225 at start of shift, restarted at midnight and left at a trickle of 15mL/hr d/t increasing residuals at 0400. Rash noted on L groin and tear/blister on R groin/thigh. Wound dressing on L thigh changed. No BM, 1,010mL u/o measured from [facility]. Pt did have episode of hypertension with SBP >200: PRN labetalol. PO2 56, FiO2 increased to 70%. All dressings and lines changed per policy. CXR pend at time of note. No seizure activity.     Vitals:    Vitals:    07/22/24 0627 07/22/24 0700 07/22/24 0727 07/22/24 0736   BP:  117/70     Pulse:  67  67   Resp:  22 22   Temp: 98.8 °F (37.1 °C)  98.3 °F (36.8 °C)    TempSrc: Axillary  Axillary    SpO2:  95%  94%   Weight:       Height:            I&O:   Intake/Output Summary (Last 24 hours) at 7/22/2024 0750  Last data filed at 7/22/2024 0749  Gross per 24 hour   Intake 3418.78 ml   Output 2135 ml   Net 1283.78 ml       Resp Status: Vent settings below    Ventilator Settings:  Vent Mode: AC/PRVC Resp Rate (Set): 22 bpm/Vt (Set, mL): 600 mL/ /FiO2 : 70 %    Critical Care IV infusions:   sodium chloride Stopped (07/22/24 0432)    phenylephrine (JOLANTA-SYNEPHRINE) 50 mg in sodium chloride 0.9 % 250 mL infusion Stopped (07/20/24 0709)    fentaNYL 200 mcg/hr (07/22/24 0749)    midazolam 5 mg/hr (07/22/24 0749)    propofol 15 mcg/kg/min (07/22/24 0749)    dextrose      sodium chloride Stopped (07/22/24 0418)        LDA:   Peripheral IV 07/15/24 Posterior;Right Hand (Active)   Number of days: 6       Peripheral IV 07/16/24 Left;Proximal Forearm (Active)   Number of days: 5       Peripheral IV 07/16/24 Right Forearm (Active)   Number of days: 5       Peripheral IV 07/19/24 Left (Active)   Number of days: 3       Peripheral IV 07/20/24

## 2024-07-22 NOTE — PROGRESS NOTES
[facility]  Office: [phone]  Klever Dickens DO, Eulogio Sr DO, Jerome Worley DO, Warren Jovel DO, Vaughn Jefferson MD, Krista Andrews MD, Ana M Cooley MD, Nia Wright MD,  Doc Metz MD, Mario Alberto Lizama MD, Shahla Dutta MD,  Dolly Wu DO, Nancy Mata MD, James Boston MD, Trey Dickens DO, Adriana Spencer MD,  Luis Alberto Thornton DO, Yanet Horan MD, Renee King MD, Fely Gomez MD, Kady Macias MD,  Kayden Murillo MD, Ramsey Valentine MD, Airam Gonsales MD, Angelina Cruz MD, Devyn Gates MD, Neil Looney MD, Navin King DO, Tera Prajapati DO, Julieta Gomez MD,  Kael Farias MD, Shirley Waterhouse, CNP,  Valencia Regalado CNP, Lobo Rojas, CNP,  Karen Mascorro, DNP, Ania Oro, CNP, Kyra Isbell, CNP, Shayla Song CNP, Jackelin Woody, CNP, Hodan Cruz, PA-C, Kim Woodard PA-C, Destiney Manriquez, CNP, Vania Silva, CNP, Hailey Carson, CNP, Brooke Olvera, CNP, Arabella Rincon, CNP, Rebecca Zavala, CNS, Laurie Crandall, CNP, Melissa Pat CNP, Tracy Schwab, CNP         [facility]   IN-PATIENT SERVICE   [facility]    Progress Note    7/22/2024    8:10 AM    Name:   Burke Ruiz  MRN:     [id number]     Acct:      [id number]   Room:   [facility]/1104-01   Day:  7  Admit Date:  7/15/2024  5:08 PM    PCP:   No primary care provider on file.  Code Status:  Full Code    Subjective:     C/C:   Chief Complaint   Patient presents with    Alcohol Intoxication     Interval History Status: not changed.     Patient remains intubated and sedated.  No further seizure activity reported.  Vented with increased oxygen requirements, now on 70% FiO2.    Brief History:     This is an unidentified male that presents via squad after drinking a large volume of vodka.  Upon evaluation he was found to have a significant alcohol level of greater than 0.4 with positive testing for cocaine and cannabis.  To a level of consciousness he was intubated admitted  file.    Vitals:  /70   Pulse 67   Temp 98.3 °F (36.8 °C) (Axillary)   Resp 22   Ht 1.803 m (5' 11\")   Wt 99.7 kg (219 lb 12.8 oz)   SpO2 94%   BMI 30.66 kg/m²   Temp (24hrs), Av.8 °F (37.1 °C), Min:98.1 °F (36.7 °C), Max:100.6 °F (38.1 °C)    Recent Labs     24  0006 24  0415 24  0702   POCGLU 97 76 104 107       I/O (24Hr):    Intake/Output Summary (Last 24 hours) at 2024 0810  Last data filed at 2024 0749  Gross per 24 hour   Intake 2998.26 ml   Output 2135 ml   Net 863.26 ml       Labs:  Hematology:  Recent Labs     24  04224  0350 24  0400   WBC 9.0 8.7 10.3   RBC 3.60* 3.56* 3.65*   HGB 12.7* 12.7* 13.0   HCT 38.5* 37.9* 39.4*   .9* 106.5* 107.9*   MCH 35.3* 35.7* 35.6*   MCHC 33.0 33.5 33.0   RDW 14.3 14.6* 14.6*    191 299   MPV 10.9 10.8 10.2     Chemistry:  Recent Labs     24  1422 24  2205 24  0420 24  0350 24  0400   NA  --  144   < > 142 140 141   K  --  4.1   < > 3.8 4.0 4.0   CL  --  110*   < > 108* 106 108*   CO2  --  25   < > 24 23 25   GLUCOSE  --  101*   < > 108* 101* 99   BUN  --  21   < > 19 18 18   CREATININE  --  1.6*   < > 1.6* 1.5* 1.6*   MG  --  2.1  --   --   --   --    ANIONGAP  --  9   < > 10 11 8*   LABGLOM  --  33*   < > 33* 36* 33*   CALCIUM  --  7.9*   < > 8.0* 8.5* 8.8   PROBNP  --   --   --   --  1,414*  --    TROPHS 24*  --   --   --   --   --     < > = values in this interval not displayed.     Recent Labs     24  0350 24  0756 24  1025 24  1156 24  1608 24  2041 24  0006 24  0415 24  0702   TSH  --   --  1.47  --   --   --   --   --   --    AMMONIA  --   --  40  --   --   --   --   --   --    TRIG 275*  --   --   --   --   --   --   --   --    POCGLU  --    < >  --  90 104 97 76 104 107    < > = values in this interval not displayed.     ABG:  Lab Results   Component Value Date/Time

## 2024-07-22 NOTE — PROGRESS NOTES
Pulmonary Critical Care Progress Note    Patient seen for the follow up of Acute alcoholic intoxication with complication (HCC)     Subjective:    He has been sedated on the ventilator 6 mg of Versed at 200 fentanyl and 25 mcg/kg/min of propofol..  He had desaturation now requires 70% oxygen....  Tube feeds on with increased residual and resumed tolerated well today.  He has improved blood pressure.  He has good urine output.      Examination:    Vitals: BP (!) 145/93   Pulse 80   Temp 99.4 °F (37.4 °C) (Axillary)   Resp 22   Ht 1.803 m (5' 11\")   Wt 99.7 kg (219 lb 12.8 oz)   SpO2 93%   BMI 30.66 kg/m²   SpO2  Av.6 %  Min: 90 %  Max: 98 %  General appearance: Intubated sedated  Neck: No JVD  Lungs: Mild decreased breath sound no crackles or wheezes  Heart: regular rate and rhythm, S1, S2 normal, no gallop  Abdomen: Soft, non tender, + BS  Extremities: no cyanosis or clubbing. No significant edema    LABs:    CBC:   Recent Labs     24  0350 24  0400   WBC 8.7 10.3   HGB 12.7* 13.0   HCT 37.9* 39.4*    299       BMP:   Recent Labs     24  0350 24  0400    141   K 4.0 4.0   CO2 23 25   BUN 18 18   CREATININE 1.5* 1.6*   LABGLOM 36* 33*   GLUCOSE 101* 99       PT/INR: No results for input(s): \"PROTIME\", \"INR\" in the last 72 hours.  APTT:No results for input(s): \"APTT\" in the last 72 hours.  LIVER PROFILE:  No results for input(s): \"AST\", \"ALT\", \"LABALBU\" in the last 72 hours.     Latest Reference Range & Units 24 03:02 24 04:45 24 12:08   Pro-BNP <300 pg/mL 543 (H)     Triglycerides <150 mg/dL  235 (H) 182 (H)   (H): Data is abnormally high   Latest Reference Range & Units 07/15/24 19:30   Amphetamine Screen, Ur NEGATIVE  NEGATIVE   Barbiturate Screen, Ur NEGATIVE  NEGATIVE   Benzodiazepine Screen, Urine NEGATIVE  NEGATIVE   Cannabinoid Scrn, Ur NEGATIVE  POSITIVE !   Cocaine Metabolite, Urine NEGATIVE  POSITIVE !   Fentanyl, Ur NEGATIVE  NEGATIVE  variation indicating normal right atrial filling pressure  No significant pericardial effusion seen           Impression/recommendations:    Acute hypoxic respiratory failure requiring intubation  Continue assist-control ventilation  propofol drip RASS -2 wean first given increased triglyceride  Continue fentanyl drip RASS -2 wean second  Versed drip RASS -2 wean last  Daily sedation vacation attempts during the daytime mornings  Continue tube feeds as tolerated/  Movantik increase dose and laxatives      Aspiration pneumonia/possible COPD/THC / smoker   DuoNeb by nebulizer  Zosyn/resume vancomycin MRSA positive  Smoking/THC cessation  Check procalcitonin repeat sputum culture  Blood cultures x 2  Check D-dimer     alcohol intoxication/fatty liver with increased liver function/alcohol withdrawal   Alcohol cessation  Klonopin 2 mg twice daily / increase Seroquel 100 twice daily  Monitor LFTs  Thiamine folate multivitamin     Seizures likely related to alcohol withdrawal   Continue sedation  Phenobarb per neurology  Neurology on consult/EEG showed moderate slowing    hypertension poor control/ Cocaine positive /mild increase troponin  Off Cardene drip  Improved on sedation     Pulmonary edema/MAIK   Hold fluids  Lasix 40 IV /monitor urine output  monitor urine output and BMP      Right inguinal hernia   discussed with RN  Discussed with clinical pharmacist    Peptic ulcer disease prophylaxis  DVT prophylaxis      Koko Paiz MD, MD, [facility]  Pulmonary Critical Care and Sleep Medicine,  [facility]  Cell: [phone]  Office: [phone]

## 2024-07-22 NOTE — PLAN OF CARE
Problem: Respiratory - Adult  Goal: Achieves optimal ventilation and oxygenation  7/22/2024 0830 by Emperatriz Marie RCP  Outcome: Progressing  Flowsheets  Taken 7/22/2024 0253 by Wendy Franz RCP  Achieves optimal ventilation and oxygenation:   Assess for changes in respiratory status   Oxygen supplementation based on oxygen saturation or arterial blood gases   Assess the need for suctioning and aspirate as needed   Respiratory therapy support as indicated  Taken 7/21/2024 2307 by Wendy Franz RCP  Achieves optimal ventilation and oxygenation:   Assess for changes in respiratory status   Oxygen supplementation based on oxygen saturation or arterial blood gases   Assess the need for suctioning and aspirate as needed   Respiratory therapy support as indicated  Taken 7/21/2024 1911 by Wendy Franz RCP  Achieves optimal ventilation and oxygenation:   Assess for changes in respiratory status   Assess the need for suctioning and aspirate as needed   Oxygen supplementation based on oxygen saturation or arterial blood gases   Respiratory therapy support as indicated  7/21/2024 1910 by Wendy Franz RCP  Outcome: Progressing  Goal: Adequate oxygenation  7/22/2024 0830 by Emperatriz Marie RCP  Outcome: Progressing  7/21/2024 1910 by Wendy Franz RCP  Outcome: Progressing  Goal: Will be able to breathe spontaneously, without ventilator support  Description: Will be able to breathe spontaneously, without ventilator support  7/22/2024 0830 by Emperatriz Marie RCP  Outcome: Progressing  7/21/2024 1910 by Wendy Franz RCP  Outcome: Progressing

## 2024-07-22 NOTE — PROGRESS NOTES
Patient was a -5 RASS, propofol was turned off. After about 30 min of the propofol patient's SBP shot up to 200. He was able to squeeze my had. He started desating and fighting vent so propofol was restarted.

## 2024-07-22 NOTE — CONSULTS
[facility]   Pharmacy Pharmacokinetic Monitoring Service - Vancomycin     Burke Ruiz is a 89 y.o. male starting on vancomycin therapy for PNA. Pharmacy consulted by Dr. Paiz for monitoring and adjustment.    Target Concentration: Dosing based on anticipated concentration <15 mg/L due to renal impairment/insufficiency    Additional Antimicrobials: zosyn     Pertinent Laboratory Values:   Wt Readings from Last 1 Encounters:   07/22/24 99.7 kg (219 lb 12.8 oz)     Temp Readings from Last 1 Encounters:   07/22/24 99.4 °F (37.4 °C) (Axillary)     Estimated Creatinine Clearance: 12 mL/min (A) (based on SCr of 1.6 mg/dL (H)).  Recent Labs     07/21/24  0350 07/22/24  0400   CREATININE 1.5* 1.6*   BUN 18 18   WBC 8.7 10.3     Procalcitonin: none - MAIK     Pertinent Cultures:      MRSA Nasal Swab: showed MRSA positive result on 7/17/24    Plan:  Concentration-guided dosing due to renal impairment/insufficiency  Start vancomycin 2500 mg IV x1  Renal labs as indicated   Vancomycin concentration ordered for 7/24 @ 0600   Pharmacy will continue to monitor patient and adjust therapy as indicated    Thank you for the consult,  LORI ESCALANTE RPH  7/22/2024 12:34 PM

## 2024-07-22 NOTE — PLAN OF CARE
Problem: Safety - Medical Restraint  Goal: Remains free of injury from restraints (Restraint for Interference with Medical Device)  Description: INTERVENTIONS:  1. Determine that other, less restrictive measures have been tried or would not be effective before applying the restraint  2. Evaluate the patient's condition at the time of restraint application  3. Inform patient/family regarding the reason for restraint  4. Q2H: Monitor safety, psychosocial status, comfort, nutrition and hydration  Outcome: Progressing  Flowsheets  Taken 7/22/2024 1534 by Trixie Nicholson RN  Remains free of injury from restraints (restraint for interference with medical device):   Determine that other, less restrictive measures have been tried or would not be effective before applying the restraint   Evaluate the patient's condition at the time of restraint application   Inform patient/family regarding the reason for restraint   Every 2 hours: Monitor safety, psychosocial status, comfort, nutrition and hydration  Problem: Pain  Goal: Verbalizes/displays adequate comfort level or baseline comfort level  Outcome: Progressing  Flowsheets (Taken 7/22/2024 1534)  Verbalizes/displays adequate comfort level or baseline comfort level:   Encourage patient to monitor pain and request assistance   Assess pain using appropriate pain scale   Administer analgesics based on type and severity of pain and evaluate response   Implement non-pharmacological measures as appropriate and evaluate response

## 2024-07-22 NOTE — PROGRESS NOTES
Patient's Ddimer is elevated, Dr. Paiz notified and ordered heparin drip, dopplers of lower extremities, and v/q scan.

## 2024-07-23 ENCOUNTER — APPOINTMENT (OUTPATIENT)
Age: 49
DRG: 130 | End: 2024-07-23
Attending: INTERNAL MEDICINE
Payer: MEDICAID

## 2024-07-23 ENCOUNTER — APPOINTMENT (OUTPATIENT)
Dept: GENERAL RADIOLOGY | Age: 49
DRG: 130 | End: 2024-07-23
Payer: MEDICAID

## 2024-07-23 LAB
ANION GAP SERPL CALCULATED.3IONS-SCNC: 11 MMOL/L (ref 9–17)
ANION GAP SERPL CALCULATED.3IONS-SCNC: 11 MMOL/L (ref 9–17)
ANTI-XA UNFRAC HEPARIN: 0.24 IU/L (ref 0.3–0.7)
ANTI-XA UNFRAC HEPARIN: 0.24 IU/L (ref 0.3–0.7)
ANTI-XA UNFRAC HEPARIN: 0.25 IU/L (ref 0.3–0.7)
ANTI-XA UNFRAC HEPARIN: 0.25 IU/L (ref 0.3–0.7)
ANTI-XA UNFRAC HEPARIN: 0.3 IU/L (ref 0.3–0.7)
BUN SERPL-MCNC: 20 MG/DL (ref 6–20)
BUN SERPL-MCNC: 20 MG/DL (ref 6–20)
BUN/CREAT SERPL: 13 (ref 9–20)
BUN/CREAT SERPL: 13 (ref 9–20)
CALCIUM SERPL-MCNC: 9 MG/DL (ref 8.6–10.4)
CALCIUM SERPL-MCNC: 9 MG/DL (ref 8.6–10.4)
CHLORIDE SERPL-SCNC: 101 MMOL/L (ref 98–107)
CHLORIDE SERPL-SCNC: 101 MMOL/L (ref 98–107)
CO2 SERPL-SCNC: 26 MMOL/L (ref 20–31)
CO2 SERPL-SCNC: 26 MMOL/L (ref 20–31)
CREAT SERPL-MCNC: 1.6 MG/DL (ref 0.7–1.2)
CREAT SERPL-MCNC: 1.6 MG/DL (ref 0.7–1.2)
ECHO BSA: 2.24 M2
ECHO BSA: 2.24 M2
ERYTHROCYTE [DISTWIDTH] IN BLOOD BY AUTOMATED COUNT: 14.7 % (ref 11.8–14.4)
ERYTHROCYTE [DISTWIDTH] IN BLOOD BY AUTOMATED COUNT: 14.7 % (ref 11.8–14.4)
FIO2: 70
FIO2: 70
GFR, ESTIMATED: 33 ML/MIN/1.73M2
GFR, ESTIMATED: 33 ML/MIN/1.73M2
GLUCOSE BLD-MCNC: 102 MG/DL (ref 75–110)
GLUCOSE BLD-MCNC: 102 MG/DL (ref 75–110)
GLUCOSE BLD-MCNC: 105 MG/DL (ref 75–110)
GLUCOSE BLD-MCNC: 105 MG/DL (ref 75–110)
GLUCOSE BLD-MCNC: 106 MG/DL (ref 75–110)
GLUCOSE BLD-MCNC: 106 MG/DL (ref 75–110)
GLUCOSE BLD-MCNC: 112 MG/DL (ref 75–110)
GLUCOSE BLD-MCNC: 112 MG/DL (ref 75–110)
GLUCOSE BLD-MCNC: 83 MG/DL (ref 75–110)
GLUCOSE BLD-MCNC: 83 MG/DL (ref 75–110)
GLUCOSE SERPL-MCNC: 105 MG/DL (ref 70–99)
GLUCOSE SERPL-MCNC: 105 MG/DL (ref 70–99)
HCT VFR BLD AUTO: 39.1 % (ref 40.7–50.3)
HCT VFR BLD AUTO: 39.1 % (ref 40.7–50.3)
HGB BLD-MCNC: 12.9 G/DL (ref 13–17)
HGB BLD-MCNC: 12.9 G/DL (ref 13–17)
MCH RBC QN AUTO: 35.2 PG (ref 25.2–33.5)
MCH RBC QN AUTO: 35.2 PG (ref 25.2–33.5)
MCHC RBC AUTO-ENTMCNC: 33 G/DL (ref 28.4–34.8)
MCHC RBC AUTO-ENTMCNC: 33 G/DL (ref 28.4–34.8)
MCV RBC AUTO: 106.8 FL (ref 82.6–102.9)
MCV RBC AUTO: 106.8 FL (ref 82.6–102.9)
NRBC BLD-RTO: 0 PER 100 WBC
NRBC BLD-RTO: 0 PER 100 WBC
PATIENT TEMP: 37.5
PATIENT TEMP: 37.5
PLATELET # BLD AUTO: 352 K/UL (ref 138–453)
PLATELET # BLD AUTO: 352 K/UL (ref 138–453)
PMV BLD AUTO: 10.3 FL (ref 8.1–13.5)
PMV BLD AUTO: 10.3 FL (ref 8.1–13.5)
POC HCO3: 28.4 MMOL/L (ref 21–28)
POC HCO3: 28.4 MMOL/L (ref 21–28)
POC O2 SATURATION: 91.3 % (ref 94–98)
POC O2 SATURATION: 91.3 % (ref 94–98)
POC PCO2 TEMP: 50 MM HG
POC PCO2 TEMP: 50 MM HG
POC PCO2: 48.9 MM HG (ref 35–48)
POC PCO2: 48.9 MM HG (ref 35–48)
POC PH TEMP: 7.37
POC PH TEMP: 7.37
POC PH: 7.37 (ref 7.35–7.45)
POC PH: 7.37 (ref 7.35–7.45)
POC PO2 TEMP: 66.7 MM HG
POC PO2 TEMP: 66.7 MM HG
POC PO2: 64.4 MM HG (ref 83–108)
POC PO2: 64.4 MM HG (ref 83–108)
POSITIVE BASE EXCESS, ART: 2.3 MMOL/L (ref 0–3)
POSITIVE BASE EXCESS, ART: 2.3 MMOL/L (ref 0–3)
POTASSIUM SERPL-SCNC: 4.4 MMOL/L (ref 3.7–5.3)
POTASSIUM SERPL-SCNC: 4.4 MMOL/L (ref 3.7–5.3)
RBC # BLD AUTO: 3.66 M/UL (ref 4.21–5.77)
RBC # BLD AUTO: 3.66 M/UL (ref 4.21–5.77)
SODIUM SERPL-SCNC: 138 MMOL/L (ref 135–144)
SODIUM SERPL-SCNC: 138 MMOL/L (ref 135–144)
TRIGL SERPL-MCNC: 290 MG/DL
TRIGL SERPL-MCNC: 290 MG/DL
WBC OTHER # BLD: 10.6 K/UL (ref 3.5–11.3)
WBC OTHER # BLD: 10.6 K/UL (ref 3.5–11.3)

## 2024-07-23 PROCEDURE — 6360000002 HC RX W HCPCS: Performed by: INTERNAL MEDICINE

## 2024-07-23 PROCEDURE — 71045 X-RAY EXAM CHEST 1 VIEW: CPT

## 2024-07-23 PROCEDURE — 99232 SBSQ HOSP IP/OBS MODERATE 35: CPT | Performed by: FAMILY MEDICINE

## 2024-07-23 PROCEDURE — 6360000002 HC RX W HCPCS

## 2024-07-23 PROCEDURE — 2580000003 HC RX 258: Performed by: INTERNAL MEDICINE

## 2024-07-23 PROCEDURE — 2580000003 HC RX 258

## 2024-07-23 PROCEDURE — 94761 N-INVAS EAR/PLS OXIMETRY MLT: CPT

## 2024-07-23 PROCEDURE — 85520 HEPARIN ASSAY: CPT

## 2024-07-23 PROCEDURE — 6370000000 HC RX 637 (ALT 250 FOR IP): Performed by: INTERNAL MEDICINE

## 2024-07-23 PROCEDURE — 37799 UNLISTED PX VASCULAR SURGERY: CPT

## 2024-07-23 PROCEDURE — 6360000002 HC RX W HCPCS: Performed by: PSYCHIATRY & NEUROLOGY

## 2024-07-23 PROCEDURE — 85027 COMPLETE CBC AUTOMATED: CPT

## 2024-07-23 PROCEDURE — 36415 COLL VENOUS BLD VENIPUNCTURE: CPT

## 2024-07-23 PROCEDURE — 93308 TTE F-UP OR LMTD: CPT

## 2024-07-23 PROCEDURE — 6370000000 HC RX 637 (ALT 250 FOR IP)

## 2024-07-23 PROCEDURE — 80048 BASIC METABOLIC PNL TOTAL CA: CPT

## 2024-07-23 PROCEDURE — 2700000000 HC OXYGEN THERAPY PER DAY

## 2024-07-23 PROCEDURE — 94640 AIRWAY INHALATION TREATMENT: CPT

## 2024-07-23 PROCEDURE — 84478 ASSAY OF TRIGLYCERIDES: CPT

## 2024-07-23 PROCEDURE — 6360000002 HC RX W HCPCS: Performed by: NURSE PRACTITIONER

## 2024-07-23 PROCEDURE — 2500000003 HC RX 250 WO HCPCS: Performed by: INTERNAL MEDICINE

## 2024-07-23 PROCEDURE — 94003 VENT MGMT INPAT SUBQ DAY: CPT

## 2024-07-23 PROCEDURE — 82803 BLOOD GASES ANY COMBINATION: CPT

## 2024-07-23 PROCEDURE — 2000000000 HC ICU R&B

## 2024-07-23 RX ADMIN — SODIUM CHLORIDE, PRESERVATIVE FREE 40 MG: 5 INJECTION INTRAVENOUS at 21:49

## 2024-07-23 RX ADMIN — LABETALOL HYDROCHLORIDE 20 MG: 5 INJECTION, SOLUTION INTRAVENOUS at 18:58

## 2024-07-23 RX ADMIN — IPRATROPIUM BROMIDE AND ALBUTEROL SULFATE 1 DOSE: 2.5; .5 SOLUTION RESPIRATORY (INHALATION) at 10:35

## 2024-07-23 RX ADMIN — Medication 10 MG/HR: at 16:58

## 2024-07-23 RX ADMIN — NALOXEGOL OXALATE 25 MG: 25 TABLET, FILM COATED ORAL at 05:56

## 2024-07-23 RX ADMIN — HEPARIN SODIUM 18 UNITS/KG/HR: 10000 INJECTION, SOLUTION INTRAVENOUS at 02:02

## 2024-07-23 RX ADMIN — PHENOBARBITAL SODIUM 32.5 MG: 65 INJECTION, SOLUTION INTRAMUSCULAR; INTRAVENOUS at 16:28

## 2024-07-23 RX ADMIN — Medication 200 MCG/HR: at 02:00

## 2024-07-23 RX ADMIN — QUETIAPINE FUMARATE 150 MG: 200 TABLET ORAL at 21:50

## 2024-07-23 RX ADMIN — CLONAZEPAM 2 MG: 0.5 TABLET ORAL at 12:41

## 2024-07-23 RX ADMIN — SODIUM CHLORIDE, PRESERVATIVE FREE 10 ML: 5 INJECTION INTRAVENOUS at 19:37

## 2024-07-23 RX ADMIN — PROPOFOL 30 MCG/KG/MIN: 10 INJECTION, EMULSION INTRAVENOUS at 22:50

## 2024-07-23 RX ADMIN — HEPARIN SODIUM 4000 UNITS: 1000 INJECTION INTRAVENOUS; SUBCUTANEOUS at 03:33

## 2024-07-23 RX ADMIN — LABETALOL HYDROCHLORIDE 20 MG: 5 INJECTION, SOLUTION INTRAVENOUS at 16:18

## 2024-07-23 RX ADMIN — VANCOMYCIN HYDROCHLORIDE 1500 MG: 5 INJECTION, POWDER, LYOPHILIZED, FOR SOLUTION INTRAVENOUS at 12:42

## 2024-07-23 RX ADMIN — LEVETIRACETAM 500 MG: 100 INJECTION, SOLUTION INTRAVENOUS at 04:58

## 2024-07-23 RX ADMIN — PHENOBARBITAL SODIUM 32.5 MG: 65 INJECTION, SOLUTION INTRAMUSCULAR; INTRAVENOUS at 03:38

## 2024-07-23 RX ADMIN — MIDAZOLAM 4 MG: 1 INJECTION INTRAMUSCULAR; INTRAVENOUS at 19:38

## 2024-07-23 RX ADMIN — MULTIVITAMIN TABLET 1 TABLET: TABLET at 07:49

## 2024-07-23 RX ADMIN — MIDAZOLAM 4 MG: 1 INJECTION INTRAMUSCULAR; INTRAVENOUS at 15:50

## 2024-07-23 RX ADMIN — HYDRALAZINE HYDROCHLORIDE 10 MG: 20 INJECTION INTRAMUSCULAR; INTRAVENOUS at 18:24

## 2024-07-23 RX ADMIN — IPRATROPIUM BROMIDE AND ALBUTEROL SULFATE 1 DOSE: 2.5; .5 SOLUTION RESPIRATORY (INHALATION) at 14:35

## 2024-07-23 RX ADMIN — HEPARIN SODIUM 4000 UNITS: 1000 INJECTION INTRAVENOUS; SUBCUTANEOUS at 17:06

## 2024-07-23 RX ADMIN — Medication 10 MG/HR: at 04:21

## 2024-07-23 RX ADMIN — Medication 200 MCG/HR: at 15:07

## 2024-07-23 RX ADMIN — SODIUM CHLORIDE, PRESERVATIVE FREE 10 ML: 5 INJECTION INTRAVENOUS at 07:50

## 2024-07-23 RX ADMIN — QUETIAPINE FUMARATE 150 MG: 200 TABLET ORAL at 07:49

## 2024-07-23 RX ADMIN — FOLIC ACID 1 MG: 1 TABLET ORAL at 07:49

## 2024-07-23 RX ADMIN — POLYETHYLENE GLYCOL 3350 17 G: 17 POWDER, FOR SOLUTION ORAL at 07:49

## 2024-07-23 RX ADMIN — Medication 200 MCG/HR: at 08:25

## 2024-07-23 RX ADMIN — SODIUM CHLORIDE, PRESERVATIVE FREE 10 ML: 5 INJECTION INTRAVENOUS at 07:49

## 2024-07-23 RX ADMIN — HEPARIN SODIUM 20 UNITS/KG/HR: 10000 INJECTION, SOLUTION INTRAVENOUS at 15:21

## 2024-07-23 RX ADMIN — IPRATROPIUM BROMIDE AND ALBUTEROL SULFATE 1 DOSE: 2.5; .5 SOLUTION RESPIRATORY (INHALATION) at 07:20

## 2024-07-23 RX ADMIN — ACETAMINOPHEN 650 MG: 650 SUPPOSITORY RECTAL at 19:47

## 2024-07-23 RX ADMIN — Medication 200 MCG/HR: at 21:55

## 2024-07-23 RX ADMIN — PIPERACILLIN AND TAZOBACTAM 3375 MG: 3; .375 INJECTION, POWDER, LYOPHILIZED, FOR SOLUTION INTRAVENOUS at 03:54

## 2024-07-23 RX ADMIN — Medication 100 MG: at 07:49

## 2024-07-23 RX ADMIN — IPRATROPIUM BROMIDE AND ALBUTEROL SULFATE 1 DOSE: 2.5; .5 SOLUTION RESPIRATORY (INHALATION) at 18:58

## 2024-07-23 RX ADMIN — LEVETIRACETAM 500 MG: 100 INJECTION, SOLUTION INTRAVENOUS at 17:19

## 2024-07-23 RX ADMIN — LABETALOL HYDROCHLORIDE 20 MG: 5 INJECTION, SOLUTION INTRAVENOUS at 07:51

## 2024-07-23 RX ADMIN — PROPOFOL 20 MCG/KG/MIN: 10 INJECTION, EMULSION INTRAVENOUS at 16:26

## 2024-07-23 RX ADMIN — CLONAZEPAM 2 MG: 0.5 TABLET ORAL at 00:02

## 2024-07-23 RX ADMIN — PIPERACILLIN AND TAZOBACTAM 3375 MG: 3; .375 INJECTION, POWDER, LYOPHILIZED, FOR SOLUTION INTRAVENOUS at 16:28

## 2024-07-23 ASSESSMENT — PULMONARY FUNCTION TESTS
PIF_VALUE: 35
PIF_VALUE: 32
PIF_VALUE: 30
PIF_VALUE: 32
PIF_VALUE: 34
PIF_VALUE: 30

## 2024-07-23 ASSESSMENT — PAIN SCALES - GENERAL
PAINLEVEL_OUTOF10: 0

## 2024-07-23 NOTE — PROGRESS NOTES
While bathing patient it was noted that patient has skin sloughing to the perineal area and rash throughout his sides. There is also new skin breakdown to the right ear which patient has been laying on.

## 2024-07-23 NOTE — PROGRESS NOTES
[facility]   Pharmacy Pharmacokinetic Monitoring Service - Vancomycin    Consulting Provider: Koko Paiz MD   Indication: pneumonia  Target Concentration: Goal AUC/AIDA 400-600 mg*hr/L  Day of Therapy: 2  Additional Antimicrobials: Zosyn    Pertinent Laboratory Values:   Wt Readings from Last 1 Encounters:   07/23/24 99.9 kg (220 lb 3.2 oz)     Temp Readings from Last 1 Encounters:   07/23/24 99.3 °F (37.4 °C) (Axillary)     Recent Labs     07/22/24  0400 07/22/24  1430 07/23/24  0348   CREATININE 1.6*  --  1.6*   BUN 18  --  20   WBC 10.3 10.6 10.6   Estimated creatinine clearance (based upon guessed age of around 50) = 58.8 ml/min    Procalcitonin: none    Pertinent Cultures:    MRSA Nasal Swab: showed MRSA positive result on 7/16/24    Plan:  Start vancomycin 1500mg Q24H (anticipated  at steady state)   Vancomycin concentration ordered for 7/24/24 @ 0600  Pharmacy will continue to monitor patient and adjust therapy as indicated    Thank you for the consult,  Radha Griffith MANDY  7/23/2024 7:53 AM

## 2024-07-23 NOTE — PLAN OF CARE
Problem: Respiratory - Adult  Goal: Achieves optimal ventilation and oxygenation  7/23/2024 1933 by Annie Haney RCP  Outcome: Progressing     Problem: Respiratory - Adult  Goal: Adequate oxygenation  7/23/2024 1933 by Annie Haney RCP  Outcome: Progressing     Problem: Respiratory - Adult  Goal: Will be able to breathe spontaneously, without ventilator support  Description: Will be able to breathe spontaneously, without ventilator support  7/23/2024 1933 by Annie Haney RCP  Outcome: Progressing

## 2024-07-23 NOTE — PROGRESS NOTES
Physical Therapy  DATE: 2024    NAME: Burke Ruiz  MRN: [id number]   : 1902    Patient not seen this date for Physical Therapy due to:      [] Cancel by RN or physician due to:    [x] Pt remains intubated & sedated on vent    [] Critical Lab Value Level     [] Blood transfusion in progress    [] Acute or unstable cardiovascular status   _MAP < 55 or more than >115  _HR < 40 or > 130    [] Acute or unstable pulmonary status   -FiO2 > 60%   _RR < 5 or >40    _O2 sats < 85%    [] Strict Bedrest    [] Off Unit for surgery or procedure    [] Off Unit for testing       [] Pending imaging to R/O fracture    [] Refusal by Patient      [] Other      [] PT being discontinued at this time. Patient independent. No further needs.     [] PT being discontinued at this time as the patient has been transferred to hospice care. No further needs.      COMPA ECHOLS, PT

## 2024-07-23 NOTE — PROGRESS NOTES
Pulmonary Critical Care Progress Note    Patient seen for the follow up of Acute alcoholic intoxication with complication (HCC)     Subjective:    He was identified now at [facility] after his sister called.  He apparently was up to the hospital after a fall recently with abrasion to forehead.  Had increased D-dimer yesterday.  I was contacted ordered Dopplers lower extremity and heparin drip empirically.  I ordered a ventilation/perfusion scan.  RN reports that radiology did not feel it is helpful to order this test.  He was weaned off propofol for increased triglycerides yesterday.  He developed agitation.  I was contacted advised to give 4 mg of Versed every 30-minute as needed and also ordered Nimbex 10 mg every hour as needed in case of severe agitation/risk of withdrawing support.  He has been sedated on the ventilator 10 mg of Versed at 200 fentanyl.  He had good urine output after diuresis..  He still requires 70% oxygen.... He has not had bowel movement she tolerates tube feeds.      Examination:    Vitals: /73   Pulse 72   Temp 99.2 °F (37.3 °C) (Oral)   Resp 22   Ht 1.803 m (5' 11\")   Wt 99.9 kg (220 lb 3.2 oz)   SpO2 94%   BMI 30.71 kg/m²   SpO2  Av.2 %  Min: 91 %  Max: 96 %  General appearance: Intubated sedated  Neck: No JVD  Lungs: Mild decreased breath sound no crackles or wheezes  Heart: regular rate and rhythm, S1, S2 normal, no gallop  Abdomen: Soft, non tender, + BS  Extremities: no cyanosis or clubbing. No significant edema    LABs:    CBC:   Recent Labs     24  1430 24  0348   WBC 10.6 10.6   HGB 13.4 12.9*   HCT 40.1* 39.1*    352       BMP:   Recent Labs     24  0400 24  0348    138   K 4.0 4.4   CO2 25 26   BUN 18 20   CREATININE 1.6* 1.6*   LABGLOM 33* 33*   GLUCOSE 99 105*       PT/INR:   Recent Labs     24  1430   PROTIME 13.3   INR 1.0     APTT:  Recent Labs     24  1430   APTT 30.5     LIVER PROFILE:  No results for

## 2024-07-23 NOTE — CARE COORDINATION
Discharge planning    Patient identified. Name is John Rivas and has sister Dena. Patient is homeless.     Patient is currently still intubated. HELP called to see if can see find any insurance information

## 2024-07-23 NOTE — PROGRESS NOTES
[facility]  Office: [phone]  Klever Dickens DO, Eulogio Sr DO, Jerome Worley DO, Warren Jovel DO, Vaughn Jefferson MD, Krista Andrews MD, Ana M Cooley MD, Nia Wright MD,  Doc Metz MD, Mario Alberto Lizama MD, Shahla Dutta MD,  Dolly Wu DO, Nancy Mata MD, James Boston MD, Trey Dickens DO, Adriana Spencer MD,  Luis Alberto Thornton DO, Yanet Horan MD, Renee King MD, Fely Gomez MD, Kady Macias MD,  Kayden Murillo MD, Ramsey Valentine MD, Airam Gonsales MD, Angelina Cruz MD, Devyn Gates MD, Neil Looney MD, Navin King DO, Tera Prajapati DO, Julieta Gomez MD,  Kael Farias MD, Shirley Waterhouse, CNP,  Valencia Regalado CNP, Lobo Rojas, CNP,  Karen Mascorro, NELIA, Ania Oro, CNP, Kyra Isbell, CNP, Shayla Song CNP, Jackelin Woody, CNP, Hodan Cruz, PA-C, Kim Woodard PA-C, Destiney Manriquez, CNP, Vania Silva, CNP, Hailey Carson, CNP, Brooke Olvera, CNP, Arabella Rincon CNP, Rebecca Zavala, CNS, Laurie Crandall, CNP, Melissa Pat CNP, Tracy Schwab, CNP       [facility]      Daily Progress Note     Admit Date: 7/15/2024  Bed/Room No.  1104/1104-01  Admitting Physician : Ana M Cooley MD  Code Status :Full Code  Hospital Day:  LOS: 8 days   Chief Complaint:     Chief Complaint   Patient presents with    Alcohol Intoxication     Principal Problem:    Acute alcoholic intoxication with complication (HCC)  Active Problems:    Alcohol intoxication in alcoholism with blood level over 0.3 with delirium (HCC)    Aspiration pneumonia of both upper lobes due to vomit (HCC)    Acute hypoxic respiratory failure (HCC)    On mechanically assisted ventilation (HCC)    Acute encephalopathy    MAIK (acute kidney injury) (HCC)    Seizure due to alcohol withdrawal, with unspecified complication (HCC)  Resolved Problems:    * No resolved hospital problems. *    Subjective :        Interval History/Significant events :

## 2024-07-23 NOTE — PROGRESS NOTES
End Of Shift Note  [facility]  Summary of shift: Patient got agitated while turning him and with any talking or movement in the patients room.  Patients systolic blood pressure increased over 200 and moving bilateral upper extremities. PRN nimbex added q1 for severe agitation/ risk of pulling support.Vent settings stayed the same throughout shift.   Vitals:    07/23/24 0347 07/23/24 0355 07/23/24 0400 07/23/24 0500   BP:   121/72 120/71   Pulse: 74  76 70   Resp: 22 22 22   Temp:   99.7 °F (37.6 °C)    TempSrc:   Oral    SpO2: 91%  91% 92%   Weight:  99.9 kg (220 lb 3.2 oz)     Height:            I&O:   Intake/Output Summary (Last 24 hours) at 7/23/2024 0606  Last data filed at 7/23/2024 0400  Gross per 24 hour   Intake 2375.73 ml   Output 4349 ml   Net -1973.27 ml       Resp Status: Ventilator     Ventilator Settings:  Vent Mode: AC/PRVC Resp Rate (Set): 22 bpm/Vt (Set, mL): 600 mL/ /FiO2 : 70 %    Critical Care IV infusions:   heparin (PORCINE) Infusion 20 Units/kg/hr (07/23/24 0334)    sodium chloride Stopped (07/22/24 0432)    phenylephrine (JOLANTA-SYNEPHRINE) 50 mg in sodium chloride 0.9 % 250 mL infusion Stopped (07/20/24 0709)    fentaNYL 200 mcg/hr (07/23/24 0208)    midazolam 10 mg/hr (07/23/24 0421)    propofol Stopped (07/22/24 1505)    dextrose      sodium chloride Stopped (07/22/24 0418)        LDA:   Peripheral IV 07/15/24 Posterior;Right Hand (Active)   Number of days: 7       Peripheral IV 07/16/24 Left;Proximal Forearm (Active)   Number of days: 6       Peripheral IV 07/16/24 Right Forearm (Active)   Number of days: 6       Peripheral IV 07/19/24 Left (Active)   Number of days: 4       Peripheral IV 07/20/24 Distal;Left;Anterior Cephalic (Active)   Number of days: 3       NG/OG/NJ/NE Tube 16 fr Left mouth (Active)   Number of days: 7       Urinary Catheter 07/15/24 (Active)   Number of days: 7       ETT  (Active)   Number of days: 7       Arterial Line 07/16/24 Right Radial (Active)   Number of  days: 6       Wound Thigh Left (Active)   Number of days:

## 2024-07-23 NOTE — PROGRESS NOTES
End Of Shift Note  [facility]    Summary of shift: Patient was identified today as John Rivas. His sister was contacted and she is aware he is here. She will visit when she can travel as she lives about 3 hours away into [place]. His SBP increased to 250 after bath and was treated with PRN labetalol. He was unable to calm down so Propofol was restarted. He has a rash to his sides and thighs, breakdown on his right ear, and his perineal area is very excoriated and sloughing skin. Moisture wicking material was placed in the perineal area. He is on Versed at 10 mg/hr, Propofol at 15 mcg/kg/hr, Fentanyl at 200 mcg/hr, and Heparin drip at 22 u/kg/hr.    Vitals:    Vitals:    07/23/24 1500 07/23/24 1525 07/23/24 1600 07/23/24 1700   BP: (!) 143/82  (!) 210/98 (!) 118/53   Pulse: 68  88 72   Resp: 22 20 22   Temp:  98.7 °F (37.1 °C) 98.7 °F (37.1 °C)    TempSrc:  Axillary Axillary    SpO2: 93%  93% 90%   Weight:       Height:            I&O:   Intake/Output Summary (Last 24 hours) at 7/23/2024 1752  Last data filed at 7/23/2024 1752  Gross per 24 hour   Intake 2649.09 ml   Output 2324 ml   Net 325.09 ml       Resp Status: Vent    Ventilator Settings:  Vent Mode: AC/PRVC Resp Rate (Set): 22 bpm/Vt (Set, mL): 600 mL/ /FiO2 : 70 %    Critical Care IV infusions:   heparin (PORCINE) Infusion 22 Units/kg/hr (07/23/24 1706)    sodium chloride Stopped (07/22/24 0432)    phenylephrine (JOLANTA-SYNEPHRINE) 50 mg in sodium chloride 0.9 % 250 mL infusion Stopped (07/20/24 0709)    fentaNYL 200 mcg/hr (07/23/24 1507)    midazolam 10 mg/hr (07/23/24 1658)    propofol 15 mcg/kg/min (07/23/24 1711)    dextrose      sodium chloride Stopped (07/22/24 8278)        LDA:   Peripheral IV 07/15/24 Posterior;Right Hand (Active)   Number of days: 7       Peripheral IV 07/16/24 Left;Proximal Forearm (Active)   Number of days: 7       Peripheral IV 07/16/24 Right Forearm (Active)   Number of days: 6       Peripheral IV 07/19/24 Left (Active)

## 2024-07-23 NOTE — PLAN OF CARE
Problem: Safety - Medical Restraint  Goal: Remains free of injury from restraints (Restraint for Interference with Medical Device)  Description: INTERVENTIONS:  1. Determine that other, less restrictive measures have been tried or would not be effective before applying the restraint  2. Evaluate the patient's condition at the time of restraint application  3. Inform patient/family regarding the reason for restraint  4. Q2H: Monitor safety, psychosocial status, comfort, nutrition and hydration  7/22/2024 2041 by Shirley Hammer RN  Outcome: Progressing  Flowsheets  Taken 7/22/2024 2011  Remains free of injury from restraints (restraint for interference with medical device): Every 2 hours: Monitor safety, psychosocial status, comfort, nutrition and hydration  Taken 7/22/2024 2000  Remains free of injury from restraints (restraint for interference with medical device): Determine that other, less restrictive measures have been tried or would not be effective before applying the restraint     Problem: Respiratory - Adult  Goal: Achieves optimal ventilation and oxygenation  7/22/2024 2041 by Shirley Hammer RN  Outcome: Progressing     Problem: Safety - Adult  Goal: Free from fall injury  Outcome: Progressing     Problem: Skin/Tissue Integrity  Goal: Absence of new skin breakdown  Description: 1.  Monitor for areas of redness and/or skin breakdown  2.  Assess vascular access sites hourly  3.  Every 4-6 hours minimum:  Change oxygen saturation probe site  4.  Every 4-6 hours:  If on nasal continuous positive airway pressure, respiratory therapy assess nares and determine need for appliance change or resting period.  Outcome: Progressing     Problem: Nutrition Deficit:  Goal: Optimize nutritional status  Outcome: Progressing

## 2024-07-23 NOTE — PROGRESS NOTES
Patient was able to be identified as John Rivas. Patient's only point of contact is his sister Dena. She reports history of sent placements, hypertension, and recent head injury which he was recently at a [facility] for.

## 2024-07-23 NOTE — PROGRESS NOTES
Patient gets agitated while writer did assessment. Patients 's. Writer gave the patient some time and patient SBP went down to 140's

## 2024-07-23 NOTE — PROGRESS NOTES
Writer turned patient, patient became agitated 's. Writer gave PRN Versed and notified critical care of patients agitation.

## 2024-07-23 NOTE — PLAN OF CARE
Problem: Respiratory - Adult  Goal: Achieves optimal ventilation and oxygenation  7/23/2024 0733 by Alana Palmer RCP  Outcome: Progressing     Problem: Respiratory - Adult  Goal: Adequate oxygenation  7/23/2024 0733 by Alana Palmer RCP  Outcome: Progressing     Problem: Respiratory - Adult  Goal: Will be able to breathe spontaneously, without ventilator support  Description: Will be able to breathe spontaneously, without ventilator support  7/23/2024 0733 by Alana Palmer RCP  Outcome: Progressing

## 2024-07-23 NOTE — PROGRESS NOTES
Nutrition Assessment     Type and Reason for Visit: Reassess    Nutrition Recommendations/Plan:   NPO diet  Vital AF 1.2 Mihai goal rate 60 mL/hr (1440 mL total volume). Water flush 30 mL every 4 hours  Monitor tube feeding rate, tolerance, GI function, vent status and labs     Malnutrition Assessment:  Malnutrition Status: Insufficient data    Nutrition Assessment:  Patient has been identified. Patient remains intubated and sedated. Propofol has turned off. Patient unable to be moved too much without stimulation and increased blood pressure. Tube feedings have been restarted and increasing to  goal rate. RN reports patient has not had a  bowel movement since admission and abdomen has become firm. Patient is also retaining a lot of fluid. Continue Vital AF 1.2 Mihai at goal rate 60 mL/hr (1440 mL total volume). Monitor tube feeding rate, tolerance, GI function, vent status and labs.    Estimated Daily Nutrient Needs:  Energy (kcal):  1814kcals - 2,267kcals a day (20-25kcal/kg) Weight Used for Energy Requirements: Current     Protein (g):  117g protein (1.5g/kg IBW) Weight Used for Protein Requirements: Other (Comment) (7/16 200lbs)        Fluid (ml/day):  2,267ml (1ml/kcal) Method Used for Fluid Requirements: Other (Comment) (7/16 200lbs)    Nutrition Related Findings:   Edema: +2 pitting BUE, +1 non-pitting BLE. Wound Type: Venous Stasis (left thigh)    Current Nutrition Therapies:    Diet NPO  ADULT TUBE FEEDING; Nasogastric; Peptide Based; Continuous; 15; Yes; 15; Q 4 hours; 60; 30; Q 4 hours    Anthropometric Measures:  Height: 180.3 cm (5' 11\")  Current Body Wt: 99.8 kg (220 lb)   BMI: 30.7    Nutrition Diagnosis:   Inadequate oral intake related to impaired respiratory function as evidenced by intubation, NPO or clear liquid status due to medical condition  Inadequate protein-energy intake related to lack or limited access to food as evidenced by other (comment) (homeless and alcohol abuse)    Nutrition

## 2024-07-24 ENCOUNTER — APPOINTMENT (OUTPATIENT)
Dept: GENERAL RADIOLOGY | Age: 49
DRG: 130 | End: 2024-07-24
Payer: MEDICAID

## 2024-07-24 LAB
ALBUMIN SERPL-MCNC: 2.8 G/DL (ref 3.5–5.2)
ALBUMIN SERPL-MCNC: 2.8 G/DL (ref 3.5–5.2)
ALP SERPL-CCNC: 87 U/L (ref 40–129)
ALP SERPL-CCNC: 87 U/L (ref 40–129)
ALT SERPL-CCNC: 20 U/L (ref 5–41)
ALT SERPL-CCNC: 20 U/L (ref 5–41)
ANION GAP SERPL CALCULATED.3IONS-SCNC: 12 MMOL/L (ref 9–17)
ANION GAP SERPL CALCULATED.3IONS-SCNC: 12 MMOL/L (ref 9–17)
ANTI-XA UNFRAC HEPARIN: 0.3 IU/L (ref 0.3–0.7)
ANTI-XA UNFRAC HEPARIN: 0.3 IU/L (ref 0.3–0.7)
APPEARANCE BRONCH: NORMAL
APPEARANCE BRONCH: NORMAL
AST SERPL-CCNC: 42 U/L
AST SERPL-CCNC: 42 U/L
BILIRUB DIRECT SERPL-MCNC: 0.5 MG/DL
BILIRUB DIRECT SERPL-MCNC: 0.5 MG/DL
BILIRUB INDIRECT SERPL-MCNC: 0.1 MG/DL (ref 0–1)
BILIRUB INDIRECT SERPL-MCNC: 0.1 MG/DL (ref 0–1)
BILIRUB SERPL-MCNC: 0.6 MG/DL (ref 0.3–1.2)
BILIRUB SERPL-MCNC: 0.6 MG/DL (ref 0.3–1.2)
BUN SERPL-MCNC: 22 MG/DL (ref 6–20)
BUN SERPL-MCNC: 22 MG/DL (ref 6–20)
BUN/CREAT SERPL: 15 (ref 9–20)
BUN/CREAT SERPL: 15 (ref 9–20)
CALCIUM SERPL-MCNC: 9.3 MG/DL (ref 8.6–10.4)
CALCIUM SERPL-MCNC: 9.3 MG/DL (ref 8.6–10.4)
CHLORIDE SERPL-SCNC: 103 MMOL/L (ref 98–107)
CHLORIDE SERPL-SCNC: 103 MMOL/L (ref 98–107)
CLOT CHECK: NORMAL
CLOT CHECK: NORMAL
CO2 SERPL-SCNC: 24 MMOL/L (ref 20–31)
CO2 SERPL-SCNC: 24 MMOL/L (ref 20–31)
COLOR BRONCH: COLORLESS
COLOR BRONCH: COLORLESS
CREAT SERPL-MCNC: 1.5 MG/DL (ref 0.7–1.2)
CREAT SERPL-MCNC: 1.5 MG/DL (ref 0.7–1.2)
ERYTHROCYTE [DISTWIDTH] IN BLOOD BY AUTOMATED COUNT: 14.6 % (ref 11.8–14.4)
ERYTHROCYTE [DISTWIDTH] IN BLOOD BY AUTOMATED COUNT: 14.6 % (ref 11.8–14.4)
FIO2: 70
FIO2: 70
GFR, ESTIMATED: 56 ML/MIN/1.73M2
GFR, ESTIMATED: 56 ML/MIN/1.73M2
GLUCOSE BLD-MCNC: 100 MG/DL (ref 75–110)
GLUCOSE BLD-MCNC: 100 MG/DL (ref 75–110)
GLUCOSE BLD-MCNC: 104 MG/DL (ref 75–110)
GLUCOSE BLD-MCNC: 104 MG/DL (ref 75–110)
GLUCOSE BLD-MCNC: 105 MG/DL (ref 75–110)
GLUCOSE BLD-MCNC: 105 MG/DL (ref 75–110)
GLUCOSE BLD-MCNC: 128 MG/DL (ref 75–110)
GLUCOSE BLD-MCNC: 128 MG/DL (ref 75–110)
GLUCOSE BLD-MCNC: 52 MG/DL (ref 75–110)
GLUCOSE BLD-MCNC: 52 MG/DL (ref 75–110)
GLUCOSE SERPL-MCNC: 92 MG/DL (ref 70–99)
GLUCOSE SERPL-MCNC: 92 MG/DL (ref 70–99)
HCO3 VENOUS: 26.4 MMOL/L (ref 22–29)
HCO3 VENOUS: 26.4 MMOL/L (ref 22–29)
HCT VFR BLD AUTO: 37 % (ref 40.7–50.3)
HCT VFR BLD AUTO: 37 % (ref 40.7–50.3)
HGB BLD-MCNC: 12.4 G/DL (ref 13–17)
HGB BLD-MCNC: 12.4 G/DL (ref 13–17)
LYMPHOCYTES, BAL: 5 %
LYMPHOCYTES, BAL: 5 %
MACROPHAGES, BAL: 6 %
MACROPHAGES, BAL: 6 %
MCH RBC QN AUTO: 35.3 PG (ref 25.2–33.5)
MCH RBC QN AUTO: 35.3 PG (ref 25.2–33.5)
MCHC RBC AUTO-ENTMCNC: 33.5 G/DL (ref 28.4–34.8)
MCHC RBC AUTO-ENTMCNC: 33.5 G/DL (ref 28.4–34.8)
MCV RBC AUTO: 105.4 FL (ref 82.6–102.9)
MCV RBC AUTO: 105.4 FL (ref 82.6–102.9)
NRBC BLD-RTO: 0 PER 100 WBC
NRBC BLD-RTO: 0 PER 100 WBC
O2 SAT, VEN: 95.7 % (ref 60–85)
O2 SAT, VEN: 95.7 % (ref 60–85)
PATIENT TEMP: 37
PATIENT TEMP: 37
PCO2 VENOUS: 40.1 MM HG (ref 41–51)
PCO2 VENOUS: 40.1 MM HG (ref 41–51)
PH VENOUS: 7.43 (ref 7.32–7.43)
PH VENOUS: 7.43 (ref 7.32–7.43)
PLATELET # BLD AUTO: 383 K/UL (ref 138–453)
PLATELET # BLD AUTO: 383 K/UL (ref 138–453)
PMV BLD AUTO: 10.1 FL (ref 8.1–13.5)
PMV BLD AUTO: 10.1 FL (ref 8.1–13.5)
PO2 VENOUS: 78 MM HG (ref 30–50)
PO2 VENOUS: 78 MM HG (ref 30–50)
POSITIVE BASE EXCESS, VEN: 1.9 MMOL/L (ref 0–3)
POSITIVE BASE EXCESS, VEN: 1.9 MMOL/L (ref 0–3)
POTASSIUM SERPL-SCNC: 4.2 MMOL/L (ref 3.7–5.3)
POTASSIUM SERPL-SCNC: 4.2 MMOL/L (ref 3.7–5.3)
PROCALCITONIN SERPL-MCNC: 0.31 NG/ML (ref 0–0.09)
PROCALCITONIN SERPL-MCNC: 0.31 NG/ML (ref 0–0.09)
PROT SERPL-MCNC: 6.7 G/DL (ref 6.4–8.3)
PROT SERPL-MCNC: 6.7 G/DL (ref 6.4–8.3)
RBC # BLD AUTO: 3.51 M/UL (ref 4.21–5.77)
RBC # BLD AUTO: 3.51 M/UL (ref 4.21–5.77)
RBC, BAL: 1240 CELLS/UL
RBC, BAL: 1240 CELLS/UL
SEGMENTED NEUTROPHILS, BAL: 88 %
SEGMENTED NEUTROPHILS, BAL: 88 %
SODIUM SERPL-SCNC: 139 MMOL/L (ref 135–144)
SODIUM SERPL-SCNC: 139 MMOL/L (ref 135–144)
SPECIMEN TYPE: NORMAL
SPECIMEN TYPE: NORMAL
TOTAL CELLS COUNTED BRONCH: 2770 CELLS/UL
TOTAL CELLS COUNTED BRONCH: 2770 CELLS/UL
VANCOMYCIN SERPL-MCNC: 20.3 UG/ML
VANCOMYCIN SERPL-MCNC: 20.3 UG/ML
WBC OTHER # BLD: 9.6 K/UL (ref 3.5–11.3)
WBC OTHER # BLD: 9.6 K/UL (ref 3.5–11.3)

## 2024-07-24 PROCEDURE — 37799 UNLISTED PX VASCULAR SURGERY: CPT

## 2024-07-24 PROCEDURE — 87015 SPECIMEN INFECT AGNT CONCNTJ: CPT

## 2024-07-24 PROCEDURE — 87102 FUNGUS ISOLATION CULTURE: CPT

## 2024-07-24 PROCEDURE — 87116 MYCOBACTERIA CULTURE: CPT

## 2024-07-24 PROCEDURE — 2580000003 HC RX 258

## 2024-07-24 PROCEDURE — 6370000000 HC RX 637 (ALT 250 FOR IP): Performed by: FAMILY MEDICINE

## 2024-07-24 PROCEDURE — 0B9C8ZZ DRAINAGE OF RIGHT UPPER LUNG LOBE, VIA NATURAL OR ARTIFICIAL OPENING ENDOSCOPIC: ICD-10-PCS | Performed by: INTERNAL MEDICINE

## 2024-07-24 PROCEDURE — 6360000002 HC RX W HCPCS: Performed by: FAMILY MEDICINE

## 2024-07-24 PROCEDURE — 80048 BASIC METABOLIC PNL TOTAL CA: CPT

## 2024-07-24 PROCEDURE — 31622 DX BRONCHOSCOPE/WASH: CPT

## 2024-07-24 PROCEDURE — 94761 N-INVAS EAR/PLS OXIMETRY MLT: CPT

## 2024-07-24 PROCEDURE — 82947 ASSAY GLUCOSE BLOOD QUANT: CPT

## 2024-07-24 PROCEDURE — 99223 1ST HOSP IP/OBS HIGH 75: CPT | Performed by: INTERNAL MEDICINE

## 2024-07-24 PROCEDURE — 84145 PROCALCITONIN (PCT): CPT

## 2024-07-24 PROCEDURE — 2700000000 HC OXYGEN THERAPY PER DAY

## 2024-07-24 PROCEDURE — 87206 SMEAR FLUORESCENT/ACID STAI: CPT

## 2024-07-24 PROCEDURE — 82803 BLOOD GASES ANY COMBINATION: CPT

## 2024-07-24 PROCEDURE — 85027 COMPLETE CBC AUTOMATED: CPT

## 2024-07-24 PROCEDURE — 6360000002 HC RX W HCPCS: Performed by: PSYCHIATRY & NEUROLOGY

## 2024-07-24 PROCEDURE — 6360000002 HC RX W HCPCS: Performed by: INTERNAL MEDICINE

## 2024-07-24 PROCEDURE — 71045 X-RAY EXAM CHEST 1 VIEW: CPT

## 2024-07-24 PROCEDURE — 88112 CYTOPATH CELL ENHANCE TECH: CPT

## 2024-07-24 PROCEDURE — 6360000002 HC RX W HCPCS

## 2024-07-24 PROCEDURE — 94640 AIRWAY INHALATION TREATMENT: CPT

## 2024-07-24 PROCEDURE — 80202 ASSAY OF VANCOMYCIN: CPT

## 2024-07-24 PROCEDURE — 87070 CULTURE OTHR SPECIMN AEROBIC: CPT

## 2024-07-24 PROCEDURE — 0B9F8ZX DRAINAGE OF RIGHT LOWER LUNG LOBE, VIA NATURAL OR ARTIFICIAL OPENING ENDOSCOPIC, DIAGNOSTIC: ICD-10-PCS | Performed by: INTERNAL MEDICINE

## 2024-07-24 PROCEDURE — 2500000003 HC RX 250 WO HCPCS: Performed by: INTERNAL MEDICINE

## 2024-07-24 PROCEDURE — 2580000003 HC RX 258: Performed by: INTERNAL MEDICINE

## 2024-07-24 PROCEDURE — 85520 HEPARIN ASSAY: CPT

## 2024-07-24 PROCEDURE — 87106 FUNGI IDENTIFICATION YEAST: CPT

## 2024-07-24 PROCEDURE — 86403 PARTICLE AGGLUT ANTBDY SCRN: CPT

## 2024-07-24 PROCEDURE — 99232 SBSQ HOSP IP/OBS MODERATE 35: CPT | Performed by: FAMILY MEDICINE

## 2024-07-24 PROCEDURE — 6370000000 HC RX 637 (ALT 250 FOR IP)

## 2024-07-24 PROCEDURE — 88305 TISSUE EXAM BY PATHOLOGIST: CPT

## 2024-07-24 PROCEDURE — 6370000000 HC RX 637 (ALT 250 FOR IP): Performed by: INTERNAL MEDICINE

## 2024-07-24 PROCEDURE — 87205 SMEAR GRAM STAIN: CPT

## 2024-07-24 PROCEDURE — 80076 HEPATIC FUNCTION PANEL: CPT

## 2024-07-24 PROCEDURE — 94003 VENT MGMT INPAT SUBQ DAY: CPT

## 2024-07-24 PROCEDURE — 2000000000 HC ICU R&B

## 2024-07-24 PROCEDURE — 87186 SC STD MICRODIL/AGAR DIL: CPT

## 2024-07-24 PROCEDURE — 2580000003 HC RX 258: Performed by: FAMILY MEDICINE

## 2024-07-24 RX ORDER — VALPROIC ACID 250 MG/5ML
250 SOLUTION ORAL EVERY 6 HOURS SCHEDULED
Status: DISCONTINUED | OUTPATIENT
Start: 2024-07-24 | End: 2024-07-27 | Stop reason: HOSPADM

## 2024-07-24 RX ORDER — AMLODIPINE BESYLATE 10 MG/1
10 TABLET ORAL DAILY
Status: DISCONTINUED | OUTPATIENT
Start: 2024-07-24 | End: 2024-07-25

## 2024-07-24 RX ORDER — ASPIRIN 81 MG/1
81 TABLET, CHEWABLE ORAL DAILY
Status: DISCONTINUED | OUTPATIENT
Start: 2024-07-24 | End: 2024-07-27 | Stop reason: HOSPADM

## 2024-07-24 RX ORDER — FUROSEMIDE 10 MG/ML
40 INJECTION INTRAMUSCULAR; INTRAVENOUS 2 TIMES DAILY
Status: DISCONTINUED | OUTPATIENT
Start: 2024-07-24 | End: 2024-07-27 | Stop reason: HOSPADM

## 2024-07-24 RX ADMIN — IPRATROPIUM BROMIDE AND ALBUTEROL SULFATE 1 DOSE: 2.5; .5 SOLUTION RESPIRATORY (INHALATION) at 10:39

## 2024-07-24 RX ADMIN — DEXTROSE MONOHYDRATE 125 ML: 100 INJECTION, SOLUTION INTRAVENOUS at 07:22

## 2024-07-24 RX ADMIN — AMLODIPINE BESYLATE 10 MG: 10 TABLET ORAL at 12:37

## 2024-07-24 RX ADMIN — MEROPENEM 1000 MG: 1 INJECTION INTRAVENOUS at 14:00

## 2024-07-24 RX ADMIN — HEPARIN SODIUM 22 UNITS/KG/HR: 10000 INJECTION, SOLUTION INTRAVENOUS at 16:34

## 2024-07-24 RX ADMIN — Medication 200 MCG/HR: at 17:48

## 2024-07-24 RX ADMIN — PROPOFOL 20 MCG/KG/MIN: 10 INJECTION, EMULSION INTRAVENOUS at 11:42

## 2024-07-24 RX ADMIN — MIDAZOLAM 4 MG: 1 INJECTION INTRAMUSCULAR; INTRAVENOUS at 21:18

## 2024-07-24 RX ADMIN — IPRATROPIUM BROMIDE AND ALBUTEROL SULFATE 1 DOSE: 2.5; .5 SOLUTION RESPIRATORY (INHALATION) at 07:27

## 2024-07-24 RX ADMIN — VANCOMYCIN HYDROCHLORIDE 1500 MG: 5 INJECTION, POWDER, LYOPHILIZED, FOR SOLUTION INTRAVENOUS at 17:42

## 2024-07-24 RX ADMIN — DOCUSATE SODIUM 100 MG: 50 LIQUID ORAL at 14:01

## 2024-07-24 RX ADMIN — PIPERACILLIN AND TAZOBACTAM 3375 MG: 3; .375 INJECTION, POWDER, LYOPHILIZED, FOR SOLUTION INTRAVENOUS at 00:41

## 2024-07-24 RX ADMIN — ASPIRIN 81 MG CHEWABLE TABLET 81 MG: 81 TABLET CHEWABLE at 12:37

## 2024-07-24 RX ADMIN — SODIUM CHLORIDE, PRESERVATIVE FREE 40 MG: 5 INJECTION INTRAVENOUS at 20:49

## 2024-07-24 RX ADMIN — VALPROIC ACID 250 MG: 250 SOLUTION ORAL at 17:36

## 2024-07-24 RX ADMIN — SODIUM CHLORIDE, PRESERVATIVE FREE 10 ML: 5 INJECTION INTRAVENOUS at 20:52

## 2024-07-24 RX ADMIN — FUROSEMIDE 40 MG: 10 INJECTION, SOLUTION INTRAMUSCULAR; INTRAVENOUS at 17:37

## 2024-07-24 RX ADMIN — QUETIAPINE FUMARATE 150 MG: 200 TABLET ORAL at 09:57

## 2024-07-24 RX ADMIN — MULTIVITAMIN TABLET 1 TABLET: TABLET at 09:59

## 2024-07-24 RX ADMIN — PIPERACILLIN AND TAZOBACTAM 3375 MG: 3; .375 INJECTION, POWDER, LYOPHILIZED, FOR SOLUTION INTRAVENOUS at 09:57

## 2024-07-24 RX ADMIN — LEVETIRACETAM 500 MG: 100 INJECTION, SOLUTION INTRAVENOUS at 17:36

## 2024-07-24 RX ADMIN — NALOXEGOL OXALATE 25 MG: 25 TABLET, FILM COATED ORAL at 05:40

## 2024-07-24 RX ADMIN — CLONAZEPAM 2 MG: 0.5 TABLET ORAL at 00:36

## 2024-07-24 RX ADMIN — PHENOBARBITAL SODIUM 16.2 MG: 65 INJECTION, SOLUTION INTRAMUSCULAR; INTRAVENOUS at 17:37

## 2024-07-24 RX ADMIN — Medication 10 MG/HR: at 16:32

## 2024-07-24 RX ADMIN — PROPOFOL 5 MCG/KG/MIN: 10 INJECTION, EMULSION INTRAVENOUS at 21:33

## 2024-07-24 RX ADMIN — FOLIC ACID 1 MG: 1 TABLET ORAL at 09:59

## 2024-07-24 RX ADMIN — CLONAZEPAM 2 MG: 0.5 TABLET ORAL at 12:37

## 2024-07-24 RX ADMIN — LEVETIRACETAM 500 MG: 100 INJECTION, SOLUTION INTRAVENOUS at 05:40

## 2024-07-24 RX ADMIN — Medication 200 MCG/HR: at 11:27

## 2024-07-24 RX ADMIN — FUROSEMIDE 40 MG: 10 INJECTION, SOLUTION INTRAMUSCULAR; INTRAVENOUS at 12:38

## 2024-07-24 RX ADMIN — Medication 10 MG/HR: at 05:24

## 2024-07-24 RX ADMIN — ACETAMINOPHEN 650 MG: 325 TABLET ORAL at 20:50

## 2024-07-24 RX ADMIN — Medication 100 MG: at 09:59

## 2024-07-24 RX ADMIN — HYDRALAZINE HYDROCHLORIDE 10 MG: 20 INJECTION INTRAMUSCULAR; INTRAVENOUS at 20:49

## 2024-07-24 RX ADMIN — Medication 200 MCG/HR: at 04:28

## 2024-07-24 RX ADMIN — HEPARIN SODIUM 22 UNITS/KG/HR: 10000 INJECTION, SOLUTION INTRAVENOUS at 03:40

## 2024-07-24 RX ADMIN — QUETIAPINE FUMARATE 150 MG: 200 TABLET ORAL at 20:50

## 2024-07-24 RX ADMIN — IPRATROPIUM BROMIDE AND ALBUTEROL SULFATE 1 DOSE: 2.5; .5 SOLUTION RESPIRATORY (INHALATION) at 15:29

## 2024-07-24 RX ADMIN — PROPOFOL 15 MCG/KG/MIN: 10 INJECTION, EMULSION INTRAVENOUS at 03:39

## 2024-07-24 RX ADMIN — DOCUSATE SODIUM 100 MG: 50 LIQUID ORAL at 20:49

## 2024-07-24 RX ADMIN — IPRATROPIUM BROMIDE AND ALBUTEROL SULFATE 1 DOSE: 2.5; .5 SOLUTION RESPIRATORY (INHALATION) at 19:36

## 2024-07-24 RX ADMIN — PHENOBARBITAL SODIUM 16.2 MG: 65 INJECTION, SOLUTION INTRAMUSCULAR; INTRAVENOUS at 04:29

## 2024-07-24 ASSESSMENT — PULMONARY FUNCTION TESTS
PIF_VALUE: 41
PIF_VALUE: 29
PIF_VALUE: 53
PIF_VALUE: 34
PIF_VALUE: 52
PIF_VALUE: 29
PIF_VALUE: 32
PIF_VALUE: 34
PIF_VALUE: 52
PIF_VALUE: 34
PIF_VALUE: 34
PIF_VALUE: 30
PIF_VALUE: 31
PIF_VALUE: 48
PIF_VALUE: 31
PIF_VALUE: 33
PIF_VALUE: 52
PIF_VALUE: 36
PIF_VALUE: 35
PIF_VALUE: 33
PIF_VALUE: 36

## 2024-07-24 ASSESSMENT — PAIN SCALES - GENERAL
PAINLEVEL_OUTOF10: 0
PAINLEVEL_OUTOF10: 0
PAINLEVEL_OUTOF10: 4

## 2024-07-24 NOTE — CARE COORDINATION
Case Management Assessment  Initial Evaluation    Date/Time of Evaluation: 7/24/2024 11:35 AM  Assessment Completed by: Shelly Elias RN    If patient is discharged prior to next notation, then this note serves as note for discharge by case management.    Patient Name: Jhon Rivas                   YOB: 1975  Diagnosis: Acute respiratory failure with hypoxia (HCC) [J96.01]  Acute encephalopathy [G93.40]  Acute alcoholic intoxication with complication (HCC) [F10.929]  Acute alcoholic intoxication without complication (HCC) [F10.920]                   Date / Time: 7/15/2024  5:08 PM    Patient Admission Status: Inpatient   Readmission Risk (Low < 19, Mod (19-27), High > 27): Readmission Risk Score: 12.8    Current PCP: No primary care provider on file.  PCP verified by CM? No (does not think he has pcp)    Chart Reviewed: Yes      History Provided by: Child/Family (sister Dena Mendez)  Patient Orientation: Sedated (Vent)    Patient Cognition: Other (see comment) (vent)    Hospitalization in the last 30 days (Readmission):  No    If yes, Readmission Assessment in CM Navigator will be completed.    Advance Directives:      Code Status: Full Code   Patient's Primary Decision Maker is: Legal Next of Kin      Discharge Planning:    Patient lives with: Alone Type of Home: Shelter, Other (Comment) (homeless)  Primary Care Giver: Self  Patient Support Systems include: Friends/Neighbors, Family Members   Current Financial resources: HELP  Current community resources: Other (Comment) (Homeless)  Current services prior to admission: None            Current DME:              Type of Home Care services:  None    ADLS  Prior functional level: Independent in ADLs/IADLs  Current functional level: Assistance with the following:, Bathing, Dressing, Toileting, Feeding, Cooking, Housework, Shopping, Mobility    PT AM-PAC:   /24  OT AM-PAC:   /24    Family can provide assistance at DC: No (limited)  Would you like  from [place]-she can check when she gets home and bring in tomorrow.     Updated Laurie from HELP program-she does not think pt has Medicare or Medicaid currently-she will visit w sister today for more information.     Relays pt was recently at [facility] he had a head injury this past month and has hx cardiac stents. Chart merge is in process.     Will need to watch for LTACH/SNF/ALL NEEDS.     Pulm in and discussed possible trach-pt to have bronch at bedside today.     The Plan for Transition of Care is related to the following treatment goals of Acute respiratory failure with hypoxia (HCC) [J96.01]  Acute encephalopathy [G93.40]  Acute alcoholic intoxication with complication (HCC) [F10.929]  Acute alcoholic intoxication without complication (HCC) [F10.920]    IF APPLICABLE: The Patient and/or patient representative John and his family were provided with a choice of provider and agrees with the discharge plan. Freedom of choice list with basic dialogue that supports the patient's individualized plan of care/goals and shares the quality data associated with the providers was provided to:     Patient Representative Name:       The Patient and/or Patient Representative Agree with the Discharge Plan?      Shelly Elias RN  Case Management Department  Ph:  Fax:

## 2024-07-24 NOTE — PLAN OF CARE
Problem: Respiratory - Adult  Goal: Achieves optimal ventilation and oxygenation  7/24/2024 1938 by Corey Boone RCP  Outcome: Progressing     Problem: Respiratory - Adult  Goal: Adequate oxygenation  7/24/2024 1938 by Corey Boone RCP  Outcome: Progressing     Problem: Respiratory - Adult  Goal: Will be able to breathe spontaneously, without ventilator support  Description: Will be able to breathe spontaneously, without ventilator support  7/24/2024 1938 by Corey Boone RCP  Outcome: Progressing

## 2024-07-24 NOTE — PROGRESS NOTES
[facility]  Office: [phone]  Klever Dickens DO, Eulogio Sr DO, Jerome Worley DO, Warren Jovel DO, Vaughn Jefferson MD, Krista Andrews MD, Ana M Cooley MD, Nia Wright MD,  Doc Metz MD, Mario Alberto Lizama MD, Shahla Dutta MD,  Dolly Wu DO, Nancy Mata MD, James Boston MD, Trey Dickens DO, Adriana Spencer MD,  Luis Alberto Thornton DO, Yanet Horan MD, Renee King MD, Fely Gomez MD, Kady Macias MD,  Kayden Murillo MD, Ramsey Valentine MD, Airam Gonsales MD, Angelina Cruz MD, Devyn Gates MD, Neil Looney MD, Navin King DO, Tera Prajapati DO, Julieta Gomez MD,  Kael Farias MD, Shirley Waterhouse, CNP,  Valencia Regalado CNP, Lobo Rojas, CNP,  Karen Mascorro, NELIA, Ania Oro, CNP, Kyra Isbell, CNP, Shayla Song CNP, Jackelin Woody, CNP, Hodan Cruz, PA-C, Kim Woodard PA-C, Destiney Manriquez, CNP, Vania Silva, CNP, Hailey Carson, CNP, Brooke Olvera, CNP, Arabella Rincon CNP, Rebecca Zavala, CNS, Laurie Crandall, CNP, Melissa Pat CNP, Tracy Schwab, CNP       [facility]      Daily Progress Note     Admit Date: 7/15/2024  Bed/Room No.  1104/1104-01  Admitting Physician : Ana M Cooley MD  Code Status :Full Code  Hospital Day:  LOS: 9 days   Chief Complaint:     Chief Complaint   Patient presents with    Alcohol Intoxication     Principal Problem:    Acute alcoholic intoxication with complication (HCC)  Active Problems:    Alcohol intoxication in alcoholism with blood level over 0.3 with delirium (HCC)    Aspiration pneumonia of both upper lobes due to vomit (HCC)    Acute hypoxic respiratory failure (HCC)    On mechanically assisted ventilation (HCC)    Acute encephalopathy    MAIK (acute kidney injury) (HCC)    Seizure due to alcohol withdrawal, with unspecified complication (HCC)  Resolved Problems:    * No resolved hospital problems. *    Subjective :        Interval History/Significant events :   infiltration of the liver.   6. No evidence of pneumothorax or pneumomediastinum.         CT CSpine W/O Contrast   Final Result   1. No acute intracranial abnormality.   2. No acute osseous abnormality of the cervical spine. Multilevel   degenerative disc disease and facet arthrosis.         CT Head W/O Contrast   Final Result   1. No acute intracranial abnormality.   2. No acute osseous abnormality of the cervical spine. Multilevel   degenerative disc disease and facet arthrosis.         XR CHEST PORTABLE   Final Result   1. Endotracheal tube terminating approximately 9.5 cm above the patricia,   recommend advancement by approximately 4 cm.   2. Enteric tube in the midline with side port and tip over the gastric   fundus, appropriate in location.   3. Airspace opacities throughout the right lung and retrocardiac left lung,   concerning for atelectasis, aspiration or developing pneumonia, new since the   prior study.         XR CHEST PORTABLE   Final Result   Mild cardiomegaly. No acute cardiopulmonary process.         NM LUNG SCAN PERFUSION ONLY    (Results Pending)        Clinical Course : unchanged  Assessment and Plan  :        Acute alcohol Intoxication - benzodiazepines as needed for withdrawal signs.  Seroquel has not been added.  Watch QTc.  Seizure-like activity.  CT head was negative.  Neurology was consulted and recommended workup once patient extubated.  No seizure like activity noted since admission.  CAD with stents however patient noncompliant with medication.  Echocardiogram showed decreased EF 45%.  Start aspirin 81, Lipitor.  Acute resp failure with Hypoxia - s/p mech ventilation . Management per critical Care .  Empirically on heparin infusion for suspected PE due to elevated D-dimer.  Ventilation scan pending.  Echocardiogram has been ordered.  CKD stage 3 - monitor kidney function. -   Homeless single  person.  Uncontrolled hypertension-add amlodipine.  Labetalol as needed.  Acute systolic CHF.

## 2024-07-24 NOTE — PROCEDURES
PROCEDURE NOTE  Date: 7/24/2024   Name: John Rivas  YOB: 1975    Procedures  Bronchoscopy      Consent was obtained.  There is benefits and complication were explained to the patient sister verbalized understanding.  Complications including hypoxia and hemodynamic compromise were explained patient and sister agreed to procedure.  Timeout was performed  Procedure was done on the ventilator.  Bronchoscope was introduced to the ET tube.  Airway evaluation revealed copious secretions thick sputum.  Aggressive suctioning and therapeutic aspiration of secretion was performed.  Airway exam revealed no endobronchial lesions.  BAL was done right lower lobe return was turbid.  Patient tolerated procedure well without complications.        Koko Paiz MD  Pulmonary critical care sleep

## 2024-07-24 NOTE — CONSULTS
[facility]   Pharmacy Pharmacokinetic Monitoring Service - Vancomycin    Consulting Provider: Dr. Brown   Indication: Pneumonia  Target Concentration: Goal AUC/AIDA 400-600 mg*hr/L  Day of Therapy: 3  Additional Antimicrobials: N/A    Pertinent Cultures:  Culture Date Source Results   7/21/24 Tracheal aspirate Staph aureus, awaiting sensitivities   7/24/24 BAL In process   MRSA Nasal Swab: showed MRSA positive result on 7/16/24.    Assessment:  Date/Time Current Dose Concentration Timing of Concentration (h) AUC   7/24/24 @ 0530 1500mg Q24H 20.3 16.75 579   Note: Serum concentrations collected for AUC dosing may appear elevated if collected in close proximity to the dose administered, this is not necessarily an indication of toxicity    Plan:  Vancomycin IV therapy resumed by Infectious Diseases team, WBC 9.6 (from 13.4 on 7/16), most recent fever was 7/23 at 20:00 (101.2).  Current dosing regimen is therapeutic.  Resume recent regimen vancomycin IV 1500 mg Q24H.  Repeat vancomycin concentration ordered for 7/25/24 @ 06:00.   Pharmacy will continue to monitor patient and adjust therapy as indicated.    Thank you for the consult,  Debbi Wilhelm RPH  7/24/2024 3:47 PM

## 2024-07-24 NOTE — CONSULTS
[facility]  Initial Consult Note  Date: 7/24/2024    Hospital day :9     Impression:   Acute respiratory failure with hypoxia currently on mechanical ventilator   Acute systolic heart failure  Fevers  Concern for pneumonia  Polysubstance abuse with acute alcohol intoxication   Possible seizure and has significant encephalopathy  CKD stage 3      Recommendations   The patient is currently on vancomycin resumed 7/22/2024 and was started on meropenem today  The patient has Staphylococcus aureus isolated in the sputum culture therefore the meropenem can be discontinued  The patient was previously on piperacillin/tazobactam started 7/15/2024 through 7/24/2024 [day #10 of therapy]  The fever and headache have been multifactorial  We will follow the sputum culture data, and blood culture data as well as monitor the clinical progress/response to diuretic therapy and adjust therapy accordingly    Chief complaint/reason for consultation:     Question Answer   Reason for Consult? persistent fevers, escalation to meropenem     History of Present Illness:   John Rivas is a 49 y.o.-year-old male who was initially admitted on 7/15/2024.     John is seen in consultation on hospital day #9    He was admitted 7/15/2024 due to concerns for alcohol intoxication and he was found on the side of a fast food restaurant and EMS was called.  The patient admitted to drinking 1/5 of alcohol was verbally abusive and aggressive in the emergency department and the patient was intubated in the emergency department for airway protection    Alcohol level was at 423.43 with a sodium 133 potassium 2.9 total CPK of 330 and he was also positive on the urine drug screen for cocaine and cannabinoids.  LFTs were abnormal with an ALT of 56 AST of 123 and arterial blood gas with a pH of 7.218 with a pCO2 of 68.5 and a pO2 of 113 point  CT imaging of the head did not show any acute intracranial abnormality, CT of the cervical spine  SWAB    MRSA, DNA, Nasal POSITIVE:  MRSA DNA detected by nucleic acid amplification. Abnormal     Comment:                                                   Results should be used as an adjunct to nosocomial control efforts to identify patients  needing enhanced precautions.    The test is not intended to identify patients with staphylococcal infections.  Results  should not be used to guide or monitor treatment for MRSA infections.      Culture, Urine [[id number]] Collected: 07/15/24 1930   Order Status: Completed Specimen: Urine, clean catch Updated: 07/16/24 2132    Specimen Description .CLEAN CATCH URINE    Special Requests Site: Urine    Culture NO SIGNIFICANT GROWTH       Electronically signed by Olga Brown MD on 7/24/2024 at 1:04 PM      [facility]  Olga Brown MD  [employer] messaging  OFFICE: [phone]    Thank you for allowing us to participate in the care of this patient. Please call with questions.     This note is created with the assistance of a speech recognition program.  While intending to generate a document that actually reflects the content of the visit, the document can still have some errors including those of syntax and sound a like substitutions which may escape proof reading.  In such instances, actual meaning can be extrapolated by contextual diversion.

## 2024-07-24 NOTE — PROGRESS NOTES
SPIRITUAL CARE DEPARTMENT [facility]  PROGRESS NOTE    Room # 1104/1104-01   Name: John Rivas           Reason for visit: Emotional Distress    I visited the family.    Admit Date & Time: 7/15/2024  5:08 PM    Assessment:  John Rivas is a 49 y.o. male in the hospital because Acute Respiratory Failure. Upon entering the room the patient remains intubated; sister is at the bedside with ADITHYA Bermudez      Intervention:  I introduced myself and my title as chaplain Romo, and engaged in conversation with the patient's sister (Dena). Dena became tearful and emotional as she shared the struggles and life challenges the patient has endured since the age of 15.    The writer provided listening support and presence, as well as words of encouragement and hope. The writer stated he will pray for continue healing and comfort for the patient,as well as inner strength and peace for the family.    Outcome:  Dena is very grateful for the care and daily support that is being given to the patient.    Plan:  Chaplains will remain available to offer spiritual and emotional support as needed.    Electronically signed by Chaplain Sheffield Jr, on 7/24/2024 at 10:09 AM.  Spiritual Care Department  [facility]     07/24/24 1005   Encounter Summary   Encounter Overview/Reason Spiritual/Emotional Needs   Service Provided For Family   Referral/Consult From Other (comment)   Support System Family members   Last Encounter  07/24/24   Complexity of Encounter Moderate   Begin Time 0920   End Time  0935   Total Time Calculated 15 min   Spiritual/Emotional needs   Type Spiritual Support   Assessment/Intervention/Outcome   Assessment Tearful   Intervention Active listening;Discussed relationship with God;Discussed belief system/Adventism practices/sweta;Nurtured Hope;Sustaining Presence/Ministry of presence   Outcome Engaged in conversation;Encouraged;Expressed Gratitude   Plan and Referrals

## 2024-07-24 NOTE — PROGRESS NOTES
Pulmonary Critical Care Progress Note    Patient seen for the follow up of Acute alcoholic intoxication with complication (HCC)     Subjective:    He got more agitated on decreasing sedation..  His propofol resumed now at 20.  He developed agitation.  He still requires Versed at 10 mg/h.  He required fentanyl at 200 mcg/h...  He still requires 70% oxygen... He is spiking fevers.. He has not had bowel movement she tolerates tube feeds.      Examination:    Vitals: BP (!) 190/88   Pulse 69   Temp 98.8 °F (37.1 °C)   Resp 22   Ht 1.803 m (5' 11\")   Wt 99.9 kg (220 lb 3.2 oz)   SpO2 95%   BMI 30.71 kg/m²   SpO2  Av.2 %  Min: 90 %  Max: 100 %  General appearance: Intubated sedated  Neck: No JVD  Lungs: Mild decreased breath sound no crackles or wheezes  Heart: regular rate and rhythm, S1, S2 normal, no gallop  Abdomen: Soft, non tender, + BS  Extremities: no cyanosis or clubbing. No significant edema    LABs:    CBC:   Recent Labs     24  0348 24  0530   WBC 10.6 9.6   HGB 12.9* 12.4*   HCT 39.1* 37.0*    383       BMP:   Recent Labs     24  0348 24  0530    139   K 4.4 4.2   CO2 26 24   BUN 20 22*   CREATININE 1.6* 1.5*   LABGLOM 33* 56*   GLUCOSE 105* 92       PT/INR:   Recent Labs     24  1430   PROTIME 13.3   INR 1.0       APTT:  Recent Labs     24  1430   APTT 30.5       LIVER PROFILE:  No results for input(s): \"AST\", \"ALT\", \"LABALBU\" in the last 72 hours.     Latest Reference Range & Units 24 03:02 24 04:45 24 12:08   Pro-BNP <300 pg/mL 543 (H)     Triglycerides <150 mg/dL  235 (H) 182 (H)   (H): Data is abnormally high   Latest Reference Range & Units 07/15/24 19:30   Amphetamine Screen, Ur NEGATIVE  NEGATIVE   Barbiturate Screen, Ur NEGATIVE  NEGATIVE   Benzodiazepine Screen, Urine NEGATIVE  NEGATIVE   Cannabinoid Scrn, Ur NEGATIVE  POSITIVE !   Cocaine Metabolite, Urine NEGATIVE  POSITIVE !   Fentanyl, Ur NEGATIVE  NEGATIVE  aortic root dimensions  The IVC appears normal in size, normal respiratory variation indicating normal right atrial filling pressure  No significant pericardial effusion seen     Repeat echocardiogram for RV strain evaluation 7/23  Left Ventricle: Mildly reduced left ventricular systolic function with a visually estimated EF of 45 - 50%. Left ventricle size is normal. Normal wall thickness. Normal wall motion. See diagram for wall motion findings. Normal diastolic function.    Image quality is suboptimal.  Lower extremity venous Dopplers negative for DVT      Impression/recommendations:    Acute hypoxic respiratory failure requiring intubation rate of 22 increase PEEP to 12 FiO2 70% tidal volume 600  Continue assist-control ventilation  Titrate FiO2 to saturation above 90%  Continue fentanyl drip RASS -2 wean second  Versed drip RASS -2 wean last  Daily sedation vacation attempts during the daytime mornings  Continue tube feeds as tolerated/  Movantik increase dose and laxatives start Colace 100 twice daily     Aspiration pneumonia/possible COPD/THC / smoker   DuoNeb by nebulizer  Zosyn/resume vancomycin MRSA positive  Smoking/THC cessation  Discontinue Zosyn start meropenem/switch vancomycin to linezolid  Consult infectious disease  Check blood culture  Prepare for bronchoscopy       alcohol intoxication/fatty liver with increased liver function/alcohol withdrawal   Alcohol cessation  Klonopin 2 mg twice daily / increase Seroquel 100 twice daily  Monitor LFTs  Thiamine folate multivitamin     Elevated dimer ?  Pulmonary embolism  Heparin drip empirically  Lower extremity venous Dopplers negative  Echocardiogram shows no RV strain  Check perfusion lung scan    seizures likely related to alcohol withdrawal   Continue sedation  Phenobarb per neurology/on Keppra  Neurology on consult/EEG showed moderate slowing  Start Depakote 500 twice daily if liver function test normal    hypertension poor control/ Cocaine positive  /mild increase troponin  Off Cardene drip  Improved on sedation     Pulmonary edema/MAIK     Lasix 40 IV increased to twice daily/monitor urine output  monitor urine output and BMP  Nephrology consult    Right inguinal hernia   discussed with RN  Discussed with RT  Discussed with Sister at bedside  Discussed with clinical pharmacist  Discussed with primary    Peptic ulcer disease prophylaxis  DVT prophylaxis      Koko Paiz MD, MD, [facility]  Pulmonary Critical Care and Sleep Medicine,  [facility]  Cell: [phone]  Office: [phone]  Cc35 min  This patient with critical illness requires high level of physician preparedness to intervene urgently.  I managed life/organ supporting measures reviewed labs imaging made decision accordingly.

## 2024-07-24 NOTE — ACP (ADVANCE CARE PLANNING)
Advance Care Planning     Advance Care Planning Activator (Inpatient)  Conversation Note      Date of ACP Conversation: 2024     Conversation Conducted with: Legal next of kin sister Dena Mendez    ACP Activator: Shelly Elias RN        Health Care Decision Maker: sister Dena     Current Designated Health Care Decision Maker: Dena     Click here to complete Healthcare Decision Makers including section of the Healthcare Decision Maker Relationship (ie \"Primary\")  Today we documented Decision Maker(s) consistent with Legal Next of Kin hierarchy.    Care Preferences    Ventilation:  \"If you were in your present state of health and suddenly became very ill and were unable to breathe on your own, what would your preference be about the use of a ventilator (breathing machine) if it were available to you?\"      Would the patient desire the use of ventilator (breathing machine)?: yes    Spoke with sister Dena Mendez she lives in [place], [place]-she is next of kin-parents are , no kids. Pt is homeless.     [] Yes   [] No   Educated Patient / Decision Maker regarding differences between Advance Directives and portable DNR orders.    Length of ACP Conversation in minutes:  10    Conversation Outcomes:  ACP discussion completed    Follow-up plan:    [] Schedule follow-up conversation to continue planning  [] Referred individual to Provider for additional questions/concerns   [] Advised patient/agent/surrogate to review completed ACP document and update if needed with changes in condition, patient preferences or care setting    [] This note routed to one or more involved healthcare providers

## 2024-07-24 NOTE — PROGRESS NOTES
[facility]   Pharmacy Pharmacokinetic Monitoring Service - Vancomycin    Consulting Provider: Koko Paiz MD   Indication: pneumonia  Target Concentration: Goal AUC/AIDA 400-600 mg*hr/L  Day of Therapy: 3  Additional Antimicrobials: Zosyn    Pertinent Laboratory Values:   Wt Readings from Last 1 Encounters:   07/23/24 99.9 kg (220 lb 3.2 oz)     Temp Readings from Last 1 Encounters:   07/24/24 98.2 °F (36.8 °C) (Esophageal)     Estimated Creatinine Clearance: 70 mL/min (A) (based on SCr of 1.5 mg/dL (H)).  Recent Labs     07/23/24  0348 07/24/24  0530   CREATININE 1.6* 1.5*   BUN 20 22*   WBC 10.6 9.6     Procalcitonin: none    Pertinent Cultures:    MRSA Nasal Swab: showed MRSA positive result on 7/16/24    Recent vancomycin administrations                     vancomycin (VANCOCIN) 1500 mg in sodium chloride 0.9 % 250 mL IVPB (mg) 1,500 mg New Bag 07/23/24 1242    vancomycin (VANCOCIN) 2500 mg in sodium chloride 0.9 % 500 mL IVPB (mg) 2,500 mg New Bag 07/22/24 1322        Assessment:  Date/Time Current Dose Concentration Timing of Concentration (h) AUC   7/24/24 @ 0530 1500mg Q24H 20.3 16.75 579   Note: Serum concentrations collected for AUC dosing may appear elevated if collected in close proximity to the dose administered, this is not necessarily an indication of toxicity    Plan:  Current dosing regimen is therapeutic  Continue current dose 1500mg Q24H  Pharmacy will continue to monitor patient and adjust therapy as indicated    Thank you for the consult,  Radha Griffith RPH  7/24/2024 7:13 AM

## 2024-07-24 NOTE — PLAN OF CARE
Problem: Safety - Medical Restraint  Goal: Remains free of injury from restraints (Restraint for Interference with Medical Device)  Description: INTERVENTIONS:  1. Determine that other, less restrictive measures have been tried or would not be effective before applying the restraint  2. Evaluate the patient's condition at the time of restraint application  3. Inform patient/family regarding the reason for restraint  4. Q2H: Monitor safety, psychosocial status, comfort, nutrition and hydration  7/24/2024 1535 by Lara Maradiaga RN  Outcome: Progressing  Flowsheets  Taken 7/24/2024 1400 by Lara Maradiaga RN  Remains free of injury from restraints (restraint for interference with medical device):   Determine that other, less restrictive measures have been tried or would not be effective before applying the restraint   Evaluate the patient's condition at the time of restraint application   Inform patient/family regarding the reason for restraint   Every 2 hours: Monitor safety, psychosocial status, comfort, nutrition and hydration  Taken 7/24/2024 1220 by Lara Maradiaga RN  Remains free of injury from restraints (restraint for interference with medical device):   Determine that other, less restrictive measures have been tried or would not be effective before applying the restraint   Evaluate the patient's condition at the time of restraint application   Inform patient/family regarding the reason for restraint   Every 2 hours: Monitor safety, psychosocial status, comfort, nutrition and hydration  Taken 7/24/2024 1200 by Lara Maradiaga RN  Remains free of injury from restraints (restraint for interference with medical device):   Determine that other, less restrictive measures have been tried or would not be effective before applying the restraint   Evaluate the patient's condition at the time of restraint application   Inform patient/family regarding the reason for restraint   Every 2 hours: Monitor safety, psychosocial

## 2024-07-24 NOTE — PROGRESS NOTES
End Of Shift Note  [facility]  Summary of shift: At start of shift patient BP was in 200's gave PRN labetolol. First temp was 101.2, gave PRN tylenol and placed ice pack; place esophageal probe. Patient residual 100,120, 80, with TF @ 15ml/hr. U/o 1025 No BM. Heparin gtt therapetic.     Vitals:    Vitals:    07/24/24 0500 07/24/24 0600 07/24/24 0727 07/24/24 0739   BP:       Pulse: 65 64 65    Resp: 22 22 22    Temp: 98.4 °F (36.9 °C) 98.4 °F (36.9 °C)  98.8 °F (37.1 °C)   TempSrc:    Esophageal   SpO2: 95%  100%    Weight:       Height:            I&O:   Intake/Output Summary (Last 24 hours) at 7/24/2024 0808  Last data filed at 7/24/2024 0801  Gross per 24 hour   Intake 3056.35 ml   Output 2325 ml   Net 731.35 ml       Resp Status: no changes to vent setting    Ventilator Settings:  Vent Mode: AC/PRVC Resp Rate (Set): 22 bpm/Vt (Set, mL): 600 mL/ /FiO2 : 70 %    Critical Care IV infusions:   heparin (PORCINE) Infusion 22 Units/kg/hr (07/24/24 0340)    sodium chloride Stopped (07/22/24 0432)    phenylephrine (JOLANTA-SYNEPHRINE) 50 mg in sodium chloride 0.9 % 250 mL infusion Stopped (07/20/24 0709)    fentaNYL 200 mcg/hr (07/24/24 0550)    midazolam 10 mg/hr (07/24/24 0551)    propofol 25 mcg/kg/min (07/24/24 0801)    dextrose      sodium chloride Stopped (07/22/24 0418)        LDA:   Peripheral IV 07/15/24 Posterior;Right Hand (Active)   Number of days: 8       Peripheral IV 07/16/24 Left;Proximal Forearm (Active)   Number of days: 7       Peripheral IV 07/16/24 Right Forearm (Active)   Number of days: 7       Peripheral IV 07/19/24 Left (Active)   Number of days: 5       Peripheral IV 07/20/24 Distal;Left;Anterior Cephalic (Active)   Number of days: 4       NG/OG/NJ/NE Tube 16 fr Left mouth (Active)   Number of days: 8       Urinary Catheter 07/15/24 (Active)   Number of days: 8       ETT  (Active)   Number of days: 8       Arterial Line 07/16/24 Right Radial (Active)   Number of days: 7       Wound Thigh Left

## 2024-07-24 NOTE — PROGRESS NOTES
Physical Therapy  DATE: 2024    NAME: John Rivas  MRN: [id number]   : 1975    Patient not seen this date for Physical Therapy due to:      [] Cancel by RN or physician due to:    [x] Pt remains intubated & sedated on vent      [] Critical Lab Value Level     [] Blood transfusion in progress    [] Acute or unstable cardiovascular status   _MAP < 55 or more than >115  _HR < 40 or > 130    [] Acute or unstable pulmonary status   -FiO2 > 60%   _RR < 5 or >40    _O2 sats < 85%    [] Strict Bedrest    [] Off Unit for surgery or procedure    [] Off Unit for testing       [] Pending imaging to R/O fracture    [] Refusal by Patient      [] Other      [] PT being discontinued at this time. Patient independent. No further needs.     [] PT being discontinued at this time as the patient has been transferred to hospice care. No further needs.      COMPA ECHOLS, PT

## 2024-07-24 NOTE — PROGRESS NOTES
Bedside Bronchoscopy done with Dr. Paiz.Patient placed on 100% RLL culture taken upstairs to lab. Patient tolerated well. Peep increased to 12.

## 2024-07-24 NOTE — PLAN OF CARE
Problem: Safety - Medical Restraint  Goal: Remains free of injury from restraints (Restraint for Interference with Medical Device)  Description: INTERVENTIONS:  1. Determine that other, less restrictive measures have been tried or would not be effective before applying the restraint  2. Evaluate the patient's condition at the time of restraint application  3. Inform patient/family regarding the reason for restraint  4. Q2H: Monitor safety, psychosocial status, comfort, nutrition and hydration  Outcome: Progressing  Flowsheets  Taken 7/24/2024 0000  Remains free of injury from restraints (restraint for interference with medical device):   Determine that other, less restrictive measures have been tried or would not be effective before applying the restraint   Evaluate the patient's condition at the time of restraint application   Inform patient/family regarding the reason for restraint   Every 2 hours: Monitor safety, psychosocial status, comfort, nutrition and hydration  Taken 7/23/2024 2200  Remains free of injury from restraints (restraint for interference with medical device):   Determine that other, less restrictive measures have been tried or would not be effective before applying the restraint   Evaluate the patient's condition at the time of restraint application   Inform patient/family regarding the reason for restraint   Every 2 hours: Monitor safety, psychosocial status, comfort, nutrition and hydration  Taken 7/23/2024 2027  Remains free of injury from restraints (restraint for interference with medical device):   Determine that other, less restrictive measures have been tried or would not be effective before applying the restraint   Evaluate the patient's condition at the time of restraint application   Inform patient/family regarding the reason for restraint   Every 2 hours: Monitor safety, psychosocial status, comfort, nutrition and hydration  Taken 7/23/2024 2000  Remains free of injury from  restraints (restraint for interference with medical device):   Evaluate the patient's condition at the time of restraint application   Determine that other, less restrictive measures have been tried or would not be effective before applying the restraint   Every 2 hours: Monitor safety, psychosocial status, comfort, nutrition and hydration   Inform patient/family regarding the reason for restraint     Problem: Discharge Planning  Goal: Discharge to home or other facility with appropriate resources  Outcome: Progressing     Problem: Respiratory - Adult  Goal: Achieves optimal ventilation and oxygenation  7/24/2024 0209 by Shama Turcios RN  Outcome: Progressing  7/23/2024 1933 by Annie Haney RCP  Outcome: Progressing  Goal: Adequate oxygenation  7/23/2024 1933 by Annie Haney RCP  Outcome: Progressing  Goal: Will be able to breathe spontaneously, without ventilator support  Description: Will be able to breathe spontaneously, without ventilator support  7/23/2024 1933 by Annie Haney RCP  Outcome: Progressing     Problem: Skin/Tissue Integrity  Goal: Absence of new skin breakdown  Description: 1.  Monitor for areas of redness and/or skin breakdown  2.  Assess vascular access sites hourly  3.  Every 4-6 hours minimum:  Change oxygen saturation probe site  4.  Every 4-6 hours:  If on nasal continuous positive airway pressure, respiratory therapy assess nares and determine need for appliance change or resting period.  Outcome: Progressing     Problem: Safety - Adult  Goal: Free from fall injury  Outcome: Progressing  Flowsheets (Taken 7/24/2024 0206)  Free From Fall Injury:   Instruct family/caregiver on patient safety   Based on caregiver fall risk screen, instruct family/caregiver to ask for assistance with transferring infant if caregiver noted to have fall risk factors     Problem: Pain  Goal: Verbalizes/displays adequate comfort level or baseline comfort level  Outcome: Progressing  Flowsheets

## 2024-07-24 NOTE — PLAN OF CARE
Problem: Respiratory - Adult  Goal: Achieves optimal ventilation and oxygenation  7/24/2024 0730 by Deya English RCP  Outcome: Progressing  7/24/2024 0209 by Shama Turcios, RN  Outcome: Progressing  Flowsheets (Taken 7/23/2024 2000)  Achieves optimal ventilation and oxygenation:   Assess for changes in respiratory status   Assess for changes in mentation and behavior   Oxygen supplementation based on oxygen saturation or arterial blood gases   Position to facilitate oxygenation and minimize respiratory effort   Initiate smoking cessation protocol as indicated   Encourage broncho-pulmonary hygiene including cough, deep breathe, incentive spirometry  7/23/2024 1933 by Annie Haney RCP  Outcome: Progressing  Goal: Adequate oxygenation  7/24/2024 0730 by Deya English RCP  Outcome: Progressing  7/23/2024 1933 by Annie Haney RCP  Outcome: Progressing  Goal: Will be able to breathe spontaneously, without ventilator support  Description: Will be able to breathe spontaneously, without ventilator support  7/24/2024 0730 by Deya English RCP  Outcome: Progressing  7/23/2024 1933 by Annie Haney RCP  Outcome: Progressing

## 2024-07-25 ENCOUNTER — APPOINTMENT (OUTPATIENT)
Dept: ULTRASOUND IMAGING | Age: 49
DRG: 130 | End: 2024-07-25
Payer: MEDICAID

## 2024-07-25 ENCOUNTER — APPOINTMENT (OUTPATIENT)
Dept: GENERAL RADIOLOGY | Age: 49
DRG: 130 | End: 2024-07-25
Payer: MEDICAID

## 2024-07-25 PROBLEM — I50.21 ACUTE SYSTOLIC CHF (CONGESTIVE HEART FAILURE) (HCC): Status: ACTIVE | Noted: 2024-07-25

## 2024-07-25 PROBLEM — I10 UNCONTROLLED HYPERTENSION: Status: ACTIVE | Noted: 2024-07-25

## 2024-07-25 LAB
ALBUMIN SERPL-MCNC: 2.8 G/DL (ref 3.5–5.2)
ALBUMIN SERPL-MCNC: 2.8 G/DL (ref 3.5–5.2)
ALP SERPL-CCNC: 93 U/L (ref 40–129)
ALP SERPL-CCNC: 93 U/L (ref 40–129)
ALT SERPL-CCNC: 19 U/L (ref 5–41)
ALT SERPL-CCNC: 19 U/L (ref 5–41)
ANION GAP SERPL CALCULATED.3IONS-SCNC: 12 MMOL/L (ref 9–17)
ANION GAP SERPL CALCULATED.3IONS-SCNC: 12 MMOL/L (ref 9–17)
ANTI-XA UNFRAC HEPARIN: 0.14 IU/L (ref 0.3–0.7)
ANTI-XA UNFRAC HEPARIN: 0.14 IU/L (ref 0.3–0.7)
ANTI-XA UNFRAC HEPARIN: 0.22 IU/L (ref 0.3–0.7)
ANTI-XA UNFRAC HEPARIN: 0.22 IU/L (ref 0.3–0.7)
AST SERPL-CCNC: 37 U/L
AST SERPL-CCNC: 37 U/L
BILIRUB DIRECT SERPL-MCNC: 0.5 MG/DL
BILIRUB DIRECT SERPL-MCNC: 0.5 MG/DL
BILIRUB INDIRECT SERPL-MCNC: 0 MG/DL (ref 0–1)
BILIRUB INDIRECT SERPL-MCNC: 0 MG/DL (ref 0–1)
BILIRUB SERPL-MCNC: 0.5 MG/DL (ref 0.3–1.2)
BILIRUB SERPL-MCNC: 0.5 MG/DL (ref 0.3–1.2)
BILIRUB UR QL STRIP: NEGATIVE
BILIRUB UR QL STRIP: NEGATIVE
BUN SERPL-MCNC: 24 MG/DL (ref 6–20)
BUN SERPL-MCNC: 24 MG/DL (ref 6–20)
BUN/CREAT SERPL: 14 (ref 9–20)
BUN/CREAT SERPL: 14 (ref 9–20)
C3 SERPL-MCNC: 289 MG/DL (ref 90–180)
C3 SERPL-MCNC: 289 MG/DL (ref 90–180)
C4 SERPL-MCNC: 64 MG/DL (ref 10–40)
C4 SERPL-MCNC: 64 MG/DL (ref 10–40)
CALCIUM SERPL-MCNC: 9.5 MG/DL (ref 8.6–10.4)
CALCIUM SERPL-MCNC: 9.5 MG/DL (ref 8.6–10.4)
CASE NUMBER:: NORMAL
CASE NUMBER:: NORMAL
CHLORIDE SERPL-SCNC: 99 MMOL/L (ref 98–107)
CHLORIDE SERPL-SCNC: 99 MMOL/L (ref 98–107)
CLARITY UR: CLEAR
CLARITY UR: CLEAR
CO2 SERPL-SCNC: 27 MMOL/L (ref 20–31)
CO2 SERPL-SCNC: 27 MMOL/L (ref 20–31)
COLOR UR: YELLOW
COLOR UR: YELLOW
CREAT SERPL-MCNC: 1.7 MG/DL (ref 0.7–1.2)
CREAT SERPL-MCNC: 1.7 MG/DL (ref 0.7–1.2)
EPI CELLS #/AREA URNS HPF: NORMAL /HPF (ref 0–5)
EPI CELLS #/AREA URNS HPF: NORMAL /HPF (ref 0–5)
ERYTHROCYTE [DISTWIDTH] IN BLOOD BY AUTOMATED COUNT: 14.3 % (ref 11.8–14.4)
ERYTHROCYTE [DISTWIDTH] IN BLOOD BY AUTOMATED COUNT: 14.3 % (ref 11.8–14.4)
GFR, ESTIMATED: 48 ML/MIN/1.73M2
GFR, ESTIMATED: 48 ML/MIN/1.73M2
GLUCOSE BLD-MCNC: 104 MG/DL (ref 75–110)
GLUCOSE BLD-MCNC: 104 MG/DL (ref 75–110)
GLUCOSE BLD-MCNC: 110 MG/DL (ref 75–110)
GLUCOSE BLD-MCNC: 110 MG/DL (ref 75–110)
GLUCOSE BLD-MCNC: 122 MG/DL (ref 75–110)
GLUCOSE BLD-MCNC: 122 MG/DL (ref 75–110)
GLUCOSE BLD-MCNC: 124 MG/DL (ref 75–110)
GLUCOSE BLD-MCNC: 124 MG/DL (ref 75–110)
GLUCOSE SERPL-MCNC: 101 MG/DL (ref 70–99)
GLUCOSE SERPL-MCNC: 101 MG/DL (ref 70–99)
GLUCOSE UR STRIP-MCNC: NEGATIVE MG/DL
GLUCOSE UR STRIP-MCNC: NEGATIVE MG/DL
HCT VFR BLD AUTO: 36.4 % (ref 40.7–50.3)
HCT VFR BLD AUTO: 36.4 % (ref 40.7–50.3)
HGB BLD-MCNC: 12.4 G/DL (ref 13–17)
HGB BLD-MCNC: 12.4 G/DL (ref 13–17)
HGB UR QL STRIP.AUTO: ABNORMAL
HGB UR QL STRIP.AUTO: ABNORMAL
KETONES UR STRIP-MCNC: NEGATIVE MG/DL
KETONES UR STRIP-MCNC: NEGATIVE MG/DL
LEUKOCYTE ESTERASE UR QL STRIP: ABNORMAL
LEUKOCYTE ESTERASE UR QL STRIP: ABNORMAL
MCH RBC QN AUTO: 35.3 PG (ref 25.2–33.5)
MCH RBC QN AUTO: 35.3 PG (ref 25.2–33.5)
MCHC RBC AUTO-ENTMCNC: 34.1 G/DL (ref 28.4–34.8)
MCHC RBC AUTO-ENTMCNC: 34.1 G/DL (ref 28.4–34.8)
MCV RBC AUTO: 103.7 FL (ref 82.6–102.9)
MCV RBC AUTO: 103.7 FL (ref 82.6–102.9)
MICROORGANISM SPEC CULT: NORMAL
MICROORGANISM/AGENT SPEC: NORMAL
NITRITE UR QL STRIP: NEGATIVE
NITRITE UR QL STRIP: NEGATIVE
NRBC BLD-RTO: 0 PER 100 WBC
NRBC BLD-RTO: 0 PER 100 WBC
PH UR STRIP: 5.5 [PH] (ref 5–8)
PH UR STRIP: 5.5 [PH] (ref 5–8)
PLATELET # BLD AUTO: 452 K/UL (ref 138–453)
PLATELET # BLD AUTO: 452 K/UL (ref 138–453)
PMV BLD AUTO: 10.5 FL (ref 8.1–13.5)
PMV BLD AUTO: 10.5 FL (ref 8.1–13.5)
POC HCO3: 28.7 MMOL/L (ref 21–28)
POC HCO3: 28.7 MMOL/L (ref 21–28)
POC O2 SATURATION: 94.9 % (ref 94–98)
POC O2 SATURATION: 94.9 % (ref 94–98)
POC PCO2: 44.5 MM HG (ref 35–48)
POC PCO2: 44.5 MM HG (ref 35–48)
POC PH: 7.42 (ref 7.35–7.45)
POC PH: 7.42 (ref 7.35–7.45)
POC PO2: 74.3 MM HG (ref 83–108)
POC PO2: 74.3 MM HG (ref 83–108)
POSITIVE BASE EXCESS, ART: 3.5 MMOL/L (ref 0–3)
POSITIVE BASE EXCESS, ART: 3.5 MMOL/L (ref 0–3)
POTASSIUM SERPL-SCNC: 3.9 MMOL/L (ref 3.7–5.3)
POTASSIUM SERPL-SCNC: 3.9 MMOL/L (ref 3.7–5.3)
PROT SERPL-MCNC: 6.7 G/DL (ref 6.4–8.3)
PROT SERPL-MCNC: 6.7 G/DL (ref 6.4–8.3)
PROT UR STRIP-MCNC: ABNORMAL MG/DL
PROT UR STRIP-MCNC: ABNORMAL MG/DL
RBC # BLD AUTO: 3.51 M/UL (ref 4.21–5.77)
RBC # BLD AUTO: 3.51 M/UL (ref 4.21–5.77)
RBC #/AREA URNS HPF: NORMAL /HPF (ref 0–2)
RBC #/AREA URNS HPF: NORMAL /HPF (ref 0–2)
SODIUM SERPL-SCNC: 138 MMOL/L (ref 135–144)
SODIUM SERPL-SCNC: 138 MMOL/L (ref 135–144)
SP GR UR STRIP: 1.02 (ref 1–1.03)
SP GR UR STRIP: 1.02 (ref 1–1.03)
SPECIMEN DESCRIPTION: NORMAL
TRIGL SERPL-MCNC: 287 MG/DL
TRIGL SERPL-MCNC: 287 MG/DL
UROBILINOGEN UR STRIP-ACNC: NORMAL EU/DL (ref 0–1)
UROBILINOGEN UR STRIP-ACNC: NORMAL EU/DL (ref 0–1)
VANCOMYCIN SERPL-MCNC: 25.7 UG/ML
VANCOMYCIN SERPL-MCNC: 25.7 UG/ML
WBC #/AREA URNS HPF: NORMAL /HPF (ref 0–5)
WBC #/AREA URNS HPF: NORMAL /HPF (ref 0–5)
WBC OTHER # BLD: 11.3 K/UL (ref 3.5–11.3)
WBC OTHER # BLD: 11.3 K/UL (ref 3.5–11.3)

## 2024-07-25 PROCEDURE — 99232 SBSQ HOSP IP/OBS MODERATE 35: CPT | Performed by: FAMILY MEDICINE

## 2024-07-25 PROCEDURE — 86225 DNA ANTIBODY NATIVE: CPT

## 2024-07-25 PROCEDURE — 6360000002 HC RX W HCPCS: Performed by: INTERNAL MEDICINE

## 2024-07-25 PROCEDURE — 71045 X-RAY EXAM CHEST 1 VIEW: CPT

## 2024-07-25 PROCEDURE — 6370000000 HC RX 637 (ALT 250 FOR IP): Performed by: FAMILY MEDICINE

## 2024-07-25 PROCEDURE — 82947 ASSAY GLUCOSE BLOOD QUANT: CPT

## 2024-07-25 PROCEDURE — 80076 HEPATIC FUNCTION PANEL: CPT

## 2024-07-25 PROCEDURE — 2580000003 HC RX 258

## 2024-07-25 PROCEDURE — 86038 ANTINUCLEAR ANTIBODIES: CPT

## 2024-07-25 PROCEDURE — 6360000002 HC RX W HCPCS: Performed by: PSYCHIATRY & NEUROLOGY

## 2024-07-25 PROCEDURE — 85027 COMPLETE CBC AUTOMATED: CPT

## 2024-07-25 PROCEDURE — 2700000000 HC OXYGEN THERAPY PER DAY

## 2024-07-25 PROCEDURE — 86160 COMPLEMENT ANTIGEN: CPT

## 2024-07-25 PROCEDURE — 2500000003 HC RX 250 WO HCPCS: Performed by: INTERNAL MEDICINE

## 2024-07-25 PROCEDURE — 80048 BASIC METABOLIC PNL TOTAL CA: CPT

## 2024-07-25 PROCEDURE — 85520 HEPARIN ASSAY: CPT

## 2024-07-25 PROCEDURE — 36415 COLL VENOUS BLD VENIPUNCTURE: CPT

## 2024-07-25 PROCEDURE — 94640 AIRWAY INHALATION TREATMENT: CPT

## 2024-07-25 PROCEDURE — 6370000000 HC RX 637 (ALT 250 FOR IP): Performed by: INTERNAL MEDICINE

## 2024-07-25 PROCEDURE — 2000000000 HC ICU R&B

## 2024-07-25 PROCEDURE — 87040 BLOOD CULTURE FOR BACTERIA: CPT

## 2024-07-25 PROCEDURE — 2580000003 HC RX 258: Performed by: INTERNAL MEDICINE

## 2024-07-25 PROCEDURE — 6360000002 HC RX W HCPCS

## 2024-07-25 PROCEDURE — 80202 ASSAY OF VANCOMYCIN: CPT

## 2024-07-25 PROCEDURE — 6360000002 HC RX W HCPCS: Performed by: FAMILY MEDICINE

## 2024-07-25 PROCEDURE — 82803 BLOOD GASES ANY COMBINATION: CPT

## 2024-07-25 PROCEDURE — 37799 UNLISTED PX VASCULAR SURGERY: CPT

## 2024-07-25 PROCEDURE — 99232 SBSQ HOSP IP/OBS MODERATE 35: CPT | Performed by: NURSE PRACTITIONER

## 2024-07-25 PROCEDURE — 81001 URINALYSIS AUTO W/SCOPE: CPT

## 2024-07-25 PROCEDURE — 83521 IG LIGHT CHAINS FREE EACH: CPT

## 2024-07-25 PROCEDURE — 94003 VENT MGMT INPAT SUBQ DAY: CPT

## 2024-07-25 PROCEDURE — 84478 ASSAY OF TRIGLYCERIDES: CPT

## 2024-07-25 PROCEDURE — 94761 N-INVAS EAR/PLS OXIMETRY MLT: CPT

## 2024-07-25 RX ORDER — CLONIDINE HYDROCHLORIDE 0.2 MG/1
0.2 TABLET ORAL 2 TIMES DAILY
Status: DISCONTINUED | OUTPATIENT
Start: 2024-07-25 | End: 2024-07-27 | Stop reason: HOSPADM

## 2024-07-25 RX ADMIN — VALPROIC ACID 250 MG: 250 SOLUTION ORAL at 12:58

## 2024-07-25 RX ADMIN — SODIUM CHLORIDE, PRESERVATIVE FREE 10 ML: 5 INJECTION INTRAVENOUS at 08:06

## 2024-07-25 RX ADMIN — QUETIAPINE FUMARATE 150 MG: 200 TABLET ORAL at 20:51

## 2024-07-25 RX ADMIN — VALPROIC ACID 250 MG: 250 SOLUTION ORAL at 16:38

## 2024-07-25 RX ADMIN — Medication 200 MCG/HR: at 07:37

## 2024-07-25 RX ADMIN — Medication 6 MG/HR: at 16:52

## 2024-07-25 RX ADMIN — CLONIDINE HYDROCHLORIDE 0.2 MG: 0.2 TABLET ORAL at 20:51

## 2024-07-25 RX ADMIN — Medication 10 MG/HR: at 05:59

## 2024-07-25 RX ADMIN — Medication 200 MCG/HR: at 14:35

## 2024-07-25 RX ADMIN — HEPARIN SODIUM 24 UNITS/KG/HR: 10000 INJECTION, SOLUTION INTRAVENOUS at 14:39

## 2024-07-25 RX ADMIN — VALPROIC ACID 250 MG: 250 SOLUTION ORAL at 00:32

## 2024-07-25 RX ADMIN — Medication 200 MCG/HR: at 00:47

## 2024-07-25 RX ADMIN — HEPARIN SODIUM 22 UNITS/KG/HR: 10000 INJECTION, SOLUTION INTRAVENOUS at 04:43

## 2024-07-25 RX ADMIN — IPRATROPIUM BROMIDE AND ALBUTEROL SULFATE 1 DOSE: 2.5; .5 SOLUTION RESPIRATORY (INHALATION) at 19:30

## 2024-07-25 RX ADMIN — HYDRALAZINE HYDROCHLORIDE 10 MG: 20 INJECTION INTRAMUSCULAR; INTRAVENOUS at 07:31

## 2024-07-25 RX ADMIN — SODIUM CHLORIDE, PRESERVATIVE FREE 10 ML: 5 INJECTION INTRAVENOUS at 20:53

## 2024-07-25 RX ADMIN — DOCUSATE SODIUM 100 MG: 50 LIQUID ORAL at 08:04

## 2024-07-25 RX ADMIN — HEPARIN SODIUM 4000 UNITS: 1000 INJECTION INTRAVENOUS; SUBCUTANEOUS at 14:39

## 2024-07-25 RX ADMIN — SODIUM CHLORIDE, PRESERVATIVE FREE 10 ML: 5 INJECTION INTRAVENOUS at 08:05

## 2024-07-25 RX ADMIN — LEVETIRACETAM 500 MG: 100 INJECTION, SOLUTION INTRAVENOUS at 16:39

## 2024-07-25 RX ADMIN — HEPARIN SODIUM 4000 UNITS: 1000 INJECTION INTRAVENOUS; SUBCUTANEOUS at 22:58

## 2024-07-25 RX ADMIN — VALPROIC ACID 250 MG: 250 SOLUTION ORAL at 06:27

## 2024-07-25 RX ADMIN — FUROSEMIDE 40 MG: 10 INJECTION, SOLUTION INTRAMUSCULAR; INTRAVENOUS at 16:39

## 2024-07-25 RX ADMIN — Medication 100 MG: at 08:04

## 2024-07-25 RX ADMIN — MULTIVITAMIN TABLET 1 TABLET: TABLET at 08:04

## 2024-07-25 RX ADMIN — Medication 200 MCG/HR: at 21:22

## 2024-07-25 RX ADMIN — ASPIRIN 81 MG CHEWABLE TABLET 81 MG: 81 TABLET CHEWABLE at 08:04

## 2024-07-25 RX ADMIN — LEVETIRACETAM 500 MG: 100 INJECTION, SOLUTION INTRAVENOUS at 05:48

## 2024-07-25 RX ADMIN — SODIUM CHLORIDE, PRESERVATIVE FREE 40 MG: 5 INJECTION INTRAVENOUS at 20:51

## 2024-07-25 RX ADMIN — DOCUSATE SODIUM 100 MG: 50 LIQUID ORAL at 20:51

## 2024-07-25 RX ADMIN — IPRATROPIUM BROMIDE AND ALBUTEROL SULFATE 1 DOSE: 2.5; .5 SOLUTION RESPIRATORY (INHALATION) at 07:42

## 2024-07-25 RX ADMIN — VANCOMYCIN HYDROCHLORIDE 1250 MG: 5 INJECTION, POWDER, LYOPHILIZED, FOR SOLUTION INTRAVENOUS at 17:38

## 2024-07-25 RX ADMIN — IPRATROPIUM BROMIDE AND ALBUTEROL SULFATE 1 DOSE: 2.5; .5 SOLUTION RESPIRATORY (INHALATION) at 11:57

## 2024-07-25 RX ADMIN — CLONAZEPAM 2 MG: 0.5 TABLET ORAL at 00:32

## 2024-07-25 RX ADMIN — NALOXEGOL OXALATE 25 MG: 25 TABLET, FILM COATED ORAL at 06:27

## 2024-07-25 RX ADMIN — AMLODIPINE BESYLATE 10 MG: 10 TABLET ORAL at 08:04

## 2024-07-25 RX ADMIN — IPRATROPIUM BROMIDE AND ALBUTEROL SULFATE 1 DOSE: 2.5; .5 SOLUTION RESPIRATORY (INHALATION) at 15:04

## 2024-07-25 RX ADMIN — FUROSEMIDE 40 MG: 10 INJECTION, SOLUTION INTRAMUSCULAR; INTRAVENOUS at 08:05

## 2024-07-25 RX ADMIN — SODIUM CHLORIDE, PRESERVATIVE FREE 10 ML: 5 INJECTION INTRAVENOUS at 21:28

## 2024-07-25 RX ADMIN — QUETIAPINE FUMARATE 150 MG: 200 TABLET ORAL at 08:04

## 2024-07-25 RX ADMIN — FOLIC ACID 1 MG: 1 TABLET ORAL at 08:04

## 2024-07-25 RX ADMIN — CLONAZEPAM 2 MG: 0.5 TABLET ORAL at 12:56

## 2024-07-25 ASSESSMENT — PULMONARY FUNCTION TESTS
PIF_VALUE: 29
PIF_VALUE: 27
PIF_VALUE: 33
PIF_VALUE: 29
PIF_VALUE: 31
PIF_VALUE: 32
PIF_VALUE: 28
PIF_VALUE: 27
PIF_VALUE: 27
PIF_VALUE: 28
PIF_VALUE: 26
PIF_VALUE: 28
PIF_VALUE: 27
PIF_VALUE: 30
PIF_VALUE: 29
PIF_VALUE: 30
PIF_VALUE: 29
PIF_VALUE: 29
PIF_VALUE: 28
PIF_VALUE: 28
PIF_VALUE: 41
PIF_VALUE: 27
PIF_VALUE: 27
PIF_VALUE: 26
PIF_VALUE: 29
PIF_VALUE: 27
PIF_VALUE: 27
PIF_VALUE: 31
PIF_VALUE: 29
PIF_VALUE: 28
PIF_VALUE: 29
PIF_VALUE: 29
PIF_VALUE: 28
PIF_VALUE: 29
PIF_VALUE: 29
PIF_VALUE: 26
PIF_VALUE: 28
PIF_VALUE: 28
PIF_VALUE: 26
PIF_VALUE: 28
PIF_VALUE: 29
PIF_VALUE: 27
PIF_VALUE: 27
PIF_VALUE: 29
PIF_VALUE: 27
PIF_VALUE: 29
PIF_VALUE: 50
PIF_VALUE: 29
PIF_VALUE: 27
PIF_VALUE: 29
PIF_VALUE: 29
PIF_VALUE: 27
PIF_VALUE: 32
PIF_VALUE: 26
PIF_VALUE: 30
PIF_VALUE: 29
PIF_VALUE: 30
PIF_VALUE: 30
PIF_VALUE: 38
PIF_VALUE: 29
PIF_VALUE: 28
PIF_VALUE: 31
PIF_VALUE: 28
PIF_VALUE: 29
PIF_VALUE: 29
PIF_VALUE: 27
PIF_VALUE: 29
PIF_VALUE: 29
PIF_VALUE: 28
PIF_VALUE: 27
PIF_VALUE: 30
PIF_VALUE: 28
PIF_VALUE: 28
PIF_VALUE: 27
PIF_VALUE: 29
PIF_VALUE: 33
PIF_VALUE: 33
PIF_VALUE: 32
PIF_VALUE: 28
PIF_VALUE: 29
PIF_VALUE: 38
PIF_VALUE: 28
PIF_VALUE: 33
PIF_VALUE: 31
PIF_VALUE: 33
PIF_VALUE: 33
PIF_VALUE: 29
PIF_VALUE: 29
PIF_VALUE: 32
PIF_VALUE: 27
PIF_VALUE: 28
PIF_VALUE: 29
PIF_VALUE: 27
PIF_VALUE: 27
PIF_VALUE: 30

## 2024-07-25 ASSESSMENT — PAIN SCALES - GENERAL
PAINLEVEL_OUTOF10: 0
PAINLEVEL_OUTOF10: 1
PAINLEVEL_OUTOF10: 1

## 2024-07-25 NOTE — PROGRESS NOTES
Patient had high residual at 00:00 of 250 mL held tube feed for an hour rechecked residual was 170 mL restarted tube feed still at 30 mL/hr.

## 2024-07-25 NOTE — PLAN OF CARE
Problem: Respiratory - Adult  Goal: Achieves optimal ventilation and oxygenation  7/25/2024 1932 by Corey Boone RCP  Outcome: Progressing     Problem: Respiratory - Adult  Goal: Adequate oxygenation  7/25/2024 1932 by Corey Boone RCP  Outcome: Progressing     Problem: Respiratory - Adult  Goal: Will be able to breathe spontaneously, without ventilator support  Description: Will be able to breathe spontaneously, without ventilator support  7/25/2024 1932 by Corey Boone RCP  Outcome: Progressing

## 2024-07-25 NOTE — PROGRESS NOTES
Pulmonary Critical Care Progress Note    Patient seen for the follow up of Acute alcoholic intoxication with complication (HCC)     Subjective:    He got more agitated on decreasing sedation had to be started back on propofol overnight at 10 weaned off at this point...  He still requires Versed at 8 mg/h.  He required fentanyl at 200 mcg/h...  He still requires 65% oxygen with PEEP of 12... He is still spiking fevers.. He  tolerates tube feeds.      Examination:    Vitals: BP (!) 167/63   Pulse 94   Temp (!) 100.8 °F (38.2 °C)   Resp 22   Ht 1.803 m (5' 11\")   Wt 103 kg (227 lb 1.2 oz)   SpO2 91%   BMI 31.67 kg/m²   SpO2  Av.9 %  Min: 90 %  Max: 100 %  General appearance: Intubated sedated  Neck: No JVD  Lungs: Mild decreased breath sound no crackles or wheezes  Heart: regular rate and rhythm, S1, S2 normal, no gallop  Abdomen: Soft, non tender, + BS  Extremities: no cyanosis or clubbing. No significant edema    LABs:    CBC:   Recent Labs     24  0530 24  0608   WBC 9.6 11.3   HGB 12.4* 12.4*   HCT 37.0* 36.4*    452       BMP:   Recent Labs     24  0530 24  0608    138   K 4.2 3.9   CO2 24 27   BUN 22* 24*   CREATININE 1.5* 1.7*   LABGLOM 56* 48*   GLUCOSE 92 101*       PT/INR:   Recent Labs     24  1430   PROTIME 13.3   INR 1.0       APTT:  Recent Labs     24  1430   APTT 30.5       LIVER PROFILE:  Recent Labs     24  1416 24  0608   AST 42* 37   ALT 20 19        Latest Reference Range & Units 24 03:02 24 04:45 24 12:08   Pro-BNP <300 pg/mL 543 (H)     Triglycerides <150 mg/dL  235 (H) 182 (H)   (H): Data is abnormally high   Latest Reference Range & Units 07/15/24 19:30   Amphetamine Screen, Ur NEGATIVE  NEGATIVE   Barbiturate Screen, Ur NEGATIVE  NEGATIVE   Benzodiazepine Screen, Urine NEGATIVE  NEGATIVE   Cannabinoid Scrn, Ur NEGATIVE  POSITIVE !   Cocaine Metabolite, Urine NEGATIVE  POSITIVE !   Fentanyl, Ur NEGATIVE   NEGATIVE   Methadone Screen, Urine NEGATIVE  NEGATIVE   Oxycodone Screen, Ur NEGATIVE  NEGATIVE   Opiates, Urine NEGATIVE  NEGATIVE   URINE DRUG SCREEN  Rpt !   Phencyclidine, Urine NEGATIVE  NEGATIVE   !: Data is abnormal  Rpt: View report in Results Review for more information     Latest Reference Range & Units 07/16/24 03:02   Pro-BNP <300 pg/mL 543 (H)   (H): Data is abnormally high   Latest Reference Range & Units 07/21/24 03:50   Pro-BNP <300 pg/mL 1,414 (H)   Triglycerides <150 mg/dL 275 (H)   (H): Data is abnormally high     Latest Reference Range & Units 07/15/24 19:15 07/19/24 14:22   Troponin, High Sensitivity 0 - 22 ng/L 32 (H) 24 (H)   (H): Data is abnormally high     Latest Reference Range & Units 07/23/24 03:48   POC HCO3 21.0 - 28.0 mmol/L 28.4 (H)   POC O2 SAT 94.0 - 98.0 % 91.3 (L)   POC pCO2 35.0 - 48.0 mm Hg 48.9 (H)   POC pCO2 Temp mm Hg 50.0   POC pH 7.350 - 7.450  7.372   POC PO2 83.0 - 108.0 mm Hg 64.4 (L)   (H): Data is abnormally high  (L): Data is abnormally low    Sputum culture 7/21  Staph aureus light growth    Radiology:  Chest x-ray 7/25  CHF is improving compared to the study from 07/24/2024.           Chest x-ray 7/24  Reviewed unchanged bibasilar opacities/congestion           Chest x-ray 7/21           Chest x-ray 7/20              Chest x-ray 7/17    CT brain    1. No acute intracranial abnormality.  2. Chronic sinusitis.        Echocardiogram :    he left ventricle appears normal in size mildly increased LV wall thickness, no obvious wall motion abnormality noted  Normal LV systolic function, ejection fraction 55 to 60%.  Grade 1 LV diastolic dysfunction  The right ventricle appears normal in size and function  The left and the right atrium appears normal in size  The aortic valve structure appears normal trace regurgitation no stenosis  Mitral valve structure appears normal, trace regurgitation no stenosis  Tricuspid valve structure appears normal trace regurgitation no  Cardene drip  Hydralazine as needed  Discontinue Norvasc  Clonidine point to twice daily     Pulmonary edema/MAIK     Lasix 40 IV  twice daily/monitor urine output  monitor urine output and BMP  Nephrology consult/discussed with nephrology    Right inguinal hernia   discussed with RN    Discussed with clinical pharmacist  Discussed with primary    Peptic ulcer disease prophylaxis  DVT prophylaxis      Koko Paiz MD, MD, [facility]  Pulmonary Critical Care and Sleep Medicine,  [facility]  Cell: [phone]  Office: [phone]

## 2024-07-25 NOTE — PROGRESS NOTES
Nutrition Assessment     Type and Reason for Visit: Reassess    Nutrition Recommendations/Plan:   NPO  Advance TF to new goal rate of 75mL/hour (1800mL total volume)   Continue to monitor TF rate, tolerance, residuals and weights      Malnutrition Assessment:  Malnutrition Status: Insufficient data    Nutrition Assessment:  Patient remains intubated and sedated. Trying to wean sedation to extubate, although pt becomes agitated. He was started on lasix. Tolerating TF but residuals are inconsistent. Last night (7/25) residuals were 150-250mL and TF was shut off for an hour then restarted. Residuals this morning were 30mL. Patient's weight has gone up by 20lbs in 7 days; writer is uncertain about the validity of the weights d/t un-identified patient, ventilatory support and fluid retention. Weights seem most consistent with 220lbs. TF goal rate will be increased to 75mL/hour to provide 2160kcals total. Continue to monitor TF rate, tolerance, residuals and weights.    Estimated Daily Nutrient Needs:  Energy (kcal):  2000kcals - 2500kcals (20-25kcal/kg) Weight Used for Energy Requirements: Current     Protein (g):  117g protien (1.5g/kg IBW) Weight Used for Protein Requirements: Ideal (220lbs (7/23))        Fluid (ml/day):  2500ml (1ml/kcal) Method Used for Fluid Requirements: Other (Comment) (220lbs (7/23))    Nutrition Related Findings:   Edema: +2 pitting BUE, +1 non-pitting BLE. Wound Type: Venous Stasis (left thigh)    Current Nutrition Therapies:    Diet NPO  ADULT TUBE FEEDING; Nasogastric; Peptide Based; Continuous; 15; Yes; 15; Q 4 hours; 60; 30; Q 4 hours    Anthropometric Measures:  Height: 180.3 cm (5' 11\")  Current Body Wt: 103 kg (227 lb)   BMI: 31.7    Nutrition Diagnosis:   Inadequate oral intake related to impaired respiratory function as evidenced by intubation, NPO or clear liquid status due to medical condition  Inadequate protein-energy intake related to lack or limited access to food as evidenced by  other (comment) (homeless and alcohol abuse)    Nutrition Interventions:   Food and/or Nutrient Delivery: Modify Tube Feeding  Nutrition Education/Counseling: Education not indicated  Coordination of Nutrition Care: Continue to monitor while inpatient       Goals:  Previous Goal Met: Progressing toward Goal(s)  Goals: Meet at least 75% of estimated needs, by next RD assessment       Nutrition Monitoring and Evaluation:      Food/Nutrient Intake Outcomes: Parenteral Nutrition Intake/Tolerance  Physical Signs/Symptoms Outcomes: Biochemical Data, Fluid Status or Edema, Skin, Weight, GI Status    Discharge Planning:    Too soon to determine     Rosa Rai  Dietetic Intern       Reviewed and approved by:  Arabella MAIERN, RDN, LDN  Lead Clinical Dietitian  RD Office Phone [phone]

## 2024-07-25 NOTE — CONSULTS
NEPHROLOGY     REASON FOR CONSULT:     MAIK (BUN/Creat 1.7          with baseline creatinine less than 1                )      Risk Factors for MAIK:  #1 hemodynamic blood pressure fluctuations  #2 bilateral aspiration pneumonia  #3 concomitant vancomycin use    ASSESSMENT     Acute kidney injury nonoliguric secondary to ischemic (bilateral pneumonia blood pressure fluctuations) and nephrotoxic ATN from vancomycin use -evolving.  Creatinine peaked to 2.2.  Baseline creatinine normal less than 1  Volume overload secondary to capillary leak/iatrogenic  Found on secondary to acute alcoholic intoxication with toxin positive for cocaine as well  Bilateral lower lobe aspiration pneumonia on antibiotics with bronchoscopy showing Staphylococcus aureus light growth  Labile hypertension  Acute hypoxic respiratory failure secondary to #3  History of coronary artery disease status post stent placement with ejection fraction 45%  History of hypertension in the past     PLAN     Continue enteral feedings and mechanical ventilation.  Settings noted.  FiO2 of 65%.  Will attempt to keep negative balance.  Agree to IV Lasix  Try alternative to vancomycin  Ultrasound/urine studies/serologies as ordered  Anticipate improvement in renal function      HISTORY OF PRESENTING ILLNESS                 This is a 49 y.o. male who was admitted on 15 July due to concerns for acute alcoholic intoxication where he was found on the side of a fast food restaurant and EMS was called.  Patient did admit to drinking alcohol and was verbally abusive and aggressive in the emergency department.  He got intubated because of management of airway protection.    Further workup showed alcohol levels of 423.43 with CPK of 330 and urine studies positive for cocaine and cannabinoids.  Marginal derangement of liver parameters is also noted.  CT imaging of the head did not show any acute intracranial pathology.  CT cervical spine no acute osseous deformity but has  To Answer (7/16/2024)    PRAPARE - Transportation     Lack of Transportation (Medical): Patient unable to answer     Lack of Transportation (Non-Medical): Patient unable to answer   Physical Activity: Not on file   Stress: Not on file   Social Connections: Patient Unable To Answer (7/25/2024)    Social Connections ([facility] HRSN)     If for any reason you need help with day-to-day activities such as bathing, preparing meals, shopping, managing finances, etc., do you get the help you need?: Not on file   Intimate Partner Violence: Not on file   Housing Stability: Patient Unable To Answer (7/16/2024)    Housing Stability Vital Sign     Unable to Pay for Housing in the Last Year: Patient unable to answer     Number of Places Lived in the Last Year: Not on file     Unstable Housing in the Last Year: Patient unable to answer       FAMILY HISTORY     Family History   Problem Relation Age of Onset    Hypertension Mother     High Cholesterol Father     Hypertension Father     Hypertension Paternal Uncle           REVIEW OF SYSTEM     Unable to obtain as intubated mechanically ventilated      PHYSICAL EXAM     Vitals:    07/25/24 1030 07/25/24 1147 07/25/24 1157 07/25/24 1215   BP:       Pulse: 98  97 94   Resp: (!) 8  22 22   Temp: (!) 100.6 °F (38.1 °C) (!) 100.6 °F (38.1 °C)  (!) 100.8 °F (38.2 °C)   TempSrc:  Esophageal  Esophageal   SpO2: 93%  93% 91%   Weight:       Height:         24HR INTAKE/OUTPUT:    Intake/Output Summary (Last 24 hours) at 7/25/2024 1249  Last data filed at 7/25/2024 1200  Gross per 24 hour   Intake 1737.02 ml   Output 4475 ml   Net -2737.98 ml       General appearance: Mechanical ventilation  Respiratory: vesicular breath sounds,no wheeze/crackles  Cardiovascular: S1 S2 normal,no gallop or organic murmur.No carotid bruit  Abdomen:Non tender/non distended.Bowel sounds present  Extremities: No Cyanosis or Clubbing, present lower extremity edema  Neurological: Mechanical ventilation    INVESTIGATIONS

## 2024-07-25 NOTE — PROGRESS NOTES
End Of Shift Note  [facility]  Summary of shift: Pt remains sedated and intubated. No fevers this shift; esophageal probe remains in. Tube feed residuals were 10, 75, 200. BID Lasix started per Dr. Paiz; had a total of 2,525mL urine from [place]. BID Colace started d/t still no BM since admit but has active bowel sounds. Dr. Paiz did a bed-side bronchoscopy today; sputum culture sent. Blood sugar 52 beginning of shift, followed PRN orders (see MAR); recheck was 128. Meds adjusted by Dr. Cooley to help w/increased BP.     Vitals:    Vitals:    07/24/24 1915 07/24/24 1930 07/24/24 1943 07/24/24 1945   BP:       Pulse: 72 74 74 74   Resp: 22 22 22 20   Temp: 100 °F (37.8 °C) 100 °F (37.8 °C)     TempSrc:       SpO2: 93% 91% 92% 93%   Weight:       Height:            I&O:   Intake/Output Summary (Last 24 hours) at 7/24/2024 2011  Last data filed at 7/24/2024 1847  Gross per 24 hour   Intake 2839.69 ml   Output 3025 ml   Net -185.31 ml       Resp Status: mechanical vent    Ventilator Settings:  Vent Mode: AC/PRVC Resp Rate (Set): 22 bpm/Vt (Set, mL): 600 mL/ /FiO2 : 70 %    Critical Care IV infusions:   heparin (PORCINE) Infusion 22 Units/kg/hr (07/24/24 1847)    sodium chloride Stopped (07/22/24 0432)    fentaNYL 200 mcg/hr (07/24/24 1847)    midazolam 10 mg/hr (07/24/24 1847)    propofol Stopped (07/24/24 1744)    dextrose      sodium chloride Stopped (07/22/24 0418)        LDA:   Peripheral IV 07/15/24 Posterior;Right Hand (Active)   Number of days: 9       Peripheral IV 07/16/24 Left;Proximal Forearm (Active)   Number of days: 8       Peripheral IV 07/16/24 Right Forearm (Active)   Number of days: 8       Peripheral IV 07/19/24 Left (Active)   Number of days: 5       Peripheral IV 07/20/24 Distal;Left;Anterior Cephalic (Active)   Number of days: 4       NG/OG/NJ/NE Tube 16 fr Left mouth (Active)   Number of days: 9       Urinary Catheter 07/15/24 (Active)   Number of days: 9       ETT  (Active)   Number of

## 2024-07-25 NOTE — PROGRESS NOTES
Physical Therapy  DATE: 2024    NAME: John Rivas  MRN: [id number]   : 1975    Patient not seen this date for Physical Therapy due to:      [] Cancel by RN or physician due to:    [] Hemodialysis    [] Critical Lab Value Level     [] Blood transfusion in progress    [] Acute or unstable cardiovascular status   _MAP < 55 or more than >115  _HR < 40 or > 130    [] Acute or unstable pulmonary status   -FiO2 > 60%   _RR < 5 or >40    _O2 sats < 85%    [x] Pt remains intubated & sedated on vent      [] Off Unit for surgery or procedure    [] Off Unit for testing       [] Pending imaging to R/O fracture    [] Refusal by Patient      [] Other      [] PT being discontinued at this time. Patient independent. No further needs.     [] PT being discontinued at this time as the patient has been transferred to hospice care. No further needs.      COMPA ECHOLS, PT

## 2024-07-25 NOTE — PROGRESS NOTES
[facility]  Progress Note    John Rivas  MRN: [id number]  Date: 7/25/2024  LOS: 10     Reason for F/U :   Pneumonia    Impression :     Acute respiratory failure with hypoxia, currently ventilator dependent  Acute systolic heart failure  Fevers  Concern for pneumonia  Polysubstance abuse with acute alcohol intoxication  Possible seizure and has significant encephalopathy  Chronic kidney disease stage III    Recommendations:   Patient continues on vancomycin  Sputum culture does have Staphylococcus aureus isolated  MRSA PCR positive  Patient was previously on Zosyn 7/15/2024 through 7/24/2024  We will continue to follow sputum culture and blood cultures and will adjust therapy accordingly    Infection Control Recommendations:   Universal precautions    Discharge Planning:   Estimated Length of IV antimicrobials: To be determined  Patient will need Midline Catheter Insertion/ PICC line Insertion: No  Patient will need: Home IV , Infusion Center,  SNF,  LTAC: Undetermined  Patient willneed outpatient wound care: No    Medical Decision making / Summary of Stay:   John Rivas is a 49 y.o.-year-old male who was initially admitted on 7/15/2024.      John is seen in consultation on hospital day #9     He was admitted 7/15/2024 due to concerns for alcohol intoxication and he was found on the side of a fast food restaurant and EMS was called.  The patient admitted to drinking 1/5 of alcohol was verbally abusive and aggressive in the emergency department and the patient was intubated in the emergency department for airway protection     Alcohol level was at 423.43 with a sodium 133 potassium 2.9 total CPK of 330 and he was also positive on the urine drug screen for cocaine and cannabinoids.  LFTs were abnormal with an ALT of 56 AST of 123 and arterial blood gas with a pH of 7.218 with a pCO2 of 68.5 and a pO2 of 113 point  CT imaging of the head did not show any acute intracranial abnormality, CT of  patients   needing enhanced precautions.    The test is not intended to identify patients with staphylococcal infections.  Results   should not be used to guide or monitor treatment for MRSA infections.         Blood Culture 1 [[id number]] Collected: 07/15/24 2005    Order Status: Completed Specimen: Blood Updated: 07/20/24 2307     Specimen Description .BLOOD     Special Requests RT FA 10ML     Culture NO GROWTH 5 DAYS    Culture, Blood 2 [[id number]]     Order Status: Canceled Specimen: Blood     Culture, Urine [[id number]] Collected: 07/15/24 1930    Order Status: Completed Specimen: Urine, clean catch Updated: 07/16/24 2132     Specimen Description .CLEAN CATCH URINE     Special Requests Site: Urine     Culture NO SIGNIFICANT GROWTH            Medications:      furosemide  40 mg IntraVENous BID    amLODIPine  10 mg Per NG tube Daily    docusate  100 mg Per NG tube BID    aspirin  81 mg Per NG tube Daily    vancomycin (VANCOCIN) intermittent dosing (placeholder)   Other RX Placeholder    vancomycin  1,500 mg IntraVENous Q24H    valproic acid  250 mg Per NG tube 4 times per day    naloxegol  25 mg Per NG tube QAM AC    ipratropium 0.5 mg-albuterol 2.5 mg  1 Dose Inhalation 4x Daily RT    levETIRAcetam  500 mg IntraVENous Q12H    QUEtiapine  150 mg Per NG tube BID    LORazepam  2 mg IntraVENous Once    labetalol  10 mg IntraVENous Once    sodium chloride flush  5-40 mL IntraVENous 2 times per day    clonazePAM  2 mg Per NG tube Q12H    thiamine mononitrate  100 mg Orogastric Daily    folic acid  1 mg Orogastric Daily    multivitamin  1 tablet Orogastric Daily    sodium chloride flush  5-40 mL IntraVENous 2 times per day    pantoprazole (PROTONIX) 40 mg in sodium chloride (PF) 0.9 % 10 mL injection  40 mg IntraVENous Nightly       Electronically signed by RAUL KO CNP on 7/25/2024 at 8:53 AM      [facility]  RAUL KO CNP  [employer] messaging  OFFICE:  [phone]    Thank you for allowing us to participate in the care of this patient. Please call with questions.    This note is created with the assistance of a speech recognition program.  While intending to generate a document that actually reflects the content of the visit, the document can still have some errors including those of syntax and sound a like substitutions which may escape proof reading.  In such instances, actual meaning can be extrapolated by contextual diversion.

## 2024-07-25 NOTE — PLAN OF CARE
Problem: Safety - Medical Restraint  Goal: Remains free of injury from restraints (Restraint for Interference with Medical Device)  Description: INTERVENTIONS:  1. Determine that other, less restrictive measures have been tried or would not be effective before applying the restraint  2. Evaluate the patient's condition at the time of restraint application  3. Inform patient/family regarding the reason for restraint  4. Q2H: Monitor safety, psychosocial status, comfort, nutrition and hydration  7/24/2024 2338 by Rosette Hurd RN  Outcome: Progressing  Flowsheets  Taken 7/24/2024 2200 by Rosette Hurd RN  Remains free of injury from restraints (restraint for interference with medical device):   Determine that other, less restrictive measures have been tried or would not be effective before applying the restraint   Evaluate the patient's condition at the time of restraint application   Inform patient/family regarding the reason for restraint   Every 2 hours: Monitor safety, psychosocial status, comfort, nutrition and hydration  Taken 7/24/2024 2000 by Rosette Hurd RN  Remains free of injury from restraints (restraint for interference with medical device):   Determine that other, less restrictive measures have been tried or would not be effective before applying the restraint   Evaluate the patient's condition at the time of restraint application   Inform patient/family regarding the reason for restraint   Every 2 hours: Monitor safety, psychosocial status, comfort, nutrition and hydration  Taken 7/24/2024 1800 by Lara Maradiaga RN  Remains free of injury from restraints (restraint for interference with medical device):   Determine that other, less restrictive measures have been tried or would not be effective before applying the restraint   Evaluate the patient's condition at the time of restraint application   Inform patient/family regarding the reason for restraint   Every 2 hours: Monitor  Corey Boone RCP  Outcome: Progressing  Goal: Will be able to breathe spontaneously, without ventilator support  Description: Will be able to breathe spontaneously, without ventilator support  7/24/2024 1938 by Corey Boone RCP  Outcome: Progressing     Problem: Skin/Tissue Integrity  Goal: Absence of new skin breakdown  Description: 1.  Monitor for areas of redness and/or skin breakdown  2.  Assess vascular access sites hourly  3.  Every 4-6 hours minimum:  Change oxygen saturation probe site  4.  Every 4-6 hours:  If on nasal continuous positive airway pressure, respiratory therapy assess nares and determine need for appliance change or resting period.  7/24/2024 2338 by Rosette Hurd RN  Outcome: Progressing  7/24/2024 1535 by Lara Maradiaga RN  Outcome: Progressing     Problem: Safety - Adult  Goal: Free from fall injury  7/24/2024 2338 by Rosette Hurd RN  Outcome: Progressing  7/24/2024 1535 by Lara Maradiaga RN  Outcome: Progressing     Problem: Pain  Goal: Verbalizes/displays adequate comfort level or baseline comfort level  7/24/2024 2338 by Rosette Hurd RN  Outcome: Progressing  7/24/2024 1535 by Lara Maradiaga RN  Outcome: Progressing     Problem: Nutrition Deficit:  Goal: Optimize nutritional status  7/24/2024 2338 by Rosette Hurd RN  Outcome: Progressing  7/24/2024 1535 by Lara Maradiaga RN  Outcome: Progressing     Problem: ABCDS Injury Assessment  Goal: Absence of physical injury  7/24/2024 2338 by Rosette Hurd RN  Outcome: Progressing  Flowsheets (Taken 7/24/2024 2337)  Absence of Physical Injury: Implement safety measures based on patient assessment  7/24/2024 1535 by Lara Maradiaga RN  Outcome: Progressing

## 2024-07-25 NOTE — PLAN OF CARE
Problem: Safety - Medical Restraint  Goal: Remains free of injury from restraints (Restraint for Interference with Medical Device)  Description: INTERVENTIONS:  1. Determine that other, less restrictive measures have been tried or would not be effective before applying the restraint  2. Evaluate the patient's condition at the time of restraint application  3. Inform patient/family regarding the reason for restraint  4. Q2H: Monitor safety, psychosocial status, comfort, nutrition and hydration  7/25/2024 0847 by Farideh Oliva RN  Outcome: Progressing  Flowsheets  Taken 7/25/2024 0800 by Farideh Oliva RN  Remains free of injury from restraints (restraint for interference with medical device): Determine that other, less restrictive measures have been tried or would not be effective before applying the restraint  Taken 7/25/2024 0600 by Rosette Hurd RN  Remains free of injury from restraints (restraint for interference with medical device):   Determine that other, less restrictive measures have been tried or would not be effective before applying the restraint   Evaluate the patient's condition at the time of restraint application   Inform patient/family regarding the reason for restraint   Every 2 hours: Monitor safety, psychosocial status, comfort, nutrition and hydration  Taken 7/25/2024 0400 by Rosette Hurd RN  Remains free of injury from restraints (restraint for interference with medical device):   Determine that other, less restrictive measures have been tried or would not be effective before applying the restraint   Evaluate the patient's condition at the time of restraint application   Inform patient/family regarding the reason for restraint   Every 2 hours: Monitor safety, psychosocial status, comfort, nutrition and hydration  Taken 7/25/2024 0200 by Rosette Hurd RN  Remains free of injury from restraints (restraint for interference with medical device):   Determine  with medical device):   Determine that other, less restrictive measures have been tried or would not be effective before applying the restraint   Evaluate the patient's condition at the time of restraint application   Inform patient/family regarding the reason for restraint   Every 2 hours: Monitor safety, psychosocial status, comfort, nutrition and hydration  Taken 7/24/2024 1600 by Lara Maradiaga RN  Remains free of injury from restraints (restraint for interference with medical device):   Determine that other, less restrictive measures have been tried or would not be effective before applying the restraint   Evaluate the patient's condition at the time of restraint application   Inform patient/family regarding the reason for restraint   Every 2 hours: Monitor safety, psychosocial status, comfort, nutrition and hydration     Problem: Respiratory - Adult  Goal: Achieves optimal ventilation and oxygenation  7/25/2024 0847 by Farideh Oliva RN  Outcome: Progressing  7/24/2024 2338 by Rosette Hurd RN  Outcome: Progressing  Flowsheets (Taken 7/24/2024 2000)  Achieves optimal ventilation and oxygenation:   Assess for changes in mentation and behavior   Assess for changes in respiratory status   Assess the need for suctioning and aspirate as needed   Assess and instruct to report shortness of breath or any respiratory difficulty  7/24/2024 1938 by Corey Boone RCP  Outcome: Progressing  Goal: Adequate oxygenation  7/24/2024 1938 by Corey Boone RCP  Outcome: Progressing  Goal: Will be able to breathe spontaneously, without ventilator support  Description: Will be able to breathe spontaneously, without ventilator support  7/24/2024 1938 by Corey Boone RCP  Outcome: Progressing     Problem: Safety - Adult  Goal: Free from fall injury  7/25/2024 0847 by Farideh Oliva RN  Outcome: Progressing  7/24/2024 2338 by Rosette Hurd RN  Outcome: Progressing     Problem: Skin/Tissue

## 2024-07-25 NOTE — PROGRESS NOTES
[facility]  Office: [phone]  Klever Dickens DO, Eulogio Sr DO, Jerome Worley DO, Warren Jovel DO, Vaughn Jefferson MD, Krista Andrews MD, Ana M Cooley MD, Nia Wright MD,  Doc Metz MD, Mario Alberto Lizama MD, Shahla Dutta MD,  Dolly Wu DO, Nancy Mata MD, James Boston MD, Trey Dickens DO, Adriana Spencer MD,  Luis Alberto Thornton DO, Yanet Horan MD, Renee King MD, Fely Gomez MD, Kady Macisa MD,  Kayden Murillo MD, Ramsey Valentine MD, Airam Gonsales MD, Angelina Cruz MD, Devyn Gates MD, Neil Looney MD, Navin King DO, Tera Prajapati DO, Julieta Gomez MD,  Kael Farias MD, Shirley Waterhouse, CNP,  Valencia Regalado CNP, Lobo Rojas, CNP,  Karen Mascorro, NELIA, Ania Oro, CNP, Kyra Isbell, CNP, Shayla Song, CNP, Jackelin Woody, CNP, Hodan Cruz, PA-C, Kim Woodard PA-C, Destiney Manriquez, CNP, Vania Silva, CNP, Hailey Carson, CNP, Brooke Olvera, CNP, Arabella Rincon CNP, Rebecca Zavala, CNS, Laurie Crandall, CNP, Melissa Pat CNP, Tracy Schwab, CNP       [facility]      Daily Progress Note     Admit Date: 7/15/2024  Bed/Room No.  1104/1104-01  Admitting Physician : Ana M Cooley MD  Code Status :Full Code  Hospital Day:  LOS: 10 days   Chief Complaint:     Chief Complaint   Patient presents with    Alcohol Intoxication     Principal Problem:    Acute alcoholic intoxication with complication (HCC)  Active Problems:    Alcohol intoxication in alcoholism with blood level over 0.3 with delirium (HCC)    Aspiration pneumonia of both upper lobes due to vomit (HCC)    Acute respiratory failure with hypoxia (HCC)    On mechanically assisted ventilation (HCC)    Acute encephalopathy    MAIK (acute kidney injury) (HCC)    Seizure due to alcohol withdrawal, with unspecified complication (HCC)  Resolved Problems:    * No resolved hospital problems. *    Subjective :        Interval History/Significant events :     2. Chronic sinusitis.         XR CHEST PORTABLE   Final Result   1. No significant change.         XR CHEST PORTABLE   Final Result   Right basilar opacity could represent subsegmental atelectasis or infection         XR CHEST PORTABLE   Final Result   Right no acute cardiopulmonary process.      Support tubes as described above.         US RETROPERITONEAL COMPLETE   Final Result   Unremarkable ultrasound of the kidneys and urinary bladder.         XR CHEST PORTABLE   Final Result   No consolidation or venous congestion.         Vascular duplex pseudoaneurysm check groin right   Final Result      XR CHEST PORTABLE   Final Result   1. Interval resolution of previously demonstrated pulmonary vascular   congestion and interstitial pulmonary edema.   2. Previously demonstrated asymmetric opacity in the right lung has resolved.   3. ET tube and NG tube in satisfactory position.   4. No acute cardiopulmonary process except for minimal right basilar   atelectasis.         CT CHEST WO CONTRAST   Final Result   1. Dense pneumonic consolidations with air bronchograms in major portions of   bilateral pulmonary lower lobes except in the anterior segment. Mild   infiltrate or atelectatic change in the posterior portion of right middle   lobe. Mild dense atelectatic changes, and/or infiltrates at the posterior   aspects of rest of bilateral pulmonary upper lobes.   2. Mild-to-moderate pulmonary vascular congestion. Mild interstitial   pulmonary edema is probably present.   3. Endotracheal tube in position. NG tube in position.   4. Marked coronary artery calcifications.   5. Moderate to marked diffuse fatty infiltration of the liver.   6. No evidence of pneumothorax or pneumomediastinum.         CT CSpine W/O Contrast   Final Result   1. No acute intracranial abnormality.   2. No acute osseous abnormality of the cervical spine. Multilevel   degenerative disc disease and facet arthrosis.         CT Head W/O Contrast   Final

## 2024-07-25 NOTE — CONSULTS
[facility]   Pharmacy Pharmacokinetic Monitoring Service - Vancomycin    Consulting Provider: Dr. Brown   Indication: Pneumonia  Target Concentration: Goal AUC/AIDA 400-600 mg*hr/L  Day of Therapy: 4  Additional Antimicrobials: N/A    Pertinent Laboratory Values:   Wt Readings from Last 1 Encounters:   07/25/24 103 kg (227 lb 1.2 oz)     Temp Readings from Last 1 Encounters:   07/25/24 (!) 100.8 °F (38.2 °C)     Estimated Creatinine Clearance: 63 mL/min (A) (based on SCr of 1.7 mg/dL (H)).  Recent Labs     07/24/24  0530 07/25/24  0608   CREATININE 1.5* 1.7*   BUN 22* 24*   WBC 9.6 11.3     Procalcitonin: 0.31 on 7/24/24.    Pertinent Cultures:  Culture Date Source Results   7/21/24 Tracheal aspirate Staph aureus, awaiting sensitivities   7/24/24 BAL GPCs in clusters   MRSA Nasal Swab: showed MRSA positive result on 7/16/24.    Recent vancomycin administrations                     vancomycin (VANCOCIN) 1,500 mg in sodium chloride 0.9 % 250 mL IVPB (mg) 1,500 mg New Bag 07/24/24 1742    vancomycin (VANCOCIN) 1500 mg in sodium chloride 0.9 % 250 mL IVPB (mg) 1,500 mg New Bag 07/23/24 1242    vancomycin (VANCOCIN) 2500 mg in sodium chloride 0.9 % 500 mL IVPB (mg) 2,500 mg New Bag 07/22/24 1322                    Assessment:  Date/Time Current Dose Concentration Timing of Concentration (h) AUC   7/25/24 @ 0608 1500mg Q24H 25.7 mcg/ml 12 hrs 26 mins 700   Note: Serum concentrations collected for AUC dosing may appear elevated if collected in close proximity to the dose administered, this is not necessarily an indication of toxicity    -Good Urine Output charted over last 24 hrs: 1.8 ml/kg/hr (on Lasix IV).    Plan:  Current dosing regimen is supra-therapeutic  Decrease dose to 1250 mg IV Q24H  Repeat vancomycin concentration ordered for 7/26/24 @ 0600   Pharmacy will continue to monitor patient and adjust therapy as indicated    Thank you for the consult,  Debbi Wilhelm RPH  7/25/2024 8:56 AM

## 2024-07-25 NOTE — PROGRESS NOTES
End Of Shift Note  [facility]  Summary of shift: Patient had uneventful day. Does get hypotensive when turned to sides. Is 110-120 when lying supine-semi fowlers. Lasix given twice today with great output. See. Flow sheet for amount. Versed weaned to 6    Vitals:    Vitals:    07/25/24 1730 07/25/24 1745 07/25/24 1800 07/25/24 1815   BP:       Pulse: 91 89 90 86   Resp: 22 22 22 22   Temp: 100.4 °F (38 °C) 100.4 °F (38 °C) 100.2 °F (37.9 °C) 100.2 °F (37.9 °C)   TempSrc:       SpO2: 94% 92% 93% 94%   Weight:       Height:            I&O:   Intake/Output Summary (Last 24 hours) at 7/25/2024 1843  Last data filed at 7/25/2024 1744  Gross per 24 hour   Intake 2215.29 ml   Output 4400 ml   Net -2184.71 ml       Resp Status: See vent settings    Ventilator Settings:  Vent Mode: AC/PRVC Resp Rate (Set): 22 bpm/Vt (Set, mL): 600 mL/ /FiO2 : 65 %    Critical Care IV infusions:   heparin (PORCINE) Infusion 24 Units/kg/hr (07/25/24 1652)    sodium chloride Stopped (07/22/24 0432)    fentaNYL 200 mcg/hr (07/25/24 1652)    midazolam 6 mg/hr (07/25/24 1652)    propofol Stopped (07/24/24 2324)    dextrose      sodium chloride Stopped (07/22/24 0418)        LDA:   Peripheral IV 07/16/24 Right Forearm (Active)   Number of days: 9       Peripheral IV 07/19/24 Left (Active)   Number of days: 6       Peripheral IV 07/20/24 Distal;Left;Anterior Cephalic (Active)   Number of days: 5       NG/OG/NJ/NE Tube 16 fr Left mouth (Active)   Number of days: 9       Urinary Catheter 07/15/24 (Active)   Number of days: 9       ETT  (Active)   Number of days: 9       Arterial Line 07/16/24 Right Radial (Active)   Number of days: 8       Wound Thigh Left (Active)   Number of days:

## 2024-07-25 NOTE — PLAN OF CARE
Problem: Respiratory - Adult  Goal: Achieves optimal ventilation and oxygenation  7/25/2024 0909 by Alana Goodrich RCP  Outcome: Progressing     Problem: Respiratory - Adult  Goal: Adequate oxygenation  7/25/2024 0909 by Alana Goodrich RCP  Outcome: Progressing     Problem: Respiratory - Adult  Goal: Will be able to breathe spontaneously, without ventilator support  Description: Will be able to breathe spontaneously, without ventilator support  7/25/2024 0909 by Alana Goodrich RCP  Outcome: Progressing

## 2024-07-26 ENCOUNTER — APPOINTMENT (OUTPATIENT)
Dept: GENERAL RADIOLOGY | Age: 49
DRG: 130 | End: 2024-07-26
Payer: MEDICAID

## 2024-07-26 LAB
ALBUMIN SERPL-MCNC: 2.8 G/DL (ref 3.5–5.2)
ALBUMIN SERPL-MCNC: 2.8 G/DL (ref 3.5–5.2)
ALP SERPL-CCNC: 108 U/L (ref 40–129)
ALP SERPL-CCNC: 108 U/L (ref 40–129)
ALT SERPL-CCNC: 19 U/L (ref 5–41)
ALT SERPL-CCNC: 19 U/L (ref 5–41)
ANA SER QL IA: NEGATIVE
ANA SER QL IA: NEGATIVE
ANION GAP SERPL CALCULATED.3IONS-SCNC: 13 MMOL/L (ref 9–17)
ANION GAP SERPL CALCULATED.3IONS-SCNC: 13 MMOL/L (ref 9–17)
ANTI-XA UNFRAC HEPARIN: 0.3 IU/L (ref 0.3–0.7)
ANTI-XA UNFRAC HEPARIN: 0.3 IU/L (ref 0.3–0.7)
ANTI-XA UNFRAC HEPARIN: 0.41 IU/L (ref 0.3–0.7)
ANTI-XA UNFRAC HEPARIN: 0.41 IU/L (ref 0.3–0.7)
AST SERPL-CCNC: 48 U/L
AST SERPL-CCNC: 48 U/L
BASOPHILS # BLD: 0.13 K/UL (ref 0–0.2)
BASOPHILS # BLD: 0.13 K/UL (ref 0–0.2)
BASOPHILS NFR BLD: 1 %
BASOPHILS NFR BLD: 1 %
BILIRUB DIRECT SERPL-MCNC: 0.4 MG/DL
BILIRUB DIRECT SERPL-MCNC: 0.4 MG/DL
BILIRUB INDIRECT SERPL-MCNC: 0.1 MG/DL (ref 0–1)
BILIRUB INDIRECT SERPL-MCNC: 0.1 MG/DL (ref 0–1)
BILIRUB SERPL-MCNC: 0.5 MG/DL (ref 0.3–1.2)
BILIRUB SERPL-MCNC: 0.5 MG/DL (ref 0.3–1.2)
BUN SERPL-MCNC: 27 MG/DL (ref 6–20)
BUN SERPL-MCNC: 27 MG/DL (ref 6–20)
BUN/CREAT SERPL: 17 (ref 9–20)
BUN/CREAT SERPL: 17 (ref 9–20)
CALCIUM SERPL-MCNC: 9.5 MG/DL (ref 8.6–10.4)
CALCIUM SERPL-MCNC: 9.5 MG/DL (ref 8.6–10.4)
CHLORIDE SERPL-SCNC: 96 MMOL/L (ref 98–107)
CHLORIDE SERPL-SCNC: 96 MMOL/L (ref 98–107)
CO2 SERPL-SCNC: 28 MMOL/L (ref 20–31)
CO2 SERPL-SCNC: 28 MMOL/L (ref 20–31)
CREAT SERPL-MCNC: 1.6 MG/DL (ref 0.7–1.2)
CREAT SERPL-MCNC: 1.6 MG/DL (ref 0.7–1.2)
DSDNA IGG SER QL IA: 1 IU/ML
DSDNA IGG SER QL IA: 1 IU/ML
EOSINOPHIL # BLD: 0.4 K/UL (ref 0–0.4)
EOSINOPHIL # BLD: 0.4 K/UL (ref 0–0.4)
EOSINOPHILS RELATIVE PERCENT: 3 % (ref 1–4)
EOSINOPHILS RELATIVE PERCENT: 3 % (ref 1–4)
ERYTHROCYTE [DISTWIDTH] IN BLOOD BY AUTOMATED COUNT: 14.2 % (ref 11.8–14.4)
ERYTHROCYTE [DISTWIDTH] IN BLOOD BY AUTOMATED COUNT: 14.2 % (ref 11.8–14.4)
FREE KAPPA/LAMBDA RATIO: 0.62 (ref 0.22–1.74)
FREE KAPPA/LAMBDA RATIO: 0.62 (ref 0.22–1.74)
GFR, ESTIMATED: 52 ML/MIN/1.73M2
GFR, ESTIMATED: 52 ML/MIN/1.73M2
GLUCOSE BLD-MCNC: 111 MG/DL (ref 75–110)
GLUCOSE BLD-MCNC: 111 MG/DL (ref 75–110)
GLUCOSE BLD-MCNC: 118 MG/DL (ref 75–110)
GLUCOSE BLD-MCNC: 118 MG/DL (ref 75–110)
GLUCOSE BLD-MCNC: 126 MG/DL (ref 75–110)
GLUCOSE BLD-MCNC: 126 MG/DL (ref 75–110)
GLUCOSE BLD-MCNC: 133 MG/DL (ref 75–110)
GLUCOSE BLD-MCNC: 133 MG/DL (ref 75–110)
GLUCOSE SERPL-MCNC: 108 MG/DL (ref 70–99)
GLUCOSE SERPL-MCNC: 108 MG/DL (ref 70–99)
HCT VFR BLD AUTO: 36.4 % (ref 40.7–50.3)
HCT VFR BLD AUTO: 36.4 % (ref 40.7–50.3)
HGB BLD-MCNC: 12.3 G/DL (ref 13–17)
HGB BLD-MCNC: 12.3 G/DL (ref 13–17)
IMM GRANULOCYTES # BLD AUTO: 0.13 K/UL (ref 0–0.3)
IMM GRANULOCYTES # BLD AUTO: 0.13 K/UL (ref 0–0.3)
IMM GRANULOCYTES NFR BLD: 1 %
IMM GRANULOCYTES NFR BLD: 1 %
KAPPA LC FREE SER-MCNC: 99.2 MG/L
KAPPA LC FREE SER-MCNC: 99.2 MG/L
LAMBDA LC FREE SERPL-MCNC: 160 MG/L (ref 4.2–27.7)
LAMBDA LC FREE SERPL-MCNC: 160 MG/L (ref 4.2–27.7)
LYMPHOCYTES NFR BLD: 2.68 K/UL (ref 1–4.8)
LYMPHOCYTES NFR BLD: 2.68 K/UL (ref 1–4.8)
LYMPHOCYTES RELATIVE PERCENT: 20 % (ref 24–44)
LYMPHOCYTES RELATIVE PERCENT: 20 % (ref 24–44)
MAGNESIUM SERPL-MCNC: 2.2 MG/DL (ref 1.6–2.6)
MAGNESIUM SERPL-MCNC: 2.2 MG/DL (ref 1.6–2.6)
MCH RBC QN AUTO: 35.1 PG (ref 25.2–33.5)
MCH RBC QN AUTO: 35.1 PG (ref 25.2–33.5)
MCHC RBC AUTO-ENTMCNC: 33.8 G/DL (ref 28.4–34.8)
MCHC RBC AUTO-ENTMCNC: 33.8 G/DL (ref 28.4–34.8)
MCV RBC AUTO: 104 FL (ref 82.6–102.9)
MCV RBC AUTO: 104 FL (ref 82.6–102.9)
MICROORGANISM SPEC CULT: ABNORMAL
MICROORGANISM/AGENT SPEC: ABNORMAL
MONOCYTES NFR BLD: 0.8 K/UL (ref 0.2–0.8)
MONOCYTES NFR BLD: 0.8 K/UL (ref 0.2–0.8)
MONOCYTES NFR BLD: 6 % (ref 1–7)
MONOCYTES NFR BLD: 6 % (ref 1–7)
NEUTROPHILS NFR BLD: 69 % (ref 36–66)
NEUTROPHILS NFR BLD: 69 % (ref 36–66)
NEUTS SEG NFR BLD: 9.26 K/UL (ref 1.8–7.7)
NEUTS SEG NFR BLD: 9.26 K/UL (ref 1.8–7.7)
NRBC BLD-RTO: 0 PER 100 WBC
NRBC BLD-RTO: 0 PER 100 WBC
NUCLEAR IGG SER IA-RTO: 0.1 U/ML
NUCLEAR IGG SER IA-RTO: 0.1 U/ML
PLATELET # BLD AUTO: 522 K/UL (ref 138–453)
PLATELET # BLD AUTO: 522 K/UL (ref 138–453)
PMV BLD AUTO: 10.6 FL (ref 8.1–13.5)
PMV BLD AUTO: 10.6 FL (ref 8.1–13.5)
POC HCO3: 29.4 MMOL/L (ref 21–28)
POC HCO3: 29.4 MMOL/L (ref 21–28)
POC O2 SATURATION: 95.1 % (ref 94–98)
POC O2 SATURATION: 95.1 % (ref 94–98)
POC PCO2: 38.3 MM HG (ref 35–48)
POC PCO2: 38.3 MM HG (ref 35–48)
POC PH: 7.49 (ref 7.35–7.45)
POC PH: 7.49 (ref 7.35–7.45)
POC PO2: 69.8 MM HG (ref 83–108)
POC PO2: 69.8 MM HG (ref 83–108)
POSITIVE BASE EXCESS, ART: 5.8 MMOL/L (ref 0–3)
POSITIVE BASE EXCESS, ART: 5.8 MMOL/L (ref 0–3)
POTASSIUM SERPL-SCNC: 3.5 MMOL/L (ref 3.7–5.3)
POTASSIUM SERPL-SCNC: 3.5 MMOL/L (ref 3.7–5.3)
PROT SERPL-MCNC: 6.8 G/DL (ref 6.4–8.3)
PROT SERPL-MCNC: 6.8 G/DL (ref 6.4–8.3)
PYRIDOXAL PHOS SERPL-SCNC: 10.3 NMOL/L (ref 20–125)
PYRIDOXAL PHOS SERPL-SCNC: 10.3 NMOL/L (ref 20–125)
RBC # BLD AUTO: 3.5 M/UL (ref 4.21–5.77)
RBC # BLD AUTO: 3.5 M/UL (ref 4.21–5.77)
SERVICE CMNT-IMP: ABNORMAL
SODIUM SERPL-SCNC: 137 MMOL/L (ref 135–144)
SODIUM SERPL-SCNC: 137 MMOL/L (ref 135–144)
SPECIMEN DESCRIPTION: ABNORMAL
SURGICAL PATHOLOGY REPORT: NORMAL
SURGICAL PATHOLOGY REPORT: NORMAL
VANCOMYCIN SERPL-MCNC: 22.7 UG/ML
VANCOMYCIN SERPL-MCNC: 22.7 UG/ML
WBC OTHER # BLD: 13.4 K/UL (ref 3.5–11.3)
WBC OTHER # BLD: 13.4 K/UL (ref 3.5–11.3)

## 2024-07-26 PROCEDURE — 80048 BASIC METABOLIC PNL TOTAL CA: CPT

## 2024-07-26 PROCEDURE — 2500000003 HC RX 250 WO HCPCS: Performed by: INTERNAL MEDICINE

## 2024-07-26 PROCEDURE — 6360000002 HC RX W HCPCS: Performed by: PSYCHIATRY & NEUROLOGY

## 2024-07-26 PROCEDURE — 99233 SBSQ HOSP IP/OBS HIGH 50: CPT | Performed by: INTERNAL MEDICINE

## 2024-07-26 PROCEDURE — 2580000003 HC RX 258: Performed by: INTERNAL MEDICINE

## 2024-07-26 PROCEDURE — 2580000003 HC RX 258

## 2024-07-26 PROCEDURE — 82947 ASSAY GLUCOSE BLOOD QUANT: CPT

## 2024-07-26 PROCEDURE — 2000000000 HC ICU R&B

## 2024-07-26 PROCEDURE — 94761 N-INVAS EAR/PLS OXIMETRY MLT: CPT

## 2024-07-26 PROCEDURE — 6360000002 HC RX W HCPCS: Performed by: FAMILY MEDICINE

## 2024-07-26 PROCEDURE — 94003 VENT MGMT INPAT SUBQ DAY: CPT

## 2024-07-26 PROCEDURE — 71045 X-RAY EXAM CHEST 1 VIEW: CPT

## 2024-07-26 PROCEDURE — 6370000000 HC RX 637 (ALT 250 FOR IP): Performed by: INTERNAL MEDICINE

## 2024-07-26 PROCEDURE — 85520 HEPARIN ASSAY: CPT

## 2024-07-26 PROCEDURE — 99232 SBSQ HOSP IP/OBS MODERATE 35: CPT | Performed by: FAMILY MEDICINE

## 2024-07-26 PROCEDURE — 94640 AIRWAY INHALATION TREATMENT: CPT

## 2024-07-26 PROCEDURE — 80076 HEPATIC FUNCTION PANEL: CPT

## 2024-07-26 PROCEDURE — 2700000000 HC OXYGEN THERAPY PER DAY

## 2024-07-26 PROCEDURE — 6360000002 HC RX W HCPCS: Performed by: INTERNAL MEDICINE

## 2024-07-26 PROCEDURE — 37799 UNLISTED PX VASCULAR SURGERY: CPT

## 2024-07-26 PROCEDURE — 6370000000 HC RX 637 (ALT 250 FOR IP): Performed by: FAMILY MEDICINE

## 2024-07-26 PROCEDURE — 80202 ASSAY OF VANCOMYCIN: CPT

## 2024-07-26 PROCEDURE — 85025 COMPLETE CBC W/AUTO DIFF WBC: CPT

## 2024-07-26 PROCEDURE — 82803 BLOOD GASES ANY COMBINATION: CPT

## 2024-07-26 PROCEDURE — 6370000000 HC RX 637 (ALT 250 FOR IP)

## 2024-07-26 PROCEDURE — 6360000002 HC RX W HCPCS

## 2024-07-26 PROCEDURE — 83735 ASSAY OF MAGNESIUM: CPT

## 2024-07-26 RX ADMIN — DEXMEDETOMIDINE 0.6 MCG/KG/HR: 200 INJECTION, SOLUTION INTRAVENOUS at 23:07

## 2024-07-26 RX ADMIN — MULTIVITAMIN TABLET 1 TABLET: TABLET at 09:49

## 2024-07-26 RX ADMIN — SODIUM CHLORIDE, PRESERVATIVE FREE 40 MG: 5 INJECTION INTRAVENOUS at 20:59

## 2024-07-26 RX ADMIN — IPRATROPIUM BROMIDE AND ALBUTEROL SULFATE 1 DOSE: 2.5; .5 SOLUTION RESPIRATORY (INHALATION) at 19:28

## 2024-07-26 RX ADMIN — CLONIDINE HYDROCHLORIDE 0.2 MG: 0.2 TABLET ORAL at 09:50

## 2024-07-26 RX ADMIN — QUETIAPINE FUMARATE 150 MG: 200 TABLET ORAL at 21:03

## 2024-07-26 RX ADMIN — ACETAMINOPHEN 650 MG: 325 TABLET ORAL at 21:31

## 2024-07-26 RX ADMIN — POTASSIUM BICARBONATE 40 MEQ: 782 TABLET, EFFERVESCENT ORAL at 16:30

## 2024-07-26 RX ADMIN — LEVETIRACETAM 500 MG: 100 INJECTION, SOLUTION INTRAVENOUS at 17:21

## 2024-07-26 RX ADMIN — NALOXEGOL OXALATE 25 MG: 25 TABLET, FILM COATED ORAL at 05:56

## 2024-07-26 RX ADMIN — VALPROIC ACID 250 MG: 250 SOLUTION ORAL at 17:30

## 2024-07-26 RX ADMIN — IPRATROPIUM BROMIDE AND ALBUTEROL SULFATE 1 DOSE: 2.5; .5 SOLUTION RESPIRATORY (INHALATION) at 07:26

## 2024-07-26 RX ADMIN — Medication 200 MCG/HR: at 15:54

## 2024-07-26 RX ADMIN — SODIUM CHLORIDE, PRESERVATIVE FREE 10 ML: 5 INJECTION INTRAVENOUS at 09:55

## 2024-07-26 RX ADMIN — VANCOMYCIN HYDROCHLORIDE 1250 MG: 5 INJECTION, POWDER, LYOPHILIZED, FOR SOLUTION INTRAVENOUS at 17:20

## 2024-07-26 RX ADMIN — VALPROIC ACID 250 MG: 250 SOLUTION ORAL at 11:32

## 2024-07-26 RX ADMIN — Medication 175 MCG/HR: at 22:27

## 2024-07-26 RX ADMIN — LEVETIRACETAM 500 MG: 100 INJECTION, SOLUTION INTRAVENOUS at 05:56

## 2024-07-26 RX ADMIN — DEXMEDETOMIDINE 0.2 MCG/KG/HR: 200 INJECTION, SOLUTION INTRAVENOUS at 15:06

## 2024-07-26 RX ADMIN — HEPARIN SODIUM 26 UNITS/KG/HR: 10000 INJECTION, SOLUTION INTRAVENOUS at 01:51

## 2024-07-26 RX ADMIN — VALPROIC ACID 250 MG: 250 SOLUTION ORAL at 05:55

## 2024-07-26 RX ADMIN — CLONAZEPAM 2 MG: 0.5 TABLET ORAL at 11:31

## 2024-07-26 RX ADMIN — VALPROIC ACID 250 MG: 250 SOLUTION ORAL at 00:35

## 2024-07-26 RX ADMIN — POLYETHYLENE GLYCOL 3350 17 G: 17 POWDER, FOR SOLUTION ORAL at 09:49

## 2024-07-26 RX ADMIN — FUROSEMIDE 40 MG: 10 INJECTION, SOLUTION INTRAMUSCULAR; INTRAVENOUS at 09:48

## 2024-07-26 RX ADMIN — Medication 200 MCG/HR: at 10:08

## 2024-07-26 RX ADMIN — CLONIDINE HYDROCHLORIDE 0.2 MG: 0.2 TABLET ORAL at 20:59

## 2024-07-26 RX ADMIN — DOCUSATE SODIUM 100 MG: 50 LIQUID ORAL at 21:03

## 2024-07-26 RX ADMIN — IPRATROPIUM BROMIDE AND ALBUTEROL SULFATE 1 DOSE: 2.5; .5 SOLUTION RESPIRATORY (INHALATION) at 10:58

## 2024-07-26 RX ADMIN — FOLIC ACID 1 MG: 1 TABLET ORAL at 09:49

## 2024-07-26 RX ADMIN — ASPIRIN 81 MG CHEWABLE TABLET 81 MG: 81 TABLET CHEWABLE at 09:49

## 2024-07-26 RX ADMIN — HEPARIN SODIUM 26 UNITS/KG/HR: 10000 INJECTION, SOLUTION INTRAVENOUS at 21:52

## 2024-07-26 RX ADMIN — IPRATROPIUM BROMIDE AND ALBUTEROL SULFATE 1 DOSE: 2.5; .5 SOLUTION RESPIRATORY (INHALATION) at 14:46

## 2024-07-26 RX ADMIN — SODIUM CHLORIDE, PRESERVATIVE FREE 10 ML: 5 INJECTION INTRAVENOUS at 09:54

## 2024-07-26 RX ADMIN — Medication 7 MG/HR: at 10:07

## 2024-07-26 RX ADMIN — DOCUSATE SODIUM 100 MG: 50 LIQUID ORAL at 09:54

## 2024-07-26 RX ADMIN — FUROSEMIDE 40 MG: 10 INJECTION, SOLUTION INTRAMUSCULAR; INTRAVENOUS at 17:22

## 2024-07-26 RX ADMIN — Medication 100 MG: at 09:49

## 2024-07-26 RX ADMIN — HEPARIN SODIUM 26 UNITS/KG/HR: 10000 INJECTION, SOLUTION INTRAVENOUS at 11:31

## 2024-07-26 RX ADMIN — QUETIAPINE FUMARATE 150 MG: 200 TABLET ORAL at 09:49

## 2024-07-26 RX ADMIN — Medication 200 MCG/HR: at 02:45

## 2024-07-26 RX ADMIN — CLONAZEPAM 2 MG: 0.5 TABLET ORAL at 00:35

## 2024-07-26 ASSESSMENT — PULMONARY FUNCTION TESTS
PIF_VALUE: 25
PIF_VALUE: 29
PIF_VALUE: 28
PIF_VALUE: 29
PIF_VALUE: 28
PIF_VALUE: 28
PIF_VALUE: 29
PIF_VALUE: 31
PIF_VALUE: 30
PIF_VALUE: 28
PIF_VALUE: 30
PIF_VALUE: 26
PIF_VALUE: 28
PIF_VALUE: 30
PIF_VALUE: 26
PIF_VALUE: 28
PIF_VALUE: 29
PIF_VALUE: 30
PIF_VALUE: 28
PIF_VALUE: 28
PIF_VALUE: 31
PIF_VALUE: 27
PIF_VALUE: 32
PIF_VALUE: 28
PIF_VALUE: 26
PIF_VALUE: 28
PIF_VALUE: 28
PIF_VALUE: 31
PIF_VALUE: 30
PIF_VALUE: 29
PIF_VALUE: 28
PIF_VALUE: 31
PIF_VALUE: 27
PIF_VALUE: 29
PIF_VALUE: 30
PIF_VALUE: 30

## 2024-07-26 ASSESSMENT — PAIN SCALES - GENERAL
PAINLEVEL_OUTOF10: 0
PAINLEVEL_OUTOF10: 0

## 2024-07-26 NOTE — PLAN OF CARE
Problem: Respiratory - Adult  Goal: Achieves optimal ventilation and oxygenation  7/26/2024 0726 by Alana Goodrich RCP  Outcome: Progressing     Problem: Respiratory - Adult  Goal: Adequate oxygenation  7/26/2024 0726 by Alana Goodrich RCP  Outcome: Progressing     Problem: Respiratory - Adult  Goal: Will be able to breathe spontaneously, without ventilator support  Description: Will be able to breathe spontaneously, without ventilator support  7/26/2024 0726 by Alana Goodrich RCP  Outcome: Progressing

## 2024-07-26 NOTE — PROGRESS NOTES
[facility]  Office: [phone]  Klever Dickens DO, Eulogio Sr DO, Jerome Worley DO, Warren Jovel DO, Vaughn Jefferson MD, Krista Andrews MD, Ana M Cooley MD, Nia Wright MD,  Doc Metz MD, Mario Alberto Lizama MD, Shahla Dutta MD,  Dolly Wu DO, Nancy Mata MD, James Boston MD, Trey Dickens DO, Adriana Spencer MD,  Luis Alberto Thornton DO, Yanet Horan MD, Renee King MD, Fely Gomez MD, Kady Macias MD,  Kayden Murillo MD, Ramsey Valentine MD, Airam Gonsales MD, Angelina Cruz MD, Devyn Gates MD, Neil Looney MD, Navin King DO, Tera Prajapati DO, Julieta Gomez MD,  Kael Farias MD, Shirley Waterhouse, CNP,  Valencia Regalado CNP, Lobo Rojas, CNP,  Karen Mascorro, NELIA, Ania Oro, CNP, Kyra Isbell, CNP, Shayla Song CNP, Jackelin Woody, CNP, Hodan Cruz, PA-C, Kim Woodard PA-C, Destiney Manriquez, CNP, Vania Silva, CNP, Hailey Carson, CNP, Brooke Olvera, CNP, Arabella Rincon CNP, Rebecca Zavala, CNS, Laurie Crandall, CNP, Melissa Pat CNP, Tracy Schwab, CNP       [facility]      Daily Progress Note     Admit Date: 7/15/2024  Bed/Room No.  1104/1104-01  Admitting Physician : Ana M Cooley MD  Code Status :Full Code  Hospital Day:  LOS: 11 days   Chief Complaint:     Chief Complaint   Patient presents with    Alcohol Intoxication     Principal Problem:    Acute alcoholic intoxication with complication (HCC)  Active Problems:    Alcohol intoxication in alcoholism with blood level over 0.3 with delirium (HCC)    Aspiration pneumonia of both upper lobes due to vomit (HCC)    Acute respiratory failure with hypoxia (HCC)    On mechanically assisted ventilation (HCC)    Acute encephalopathy    MAIK (acute kidney injury) (HCC)    Seizure due to alcohol withdrawal, with unspecified complication (HCC)    Uncontrolled hypertension    Acute systolic CHF (congestive heart failure) (HCC)  Resolved Problems:    * No resolved hospital

## 2024-07-26 NOTE — CARE COORDINATION
Discharge planning    Patient chart reviewed. Patient was Burke Ruiz on admission. Has since been identified. Has sister Dena Mendez ([phone])    Patient has been in [facility] most of life, released 9 years ago and is homeless. He stays at times with girlfriend Rosette ( no other info available) and tents.     He has no children, never . Per chart merge SS number is .  [facility] has pending medicaid.    Nephro, ID, neuro and pulmonary following.     Intubated since 7/15. Will need to watch for trach and peg if unable to wean,

## 2024-07-26 NOTE — PLAN OF CARE
Problem: Safety - Medical Restraint  Goal: Remains free of injury from restraints (Restraint for Interference with Medical Device)  Description: INTERVENTIONS:  1. Determine that other, less restrictive measures have been tried or would not be effective before applying the restraint  2. Evaluate the patient's condition at the time of restraint application  3. Inform patient/family regarding the reason for restraint  4. Q2H: Monitor safety, psychosocial status, comfort, nutrition and hydration  7/25/2024 2226 by Rosette Hurd RN  Outcome: Progressing  Flowsheets  Taken 7/25/2024 2200 by Rosette Hurd RN  Remains free of injury from restraints (restraint for interference with medical device):   Determine that other, less restrictive measures have been tried or would not be effective before applying the restraint   Evaluate the patient's condition at the time of restraint application   Inform patient/family regarding the reason for restraint   Every 2 hours: Monitor safety, psychosocial status, comfort, nutrition and hydration  Taken 7/25/2024 2000 by Rosette Hurd RN  Remains free of injury from restraints (restraint for interference with medical device):   Determine that other, less restrictive measures have been tried or would not be effective before applying the restraint   Evaluate the patient's condition at the time of restraint application   Inform patient/family regarding the reason for restraint   Every 2 hours: Monitor safety, psychosocial status, comfort, nutrition and hydration  Taken 7/25/2024 1800 by Farideh Oliva RN  Remains free of injury from restraints (restraint for interference with medical device): Determine that other, less restrictive measures have been tried or would not be effective before applying the restraint  Taken 7/25/2024 1600 by Farideh Oliva RN  Remains free of injury from restraints (restraint for interference with medical device): Determine that  Farideh DUEÑAS RN  Verbalizes/displays adequate comfort level or baseline comfort level: Assess pain using appropriate pain scale  7/25/2024 0847 by Farideh Oliva RN  Outcome: Progressing  Flowsheets  Taken 7/25/2024 0804 by Farideh Oliva RN  Verbalizes/displays adequate comfort level or baseline comfort level: Assess pain using appropriate pain scale  Taken 7/25/2024 0000 by Rosette Hurd RN  Verbalizes/displays adequate comfort level or baseline comfort level: Assess pain using appropriate pain scale     Problem: Nutrition Deficit:  Goal: Optimize nutritional status  7/25/2024 2226 by Rosette Hurd, RN  Outcome: Progressing  7/25/2024 0847 by Farideh Oliva RN  Outcome: Progressing     Problem: ABCDS Injury Assessment  Goal: Absence of physical injury  7/25/2024 2226 by Rosette Hurd RN  Outcome: Progressing  7/25/2024 0847 by Farideh Oliva RN  Outcome: Progressing     Problem: Chronic Conditions and Co-morbidities  Goal: Patient's chronic conditions and co-morbidity symptoms are monitored and maintained or improved  Outcome: Progressing  Flowsheets (Taken 7/25/2024 2000)  Care Plan - Patient's Chronic Conditions and Co-Morbidity Symptoms are Monitored and Maintained or Improved: Monitor and assess patient's chronic conditions and comorbid symptoms for stability, deterioration, or improvement

## 2024-07-26 NOTE — PLAN OF CARE
Problem: Safety - Medical Restraint  Goal: Remains free of injury from restraints (Restraint for Interference with Medical Device)  Description: INTERVENTIONS:  1. Determine that other, less restrictive measures have been tried or would not be effective before applying the restraint  2. Evaluate the patient's condition at the time of restraint application  3. Inform patient/family regarding the reason for restraint  4. Q2H: Monitor safety, psychosocial status, comfort, nutrition and hydration  7/26/2024 1644 by Jailene Avila RN  Outcome: Progressing  7/26/2024 1642 by Jailene Avial RN  Outcome: Progressing  Flowsheets  Taken 7/26/2024 1600 by Jailene Avila RN  Remains free of injury from restraints (restraint for interference with medical device):   Determine that other, less restrictive measures have been tried or would not be effective before applying the restraint   Evaluate the patient's condition at the time of restraint application   Every 2 hours: Monitor safety, psychosocial status, comfort, nutrition and hydration  Taken 7/26/2024 1400 by Jailene Avila RN  Remains free of injury from restraints (restraint for interference with medical device):   Determine that other, less restrictive measures have been tried or would not be effective before applying the restraint   Evaluate the patient's condition at the time of restraint application   Every 2 hours: Monitor safety, psychosocial status, comfort, nutrition and hydration  Taken 7/26/2024 1200 by Jailene Avila RN  Remains free of injury from restraints (restraint for interference with medical device):   Determine that other, less restrictive measures have been tried or would not be effective before applying the restraint   Evaluate the patient's condition at the time of restraint application   Every 2 hours: Monitor safety, psychosocial status,  appropriate resources  7/26/2024 1644 by Jailene Avila RN  Outcome: Progressing  7/26/2024 1642 by Jailene Avila RN  Outcome: Progressing     Problem: Respiratory - Adult  Goal: Achieves optimal ventilation and oxygenation  7/26/2024 1644 by Jailene Avila RN  Outcome: Progressing  7/26/2024 1642 by Jailene Avila RN  Outcome: Progressing  7/26/2024 0726 by Alana Goodrich RCP  Outcome: Progressing  Goal: Adequate oxygenation  7/26/2024 0726 by Alana Goodrich RCP  Outcome: Progressing  Goal: Will be able to breathe spontaneously, without ventilator support  Description: Will be able to breathe spontaneously, without ventilator support  7/26/2024 0726 by Alana Goodrich RCP  Outcome: Progressing     Problem: Skin/Tissue Integrity  Goal: Absence of new skin breakdown  Description: 1.  Monitor for areas of redness and/or skin breakdown  2.  Assess vascular access sites hourly  3.  Every 4-6 hours minimum:  Change oxygen saturation probe site  4.  Every 4-6 hours:  If on nasal continuous positive airway pressure, respiratory therapy assess nares and determine need for appliance change or resting period.  7/26/2024 1644 by Jailene Avila RN  Outcome: Progressing  7/26/2024 1642 by Jailene Avila RN  Outcome: Progressing     Problem: Safety - Adult  Goal: Free from fall injury  7/26/2024 1644 by Jailene Avila RN  Outcome: Progressing  7/26/2024 1642 by Jailene Avila RN  Outcome: Progressing  Flowsheets (Taken 7/26/2024 0750)  Free From Fall Injury: Instruct family/caregiver on patient safety     Problem: Pain  Goal: Verbalizes/displays adequate comfort level or baseline comfort level  7/26/2024 1644 by Jailene Avila RN  Outcome: Progressing  7/26/2024 1642 by Jailene Avila RN  Outcome: Progressing

## 2024-07-26 NOTE — PROGRESS NOTES
Nephrology Progress Note      SUBJECTIVE      Patient is seen and examined in the ICU.  He is sedated on the ventilator with FiO2 of 65%, stable per staff.  There are some secretions being suctioned.  The patient's creatinine is stable at 1.6.  Chest x-ray shows evidence of significant pulmonary edema/CHF.  The patient is on Lasix 40 mg IV twice daily.  Intake and output yesterday showed 2.2 L and 3.8 L out for a 24-hour period.  Patient's wife is in the room.  Questions asked and answered to the best of my ability.    Labs today showed sodium 137 potassium 3.5 chloride 96 and CO2 28 with BUN 27 creatinine 1.6 magnesium 2.2 and calcium 9.5.  CBC shows white blood cells 13.4 with hemoglobin 12.3 and platelets 522.  Patient does have as needed potassium supplement ordered.           OBJECTIVE      CURRENT TEMPERATURE:  Temp: 100 °F (37.8 °C)  MAXIMUM TEMPERATURE OVER 24HRS:  Temp (24hrs), Av.1 °F (37.8 °C), Min:99.5 °F (37.5 °C), Max:100.8 °F (38.2 °C)    CURRENT RESPIRATORY RATE:  Respirations: 20  CURRENT PULSE:  Pulse: 70  CURRENT BLOOD PRESSURE:  BP: (!) 94/42  24HR BLOOD PRESSURE RANGE:  Systolic (24hrs), Av , Min:94 , Max:173   ; Diastolic (24hrs), Av, Min:42, Max:63    24HR INTAKE/OUTPUT:    Intake/Output Summary (Last 24 hours) at 2024 1249  Last data filed at 2024 0955  Gross per 24 hour   Intake 2966.71 ml   Output 2660 ml   Net 306.71 ml       PHYSICAL EXAM      GENERAL APPEARANCE: Sedated and on the ventilator  SKIN: warm and dry   ENT: Oral endotracheal tube in place  NECK: No obvious JVD, midline trachea  PULMONARY: Equal bilateral breath sounds  CADRDIOVASCULAR: Regular rate and rhythm with positive S1 and S2 and no rubs   ABDOMEN: Soft and mildly distended with sluggish bowel sounds  EXTREMITIES: no cyanosis, clubbing or edema and 2+ DP pulses bilaterally    CURRENT MEDICATIONS      vancomycin (VANCOCIN) 1,250 mg in sodium chloride 0.9 % 250 mL IVPB, Q24H  cloNIDine (CATAPRES)  Line), PRN  magnesium sulfate 1000 mg in dextrose 5% 100 mL IVPB, PRN  ondansetron (ZOFRAN-ODT) disintegrating tablet 4 mg, Q8H PRN   Or  ondansetron (ZOFRAN) injection 4 mg, Q6H PRN  polyethylene glycol (GLYCOLAX) packet 17 g, Daily PRN  acetaminophen (TYLENOL) tablet 650 mg, Q6H PRN   Or  acetaminophen (TYLENOL) suppository 650 mg, Q6H PRN  pantoprazole (PROTONIX) 40 mg in sodium chloride (PF) 0.9 % 10 mL injection, Nightly          LABS      CBC:   Recent Labs     07/24/24  0530 07/25/24  0608 07/26/24  0900   WBC 9.6 11.3 13.4*   RBC 3.51* 3.51* 3.50*   HGB 12.4* 12.4* 12.3*   HCT 37.0* 36.4* 36.4*   .4* 103.7* 104.0*   MCH 35.3* 35.3* 35.1*   MCHC 33.5 34.1 33.8   RDW 14.6* 14.3 14.2    452 522*   MPV 10.1 10.5 10.6      BMP:   Recent Labs     07/24/24  0530 07/25/24  0608 07/26/24  0900    138 137   K 4.2 3.9 3.5*    99 96*   CO2 24 27 28   BUN 22* 24* 27*   CREATININE 1.5* 1.7* 1.6*   GLUCOSE 92 101* 108*   CALCIUM 9.3 9.5 9.5      BNP:No results found for: \"BNP\"  PHOSPHORUS:  No results for input(s): \"PHOS\" in the last 72 hours.  MAGNESIUM:   Recent Labs     07/26/24  0900   MG 2.2     ALBUMIN: No results for input(s): \"LABALBU\" in the last 72 hours.  IRON:  No results found for: \"IRON\"  IRON SATURATION:  No components found for: \"LABIRON\"  TIBC:  No results found for: \"TIBC\"  FERRITIN:  No results found for: \"FERRITIN\"  DOMINGA: No results found for: \"DOMINGA\"    SPEP: No results found for: \"ALBCAL\", \"ALBPCT\", \"LABALPH\", \"A1PCT\", \"A2PCT\", \"LABBETA\", \"BETAPCT\", \"GAMGLOB\", \"GGPCT\", \"PATH\"  UPEP: No results found for: \"TPU\"   HEPBSAG:No results found for: \"HEPBSAG\"  HEPCAB:No results found for: \"HEPCAB\"  C3:   Lab Results   Component Value Date    C3 289 (H) 07/25/2024     C4:   Lab Results   Component Value Date    C4 64 (H) 07/25/2024     MPO ANCA: No results found for: \"MPO\" .  PR3 ANCA:  No results found for: \"PR3\"  URINE SODIUM:  No components found for: \"ELENA\"   URINE POTASSIUM:  No  with questions.    Electronically signed by Aaron Tamayo MD on 7/26/2024 at 12:49 PM

## 2024-07-26 NOTE — PROGRESS NOTES
strain evaluation 7/23  Left Ventricle: Mildly reduced left ventricular systolic function with a visually estimated EF of 45 - 50%. Left ventricle size is normal. Normal wall thickness. Normal wall motion. See diagram for wall motion findings. Normal diastolic function.    Image quality is suboptimal.  Lower extremity venous Dopplers negative for DVT      Impression/recommendations:    Acute hypoxic respiratory failure requiring intubation rate of 22 increase PEEP to 12 FiO2 65% tidal volume 600  Continue assist-control ventilation  Titrate FiO2 to saturation above 90%  Continue fentanyl drip RASS -2 wean second  Versed drip RASS -2 wean last  Daily sedation vacation attempts during the daytime mornings  Continue tube feeds as tolerated/  Movantik increase dose and laxatives /Colace 100 twice daily     MRSA pneumonia/possible COPD/THC / smoker   DuoNeb by nebulizer     vancomycin per ID  Check blood culture  Check fungal culture results  Smoking THC cessation     alcohol intoxication/fatty liver with increased liver function/alcohol withdrawal   Alcohol cessation  Klonopin 2 mg twice daily /  Seroquel 150 twice daily  Thiamine folate multivitamin     Elevated dimer ?  Pulmonary embolism  Heparin drip empirically  Lower extremity venous Dopplers negative  Echocardiogram shows no RV strain  Perfusion lung scan not done/radiology feels would be indeterminant/will consider CTA chest when creatinine improves      seizures likely related to alcohol withdrawal   Continue sedation  Phenobarb per neurology/on Keppra  Neurology on consult/EEG showed moderate slowing  Depakote per NG/monitor liver function     hypertension poor control/ Cocaine positive /mild increase troponin  Off Cardene drip  Hydralazine as needed  Clonidine 0.2  twice daily     Pulmonary edema/MAIK     Lasix 40 IV  twice daily/monitor urine output  monitor urine output and BMP  Nephrology consult    Right inguinal hernia   discussed with RN   Discussed with  sister at bedside; advised patient may need trach PEG given prolonged ventilation 10 days on the vent now.    Discussed with clinical pharmacist  Discussed with primary    Peptic ulcer disease prophylaxis  DVT prophylaxis      Koko Paiz MD, MD, [facility]  Pulmonary Critical Care and Sleep Medicine,  [facility]  Cell: [phone]  Office: [phone]

## 2024-07-26 NOTE — PROGRESS NOTES
[facility]   Pharmacy Pharmacokinetic Monitoring Service - Vancomycin    Consulting Provider: Dr. Brown   Indication: pneumoni  Target Concentration: Goal AUC/AIDA 400-600 mg*hr/L  Day of Therapy: 5  Additional Antimicrobials: none    Pertinent Laboratory Values:   Wt Readings from Last 1 Encounters:   07/26/24 99.7 kg (219 lb 12.8 oz)     Temp Readings from Last 1 Encounters:   07/26/24 99.7 °F (37.6 °C)     Estimated Creatinine Clearance: 66 mL/min (A) (based on SCr of 1.6 mg/dL (H)).  Recent Labs     07/24/24  0530 07/25/24  0608 07/26/24  0900   CREATININE 1.5* 1.7* 1.6*   BUN 22* 24* 27*   WBC 9.6 11.3  --      Procalcitonin: 0.31 (7/24)    Pertinent Cultures:  Culture Date Source Results   7/21 Tracheal aspirate Staph aureus (sens pending)   MRSA Nasal Swab: showed MRSA positive result on 7/16    Recent vancomycin administrations                     vancomycin (VANCOCIN) 1,250 mg in sodium chloride 0.9 % 250 mL IVPB (mg) 1,250 mg New Bag 07/25/24 1738    vancomycin (VANCOCIN) 1,500 mg in sodium chloride 0.9 % 250 mL IVPB (mg) 1,500 mg New Bag 07/24/24 1742    vancomycin (VANCOCIN) 1500 mg in sodium chloride 0.9 % 250 mL IVPB (mg) 1,500 mg New Bag 07/23/24 1242                    Assessment:  Date/Time Current Dose Concentration Timing of Concentration (h) AUC   7/26 1250mg IV q 24 hrs 22.7 mg/dl 12 hrs 27 min  554 mg/L.hr   Note: Serum concentrations collected for AUC dosing may appear elevated if collected in close proximity to the dose administered, this is not necessarily an indication of toxicity    Plan:  Current dosing regimen is therapeutic  Continue current dose  Repeat vancomycin concentration ordered for 7/29 @ 0600   Pharmacy will continue to monitor patient and adjust therapy as indicated    Thank you for the consult,  Laurie Smith [facility]  7/26/2024 9:39 AM

## 2024-07-26 NOTE — PROGRESS NOTES
[facility]  Progress Note    John Rivas  MRN: [id number]  Date: 7/26/2024  LOS: 11     Reason for F/U :   Pneumonia    Impression :   Acute respiratory failure with hypoxia, currently ventilator dependent  Bilateral Staphylococcus aureus pneumonia   status post bronchoscopy 7/24/2026  Sputum and BAL cultures thus far with Staphylococcus aureus  Fevers -likely related to above  Polysubstance abuse with acute alcohol intoxication  Possible seizure   EEG with significant encephalopathy  Chronic kidney disease stage III    Recommendations:   Continue intravenous antimicrobial therapy with vancomycin for the Staphylococcus aureus isolated in the sputum  The patient is improving clinically opening his eyes to stimulation and FiO2 requirements are improved  The plan will be for a 7 to 10-day course of antibiotic coverage and we are awaiting the sensitivity data though the patient does have known MRSA colonization    Previous antibiotics:  Piperacillin/tazobactam 7/15/2024 through 7/24/2024 [ #10 days of therapy]  Meropenem for 1 day 7/24/2024    Infection Control Recommendations:   Contact precautions    Discharge Planning:   Estimated Length of IV antimicrobials: 7 to 10 days  Patient will need Midline Catheter Insertion/ PICC line Insertion: No  Patient will need: Home IV , Infusion Center,  SNF,  LTAC: Undetermined  Patient willneed outpatient wound care: No    Medical Decision making / Summary of Stay:   John Rivas is a 49 y.o.-year-old male who was initially admitted on 7/15/2024.      John is seen in consultation on 7/24/2024     He was admitted 7/15/2024 due to concerns for alcohol intoxication and he was found on the side of a fast food restaurant and EMS was called.  The patient admitted to drinking 1/5 of alcohol was verbally abusive and aggressive in the emergency department and the patient was intubated in the emergency department for airway protection     Alcohol level was at 423.43  WRIST, 9 ML    Culture NO GROWTH 3 DAYS   Culture, Blood 1 [[id number]] Collected: 07/22/24 1317   Order Status: Completed Specimen: Blood Updated: 07/25/24 2046    Specimen Description .BLOOD    Special Requests LW 1.5ML    Culture NO GROWTH 3 DAYS   Culture, Respiratory [[id number]] (Abnormal) Collected: 07/24/24 1351   Order Status: Completed Specimen: Sputum from BAL- Bronch. Lavage Updated: 07/25/24 1143    Specimen Description .BRONCHIAL ALVEOLAR LAVAGE RLL    Special Requests Site: Sputum    Direct Exam MANY NEUTROPHILS     MODERATE GRAM POSITIVE COCCI IN CLUSTERS Abnormal     Culture STAPHYLOCOCCUS AUREUS <10,000 CFU/ML Abnormal    Culture, Fungus [[id number]] Collected: 07/24/24 1628   Order Status: Completed Specimen: BAL- Bronch. Lavage Updated: 07/25/24 1015    Specimen Description .BRONCHIAL ALVEOLAR LAVAGE RLL    Direct Exam NO FUNGAL ELEMENTS SEEN    Culture PENDING   Culture with Smear, Acid Fast Bacillius [[id number]] Collected: 07/24/24 1628   Order Status: Completed Specimen: BAL- Bronch. Lavage Updated: 07/25/24 1015    Specimen Description .BRONCHIAL ALVEOLAR LAVAGE .RIGHT LOWER LOBE    Direct Exam NO ACID FAST BACILLI SEEN (CONCENTRATED SMEAR)    Culture PENDING   Culture, Respiratory [[id number]] (Abnormal) Collected: 07/21/24 1815   Order Status: Completed Specimen: Tracheal Aspirate Updated: 07/24/24 1241    Specimen Description .TRACHEAL ASPIRATE    Special Requests Site: Tracheal Aspirate    Direct Exam < 10 EPITHELIAL CELLS/LPF     <10 NEUTROPHILS/LPF     NO SIGNIFICANT PATHOGENS SEEN    Culture STAPHYLOCOCCUS AUREUS LIGHT GROWTH Abnormal    Blood Culture 1 [[id number]] Collected: 07/15/24 2005   Order Status: Completed Specimen: Blood Updated: 07/20/24 2307    Specimen Description .BLOOD    Special Requests RT FA 10ML    Culture NO GROWTH 5 DAYS   MRSA DNA Probe, Nasal [[id number]] (Abnormal) Collected: 07/16/24 1220   Order Status: Completed Specimen: Nasal Updated: 07/17/24 1235

## 2024-07-26 NOTE — PROGRESS NOTES
Physical Therapy  DATE: 2024    NAME: John Rivas  MRN: [id number]   : 1975    Patient not seen this date for Physical Therapy due to:      [] Cancel by RN or physician due to:     [x] Pt remains intubated & sedated on vent      [] Critical Lab Value Level     [] Blood transfusion in progress    [] Acute or unstable cardiovascular status   _MAP < 55 or more than >115  _HR < 40 or > 130    [] Acute or unstable pulmonary status   -FiO2 > 60%   _RR < 5 or >40    _O2 sats < 85%    [] Strict Bedrest    [] Off Unit for surgery or procedure    [] Off Unit for testing       [] Pending imaging to R/O fracture    [] Refusal by Patient      [] Other      [] PT being discontinued at this time. Patient independent. No further needs.     [] PT being discontinued at this time as the patient has been transferred to hospice care. No further needs.      COMPA ECHOLS, PT

## 2024-07-26 NOTE — PLAN OF CARE
Problem: Respiratory - Adult  Goal: Achieves optimal ventilation and oxygenation  7/26/2024 1931 by Corey Boone RCP  Outcome: Progressing     Problem: Respiratory - Adult  Goal: Adequate oxygenation  7/26/2024 1931 by Corey Boone RCP  Outcome: Progressing     Problem: Respiratory - Adult  Goal: Will be able to breathe spontaneously, without ventilator support  Description: Will be able to breathe spontaneously, without ventilator support  7/26/2024 1931 by Corey Boone RCP  Outcome: Progressing

## 2024-07-26 NOTE — PROGRESS NOTES
End Of Shift Note  [facility]  Summary of shift: Patient had uneventful shift XA came back 0.22 gave bolus and upped heparin drip. Residuals were 15, 30, 30 increase tube feed to goal. No BM this shift, output 1350. Still on versed 7 mL, fentanyl 200 mcg. Patient resting comfortably     Vitals:    Vitals:    07/26/24 0536 07/26/24 0545 07/26/24 0600 07/26/24 0615   BP:       Pulse: 74 71 71 68   Resp:       Temp: 100.2 °F (37.9 °C) 99.9 °F (37.7 °C) 99.9 °F (37.7 °C) 99.7 °F (37.6 °C)   TempSrc:       SpO2: 94% 92% 95% 92%   Weight: 99.7 kg (219 lb 12.8 oz)      Height:            I&O:   Intake/Output Summary (Last 24 hours) at 7/26/2024 0623  Last data filed at 7/26/2024 0622  Gross per 24 hour   Intake 2202.71 ml   Output 3800 ml   Net -1597.29 ml       Resp Status: FiO2 65%    Ventilator Settings:  Vent Mode: AC/PRVC Resp Rate (Set): 20 bpm/Vt (Set, mL): 600 mL/ /FiO2 : 65 %    Critical Care IV infusions:   heparin (PORCINE) Infusion 26 Units/kg/hr (07/26/24 0622)    sodium chloride Stopped (07/22/24 0432)    fentaNYL 200 mcg/hr (07/26/24 0622)    midazolam 7 mg/hr (07/26/24 0622)    propofol Stopped (07/24/24 8754)    dextrose      sodium chloride Stopped (07/22/24 4798)        LDA:   Peripheral IV 07/16/24 Right Forearm (Active)   Number of days: 9       Peripheral IV 07/19/24 Left (Active)   Number of days: 7       Peripheral IV 07/20/24 Distal;Left;Anterior Cephalic (Active)   Number of days: 6       NG/OG/NJ/NE Tube 16 fr Left mouth (Active)   Number of days: 10       Urinary Catheter 07/15/24 (Active)   Number of days: 10       ETT  (Active)   Number of days: 10       Arterial Line 07/16/24 Right Radial (Active)   Number of days: 9       Wound Thigh Left (Active)   Number of days:

## 2024-07-26 NOTE — PROGRESS NOTES
End Of Shift Note  [facility]  Summary of shift: Stable all day.  Versed weaned down to 5mg/hr. Afebrile all day.  GCS improving E3V1M4. BNO since admission.  Colace and glycolax given, no effect as yet.  Plan is to start on precedex and once tolerating precedex can start weaning down fentanyl. Absorbing feed well.  Sister stayed all day.    Vitals:    Vitals:    07/26/24 1458 07/26/24 1503 07/26/24 1600 07/26/24 1722   BP:   (!) 126/58 (!) 121/55   Pulse: 72 69     Resp: 20 20     Temp:   100.3 °F (37.9 °C)    TempSrc:   Axillary    SpO2: 94% 92%     Weight:       Height:            I&O:   Intake/Output Summary (Last 24 hours) at 7/26/2024 1830  Last data filed at 7/26/2024 1503  Gross per 24 hour   Intake 2119.43 ml   Output 4610 ml   Net -2490.57 ml       Resp Status: Remains intubated and ventilated.    Ventilator Settings:  Vent Mode: AC/PRVC Resp Rate (Set): 20 bpm/Vt (Set, mL): 600 mL/ /FiO2 : 65 %    Critical Care IV infusions:   dexmedeTOMIDine (PRECEDEX) 400 mcg in sodium chloride 0.9 % 100 mL infusion 0.6 mcg/kg/hr (07/26/24 1723)    heparin (PORCINE) Infusion 26 Units/kg/hr (07/26/24 1131)    sodium chloride Stopped (07/22/24 0432)    fentaNYL 200 mcg/hr (07/26/24 1554)    midazolam 5 mg/hr (07/26/24 1240)    propofol Stopped (07/24/24 2324)    dextrose      sodium chloride Stopped (07/22/24 0418)        LDA:   Peripheral IV 07/16/24 Right Forearm (Active)   Number of days: 10       Peripheral IV 07/19/24 Left (Active)   Number of days: 7       Peripheral IV 07/20/24 Distal;Left;Anterior Cephalic (Active)   Number of days: 6       NG/OG/NJ/NE Tube 16 fr Left mouth (Active)   Number of days: 10       Urinary Catheter 07/15/24 (Active)   Number of days: 10       ETT  (Active)   Number of days: 10       Arterial Line 07/16/24 Right Radial (Active)   Number of days: 9       Wound Thigh Left (Active)   Number of days:

## 2024-07-27 ENCOUNTER — APPOINTMENT (OUTPATIENT)
Dept: GENERAL RADIOLOGY | Age: 49
DRG: 130 | End: 2024-07-27
Payer: MEDICAID

## 2024-07-27 ENCOUNTER — HOSPITAL ENCOUNTER (INPATIENT)
Age: 49
LOS: 9 days | Discharge: HOME | DRG: 130 | End: 2024-08-05
Attending: INTERNAL MEDICINE | Admitting: INTERNAL MEDICINE
Payer: MEDICAID

## 2024-07-27 VITALS
HEART RATE: 64 BPM | OXYGEN SATURATION: 94 % | DIASTOLIC BLOOD PRESSURE: 73 MMHG | WEIGHT: 219.8 LBS | BODY MASS INDEX: 30.77 KG/M2 | WEIGHT: 219.8 LBS | BODY MASS INDEX: 30.77 KG/M2 | HEART RATE: 64 BPM | HEIGHT: 71 IN | SYSTOLIC BLOOD PRESSURE: 162 MMHG | HEIGHT: 71 IN | DIASTOLIC BLOOD PRESSURE: 73 MMHG | OXYGEN SATURATION: 94 % | SYSTOLIC BLOOD PRESSURE: 162 MMHG | RESPIRATION RATE: 20 BRPM | TEMPERATURE: 100.6 F | RESPIRATION RATE: 20 BRPM | TEMPERATURE: 100.6 F

## 2024-07-27 PROBLEM — J96.01 ACUTE HYPOXIC RESPIRATORY FAILURE (HCC): Status: ACTIVE | Noted: 2024-07-27

## 2024-07-27 LAB
ALBUMIN SERPL-MCNC: 2.9 G/DL (ref 3.5–5.2)
ALBUMIN SERPL-MCNC: 2.9 G/DL (ref 3.5–5.2)
ALLEN TEST: ABNORMAL
ALP SERPL-CCNC: 117 U/L (ref 40–129)
ALP SERPL-CCNC: 117 U/L (ref 40–129)
ALT SERPL-CCNC: 22 U/L (ref 5–41)
ALT SERPL-CCNC: 22 U/L (ref 5–41)
ANION GAP SERPL CALCULATED.3IONS-SCNC: 11 MMOL/L (ref 9–17)
ANION GAP SERPL CALCULATED.3IONS-SCNC: 11 MMOL/L (ref 9–17)
ANTI-XA UNFRAC HEPARIN: 0.14 IU/L
ANTI-XA UNFRAC HEPARIN: 0.31 IU/L (ref 0.3–0.7)
ANTI-XA UNFRAC HEPARIN: 0.31 IU/L (ref 0.3–0.7)
AST SERPL-CCNC: 50 U/L
AST SERPL-CCNC: 50 U/L
BASOPHILS # BLD: 0.13 K/UL (ref 0–0.2)
BASOPHILS # BLD: 0.13 K/UL (ref 0–0.2)
BASOPHILS NFR BLD: 1 % (ref 0–2)
BASOPHILS NFR BLD: 1 % (ref 0–2)
BILIRUB DIRECT SERPL-MCNC: 0.3 MG/DL
BILIRUB DIRECT SERPL-MCNC: 0.3 MG/DL
BILIRUB INDIRECT SERPL-MCNC: 0.2 MG/DL (ref 0–1)
BILIRUB INDIRECT SERPL-MCNC: 0.2 MG/DL (ref 0–1)
BILIRUB SERPL-MCNC: 0.5 MG/DL (ref 0.3–1.2)
BILIRUB SERPL-MCNC: 0.5 MG/DL (ref 0.3–1.2)
BILIRUB UR QL STRIP: ABNORMAL
BUN SERPL-MCNC: 31 MG/DL (ref 6–20)
BUN SERPL-MCNC: 31 MG/DL (ref 6–20)
BUN/CREAT SERPL: 19 (ref 9–20)
BUN/CREAT SERPL: 19 (ref 9–20)
CALCIUM SERPL-MCNC: 9.7 MG/DL (ref 8.6–10.4)
CALCIUM SERPL-MCNC: 9.7 MG/DL (ref 8.6–10.4)
CHLORIDE SERPL-SCNC: 93 MMOL/L (ref 98–107)
CHLORIDE SERPL-SCNC: 93 MMOL/L (ref 98–107)
CLARITY UR: ABNORMAL
CO2 BLD CALC-SCNC: 35 MMOL/L (ref 22–30)
CO2 BLD CALC-SCNC: 35 MMOL/L (ref 22–30)
CO2 SERPL-SCNC: 30 MMOL/L (ref 20–31)
CO2 SERPL-SCNC: 30 MMOL/L (ref 20–31)
COLOR UR: ABNORMAL
CREAT SERPL-MCNC: 1.6 MG/DL (ref 0.7–1.2)
CREAT SERPL-MCNC: 1.6 MG/DL (ref 0.7–1.2)
EOSINOPHIL # BLD: 0.38 K/UL (ref 0–0.44)
EOSINOPHIL # BLD: 0.38 K/UL (ref 0–0.44)
EOSINOPHILS RELATIVE PERCENT: 3 % (ref 1–4)
EOSINOPHILS RELATIVE PERCENT: 3 % (ref 1–4)
ERYTHROCYTE [DISTWIDTH] IN BLOOD BY AUTOMATED COUNT: 14 % (ref 11.8–14.4)
ERYTHROCYTE [DISTWIDTH] IN BLOOD BY AUTOMATED COUNT: 14 % (ref 11.8–14.4)
ERYTHROCYTE [DISTWIDTH] IN BLOOD BY AUTOMATED COUNT: 14.1 % (ref 11.8–14.4)
FIO2: 100
GFR, ESTIMATED: 52 ML/MIN/1.73M2
GFR, ESTIMATED: 52 ML/MIN/1.73M2
GLUCOSE BLD-MCNC: 117 MG/DL (ref 74–100)
GLUCOSE BLD-MCNC: 125 MG/DL (ref 75–110)
GLUCOSE BLD-MCNC: 125 MG/DL (ref 75–110)
GLUCOSE SERPL-MCNC: 107 MG/DL (ref 70–99)
GLUCOSE SERPL-MCNC: 107 MG/DL (ref 70–99)
GLUCOSE UR STRIP-MCNC: NEGATIVE MG/DL
HCT VFR BLD AUTO: 37 % (ref 40.7–50.3)
HCT VFR BLD AUTO: 37 % (ref 40.7–50.3)
HCT VFR BLD AUTO: 38.7 % (ref 40.7–50.3)
HGB BLD-MCNC: 12.6 G/DL (ref 13–17)
HGB BLD-MCNC: 12.6 G/DL (ref 13–17)
HGB BLD-MCNC: 12.9 G/DL (ref 13–17)
HGB UR QL STRIP.AUTO: ABNORMAL
IMM GRANULOCYTES # BLD AUTO: 0.25 K/UL (ref 0–0.3)
IMM GRANULOCYTES # BLD AUTO: 0.25 K/UL (ref 0–0.3)
IMM GRANULOCYTES NFR BLD: 2 %
IMM GRANULOCYTES NFR BLD: 2 %
INR PPP: 1
KETONES UR STRIP-MCNC: NEGATIVE MG/DL
LEUKOCYTE ESTERASE UR QL STRIP: ABNORMAL
LIPASE SERPL-CCNC: 10 U/L (ref 13–60)
LYMPHOCYTES NFR BLD: 2.88 K/UL (ref 1.1–3.7)
LYMPHOCYTES NFR BLD: 2.88 K/UL (ref 1.1–3.7)
LYMPHOCYTES RELATIVE PERCENT: 23 % (ref 24–43)
LYMPHOCYTES RELATIVE PERCENT: 23 % (ref 24–43)
MAGNESIUM SERPL-MCNC: 2.2 MG/DL (ref 1.6–2.6)
MAGNESIUM SERPL-MCNC: 2.2 MG/DL (ref 1.6–2.6)
MCH RBC QN AUTO: 34.9 PG (ref 25.2–33.5)
MCHC RBC AUTO-ENTMCNC: 33.3 G/DL (ref 28.4–34.8)
MCHC RBC AUTO-ENTMCNC: 34.1 G/DL (ref 28.4–34.8)
MCHC RBC AUTO-ENTMCNC: 34.1 G/DL (ref 28.4–34.8)
MCV RBC AUTO: 102.5 FL (ref 82.6–102.9)
MCV RBC AUTO: 102.5 FL (ref 82.6–102.9)
MCV RBC AUTO: 104.6 FL (ref 82.6–102.9)
MICROORGANISM SPEC CULT: NORMAL
MODE: ABNORMAL
MONOCYTES NFR BLD: 0.63 K/UL (ref 0.1–1.2)
MONOCYTES NFR BLD: 0.63 K/UL (ref 0.1–1.2)
MONOCYTES NFR BLD: 5 % (ref 3–12)
MONOCYTES NFR BLD: 5 % (ref 3–12)
NEUTROPHILS NFR BLD: 66 % (ref 36–65)
NEUTROPHILS NFR BLD: 66 % (ref 36–65)
NEUTS SEG NFR BLD: 8.23 K/UL (ref 1.5–8.1)
NEUTS SEG NFR BLD: 8.23 K/UL (ref 1.5–8.1)
NITRITE UR QL STRIP: NEGATIVE
NRBC BLD-RTO: 0 PER 100 WBC
O2 DELIVERY DEVICE: ABNORMAL
PARTIAL THROMBOPLASTIN TIME: 47.7 SEC (ref 23–36.5)
PH UR STRIP: 5 [PH] (ref 5–8)
PHOSPHATE SERPL-MCNC: 4.8 MG/DL (ref 2.5–4.5)
PHOSPHATE SERPL-MCNC: 4.8 MG/DL (ref 2.5–4.5)
PLATELET # BLD AUTO: 578 K/UL (ref 138–453)
PLATELET # BLD AUTO: 598 K/UL (ref 138–453)
PLATELET # BLD AUTO: 598 K/UL (ref 138–453)
PMV BLD AUTO: 10.3 FL (ref 8.1–13.5)
PMV BLD AUTO: 10.3 FL (ref 8.1–13.5)
PMV BLD AUTO: 10.8 FL (ref 8.1–13.5)
POC HCO3: 32.8 MMOL/L (ref 21–28)
POC HCO3: 32.8 MMOL/L (ref 21–28)
POC HCO3: 33.3 MMOL/L (ref 21–28)
POC HCO3: 33.3 MMOL/L (ref 21–28)
POC HCO3: 35.1 MMOL/L (ref 21–28)
POC HCO3: 35.1 MMOL/L (ref 21–28)
POC HCO3: 36.7 MMOL/L (ref 21–28)
POC LACTIC ACID: 0.7 MMOL/L (ref 0.56–1.39)
POC O2 SATURATION: 91.2 % (ref 94–98)
POC O2 SATURATION: 91.2 % (ref 94–98)
POC O2 SATURATION: 95.8 % (ref 94–98)
POC O2 SATURATION: 96.2 % (ref 94–98)
POC O2 SATURATION: 96.2 % (ref 94–98)
POC O2 SATURATION: 96.4 % (ref 94–98)
POC O2 SATURATION: 96.4 % (ref 94–98)
POC PCO2: 43 MM HG (ref 35–48)
POC PCO2: 43 MM HG (ref 35–48)
POC PCO2: 45.9 MM HG (ref 35–48)
POC PCO2: 45.9 MM HG (ref 35–48)
POC PCO2: 47.4 MM HG (ref 35–48)
POC PCO2: 47.4 MM HG (ref 35–48)
POC PCO2: 56.2 MM HG (ref 35–48)
POC PH: 7.42 (ref 7.35–7.45)
POC PH: 7.47 (ref 7.35–7.45)
POC PH: 7.47 (ref 7.35–7.45)
POC PH: 7.48 (ref 7.35–7.45)
POC PH: 7.48 (ref 7.35–7.45)
POC PH: 7.49 (ref 7.35–7.45)
POC PH: 7.49 (ref 7.35–7.45)
POC PO2: 58.6 MM HG (ref 83–108)
POC PO2: 58.6 MM HG (ref 83–108)
POC PO2: 76.8 MM HG (ref 83–108)
POC PO2: 76.8 MM HG (ref 83–108)
POC PO2: 80.3 MM HG (ref 83–108)
POC PO2: 80.3 MM HG (ref 83–108)
POC PO2: 81.6 MM HG (ref 83–108)
POSITIVE BASE EXCESS, ART: 10 MMOL/L (ref 0–3)
POSITIVE BASE EXCESS, ART: 8.4 MMOL/L (ref 0–3)
POSITIVE BASE EXCESS, ART: 8.4 MMOL/L (ref 0–3)
POSITIVE BASE EXCESS, ART: 8.5 MMOL/L (ref 0–3)
POSITIVE BASE EXCESS, ART: 8.5 MMOL/L (ref 0–3)
POSITIVE BASE EXCESS, ART: 9.9 MMOL/L (ref 0–3)
POSITIVE BASE EXCESS, ART: 9.9 MMOL/L (ref 0–3)
POTASSIUM SERPL-SCNC: 3.5 MMOL/L (ref 3.7–5.3)
POTASSIUM SERPL-SCNC: 3.5 MMOL/L (ref 3.7–5.3)
PROT SERPL-MCNC: 7.2 G/DL (ref 6.4–8.3)
PROT SERPL-MCNC: 7.2 G/DL (ref 6.4–8.3)
PROT UR STRIP-MCNC: ABNORMAL MG/DL
PROTHROMBIN TIME: 13.4 SEC (ref 11.7–14.9)
RBC # BLD AUTO: 3.61 M/UL (ref 4.21–5.77)
RBC # BLD AUTO: 3.61 M/UL (ref 4.21–5.77)
RBC # BLD AUTO: 3.7 M/UL (ref 4.21–5.77)
SAMPLE SITE: ABNORMAL
SERVICE CMNT-IMP: NORMAL
SODIUM SERPL-SCNC: 134 MMOL/L (ref 135–144)
SODIUM SERPL-SCNC: 134 MMOL/L (ref 135–144)
SP GR UR STRIP: 1.02 (ref 1–1.03)
SPECIMEN DESCRIPTION: NORMAL
TROPONIN I SERPL HS-MCNC: 25 NG/L (ref 0–22)
TROPONIN I SERPL HS-MCNC: 27 NG/L (ref 0–22)
UROBILINOGEN UR STRIP-ACNC: NORMAL EU/DL (ref 0–1)
WBC OTHER # BLD: 12.5 K/UL (ref 3.5–11.3)
WBC OTHER # BLD: 12.5 K/UL (ref 3.5–11.3)
WBC OTHER # BLD: 14.4 K/UL (ref 3.5–11.3)

## 2024-07-27 PROCEDURE — 2500000003 HC RX 250 WO HCPCS: Performed by: INTERNAL MEDICINE

## 2024-07-27 PROCEDURE — 2700000000 HC OXYGEN THERAPY PER DAY

## 2024-07-27 PROCEDURE — 0BH17EZ INSERTION OF ENDOTRACHEAL AIRWAY INTO TRACHEA, VIA NATURAL OR ARTIFICIAL OPENING: ICD-10-PCS

## 2024-07-27 PROCEDURE — 6360000002 HC RX W HCPCS: Performed by: PSYCHIATRY & NEUROLOGY

## 2024-07-27 PROCEDURE — 80048 BASIC METABOLIC PNL TOTAL CA: CPT

## 2024-07-27 PROCEDURE — 6370000000 HC RX 637 (ALT 250 FOR IP): Performed by: FAMILY MEDICINE

## 2024-07-27 PROCEDURE — 83735 ASSAY OF MAGNESIUM: CPT

## 2024-07-27 PROCEDURE — 36415 COLL VENOUS BLD VENIPUNCTURE: CPT

## 2024-07-27 PROCEDURE — 94003 VENT MGMT INPAT SUBQ DAY: CPT

## 2024-07-27 PROCEDURE — 6370000000 HC RX 637 (ALT 250 FOR IP): Performed by: INTERNAL MEDICINE

## 2024-07-27 PROCEDURE — 85520 HEPARIN ASSAY: CPT

## 2024-07-27 PROCEDURE — 94761 N-INVAS EAR/PLS OXIMETRY MLT: CPT

## 2024-07-27 PROCEDURE — 82374 ASSAY BLOOD CARBON DIOXIDE: CPT

## 2024-07-27 PROCEDURE — 2500000003 HC RX 250 WO HCPCS

## 2024-07-27 PROCEDURE — 2580000003 HC RX 258

## 2024-07-27 PROCEDURE — 80076 HEPATIC FUNCTION PANEL: CPT

## 2024-07-27 PROCEDURE — 99232 SBSQ HOSP IP/OBS MODERATE 35: CPT | Performed by: FAMILY MEDICINE

## 2024-07-27 PROCEDURE — 82803 BLOOD GASES ANY COMBINATION: CPT

## 2024-07-27 PROCEDURE — 84100 ASSAY OF PHOSPHORUS: CPT

## 2024-07-27 PROCEDURE — 2580000003 HC RX 258: Performed by: INTERNAL MEDICINE

## 2024-07-27 PROCEDURE — 83690 ASSAY OF LIPASE: CPT

## 2024-07-27 PROCEDURE — 71045 X-RAY EXAM CHEST 1 VIEW: CPT

## 2024-07-27 PROCEDURE — 6360000002 HC RX W HCPCS: Performed by: FAMILY MEDICINE

## 2024-07-27 PROCEDURE — 81001 URINALYSIS AUTO W/SCOPE: CPT

## 2024-07-27 PROCEDURE — 6360000002 HC RX W HCPCS: Performed by: INTERNAL MEDICINE

## 2024-07-27 PROCEDURE — 6370000000 HC RX 637 (ALT 250 FOR IP)

## 2024-07-27 PROCEDURE — 82947 ASSAY GLUCOSE BLOOD QUANT: CPT

## 2024-07-27 PROCEDURE — 85730 THROMBOPLASTIN TIME PARTIAL: CPT

## 2024-07-27 PROCEDURE — 94640 AIRWAY INHALATION TREATMENT: CPT

## 2024-07-27 PROCEDURE — 85025 COMPLETE CBC W/AUTO DIFF WBC: CPT

## 2024-07-27 PROCEDURE — 99233 SBSQ HOSP IP/OBS HIGH 50: CPT | Performed by: INTERNAL MEDICINE

## 2024-07-27 PROCEDURE — 37799 UNLISTED PX VASCULAR SURGERY: CPT

## 2024-07-27 PROCEDURE — 85027 COMPLETE CBC AUTOMATED: CPT

## 2024-07-27 PROCEDURE — 87040 BLOOD CULTURE FOR BACTERIA: CPT

## 2024-07-27 PROCEDURE — 85610 PROTHROMBIN TIME: CPT

## 2024-07-27 PROCEDURE — 84484 ASSAY OF TROPONIN QUANT: CPT

## 2024-07-27 PROCEDURE — 94002 VENT MGMT INPAT INIT DAY: CPT

## 2024-07-27 PROCEDURE — 2000000000 HC ICU R&B

## 2024-07-27 PROCEDURE — 83605 ASSAY OF LACTIC ACID: CPT

## 2024-07-27 PROCEDURE — 5A1945Z RESPIRATORY VENTILATION, 24-96 CONSECUTIVE HOURS: ICD-10-PCS

## 2024-07-27 PROCEDURE — 6360000002 HC RX W HCPCS

## 2024-07-27 RX ORDER — HYDRALAZINE HYDROCHLORIDE 20 MG/ML
10 INJECTION INTRAMUSCULAR; INTRAVENOUS EVERY 6 HOURS PRN
OUTPATIENT
Start: 2024-07-27

## 2024-07-27 RX ORDER — SODIUM CHLORIDE 9 MG/ML
INJECTION, SOLUTION INTRAVENOUS PRN
OUTPATIENT
Start: 2024-07-27

## 2024-07-27 RX ORDER — LABETALOL HYDROCHLORIDE 5 MG/ML
20 INJECTION, SOLUTION INTRAVENOUS
OUTPATIENT
Start: 2024-07-27

## 2024-07-27 RX ORDER — POTASSIUM CHLORIDE 20 MEQ/1
40 TABLET, EXTENDED RELEASE ORAL PRN
OUTPATIENT
Start: 2024-07-27

## 2024-07-27 RX ORDER — LEVETIRACETAM 500 MG/5ML
500 INJECTION, SOLUTION, CONCENTRATE INTRAVENOUS EVERY 12 HOURS
OUTPATIENT
Start: 2024-07-27

## 2024-07-27 RX ORDER — IPRATROPIUM BROMIDE AND ALBUTEROL SULFATE 2.5; .5 MG/3ML; MG/3ML
1 SOLUTION RESPIRATORY (INHALATION)
OUTPATIENT
Start: 2024-07-27

## 2024-07-27 RX ORDER — VALPROIC ACID 250 MG/5ML
250 SOLUTION ORAL 4 TIMES DAILY
Status: DISCONTINUED | OUTPATIENT
Start: 2024-07-27 | End: 2024-07-30

## 2024-07-27 RX ORDER — ONDANSETRON 2 MG/ML
4 INJECTION INTRAMUSCULAR; INTRAVENOUS EVERY 6 HOURS PRN
OUTPATIENT
Start: 2024-07-27

## 2024-07-27 RX ORDER — SODIUM CHLORIDE 9 MG/ML
INJECTION, SOLUTION INTRAVENOUS PRN
Status: DISCONTINUED | OUTPATIENT
Start: 2024-07-27 | End: 2024-08-05 | Stop reason: HOSPADM

## 2024-07-27 RX ORDER — SODIUM CHLORIDE 0.9 % (FLUSH) 0.9 %
5-40 SYRINGE (ML) INJECTION PRN
OUTPATIENT
Start: 2024-07-27

## 2024-07-27 RX ORDER — ACETAMINOPHEN 325 MG/1
650 TABLET ORAL EVERY 6 HOURS PRN
Status: DISCONTINUED | OUTPATIENT
Start: 2024-07-27 | End: 2024-08-05 | Stop reason: HOSPADM

## 2024-07-27 RX ORDER — POTASSIUM CHLORIDE 7.45 MG/ML
10 INJECTION INTRAVENOUS PRN
Status: DISCONTINUED | OUTPATIENT
Start: 2024-07-27 | End: 2024-08-05 | Stop reason: HOSPADM

## 2024-07-27 RX ORDER — MAGNESIUM SULFATE 1 G/100ML
1000 INJECTION INTRAVENOUS PRN
OUTPATIENT
Start: 2024-07-27

## 2024-07-27 RX ORDER — POTASSIUM CHLORIDE 29.8 MG/ML
20 INJECTION INTRAVENOUS PRN
Status: DISCONTINUED | OUTPATIENT
Start: 2024-07-27 | End: 2024-08-05 | Stop reason: HOSPADM

## 2024-07-27 RX ORDER — ONDANSETRON 2 MG/ML
4 INJECTION INTRAMUSCULAR; INTRAVENOUS EVERY 6 HOURS PRN
Status: DISCONTINUED | OUTPATIENT
Start: 2024-07-27 | End: 2024-08-05 | Stop reason: HOSPADM

## 2024-07-27 RX ORDER — CLONAZEPAM 0.5 MG/1
2 TABLET ORAL EVERY 12 HOURS
OUTPATIENT
Start: 2024-07-28

## 2024-07-27 RX ORDER — SODIUM CHLORIDE 0.9 % (FLUSH) 0.9 %
5-40 SYRINGE (ML) INJECTION PRN
Status: DISCONTINUED | OUTPATIENT
Start: 2024-07-27 | End: 2024-08-05 | Stop reason: HOSPADM

## 2024-07-27 RX ORDER — SODIUM CHLORIDE 0.9 % (FLUSH) 0.9 %
5-40 SYRINGE (ML) INJECTION EVERY 12 HOURS SCHEDULED
OUTPATIENT
Start: 2024-07-27

## 2024-07-27 RX ORDER — HEPARIN SODIUM 10000 [USP'U]/100ML
5-30 INJECTION, SOLUTION INTRAVENOUS CONTINUOUS
Status: DISCONTINUED | OUTPATIENT
Start: 2024-07-27 | End: 2024-07-27

## 2024-07-27 RX ORDER — DEXMEDETOMIDINE HYDROCHLORIDE 4 UG/ML
.1-1.5 INJECTION, SOLUTION INTRAVENOUS CONTINUOUS
Status: DISCONTINUED | OUTPATIENT
Start: 2024-07-27 | End: 2024-08-02

## 2024-07-27 RX ORDER — FOLIC ACID 1 MG/1
1 TABLET ORAL DAILY
OUTPATIENT
Start: 2024-07-28

## 2024-07-27 RX ORDER — FUROSEMIDE 10 MG/ML
40 INJECTION INTRAMUSCULAR; INTRAVENOUS 2 TIMES DAILY
Status: DISCONTINUED | OUTPATIENT
Start: 2024-07-27 | End: 2024-07-30

## 2024-07-27 RX ORDER — HEPARIN SODIUM 1000 [USP'U]/ML
40 INJECTION, SOLUTION INTRAVENOUS; SUBCUTANEOUS PRN
OUTPATIENT
Start: 2024-07-27

## 2024-07-27 RX ORDER — MIDAZOLAM HYDROCHLORIDE 1 MG/ML
1-10 INJECTION, SOLUTION INTRAVENOUS CONTINUOUS
Status: DISCONTINUED | OUTPATIENT
Start: 2024-07-27 | End: 2024-07-31

## 2024-07-27 RX ORDER — GAUZE BANDAGE 2" X 2"
100 BANDAGE TOPICAL DAILY
OUTPATIENT
Start: 2024-07-28

## 2024-07-27 RX ORDER — ONDANSETRON 4 MG/1
4 TABLET, ORALLY DISINTEGRATING ORAL EVERY 8 HOURS PRN
Status: DISCONTINUED | OUTPATIENT
Start: 2024-07-27 | End: 2024-08-05 | Stop reason: HOSPADM

## 2024-07-27 RX ORDER — HEPARIN SODIUM 1000 [USP'U]/ML
80 INJECTION, SOLUTION INTRAVENOUS; SUBCUTANEOUS ONCE
Status: DISCONTINUED | OUTPATIENT
Start: 2024-07-27 | End: 2024-07-27

## 2024-07-27 RX ORDER — POTASSIUM CHLORIDE 7.45 MG/ML
10 INJECTION INTRAVENOUS PRN
OUTPATIENT
Start: 2024-07-27

## 2024-07-27 RX ORDER — POLYETHYLENE GLYCOL 3350 17 G/17G
17 POWDER, FOR SOLUTION ORAL DAILY PRN
OUTPATIENT
Start: 2024-07-27

## 2024-07-27 RX ORDER — PROPOFOL 10 MG/ML
5-50 INJECTION, EMULSION INTRAVENOUS CONTINUOUS
OUTPATIENT
Start: 2024-07-27

## 2024-07-27 RX ORDER — MIDAZOLAM HYDROCHLORIDE 1 MG/ML
4 INJECTION INTRAMUSCULAR; INTRAVENOUS PRN
OUTPATIENT
Start: 2024-07-27

## 2024-07-27 RX ORDER — KETOROLAC TROMETHAMINE 30 MG/ML
30 INJECTION, SOLUTION INTRAMUSCULAR; INTRAVENOUS ONCE
Status: COMPLETED | OUTPATIENT
Start: 2024-07-27 | End: 2024-07-27

## 2024-07-27 RX ORDER — CLONIDINE HYDROCHLORIDE 0.2 MG/1
0.2 TABLET ORAL 2 TIMES DAILY
OUTPATIENT
Start: 2024-07-27

## 2024-07-27 RX ORDER — SODIUM CHLORIDE 0.9 % (FLUSH) 0.9 %
10 SYRINGE (ML) INJECTION PRN
OUTPATIENT
Start: 2024-07-27

## 2024-07-27 RX ORDER — ASPIRIN 81 MG/1
81 TABLET, CHEWABLE ORAL DAILY
OUTPATIENT
Start: 2024-07-28

## 2024-07-27 RX ORDER — VALPROIC ACID 250 MG/5ML
250 SOLUTION ORAL EVERY 6 HOURS SCHEDULED
OUTPATIENT
Start: 2024-07-27

## 2024-07-27 RX ORDER — ONDANSETRON 4 MG/1
4 TABLET, ORALLY DISINTEGRATING ORAL EVERY 8 HOURS PRN
OUTPATIENT
Start: 2024-07-27

## 2024-07-27 RX ORDER — DEXTROSE MONOHYDRATE 100 MG/ML
INJECTION, SOLUTION INTRAVENOUS CONTINUOUS PRN
OUTPATIENT
Start: 2024-07-27

## 2024-07-27 RX ORDER — BUDESONIDE 0.5 MG/2ML
0.5 INHALANT ORAL
Status: DISCONTINUED | OUTPATIENT
Start: 2024-07-27 | End: 2024-07-27 | Stop reason: HOSPADM

## 2024-07-27 RX ORDER — LORAZEPAM 2 MG/ML
4 INJECTION INTRAMUSCULAR EVERY 5 MIN PRN
OUTPATIENT
Start: 2024-07-27

## 2024-07-27 RX ORDER — ACETAMINOPHEN 325 MG/1
650 TABLET ORAL EVERY 6 HOURS PRN
OUTPATIENT
Start: 2024-07-27

## 2024-07-27 RX ORDER — LEVETIRACETAM 500 MG/5ML
500 INJECTION, SOLUTION, CONCENTRATE INTRAVENOUS EVERY 12 HOURS
Status: DISCONTINUED | OUTPATIENT
Start: 2024-07-27 | End: 2024-07-31

## 2024-07-27 RX ORDER — HEPARIN SODIUM 5000 [USP'U]/ML
5000 INJECTION, SOLUTION INTRAVENOUS; SUBCUTANEOUS EVERY 8 HOURS SCHEDULED
Status: DISCONTINUED | OUTPATIENT
Start: 2024-07-27 | End: 2024-08-05 | Stop reason: HOSPADM

## 2024-07-27 RX ORDER — HEPARIN SODIUM 10000 [USP'U]/100ML
5-30 INJECTION, SOLUTION INTRAVENOUS CONTINUOUS
OUTPATIENT
Start: 2024-07-27

## 2024-07-27 RX ORDER — ACETAMINOPHEN 650 MG/1
650 SUPPOSITORY RECTAL EVERY 6 HOURS PRN
OUTPATIENT
Start: 2024-07-27

## 2024-07-27 RX ORDER — FUROSEMIDE 10 MG/ML
40 INJECTION INTRAMUSCULAR; INTRAVENOUS 2 TIMES DAILY
OUTPATIENT
Start: 2024-07-27

## 2024-07-27 RX ORDER — ACETAMINOPHEN 650 MG/1
650 SUPPOSITORY RECTAL EVERY 6 HOURS PRN
Status: DISCONTINUED | OUTPATIENT
Start: 2024-07-27 | End: 2024-08-05 | Stop reason: HOSPADM

## 2024-07-27 RX ORDER — HEPARIN SODIUM 1000 [USP'U]/ML
40 INJECTION, SOLUTION INTRAVENOUS; SUBCUTANEOUS PRN
Status: DISCONTINUED | OUTPATIENT
Start: 2024-07-27 | End: 2024-07-27

## 2024-07-27 RX ORDER — HEPARIN SODIUM 1000 [USP'U]/ML
80 INJECTION, SOLUTION INTRAVENOUS; SUBCUTANEOUS PRN
Status: DISCONTINUED | OUTPATIENT
Start: 2024-07-27 | End: 2024-07-27

## 2024-07-27 RX ORDER — HEPARIN SODIUM 1000 [USP'U]/ML
80 INJECTION, SOLUTION INTRAVENOUS; SUBCUTANEOUS PRN
OUTPATIENT
Start: 2024-07-27

## 2024-07-27 RX ORDER — LINEZOLID 2 MG/ML
600 INJECTION, SOLUTION INTRAVENOUS EVERY 12 HOURS
Status: DISCONTINUED | OUTPATIENT
Start: 2024-07-27 | End: 2024-08-05 | Stop reason: HOSPADM

## 2024-07-27 RX ORDER — POLYETHYLENE GLYCOL 3350 17 G/17G
17 POWDER, FOR SOLUTION ORAL DAILY PRN
Status: DISCONTINUED | OUTPATIENT
Start: 2024-07-27 | End: 2024-08-05 | Stop reason: HOSPADM

## 2024-07-27 RX ORDER — SODIUM CHLORIDE 0.9 % (FLUSH) 0.9 %
5-40 SYRINGE (ML) INJECTION EVERY 12 HOURS SCHEDULED
Status: DISCONTINUED | OUTPATIENT
Start: 2024-07-27 | End: 2024-08-05 | Stop reason: HOSPADM

## 2024-07-27 RX ORDER — MAGNESIUM SULFATE IN WATER 40 MG/ML
2000 INJECTION, SOLUTION INTRAVENOUS PRN
Status: DISCONTINUED | OUTPATIENT
Start: 2024-07-27 | End: 2024-08-05 | Stop reason: HOSPADM

## 2024-07-27 RX ORDER — MULTIVITAMIN WITH IRON
1 TABLET ORAL DAILY
OUTPATIENT
Start: 2024-07-28

## 2024-07-27 RX ADMIN — LEVETIRACETAM 500 MG: 100 INJECTION, SOLUTION INTRAVENOUS at 06:27

## 2024-07-27 RX ADMIN — VALPROIC ACID 250 MG: 250 SOLUTION ORAL at 06:28

## 2024-07-27 RX ADMIN — HEPARIN SODIUM 26 UNITS/KG/HR: 10000 INJECTION, SOLUTION INTRAVENOUS at 07:50

## 2024-07-27 RX ADMIN — SODIUM CHLORIDE: 9 INJECTION, SOLUTION INTRAVENOUS at 20:57

## 2024-07-27 RX ADMIN — VALPROIC ACID 250 MG: 250 SOLUTION ORAL at 00:05

## 2024-07-27 RX ADMIN — POTASSIUM BICARBONATE 40 MEQ: 782 TABLET, EFFERVESCENT ORAL at 06:27

## 2024-07-27 RX ADMIN — ANTI-FUNGAL POWDER MICONAZOLE NITRATE TALC FREE: 1.42 POWDER TOPICAL at 20:49

## 2024-07-27 RX ADMIN — IPRATROPIUM BROMIDE AND ALBUTEROL SULFATE 1 DOSE: 2.5; .5 SOLUTION RESPIRATORY (INHALATION) at 07:48

## 2024-07-27 RX ADMIN — ACETAMINOPHEN 650 MG: 325 TABLET ORAL at 12:11

## 2024-07-27 RX ADMIN — VANCOMYCIN HYDROCHLORIDE 1250 MG: 5 INJECTION, POWDER, LYOPHILIZED, FOR SOLUTION INTRAVENOUS at 16:45

## 2024-07-27 RX ADMIN — IPRATROPIUM BROMIDE AND ALBUTEROL SULFATE 1 DOSE: 2.5; .5 SOLUTION RESPIRATORY (INHALATION) at 15:31

## 2024-07-27 RX ADMIN — SODIUM CHLORIDE, PRESERVATIVE FREE 10 ML: 5 INJECTION INTRAVENOUS at 08:58

## 2024-07-27 RX ADMIN — DEXMEDETOMIDINE 0.8 MCG/KG/HR: 200 INJECTION, SOLUTION INTRAVENOUS at 04:20

## 2024-07-27 RX ADMIN — FOLIC ACID 1 MG: 1 TABLET ORAL at 08:47

## 2024-07-27 RX ADMIN — Medication 125 MCG/HR: at 16:35

## 2024-07-27 RX ADMIN — FUROSEMIDE 40 MG: 10 INJECTION, SOLUTION INTRAMUSCULAR; INTRAVENOUS at 20:48

## 2024-07-27 RX ADMIN — HEPARIN SODIUM 5000 UNITS: 5000 INJECTION INTRAVENOUS; SUBCUTANEOUS at 22:17

## 2024-07-27 RX ADMIN — SODIUM CHLORIDE, PRESERVATIVE FREE 10 ML: 5 INJECTION INTRAVENOUS at 08:49

## 2024-07-27 RX ADMIN — MULTIVITAMIN TABLET 1 TABLET: TABLET at 08:46

## 2024-07-27 RX ADMIN — VALPROIC ACID 250 MG: 250 SOLUTION ORAL at 20:48

## 2024-07-27 RX ADMIN — Medication 5 MG/HR: at 09:52

## 2024-07-27 RX ADMIN — SODIUM CHLORIDE, PRESERVATIVE FREE 10 ML: 5 INJECTION INTRAVENOUS at 20:50

## 2024-07-27 RX ADMIN — CLONAZEPAM 2 MG: 0.5 TABLET ORAL at 00:04

## 2024-07-27 RX ADMIN — LEVETIRACETAM 500 MG: 100 INJECTION, SOLUTION INTRAVENOUS at 16:46

## 2024-07-27 RX ADMIN — IPRATROPIUM BROMIDE AND ALBUTEROL SULFATE 1 DOSE: 2.5; .5 SOLUTION RESPIRATORY (INHALATION) at 10:13

## 2024-07-27 RX ADMIN — SODIUM CHLORIDE, PRESERVATIVE FREE 40 MG: 5 INJECTION INTRAVENOUS at 20:47

## 2024-07-27 RX ADMIN — CLONIDINE HYDROCHLORIDE 0.2 MG: 0.2 TABLET ORAL at 08:46

## 2024-07-27 RX ADMIN — DEXMEDETOMIDINE 1 MCG/KG/HR: 200 INJECTION, SOLUTION INTRAVENOUS at 17:28

## 2024-07-27 RX ADMIN — HEPARIN SODIUM 26 UNITS/KG/HR: 10000 INJECTION, SOLUTION INTRAVENOUS at 16:59

## 2024-07-27 RX ADMIN — ASPIRIN 81 MG CHEWABLE TABLET 81 MG: 81 TABLET CHEWABLE at 08:46

## 2024-07-27 RX ADMIN — LEVETIRACETAM 500 MG: 100 INJECTION INTRAVENOUS at 20:48

## 2024-07-27 RX ADMIN — LINEZOLID 600 MG: 600 INJECTION, SOLUTION INTRAVENOUS at 21:00

## 2024-07-27 RX ADMIN — DEXMEDETOMIDINE 1 MCG/KG/HR: 200 INJECTION, SOLUTION INTRAVENOUS at 13:22

## 2024-07-27 RX ADMIN — DOCUSATE SODIUM 100 MG: 50 LIQUID ORAL at 08:46

## 2024-07-27 RX ADMIN — DEXMEDETOMIDINE HYDROCHLORIDE 1 MCG/KG/HR: 400 INJECTION, SOLUTION INTRAVENOUS at 20:47

## 2024-07-27 RX ADMIN — Medication 175 MCG/HR: at 06:25

## 2024-07-27 RX ADMIN — Medication 125 MCG/HR: at 20:26

## 2024-07-27 RX ADMIN — DEXMEDETOMIDINE 1 MCG/KG/HR: 200 INJECTION, SOLUTION INTRAVENOUS at 08:58

## 2024-07-27 RX ADMIN — Medication 100 MG: at 08:47

## 2024-07-27 RX ADMIN — NALOXEGOL OXALATE 25 MG: 25 TABLET, FILM COATED ORAL at 07:00

## 2024-07-27 RX ADMIN — SODIUM CHLORIDE, PRESERVATIVE FREE 10 ML: 5 INJECTION INTRAVENOUS at 08:59

## 2024-07-27 RX ADMIN — QUETIAPINE FUMARATE 150 MG: 200 TABLET ORAL at 08:46

## 2024-07-27 RX ADMIN — VALPROIC ACID 250 MG: 250 SOLUTION ORAL at 12:11

## 2024-07-27 RX ADMIN — CLONAZEPAM 2 MG: 0.5 TABLET ORAL at 12:11

## 2024-07-27 RX ADMIN — FUROSEMIDE 40 MG: 10 INJECTION, SOLUTION INTRAMUSCULAR; INTRAVENOUS at 08:46

## 2024-07-27 RX ADMIN — Medication 5 MG/HR: at 20:37

## 2024-07-27 RX ADMIN — KETOROLAC TROMETHAMINE 30 MG: 30 INJECTION, SOLUTION INTRAMUSCULAR at 16:32

## 2024-07-27 ASSESSMENT — PULMONARY FUNCTION TESTS
PIF_VALUE: 29
PIF_VALUE: 27
PIF_VALUE: 29
PIF_VALUE: 30
PIF_VALUE: 30
PIF_VALUE: 29
PIF_VALUE: 30
PIF_VALUE: 27
PIF_VALUE: 28
PIF_VALUE: 31
PIF_VALUE: 27

## 2024-07-27 NOTE — SIGNIFICANT EVENT
Informed by RN transport will be here in 15 minutes.  Will hold off central line defer to [facility] critical care team       Koko Paiz MD  Pulmonary critical care sleep

## 2024-07-27 NOTE — PLAN OF CARE
Problem: Respiratory - Adult  Goal: Achieves optimal ventilation and oxygenation  7/27/2024 0749 by Alana Goodrich RCP  Outcome: Progressing     Problem: Respiratory - Adult  Goal: Adequate oxygenation  7/27/2024 0749 by Alana Goodrich RCP  Outcome: Progressing     Problem: Respiratory - Adult  Goal: Will be able to breathe spontaneously, without ventilator support  Description: Will be able to breathe spontaneously, without ventilator support  7/27/2024 0749 by Alana Goodrich RCP  Outcome: Progressing

## 2024-07-27 NOTE — PROGRESS NOTES
[facility]   Pharmacy Pharmacokinetic Monitoring Service - Vancomycin    Consulting Provider: Dr. Brown    Indication: PNA  Target Concentration: Goal AUC/AIDA 400-600 mg*hr/L  Day of Therapy: 6  Additional Antimicrobials: none     Pertinent Laboratory Values:   Wt Readings from Last 1 Encounters:   07/26/24 99.7 kg (219 lb 12.8 oz)     Temp Readings from Last 1 Encounters:   07/27/24 98.8 °F (37.1 °C)     Estimated Creatinine Clearance: 66 mL/min (A) (based on SCr of 1.6 mg/dL (H)).  Recent Labs     07/26/24  0900 07/27/24  0405   CREATININE 1.6* 1.6*   BUN 27* 31*   WBC 13.4* 12.5*     Procalcitonin: 0.31 (7/24)     Pertinent Cultures:  Culture Date Source Results   7/21 Tracheal aspirate  MRSA light growth   7/24 BAL MRSA >100k   MRSA Nasal Swab:  showed MRSA positive result on 7/16    Recent vancomycin administrations                     vancomycin (VANCOCIN) 1,250 mg in sodium chloride 0.9 % 250 mL IVPB (mg) 1,250 mg New Bag 07/26/24 1720     1,250 mg New Bag 07/25/24 1738    vancomycin (VANCOCIN) 1,500 mg in sodium chloride 0.9 % 250 mL IVPB (mg) 1,500 mg New Bag 07/24/24 1742                    Plan:  Current dosing regimen is  1250 mg IV q24h  Continue current dose  Repeat vancomycin concentration ordered for 7/29 @ 0600   Pharmacy will continue to monitor patient and adjust therapy as indicated    Thank you for the consult,  LORI ESCALANTE RPH  7/27/2024 6:55 AM

## 2024-07-27 NOTE — PROGRESS NOTES
[facility]  Progress Note    John Rivas  MRN: [id number]  Date: 7/27/2024  LOS: 12     Reason for F/U :   Pneumonia    Impression :   Acute respiratory failure with hypoxia, currently ventilator dependent  Bilateral Staphylococcus aureus pneumonia   status post bronchoscopy 7/24/2026  Sputum and BAL cultures thus far with Staphylococcus aureus  Fevers -likely related to above  Polysubstance abuse with acute alcohol intoxication  Possible seizure   EEG with significant encephalopathy  Chronic kidney disease stage III    Recommendations:   Continue intravenous antimicrobial therapy with vancomycin for the Staphylococcus aureus isolated in the sputum  The chest x-ray essentially remained stable but the patient did get hypoxic and FiO2 requirements had to increase today  The patient is currently on 100% FiO2 and 12 of PEEP  The concern would be for mucous plugging  The patient already remains on heparin for possible pulmonary embolism  The plan will be for a 7 to 10-day course of antibiotic coverage and we are awaiting the sensitivity data though the patient does have known MRSA colonization    Previous antibiotics:  Piperacillin/tazobactam 7/15/2024 through 7/24/2024 [ #10 days of therapy]  Meropenem for 1 day 7/24/2024    Infection Control Recommendations:   Contact precautions    Discharge Planning:   Estimated Length of IV antimicrobials: 7 to 10 days  Patient will need Midline Catheter Insertion/ PICC line Insertion: No  Patient will need: Home IV , Infusion Center,  SNF,  LTAC: Undetermined  Patient willneed outpatient wound care: No    Medical Decision making / Summary of Stay:   John Rivas is a 49 y.o.-year-old male who was initially admitted on 7/15/2024.      John is seen in consultation on 7/24/2024     He was admitted 7/15/2024 due to concerns for alcohol intoxication and he was found on the side of a fast food restaurant and EMS was called.  The patient admitted to drinking 1/5 of  independently reviewed the followinglabs:  CBC with Differential:   Recent Labs     07/26/24  0900 07/27/24  0405   WBC 13.4* 12.5*   HGB 12.3* 12.6*   HCT 36.4* 37.0*   * 598*   LYMPHOPCT 20* 23*   MONOPCT 6 5   EOSPCT 3 3       BMP:   Recent Labs     07/26/24  0900 07/27/24  0405    134*   K 3.5* 3.5*   CL 96* 93*   CO2 28 30   BUN 27* 31*   CREATININE 1.6* 1.6*   MG 2.2 2.2       Hepatic Function Panel:   Recent Labs     07/26/24  0605 07/27/24  0405   BILIDIR 0.4* 0.3*   IBILI 0.1 0.2   BILITOT 0.5 0.5   ALKPHOS 108 117   ALT 19 22   AST 48* 50*           Lab Results   Component Value Date/Time    PROCAL 0.31 07/24/2024 02:16 PM     No results found for: \"CRP\"  No results found for: \"SEDRATE\"      Lab Results   Component Value Date/Time    DDIMER 2.38 07/22/2024 01:18 PM     No results found for: \"FERRITIN\"  No results found for: \"LDH\"  No results found for: \"FIBRINOGEN\"    No results found for requested labs within last 30 days.     No results found for: \"COVID19\"    No results for input(s): \"VANCOTROUGH\" in the last 72 hours.    Imaging Studies:   ONE XRAY VIEW OF THE CHEST 7/27/2024 3:25 am   IMPRESSION:  Unchanged airspace opacity at the right lower lung zone. Right pleural  effusion cannot be excluded.              Specimen Collected: 07/27/24 05:35 EDT Last Resulted: 07/27/24 05:38 EDT               ONE XRAY VIEW OF THE CHEST 7/26/2024 6:47 am   IMPRESSION:  1. Possible slight decrease in bibasilar airspace opacities with suspected  overlying bilateral pleural effusions and could be due to differences in  positioning with resultant differences in layering of the effusions.  In  addition or as an alternative, there could be improved atelectasis,  aspiration, and/or pneumonia.  2. No significant change in pulmonary vascular congestion with possible  perihilar edema, potentially congestive heart failure given mild  cardiomegaly.  Pneumonia could appear similar.              Specimen Collected:  07/26/24 08:49 EDT Last Resulted: 07/26/24 08:52 EDT           Cultures:   Culture and Sensitivities:  Recent Labs     07/25/24  1302   SPECDESC .BLOOD   SPECIAL L HAND 13 ML   CULTURE NO GROWTH 1 DAY       Procedure Component Value Units Date/Time   Culture, Blood 1 [[id number]] Collected: 07/25/24 1302   Order Status: Completed Specimen: Arm, Forearm from Blood Updated: 07/26/24 0539    Specimen Description .BLOOD    Special Requests L HAND 13 ML    Culture NO GROWTH 12 HOURS   Culture, Blood 2 [[id number]] Collected: 07/22/24 1712   Order Status: Completed Specimen: Blood Updated: 07/25/24 2217    Specimen Description .BLOOD    Special Requests LT WRIST, 9 ML    Culture NO GROWTH 3 DAYS   Culture, Blood 1 [[id number]] Collected: 07/22/24 1317   Order Status: Completed Specimen: Blood Updated: 07/25/24 2046    Specimen Description .BLOOD    Special Requests LW 1.5ML    Culture NO GROWTH 3 DAYS   Culture, Respiratory [[id number]] (Abnormal) Collected: 07/24/24 1351   Order Status: Completed Specimen: Sputum from BAL- Bronch. Lavage Updated: 07/25/24 1143    Specimen Description .BRONCHIAL ALVEOLAR LAVAGE RLL    Special Requests Site: Sputum    Direct Exam MANY NEUTROPHILS     MODERATE GRAM POSITIVE COCCI IN CLUSTERS Abnormal     Culture STAPHYLOCOCCUS AUREUS <10,000 CFU/ML Abnormal    Culture, Fungus [[id number]] Collected: 07/24/24 1628   Order Status: Completed Specimen: BAL- Bronch. Lavage Updated: 07/25/24 1015    Specimen Description .BRONCHIAL ALVEOLAR LAVAGE RLL    Direct Exam NO FUNGAL ELEMENTS SEEN    Culture PENDING   Culture with Smear, Acid Fast Bacillius [[id number]] Collected: 07/24/24 1628   Order Status: Completed Specimen: BAL- Bronch. Lavage Updated: 07/25/24 1015    Specimen Description .BRONCHIAL ALVEOLAR LAVAGE .RIGHT LOWER LOBE    Direct Exam NO ACID FAST BACILLI SEEN (CONCENTRATED SMEAR)    Culture PENDING   Culture, Respiratory [[id number]] (Abnormal) Collected: 07/21/24 1815   Order Status:

## 2024-07-27 NOTE — DISCHARGE SUMMARY
[facility]  Office: [phone]  Klever Dickens DO, Eulogio rS DO, Jerome Worley DO, Warren Jovel DO, Vaughn Jefferson MD, Krista Andrews MD, Ana M Cooley MD, Nia Wright MD,  Doc Metz MD, Mario Alberto Lizama MD, Tera Prajapati DO, Shahla Dutta MD,  Dolly Wu DO, Nancy Mata MD, James Boston MD, Trey Dickens DO, Adriana Spencer MD,  Luis Alberto Thornton DO, Yanet Horan MD, Renee King MD, Fely Gomez MD, Kady Macias MD,  Kayden Murillo MD, Ramsey Valentine MD, Airam Gonsales MD, Angelina Cruz MD, Navin King DO, Julieta Gomez MD,  Kael Farais MD, Devyn Gates MD, Shirley Waterhouse, YAMILE,  Valencia Regalado CNP,, Lobo Rojas, CNP,  Karen Mascorro, DNP, Ania Oro, CNP, Kyra Isbell, CNP, Arabella Rincon CNP, Shayla Song CNP, Jackelin Woody CNP, Brooke Olvera, CNP, Rebecca Zavala, CNS, Laurie Crandall, CNP, Melissa Pat, CNP                  [facility]      Discharge Summary     Patient ID: John Rivas  :  1975   MRN: [id number]     ACCOUNT:  [id number]   Patient Location : 1104/1104-01  Patient's PCP: No primary care provider on file.  Admit Date: 7/15/2024   Discharge Date:  24     Length of Stay: 12  Code Status:  Full Code  Admitting Physician: Ana M Cooley MD  Discharge Physician: Ana M Cooley MD     Active Discharge Diagnosis :     Primary Problem  Acute alcoholic intoxication with complication (HCC)      Hospital Problems  Active Hospital Problems    Diagnosis Date Noted    Uncontrolled hypertension [I10] 2024    Acute systolic CHF (congestive heart failure) (HCC) [I50.21] 2024    Seizure due to alcohol withdrawal, with unspecified complication (HCC) [F10.939, R56.9] 2024    MAIK (acute kidney injury) (HCC) [N17.9] 2024    Acute encephalopathy [G93.40] 2024    Acute alcoholic intoxication with complication (HCC) [F10.929] 07/15/2024    Alcohol intoxication in alcoholism

## 2024-07-27 NOTE — PROGRESS NOTES
Nephrology Progress Note      SUBJECTIVE      Patient is seen and examined, remains in icu, sedated on vent. Family at bedside updated.   Attempting to wean, with turning O2 drops to 89%.   Remains on fentanyl, heparin gtt, versed, not on maintenance ivf,  [place] 800 in bag, yellow. U/o 5550 past 24 hours. Net + 5.8 L from admission, but this is improving.   Remains on Lasix 40mg IV BID./   No pressors.   Tube feeds held since early am as residuals went over 500. +BM just now.     Fi02 65% PEEP 12    Labs today showed sodium 134 potassium 3.5 chloride 93 and CO2 31 with BUN 31 creatinine stable at 1.6 magnesium 2.2 and calcium 9.7.  CBC shows white blood cells 12.5 with hemoglobin 12.6 and platelets 598.    Potassium replaced as per sliding scale.      CXR this am.   IMPRESSION:  Unchanged airspace opacity at the right lower lung zone. Right pleural  effusion cannot be excluded.              Specimen Collected: 24 05:35 EDT             OBJECTIVE      CURRENT TEMPERATURE:  Temp: 99.4 °F (37.4 °C)  MAXIMUM TEMPERATURE OVER 24HRS:  Temp (24hrs), Av.7 °F (37.6 °C), Min:98.2 °F (36.8 °C), Max:100.3 °F (37.9 °C)    CURRENT RESPIRATORY RATE:  Respirations: 18  CURRENT PULSE:  Pulse: 64  CURRENT BLOOD PRESSURE:  BP: (!) 162/73  24HR BLOOD PRESSURE RANGE:  Systolic (24hrs), Av , Min:94 , Max:162   ; Diastolic (24hrs), Av, Min:42, Max:73    24HR INTAKE/OUTPUT:    Intake/Output Summary (Last 24 hours) at 2024 0935  Last data filed at 2024 0849  Gross per 24 hour   Intake 2115.31 ml   Output 6400 ml   Net -4284.69 ml       PHYSICAL EXAM      GENERAL APPEARANCE: Sedated and on the ventilator  SKIN: warm and dry   ENT: Oral endotracheal tube in place  NECK: No obvious JVD, midline trachea  PULMONARY: diminished R>L  CADRDIOVASCULAR: Regular rate and rhythm with positive S1 and S2 and no rubs   ABDOMEN: Soft and hypoactive bs, no fluid wave  EXTREMITIES: no cyanosis, clubbing or edema and 2+ DP pulses  Results   Component Value Date    C3 289 (H) 07/25/2024     C4:   Lab Results   Component Value Date    C4 64 (H) 07/25/2024     MPO ANCA: No results found for: \"MPO\" .  PR3 ANCA:  No results found for: \"PR3\"  URINE SODIUM:  No components found for: \"ELENA\"   URINE POTASSIUM:  No results found for: \"KUR\"  URINE CHLORIDE:  No results found for: \"CLU\"  URINE PH:  No components found for: \"PO4U\"  URINE OSMOLARITY:  No results found for: \"OSMOU\"  URINE CREATININE:  No results found for: \"LABCREA\"  URINE EOSINOPHILS: No components found for: \"EOSU\"  URINE PROTEIN:  No results found for: \"TPU\"  URINALYSIS:  U/A:   Lab Results   Component Value Date/Time    NITRU NEGATIVE 07/25/2024 03:00 PM    COLORU Yellow 07/25/2024 03:00 PM    PHUR 5.5 07/25/2024 03:00 PM    WBCUA 2 TO 5 07/25/2024 03:00 PM    RBCUA 10 TO 20 07/25/2024 03:00 PM    LEUKOCYTESUR TRACE 07/25/2024 03:00 PM    UROBILINOGEN Normal 07/25/2024 03:00 PM    BILIRUBINUR NEGATIVE 07/25/2024 03:00 PM    GLUCOSEU NEGATIVE 07/25/2024 03:00 PM    KETUA NEGATIVE 07/25/2024 03:00 PM     ANTIGBM:No results found for: \"GBMABIGG\"      RADIOLOGY      Reviewed as available.      ASSESSMENT      1.  Acute kidney injury secondary to ischemic ATN with some pneumonia and variable blood pressures along with nephrotoxic ATN from vancomycin use with creatinine peaking at 2.2 and now with plateau at 1.6 today  2.  Bilateral CHF with history of CAD with stent placement and depressed LVEF of 45%, evident on chest x-ray  3.  Ventilator dependent hypoxic acute respiratory failure  4.  Acute alcohol intoxication along with the patient positive for cocaine as well  5.  Bilateral lower lobe aspiration pneumonia or on treatment with bronchoscopy showing light growth Staph aureus  6.  History of hypertension with SBP in 160's this am     PLAN       1.  Continue Lasix 40 mg IV twice a day to continue to keep in negative balance   2.  No maintenance IVF  3.  Note, free serum light chain ratio

## 2024-07-27 NOTE — PLAN OF CARE
Problem: Safety - Medical Restraint  Goal: Remains free of injury from restraints (Restraint for Interference with Medical Device)  Description: INTERVENTIONS:  1. Determine that other, less restrictive measures have been tried or would not be effective before applying the restraint  2. Evaluate the patient's condition at the time of restraint application  3. Inform patient/family regarding the reason for restraint  4. Q2H: Monitor safety, psychosocial status, comfort, nutrition and hydration  Outcome: Progressing  Flowsheets (Taken 7/26/2024 2000)  Remains free of injury from restraints (restraint for interference with medical device):   Determine that other, less restrictive measures have been tried or would not be effective before applying the restraint   Evaluate the patient's condition at the time of restraint application   Inform patient/family regarding the reason for restraint   Every 2 hours: Monitor safety, psychosocial status, comfort, nutrition and hydration     Problem: Discharge Planning  Goal: Discharge to home or other facility with appropriate resources  Outcome: Progressing  Flowsheets (Taken 7/26/2024 2000)  Discharge to home or other facility with appropriate resources:   Identify barriers to discharge with patient and caregiver   Arrange for needed discharge resources and transportation as appropriate   Identify discharge learning needs (meds, wound care, etc)   Refer to discharge planning if patient needs post-hospital services based on physician order or complex needs related to functional status, cognitive ability or social support system     Problem: Respiratory - Adult  Goal: Achieves optimal ventilation and oxygenation  7/27/2024 0701 by Scott Paulson RN  Outcome: Progressing  Flowsheets (Taken 7/26/2024 2000)  Achieves optimal ventilation and oxygenation:   Assess for changes in respiratory status   Assess for changes in mentation and behavior   Position to facilitate oxygenation and  improvement   Update acute care plan with appropriate goals if chronic or comorbid symptoms are exacerbated and prevent overall improvement and discharge     Problem: Neurosensory - Adult  Goal: Absence of seizures  Outcome: Progressing     Problem: Genitourinary - Adult  Goal: Urinary catheter remains patent  Outcome: Progressing     Problem: Metabolic/Fluid and Electrolytes - Adult  Goal: Electrolytes maintained within normal limits  Outcome: Progressing  Flowsheets (Taken 7/26/2024 2000)  Electrolytes maintained within normal limits:   Monitor labs and assess patient for signs and symptoms of electrolyte imbalances   Administer electrolyte replacement as ordered   Monitor response to electrolyte replacements, including repeat lab results as appropriate  Goal: Hemodynamic stability and optimal renal function maintained  Outcome: Progressing  Flowsheets (Taken 7/26/2024 2000)  Hemodynamic stability and optimal renal function maintained: Monitor labs and assess for signs and symptoms of volume excess or deficit  Goal: Glucose maintained within prescribed range  Outcome: Progressing  Flowsheets (Taken 7/26/2024 2000)  Glucose maintained within prescribed range: Monitor blood glucose as ordered

## 2024-07-27 NOTE — PROGRESS NOTES
End Of Shift Note  [place] [facility]  Summary of shift: Patient had fever at the beginning of shift, given tylenol. Patient had small BM. Patient residual @0400 was 525. Tube feed was stopped and recheck will be at 0800.     Vitals:    Vitals:    07/27/24 0400 07/27/24 0414 07/27/24 0429 07/27/24 0624   BP:       Pulse: 60 60 59    Resp: 20 18 18    Temp: 99.1 °F (37.3 °C)  98.8 °F (37.1 °C) 99.9 °F (37.7 °C)   TempSrc: Esophageal      SpO2: 94% 95%     Weight:       Height:            I&O:   Intake/Output Summary (Last 24 hours) at 7/27/2024 0718  Last data filed at 7/27/2024 0710  Gross per 24 hour   Intake 2909.31 ml   Output 5560 ml   Net -2650.69 ml       Resp Status: Mechanical Vent    Ventilator Settings:  Vent Mode: AC/PRVC Resp Rate (Set): 20 bpm/Vt (Set, mL): 600 mL/ /FiO2 : 65 %    Critical Care IV infusions:   dexmedeTOMIDine (PRECEDEX) 400 mcg in sodium chloride 0.9 % 100 mL infusion 0.8 mcg/kg/hr (07/27/24 0710)    heparin (PORCINE) Infusion 26 Units/kg/hr (07/27/24 0710)    sodium chloride Stopped (07/22/24 0432)    fentaNYL 175 mcg/hr (07/27/24 0710)    midazolam 5 mg/hr (07/27/24 0710)    propofol Stopped (07/24/24 3955)    dextrose      sodium chloride Stopped (07/22/24 9138)        LDA:   Peripheral IV 07/16/24 Right Forearm (Active)   Number of days: 10       Peripheral IV 07/19/24 Left (Active)   Number of days: 8       Peripheral IV 07/20/24 Distal;Left;Anterior Cephalic (Active)   Number of days: 7       NG/OG/NJ/NE Tube 16 fr Left mouth (Active)   Number of days: 11       Urinary Catheter 07/15/24 (Active)   Number of days: 11       ETT  (Active)   Number of days: 11       Arterial Line 07/16/24 Right Radial (Active)   Number of days: 10       Wound Thigh Left (Active)   Number of days:

## 2024-07-27 NOTE — PROGRESS NOTES
Pulmonary Critical Care Progress Note    Patient seen for the follow up of Acute alcoholic intoxication with complication (HCC)     Subjective:    He developed significant desaturation on mobilization according to RN RT.  He had to be increased to 100% FiO2.  He had desaturation requiring pressure control ventilation.  I was contacted advised ABG chest x-ray.  He is now on PEEP high of 16 and I increased his PEEP to 14 FiO2 100%.  he is on Versed and fentanyl drip in addition to Precedex.  He is spiking fevers.  He  tolerates tube feeds.  He had several bowel movement      Examination:    Vitals: BP (!) 162/73   Pulse 66   Temp (!) 101.3 °F (38.5 °C) (Axillary)   Resp 20   Ht 1.803 m (5' 11\")   Wt 99.7 kg (219 lb 12.8 oz)   SpO2 94%   BMI 30.66 kg/m²   SpO2  Av.7 %  Min: 90 %  Max: 95 %  General appearance: Intubated sedated  Neck: No JVD  Lungs: Mild decreased breath sound no crackles or wheezes  Heart: regular rate and rhythm, S1, S2 normal, no gallop  Abdomen: Soft, non tender, + BS scrotal edema improved  Extremities: no cyanosis or clubbing.  2+ edema    LABs:    CBC:   Recent Labs     24  0900 24  0405   WBC 13.4* 12.5*   HGB 12.3* 12.6*   HCT 36.4* 37.0*   * 598*       BMP:   Recent Labs     24  0900 24  0405    134*   K 3.5* 3.5*   CO2 28 30   BUN 27* 31*   CREATININE 1.6* 1.6*   LABGLOM 52* 52*   GLUCOSE 108* 107*           LIVER PROFILE:  Recent Labs     24  0605 24  0405   AST 48* 50*   ALT 19 22        Latest Reference Range & Units 07/15/24 19:30   Amphetamine Screen, Ur NEGATIVE  NEGATIVE   Barbiturate Screen, Ur NEGATIVE  NEGATIVE   Benzodiazepine Screen, Urine NEGATIVE  NEGATIVE   Cannabinoid Scrn, Ur NEGATIVE  POSITIVE !   Cocaine Metabolite, Urine NEGATIVE  POSITIVE !   Fentanyl, Ur NEGATIVE  NEGATIVE   Methadone Screen, Urine NEGATIVE  NEGATIVE   Oxycodone Screen, Ur NEGATIVE  NEGATIVE   Opiates, Urine NEGATIVE  NEGATIVE   URINE DRUG

## 2024-07-27 NOTE — PROGRESS NOTES
[facility]  Office: [phone]  Klever Dickens DO, Eulogio Sr DO, Jerome Worley DO, Warren Jovel DO, Vaughn Jefferson MD, Krista Andrews MD, Ana M Cooley MD, Nia Wright MD,  Doc Metz MD, Mario Alberto Lizama MD, Shahla Dutta MD,  Dolly Wu DO, Nancy Mata MD, James Boston MD, Trey Dickens DO, Adriana Spencer MD,  Luis Alberto Thornton DO, Yanet Horan MD, Renee King MD, Fely Gomez MD, Kady Macias MD,  Kayden Murillo MD, Ramsey Valentine MD, Airam Gonsales MD, Angelina Cruz MD, Devyn Gates MD, Neil Looney MD, Navin King DO, Tera Prajapati DO, Julieta Gomez MD,  Kael Farias MD, Shirley Waterhouse, CNP,  Valencia Regalado CNP, Lobo Rojas, CNP,  Karen Mascorro, NELIA, Ania Oro, CNP, Kyra Isbell, CNP, Shayla Song CNP, Jackelin Woody, CNP, Hodan Cruz, PA-C, Kim Woodard PA-C, Destiney Manriquez, CNP, Vania Silva, CNP, Hailey Carson, CNP, Brooke Olvera, CNP, Arabella Rincon CNP, Rebecca Zavala, CNS, Laurie Crandall, CNP, Melissa Pat CNP, Tracy Schwab, CNP       [facility]      Daily Progress Note     Admit Date: 7/15/2024  Bed/Room No.  1104/1104-01  Admitting Physician : Ana M Cooley MD  Code Status :Full Code  Hospital Day:  LOS: 12 days   Chief Complaint:     Chief Complaint   Patient presents with    Alcohol Intoxication     Principal Problem:    Acute alcoholic intoxication with complication (HCC)  Active Problems:    Alcohol intoxication in alcoholism with blood level over 0.3 with delirium (HCC)    Aspiration pneumonia of both upper lobes due to vomit (HCC)    Acute respiratory failure with hypoxia (HCC)    On mechanically assisted ventilation (HCC)    Acute encephalopathy    MAIK (acute kidney injury) (HCC)    Seizure due to alcohol withdrawal, with unspecified complication (HCC)    Uncontrolled hypertension    Acute systolic CHF (congestive heart failure) (HCC)  Resolved Problems:    * No resolved hospital

## 2024-07-27 NOTE — PROGRESS NOTES
Patient for transfer to [facility].  Report given to ADITHYA Jaffe.  Transport arrived, report given to the crew.  Attached to their ventilator,  all ongoing drips sent with patient.   All belongings given back to sister.

## 2024-07-27 NOTE — PROGRESS NOTES
Patient was incontinent of feces, cleaned and turned on his left side, noted to be desaturating to 89%.  RT made aware. Came down to review patient, vent setting changed.

## 2024-07-27 NOTE — PROGRESS NOTES
Pt to [facility] room 3018 at 1815 from [facility]:  Fentanyl @ 125 mch/hr  Versed @ 6 mg/hr  Precedex @ 1 mcg/kg/hr  Heparin @ 26 units/kg/hr     Tucked in by writer, Scott RN, Mary RN, Stephanie RN, and respiratory therapist, awaiting admit orders.

## 2024-07-27 NOTE — MANAGEMENT PLAN
Called with Dr Jerez, worsening respiratory failure . Recommends transfer to [facility]. I discussed with [facility] intensivist - Patient will be accepted at [facility]. Will wait for bed availability.

## 2024-07-27 NOTE — PLAN OF CARE
Problem: Safety - Medical Restraint  Goal: Remains free of injury from restraints (Restraint for Interference with Medical Device)  Description: INTERVENTIONS:  1. Determine that other, less restrictive measures have been tried or would not be effective before applying the restraint  2. Evaluate the patient's condition at the time of restraint application  3. Inform patient/family regarding the reason for restraint  4. Q2H: Monitor safety, psychosocial status, comfort, nutrition and hydration  7/27/2024 0832 by Jailene Avila RN  Outcome: Progressing  Flowsheets (Taken 7/27/2024 0800)  Remains free of injury from restraints (restraint for interference with medical device):   Determine that other, less restrictive measures have been tried or would not be effective before applying the restraint   Evaluate the patient's condition at the time of restraint application   Every 2 hours: Monitor safety, psychosocial status, comfort, nutrition and hydration  7/27/2024 0701 by Scott Paulson RN  Outcome: Progressing  Flowsheets (Taken 7/26/2024 2000)  Remains free of injury from restraints (restraint for interference with medical device):   Determine that other, less restrictive measures have been tried or would not be effective before applying the restraint   Evaluate the patient's condition at the time of restraint application   Inform patient/family regarding the reason for restraint   Every 2 hours: Monitor safety, psychosocial status, comfort, nutrition and hydration     Problem: Discharge Planning  Goal: Discharge to home or other facility with appropriate resources  7/27/2024 0832 by Jailene Avila RN  Outcome: Progressing  Flowsheets (Taken 7/27/2024 0832)  Discharge to home or other facility with appropriate resources: Identify barriers to discharge with patient and caregiver  7/27/2024 0701 by Scott Paulson RN  Outcome: Progressing  Flowsheets (Taken 7/26/2024  or resting period.  7/27/2024 0832 by Jailene Avila RN  Outcome: Progressing  7/27/2024 0701 by Scott Paulson RN  Outcome: Progressing     Problem: Safety - Adult  Goal: Free from fall injury  7/27/2024 0832 by Jailene Avila RN  Outcome: Progressing  Flowsheets (Taken 7/27/2024 0832)  Free From Fall Injury: Instruct family/caregiver on patient safety  7/27/2024 0701 by Scott Paulson RN  Outcome: Progressing     Problem: Pain  Goal: Verbalizes/displays adequate comfort level or baseline comfort level  7/27/2024 0832 by Jailene Avila RN  Outcome: Progressing  Flowsheets (Taken 7/27/2024 0832)  Verbalizes/displays adequate comfort level or baseline comfort level:   Administer analgesics based on type and severity of pain and evaluate response   Consider cultural and social influences on pain and pain management  7/27/2024 0701 by Scott Paulson RN  Outcome: Progressing  Flowsheets (Taken 7/26/2024 2000)  Verbalizes/displays adequate comfort level or baseline comfort level:   Assess pain using appropriate pain scale   Administer analgesics based on type and severity of pain and evaluate response     Problem: Nutrition Deficit:  Goal: Optimize nutritional status  7/27/2024 0832 by Jailene Avila RN  Outcome: Progressing  7/27/2024 0701 by Scott Paulson RN  Outcome: Progressing     Problem: ABCDS Injury Assessment  Goal: Absence of physical injury  7/27/2024 0832 by Jailene Avila RN  Outcome: Progressing  7/27/2024 0701 by Scott Paulson RN  Outcome: Progressing     Problem: Chronic Conditions and Co-morbidities  Goal: Patient's chronic conditions and co-morbidity symptoms are monitored and maintained or improved  7/27/2024 0832 by Jailene Avila RN  Outcome: Progressing  Flowsheets (Taken 7/27/2024 0832)  Care Plan - Patient's Chronic Conditions and Co-Morbidity Symptoms are Monitored and Maintained  RN  Outcome: Progressing  7/27/2024 0701 by Scott Paulson RN  Outcome: Progressing  Flowsheets (Taken 7/26/2024 2000)  Hemodynamic stability and optimal renal function maintained: Monitor labs and assess for signs and symptoms of volume excess or deficit  Goal: Glucose maintained within prescribed range  7/27/2024 0832 by Jailene Avila RN  Outcome: Progressing  7/27/2024 0701 by Scott Paulson RN  Outcome: Progressing  Flowsheets (Taken 7/26/2024 2000)  Glucose maintained within prescribed range: Monitor blood glucose as ordered

## 2024-07-28 ENCOUNTER — APPOINTMENT (OUTPATIENT)
Age: 49
DRG: 130 | End: 2024-07-28
Attending: INTERNAL MEDICINE
Payer: MEDICAID

## 2024-07-28 PROBLEM — E87.70 HYPERVOLEMIA: Status: ACTIVE | Noted: 2024-07-28

## 2024-07-28 PROBLEM — R56.9 SEIZURE (HCC): Status: ACTIVE | Noted: 2024-07-28

## 2024-07-28 PROBLEM — J15.212 PNEUMONIA OF BOTH LOWER LOBES DUE TO METHICILLIN RESISTANT STAPHYLOCOCCUS AUREUS (MRSA) (HCC): Status: ACTIVE | Noted: 2024-07-28

## 2024-07-28 LAB
ALBUMIN SERPL-MCNC: 3.3 G/DL (ref 3.5–5.2)
ALBUMIN/GLOB SERPL: 1 {RATIO} (ref 1–2.5)
ALLEN TEST: ABNORMAL
ALP SERPL-CCNC: 134 U/L (ref 40–129)
ALT SERPL-CCNC: 27 U/L (ref 10–50)
ANION GAP SERPL CALCULATED.3IONS-SCNC: 12 MMOL/L (ref 9–16)
AST SERPL-CCNC: 68 U/L (ref 10–50)
BACTERIA URNS QL MICRO: NORMAL
BASOPHILS # BLD: 0 K/UL (ref 0–0.2)
BASOPHILS NFR BLD: 0 % (ref 0–2)
BILIRUB SERPL-MCNC: 0.7 MG/DL (ref 0–1.2)
BUN SERPL-MCNC: 32 MG/DL (ref 6–20)
CALCIUM SERPL-MCNC: 10.1 MG/DL (ref 8.6–10.4)
CASTS #/AREA URNS LPF: NORMAL /LPF (ref 0–8)
CHLORIDE SERPL-SCNC: 94 MMOL/L (ref 98–107)
CO2 SERPL-SCNC: 31 MMOL/L (ref 20–31)
CREAT SERPL-MCNC: 1.6 MG/DL (ref 0.7–1.2)
EOSINOPHIL # BLD: 0.32 K/UL (ref 0–0.4)
EOSINOPHILS RELATIVE PERCENT: 2 % (ref 1–4)
EPI CELLS #/AREA URNS HPF: NORMAL /HPF (ref 0–5)
ERYTHROCYTE [DISTWIDTH] IN BLOOD BY AUTOMATED COUNT: 13.8 % (ref 11.8–14.4)
FIO2: 70
FIO2: 95
GFR, ESTIMATED: 51 ML/MIN/1.73M2
GLUCOSE BLD-MCNC: 110 MG/DL (ref 74–100)
GLUCOSE SERPL-MCNC: 100 MG/DL (ref 74–99)
HCT VFR BLD AUTO: 38.3 % (ref 40.7–50.3)
HGB BLD-MCNC: 12.8 G/DL (ref 13–17)
IMM GRANULOCYTES # BLD AUTO: 0.16 K/UL (ref 0–0.3)
IMM GRANULOCYTES NFR BLD: 1 %
LACTIC ACID, WHOLE BLOOD: 0.9 MMOL/L (ref 0.7–2.1)
LACTIC ACID, WHOLE BLOOD: 1.3 MMOL/L (ref 0.7–2.1)
LACTIC ACID, WHOLE BLOOD: 2.2 MMOL/L (ref 0.7–2.1)
LYMPHOCYTES NFR BLD: 2.39 K/UL (ref 1–4.8)
LYMPHOCYTES RELATIVE PERCENT: 15 % (ref 24–44)
MCH RBC QN AUTO: 34 PG (ref 25.2–33.5)
MCHC RBC AUTO-ENTMCNC: 33.4 G/DL (ref 28.4–34.8)
MCV RBC AUTO: 101.9 FL (ref 82.6–102.9)
MICROORGANISM SPEC CULT: NO GROWTH
MICROORGANISM SPEC CULT: NORMAL
MODE: ABNORMAL
MODE: ABNORMAL
MONOCYTES NFR BLD: 0.64 K/UL (ref 0.1–0.8)
MONOCYTES NFR BLD: 4 % (ref 1–7)
MORPHOLOGY: NORMAL
NEUTROPHILS NFR BLD: 78 % (ref 36–66)
NEUTS SEG NFR BLD: 12.39 K/UL (ref 1.8–7.7)
NRBC BLD-RTO: 0 PER 100 WBC
O2 DELIVERY DEVICE: ABNORMAL
O2 DELIVERY DEVICE: ABNORMAL
PATIENT TEMP: 38.1
PLATELET # BLD AUTO: 607 K/UL (ref 138–453)
PMV BLD AUTO: 10.7 FL (ref 8.1–13.5)
POC HCO3: 34.6 MMOL/L (ref 21–28)
POC HCO3: 37.9 MMOL/L (ref 21–28)
POC LACTIC ACID: 0.8 MMOL/L (ref 0.56–1.39)
POC O2 SATURATION: 97.1 % (ref 94–98)
POC O2 SATURATION: 98.6 % (ref 94–98)
POC PCO2 TEMP: 47.8 MM HG
POC PCO2: 45.6 MM HG (ref 35–48)
POC PCO2: 53.5 MM HG (ref 35–48)
POC PH TEMP: 7.47
POC PH: 7.46 (ref 7.35–7.45)
POC PH: 7.49 (ref 7.35–7.45)
POC PO2 TEMP: 92.1 MM HG
POC PO2: 114.7 MM HG (ref 83–108)
POC PO2: 85.8 MM HG (ref 83–108)
POSITIVE BASE EXCESS, ART: 11.7 MMOL/L (ref 0–3)
POSITIVE BASE EXCESS, ART: 9.8 MMOL/L (ref 0–3)
POTASSIUM SERPL-SCNC: 3.9 MMOL/L (ref 3.7–5.3)
PROT SERPL-MCNC: 7.6 G/DL (ref 6.6–8.7)
RBC # BLD AUTO: 3.76 M/UL (ref 4.21–5.77)
RBC #/AREA URNS HPF: NORMAL /HPF (ref 0–4)
SAMPLE SITE: ABNORMAL
SAMPLE SITE: ABNORMAL
SERVICE CMNT-IMP: NORMAL
SODIUM SERPL-SCNC: 137 MMOL/L (ref 136–145)
SPECIMEN DESCRIPTION: NORMAL
TROPONIN I SERPL HS-MCNC: 27 NG/L (ref 0–22)
VIT B1 PYROPHOSHATE BLD-SCNC: 151 NMOL/L (ref 70–180)
VIT B1 PYROPHOSHATE BLD-SCNC: 151 NMOL/L (ref 70–180)
WBC #/AREA URNS HPF: NORMAL /HPF (ref 0–5)
WBC OTHER # BLD: 15.9 K/UL (ref 3.5–11.3)

## 2024-07-28 PROCEDURE — 95705 EEG W/O VID 2-12 HR UNMNTR: CPT

## 2024-07-28 PROCEDURE — 37799 UNLISTED PX VASCULAR SURGERY: CPT

## 2024-07-28 PROCEDURE — 2500000003 HC RX 250 WO HCPCS: Performed by: EMERGENCY MEDICINE

## 2024-07-28 PROCEDURE — 6360000002 HC RX W HCPCS: Performed by: EMERGENCY MEDICINE

## 2024-07-28 PROCEDURE — 6370000000 HC RX 637 (ALT 250 FOR IP)

## 2024-07-28 PROCEDURE — 82947 ASSAY GLUCOSE BLOOD QUANT: CPT

## 2024-07-28 PROCEDURE — 83605 ASSAY OF LACTIC ACID: CPT

## 2024-07-28 PROCEDURE — 85025 COMPLETE CBC W/AUTO DIFF WBC: CPT

## 2024-07-28 PROCEDURE — 99222 1ST HOSP IP/OBS MODERATE 55: CPT | Performed by: PSYCHIATRY & NEUROLOGY

## 2024-07-28 PROCEDURE — 80053 COMPREHEN METABOLIC PANEL: CPT

## 2024-07-28 PROCEDURE — 94003 VENT MGMT INPAT SUBQ DAY: CPT

## 2024-07-28 PROCEDURE — 71250 CT THORAX DX C-: CPT

## 2024-07-28 PROCEDURE — 6360000002 HC RX W HCPCS

## 2024-07-28 PROCEDURE — 6370000000 HC RX 637 (ALT 250 FOR IP): Performed by: EMERGENCY MEDICINE

## 2024-07-28 PROCEDURE — 99233 SBSQ HOSP IP/OBS HIGH 50: CPT | Performed by: INTERNAL MEDICINE

## 2024-07-28 PROCEDURE — 99291 CRITICAL CARE FIRST HOUR: CPT | Performed by: STUDENT IN AN ORGANIZED HEALTH CARE EDUCATION/TRAINING PROGRAM

## 2024-07-28 PROCEDURE — 82803 BLOOD GASES ANY COMBINATION: CPT

## 2024-07-28 PROCEDURE — 94640 AIRWAY INHALATION TREATMENT: CPT

## 2024-07-28 PROCEDURE — 94761 N-INVAS EAR/PLS OXIMETRY MLT: CPT

## 2024-07-28 PROCEDURE — 87086 URINE CULTURE/COLONY COUNT: CPT

## 2024-07-28 PROCEDURE — 2000000000 HC ICU R&B

## 2024-07-28 PROCEDURE — 2500000003 HC RX 250 WO HCPCS

## 2024-07-28 PROCEDURE — 2580000003 HC RX 258

## 2024-07-28 PROCEDURE — 95700 EEG CONT REC W/VID EEG TECH: CPT

## 2024-07-28 PROCEDURE — 2700000000 HC OXYGEN THERAPY PER DAY

## 2024-07-28 RX ORDER — FENTANYL CITRATE 50 UG/ML
50 INJECTION, SOLUTION INTRAMUSCULAR; INTRAVENOUS ONCE
Status: DISCONTINUED | OUTPATIENT
Start: 2024-07-28 | End: 2024-07-31

## 2024-07-28 RX ORDER — AMLODIPINE BESYLATE 10 MG/1
10 TABLET ORAL DAILY
Status: DISCONTINUED | OUTPATIENT
Start: 2024-07-28 | End: 2024-07-30

## 2024-07-28 RX ORDER — MIDAZOLAM HYDROCHLORIDE 1 MG/ML
INJECTION INTRAMUSCULAR; INTRAVENOUS
Status: COMPLETED
Start: 2024-07-28 | End: 2024-07-28

## 2024-07-28 RX ORDER — HYDRALAZINE HYDROCHLORIDE 20 MG/ML
10 INJECTION INTRAMUSCULAR; INTRAVENOUS ONCE
Status: COMPLETED | OUTPATIENT
Start: 2024-07-28 | End: 2024-07-28

## 2024-07-28 RX ORDER — IPRATROPIUM BROMIDE AND ALBUTEROL SULFATE 2.5; .5 MG/3ML; MG/3ML
1 SOLUTION RESPIRATORY (INHALATION)
Status: DISCONTINUED | OUTPATIENT
Start: 2024-07-28 | End: 2024-07-28

## 2024-07-28 RX ORDER — THIAMINE HYDROCHLORIDE 100 MG/ML
100 INJECTION, SOLUTION INTRAMUSCULAR; INTRAVENOUS DAILY
Status: DISCONTINUED | OUTPATIENT
Start: 2024-07-28 | End: 2024-07-31

## 2024-07-28 RX ORDER — MIDAZOLAM HYDROCHLORIDE 2 MG/2ML
2 INJECTION, SOLUTION INTRAMUSCULAR; INTRAVENOUS ONCE
Status: COMPLETED | OUTPATIENT
Start: 2024-07-28 | End: 2024-07-28

## 2024-07-28 RX ORDER — FOLIC ACID 5 MG/ML
1 INJECTION, SOLUTION INTRAMUSCULAR; INTRAVENOUS; SUBCUTANEOUS DAILY
Status: DISCONTINUED | OUTPATIENT
Start: 2024-07-28 | End: 2024-07-28

## 2024-07-28 RX ORDER — HYDRALAZINE HYDROCHLORIDE 20 MG/ML
5 INJECTION INTRAMUSCULAR; INTRAVENOUS ONCE
Status: COMPLETED | OUTPATIENT
Start: 2024-07-28 | End: 2024-07-28

## 2024-07-28 RX ORDER — ASPIRIN 81 MG/1
81 TABLET, CHEWABLE ORAL DAILY
Status: DISCONTINUED | OUTPATIENT
Start: 2024-07-28 | End: 2024-08-05 | Stop reason: HOSPADM

## 2024-07-28 RX ORDER — IPRATROPIUM BROMIDE AND ALBUTEROL SULFATE 2.5; .5 MG/3ML; MG/3ML
1 SOLUTION RESPIRATORY (INHALATION)
Status: DISCONTINUED | OUTPATIENT
Start: 2024-07-28 | End: 2024-08-02

## 2024-07-28 RX ORDER — FOLIC ACID 1 MG/1
1 TABLET ORAL DAILY
Status: DISCONTINUED | OUTPATIENT
Start: 2024-07-28 | End: 2024-08-05 | Stop reason: HOSPADM

## 2024-07-28 RX ORDER — HYDRALAZINE HYDROCHLORIDE 20 MG/ML
INJECTION INTRAMUSCULAR; INTRAVENOUS
Status: COMPLETED
Start: 2024-07-28 | End: 2024-07-28

## 2024-07-28 RX ADMIN — MIDAZOLAM HYDROCHLORIDE 2 MG: 2 INJECTION, SOLUTION INTRAMUSCULAR; INTRAVENOUS at 03:58

## 2024-07-28 RX ADMIN — FOLIC ACID 1 MG: 5 INJECTION, SOLUTION INTRAMUSCULAR; INTRAVENOUS; SUBCUTANEOUS at 09:26

## 2024-07-28 RX ADMIN — HYDRALAZINE HYDROCHLORIDE 5 MG: 20 INJECTION INTRAMUSCULAR; INTRAVENOUS at 02:31

## 2024-07-28 RX ADMIN — Medication 8 MG/HR: at 07:56

## 2024-07-28 RX ADMIN — VALPROIC ACID 250 MG: 250 SOLUTION ORAL at 09:27

## 2024-07-28 RX ADMIN — DEXMEDETOMIDINE HYDROCHLORIDE 0.6 MCG/KG/HR: 400 INJECTION, SOLUTION INTRAVENOUS at 18:06

## 2024-07-28 RX ADMIN — HEPARIN SODIUM 5000 UNITS: 5000 INJECTION INTRAVENOUS; SUBCUTANEOUS at 13:49

## 2024-07-28 RX ADMIN — FUROSEMIDE 40 MG: 10 INJECTION, SOLUTION INTRAMUSCULAR; INTRAVENOUS at 08:01

## 2024-07-28 RX ADMIN — LEVETIRACETAM 500 MG: 100 INJECTION INTRAVENOUS at 08:00

## 2024-07-28 RX ADMIN — ACETAMINOPHEN 650 MG: 325 TABLET ORAL at 01:30

## 2024-07-28 RX ADMIN — MIDAZOLAM HYDROCHLORIDE 2 MG: 1 INJECTION, SOLUTION INTRAMUSCULAR; INTRAVENOUS at 03:58

## 2024-07-28 RX ADMIN — IPRATROPIUM BROMIDE AND ALBUTEROL SULFATE 1 DOSE: 2.5; .5 SOLUTION RESPIRATORY (INHALATION) at 19:59

## 2024-07-28 RX ADMIN — DEXMEDETOMIDINE HYDROCHLORIDE 1 MCG/KG/HR: 400 INJECTION, SOLUTION INTRAVENOUS at 05:12

## 2024-07-28 RX ADMIN — DEXMEDETOMIDINE HYDROCHLORIDE 0.8 MCG/KG/HR: 400 INJECTION, SOLUTION INTRAVENOUS at 01:28

## 2024-07-28 RX ADMIN — VALPROIC ACID 250 MG: 250 SOLUTION ORAL at 17:29

## 2024-07-28 RX ADMIN — HEPARIN SODIUM 5000 UNITS: 5000 INJECTION INTRAVENOUS; SUBCUTANEOUS at 22:16

## 2024-07-28 RX ADMIN — SODIUM CHLORIDE, PRESERVATIVE FREE 10 ML: 5 INJECTION INTRAVENOUS at 20:28

## 2024-07-28 RX ADMIN — Medication 200 MCG/HR: at 06:54

## 2024-07-28 RX ADMIN — HYDRALAZINE HYDROCHLORIDE 10 MG: 20 INJECTION INTRAMUSCULAR; INTRAVENOUS at 06:38

## 2024-07-28 RX ADMIN — DEXMEDETOMIDINE HYDROCHLORIDE 0.8 MCG/KG/HR: 400 INJECTION, SOLUTION INTRAVENOUS at 13:16

## 2024-07-28 RX ADMIN — ANTI-FUNGAL POWDER MICONAZOLE NITRATE TALC FREE: 1.42 POWDER TOPICAL at 20:28

## 2024-07-28 RX ADMIN — LINEZOLID 600 MG: 600 INJECTION, SOLUTION INTRAVENOUS at 06:40

## 2024-07-28 RX ADMIN — THIAMINE HYDROCHLORIDE 100 MG: 100 INJECTION, SOLUTION INTRAMUSCULAR; INTRAVENOUS at 09:27

## 2024-07-28 RX ADMIN — ANTI-FUNGAL POWDER MICONAZOLE NITRATE TALC FREE: 1.42 POWDER TOPICAL at 07:53

## 2024-07-28 RX ADMIN — AMLODIPINE BESYLATE 10 MG: 10 TABLET ORAL at 15:54

## 2024-07-28 RX ADMIN — HEPARIN SODIUM 5000 UNITS: 5000 INJECTION INTRAVENOUS; SUBCUTANEOUS at 05:39

## 2024-07-28 RX ADMIN — LINEZOLID 600 MG: 600 INJECTION, SOLUTION INTRAVENOUS at 19:00

## 2024-07-28 RX ADMIN — FENTANYL CITRATE 50 MCG: 50 INJECTION, SOLUTION INTRAMUSCULAR; INTRAVENOUS at 06:25

## 2024-07-28 RX ADMIN — FUROSEMIDE 40 MG: 10 INJECTION, SOLUTION INTRAMUSCULAR; INTRAVENOUS at 17:28

## 2024-07-28 RX ADMIN — LEVETIRACETAM 500 MG: 100 INJECTION INTRAVENOUS at 20:28

## 2024-07-28 RX ADMIN — MIDAZOLAM HYDROCHLORIDE 2 MG: 2 INJECTION, SOLUTION INTRAMUSCULAR; INTRAVENOUS at 05:40

## 2024-07-28 RX ADMIN — DEXMEDETOMIDINE HYDROCHLORIDE 1 MCG/KG/HR: 400 INJECTION, SOLUTION INTRAVENOUS at 08:33

## 2024-07-28 RX ADMIN — VALPROIC ACID 250 MG: 250 SOLUTION ORAL at 13:18

## 2024-07-28 RX ADMIN — VALPROIC ACID 250 MG: 250 SOLUTION ORAL at 20:27

## 2024-07-28 RX ADMIN — MIDAZOLAM HYDROCHLORIDE 2 MG: 1 INJECTION, SOLUTION INTRAMUSCULAR; INTRAVENOUS at 05:40

## 2024-07-28 RX ADMIN — ASPIRIN 81 MG 81 MG: 81 TABLET ORAL at 13:18

## 2024-07-28 RX ADMIN — SODIUM CHLORIDE, PRESERVATIVE FREE 40 MG: 5 INJECTION INTRAVENOUS at 08:01

## 2024-07-28 ASSESSMENT — PULMONARY FUNCTION TESTS
PIF_VALUE: 31
PIF_VALUE: 16
PIF_VALUE: 30
PIF_VALUE: 32
PIF_VALUE: 17
PIF_VALUE: 16
PIF_VALUE: 14
PIF_VALUE: 15
PIF_VALUE: 15
PIF_VALUE: 16
PIF_VALUE: 16
PIF_VALUE: 32
PIF_VALUE: 32
PIF_VALUE: 30
PIF_VALUE: 32
PIF_VALUE: 15
PIF_VALUE: 14
PIF_VALUE: 14
PIF_VALUE: 27
PIF_VALUE: 28

## 2024-07-28 NOTE — PROGRESS NOTES
Per Sulma NP via PS, since EEG leads are non-compatible ok to obtain MRI tomorrow if patient is seizure free for 24 hours. Screening form sent to MRI- completed by sister.

## 2024-07-28 NOTE — PROGRESS NOTES
Comprehensive Nutrition Assessment    Type and Reason for Visit:  Initial (Vent Check)    Nutrition Recommendations/Plan:   Continue NPO.  Monitor plans for nutrition and plan of care.  If nutrition support to be restarted, suggest Tube Feedings of Standard with Fiber (Jevity 1.2) formula goal 50 mL/hr + 1 Protein modular per day.  Suggest starting at a trickle feeding at 10 mL/hr with slow advancement as tolerated.     Malnutrition Assessment:  Malnutrition Status:  Insufficient data (07/28/24 1406)    Context:  Acute Illness     Findings of the 6 clinical characteristics of malnutrition:  Energy Intake:  75% or less of estimated energy requirements for 7 or more days  Weight Loss:  Unable to assess     Body Fat Loss:  Unable to assess   Muscle Mass Loss:  Unable to assess  Fluid Accumulation:  Mild Generalized, Extremities   Strength:  Not Performed    Nutrition Assessment:    Admitted as a transfer from [facility] - initially admitted s/p seizure and for management of acute alcohol intoxication.  PMH: CAD, HTN; h/o daily alcohol use.  Per chart review, pt having difficulties tolerating TF with residuals of 150-500 mL.  RN reports NG output of 450 mL after admission.  Discussed providing recommendations for alternative TF formula with a lower goal rate.  Last recorded BM 7/27.  Labs reviewed.  Meds include: Lasix, Thiamine.    Nutrition Related Findings:    Distended abdomen.  +1 generalized, +1 pitting bilateral UE, and +1 non-pitting bilateral LE edema.  Last BM 7/27.   Wound Type: Pressure Injury, Stage I       Current Nutrition Intake & Therapies:    Average Meal Intake: NPO  Average Supplements Intake: NPO  Diet NPO  Additional Calorie Sources:  None    Anthropometric Measures:  Height: 180.3 cm (5' 10.98\")  Ideal Body Weight (IBW): 172 lbs (78 kg)    Admission Body Weight: 99.7 kg (219 lb 12.8 oz)  Current Body Weight: 99.7 kg (219 lb 12.8 oz), 127.8 % IBW. Weight Source: Bed Scale  Current BMI (kg/m2):

## 2024-07-28 NOTE — H&P
than 1  Volume overload secondary to capillary leak/iatrogenic  Found on secondary to acute alcoholic intoxication with toxin positive for cocaine as well  On Lasix 40 IV twice daily  +6.3 L since admission, strictly monitor I's and O's, UA with reflex to culture sent as when the Arrington was removed at [facility] patient was found to have purulent secretion at the catheter tip  Hypokalemia, replace potassium, continue to monitor BMP      Heme:  Leukocytosis from multifocal pneumonia secondary to aspiration secondary to seizure  Macrocytic anemia, vitamin B12 and folate WNL  Elevated D-dimer of 2.38  Bilateral duplex venous extremity lower was unremarkable without any evidence of DVT  Patient was on heparin infusion, case was discussed with on-call critical care attending, will discontinue the heparin infusion and start DVT prophylaxis with heparin 5000 3 times daily    ID:  Respiratory culture from 7/24/2024 grew MRSA more than 100,000 colony-forming unit  Sputum and BAL cultures thus far with Staphylococcus aureus   Intermittent febrile episode likely due to above  CT chest showed bilateral diffuse ARDS like picture  Piperacillin/tazobactam 7/15/2024 through 7/24/2024 [ #10 days of therapy],Meropenem for 1 day 7/24/2024, was started on IV vancomycin because of MRSA positive sputum  Infectious disease consulted, currently on IV Zyvox      Prophylaxis:  DVT: Heparin 5000 3 times daily  GI: Protonix 40 IV daily    Dispo:  To remain in ICU        Diandra Hannah MD   Internal Medicine - PGY-3  Intensive Care Unit  7/27/2024, 8:09 PM     Attending Physician Statement  I have discussed the care of John Rivas, including pertinent history and exam findings,  with the resident. I agree with the assessment, plan and orders as documented by the resident with additions .    The patient seen on 7/28/2024.  Please refer to the progress note    Electronically signed by ERENDIRA HARPER MD on   7/28/24 at 9:26 PM

## 2024-07-28 NOTE — PROGRESS NOTES
from BAL  Fungal culture from bronchoscopy growing yeast  OBJECTIVE:   VITAL SIGNS:  Patient Vitals for the past 8 hrs:   BP Temp Temp src Pulse Resp SpO2   07/28/24 0613 -- -- -- 59 22 97 %   07/28/24 0600 -- 99.9 °F (37.7 °C) -- 59 20 97 %   07/28/24 0545 -- 100 °F (37.8 °C) -- 58 20 97 %   07/28/24 0530 -- 100 °F (37.8 °C) -- 57 20 97 %   07/28/24 0515 -- 100.2 °F (37.9 °C) -- 57 20 97 %   07/28/24 0500 -- 100.4 °F (38 °C) -- 58 20 97 %   07/28/24 0445 -- 100.4 °F (38 °C) -- 59 20 97 %   07/28/24 0430 -- (!) 100.6 °F (38.1 °C) -- 59 20 97 %   07/28/24 0415 -- (!) 100.6 °F (38.1 °C) -- 61 20 96 %   07/28/24 0400 138/62 (!) 100.8 °F (38.2 °C) Bladder 61 20 96 %   07/28/24 0345 -- (!) 100.8 °F (38.2 °C) -- 61 20 96 %   07/28/24 0335 -- -- -- 63 23 96 %   07/28/24 0330 -- (!) 100.9 °F (38.3 °C) -- 62 20 96 %   07/28/24 0315 -- (!) 100.9 °F (38.3 °C) -- 62 20 96 %   07/28/24 0300 -- (!) 101.1 °F (38.4 °C) -- 62 20 96 %   07/28/24 0245 -- (!) 101.1 °F (38.4 °C) -- 62 20 96 %   07/28/24 0230 -- (!) 100.9 °F (38.3 °C) -- 61 20 96 %   07/28/24 0215 -- (!) 100.9 °F (38.3 °C) -- 60 20 97 %   07/28/24 0200 -- (!) 100.8 °F (38.2 °C) -- 60 20 96 %   07/28/24 0145 -- (!) 100.8 °F (38.2 °C) -- 59 20 96 %   07/28/24 0130 -- (!) 100.6 °F (38.1 °C) -- 60 20 96 %   07/28/24 0115 -- (!) 100.6 °F (38.1 °C) -- 59 20 96 %   07/28/24 0100 -- 100.4 °F (38 °C) -- 59 20 96 %   07/28/24 0045 -- 100.4 °F (38 °C) -- 59 20 95 %   07/28/24 0030 -- 100.2 °F (37.9 °C) -- 58 20 95 %   07/28/24 0015 -- 100.2 °F (37.9 °C) -- 58 20 95 %   07/28/24 0000 131/63 100.2 °F (37.9 °C) Bladder 58 20 94 %   07/27/24 2345 -- 100 °F (37.8 °C) -- 58 20 94 %   07/27/24 2330 -- 100 °F (37.8 °C) -- 58 20 92 %   07/27/24 2315 -- 100 °F (37.8 °C) -- 59 20 96 %   07/27/24 2312 -- -- -- 59 19 96 %       Last Body weight:   Wt Readings from Last 3 Encounters:   07/26/24 99.7 kg (219 lb 12.8 oz)       Body Mass Index : There is no height or weight on file to calculate

## 2024-07-28 NOTE — PROGRESS NOTES
During turn concern for potential gaze noted by another RN, Dr. Lynne to bedside to eval, no concern from her standpoint. No new orders.

## 2024-07-28 NOTE — PROGRESS NOTES
Each time MAR is opened warning states \"interactions checking, too many warnings to display\". Called pharmacist who states everything is good on his end.

## 2024-07-29 ENCOUNTER — APPOINTMENT (OUTPATIENT)
Dept: GENERAL RADIOLOGY | Age: 49
DRG: 130 | End: 2024-07-29
Attending: INTERNAL MEDICINE
Payer: MEDICAID

## 2024-07-29 ENCOUNTER — APPOINTMENT (OUTPATIENT)
Dept: MRI IMAGING | Age: 49
DRG: 130 | End: 2024-07-29
Attending: INTERNAL MEDICINE
Payer: MEDICAID

## 2024-07-29 PROBLEM — F10.931 ALCOHOL WITHDRAWAL SYNDROME, WITH DELIRIUM (HCC): Status: ACTIVE | Noted: 2024-07-29

## 2024-07-29 PROBLEM — G92.8 TOXIC METABOLIC ENCEPHALOPATHY: Status: ACTIVE | Noted: 2024-07-29

## 2024-07-29 PROBLEM — N17.0 ACUTE RENAL FAILURE WITH TUBULAR NECROSIS (HCC): Status: ACTIVE | Noted: 2024-07-17

## 2024-07-29 PROBLEM — R56.9 PROVOKED SEIZURES (HCC): Status: ACTIVE | Noted: 2024-07-29

## 2024-07-29 LAB
ALBUMIN SERPL-MCNC: 3.2 G/DL (ref 3.5–5.2)
ALBUMIN/GLOB SERPL: 1 {RATIO} (ref 1–2.5)
ALLEN TEST: ABNORMAL
ALP SERPL-CCNC: 100 U/L (ref 40–129)
ALT SERPL-CCNC: 20 U/L (ref 10–50)
ANION GAP SERPL CALCULATED.3IONS-SCNC: 11 MMOL/L (ref 9–16)
AST SERPL-CCNC: 50 U/L (ref 10–50)
BASOPHILS # BLD: 0.15 K/UL (ref 0–0.2)
BASOPHILS NFR BLD: 1 % (ref 0–2)
BILIRUB SERPL-MCNC: 0.6 MG/DL (ref 0–1.2)
BNP SERPL-MCNC: 1455 PG/ML (ref 0–300)
BUN SERPL-MCNC: 31 MG/DL (ref 6–20)
CALCIUM SERPL-MCNC: 9.7 MG/DL (ref 8.6–10.4)
CHLORIDE SERPL-SCNC: 95 MMOL/L (ref 98–107)
CO2 SERPL-SCNC: 32 MMOL/L (ref 20–31)
CREAT SERPL-MCNC: 1.5 MG/DL (ref 0.7–1.2)
CREAT UR-MCNC: 21.4 MG/DL (ref 39–259)
EOSINOPHIL # BLD: 0.58 K/UL (ref 0–0.44)
EOSINOPHILS RELATIVE PERCENT: 4 % (ref 1–4)
ERYTHROCYTE [DISTWIDTH] IN BLOOD BY AUTOMATED COUNT: 13.6 % (ref 11.8–14.4)
FIO2: 60
GFR, ESTIMATED: 57 ML/MIN/1.73M2
GLUCOSE BLD-MCNC: 103 MG/DL (ref 74–100)
GLUCOSE SERPL-MCNC: 94 MG/DL (ref 74–99)
HCT VFR BLD AUTO: 36.2 % (ref 40.7–50.3)
HGB BLD-MCNC: 12.1 G/DL (ref 13–17)
IMM GRANULOCYTES # BLD AUTO: 0.15 K/UL (ref 0–0.3)
IMM GRANULOCYTES NFR BLD: 1 %
LYMPHOCYTES NFR BLD: 3.05 K/UL (ref 1.1–3.7)
LYMPHOCYTES RELATIVE PERCENT: 21 % (ref 24–43)
MCH RBC QN AUTO: 34.4 PG (ref 25.2–33.5)
MCHC RBC AUTO-ENTMCNC: 33.4 G/DL (ref 28.4–34.8)
MCV RBC AUTO: 102.8 FL (ref 82.6–102.9)
MICROORGANISM SPEC CULT: ABNORMAL
MICROORGANISM SPEC CULT: NORMAL
MICROORGANISM/AGENT SPEC: ABNORMAL
MICROORGANISM/AGENT SPEC: ABNORMAL
MICROORGANISM/AGENT SPEC: NORMAL
MONOCYTES NFR BLD: 0.73 K/UL (ref 0.1–1.2)
MONOCYTES NFR BLD: 5 % (ref 3–12)
MORPHOLOGY: ABNORMAL
NEUTROPHILS NFR BLD: 68 % (ref 36–65)
NEUTS SEG NFR BLD: 9.84 K/UL (ref 1.5–8.1)
NRBC BLD-RTO: 0 PER 100 WBC
O2 DELIVERY DEVICE: ABNORMAL
PLATELET # BLD AUTO: 618 K/UL (ref 138–453)
PMV BLD AUTO: 10.2 FL (ref 8.1–13.5)
POC HCO3: 41.1 MMOL/L (ref 21–28)
POC LACTIC ACID: 0.8 MMOL/L (ref 0.56–1.39)
POC O2 SATURATION: 97 % (ref 94–98)
POC PCO2: 61.1 MM HG (ref 35–48)
POC PH: 7.44 (ref 7.35–7.45)
POC PO2: 91.5 MM HG (ref 83–108)
POSITIVE BASE EXCESS, ART: 14 MMOL/L (ref 0–3)
POTASSIUM SERPL-SCNC: 4 MMOL/L (ref 3.7–5.3)
PROT SERPL-MCNC: 7.3 G/DL (ref 6.6–8.7)
RBC # BLD AUTO: 3.52 M/UL (ref 4.21–5.77)
SAMPLE SITE: ABNORMAL
SODIUM SERPL-SCNC: 138 MMOL/L (ref 136–145)
SPECIMEN DESCRIPTION: ABNORMAL
SPECIMEN DESCRIPTION: ABNORMAL
SPECIMEN DESCRIPTION: NORMAL
TOTAL PROTEIN, URINE: 12 MG/DL
WBC OTHER # BLD: 14.5 K/UL (ref 3.5–11.3)

## 2024-07-29 PROCEDURE — 83516 IMMUNOASSAY NONANTIBODY: CPT

## 2024-07-29 PROCEDURE — 99291 CRITICAL CARE FIRST HOUR: CPT | Performed by: INTERNAL MEDICINE

## 2024-07-29 PROCEDURE — 6360000002 HC RX W HCPCS: Performed by: EMERGENCY MEDICINE

## 2024-07-29 PROCEDURE — 2500000003 HC RX 250 WO HCPCS

## 2024-07-29 PROCEDURE — 6370000000 HC RX 637 (ALT 250 FOR IP): Performed by: EMERGENCY MEDICINE

## 2024-07-29 PROCEDURE — 71045 X-RAY EXAM CHEST 1 VIEW: CPT

## 2024-07-29 PROCEDURE — 74018 RADEX ABDOMEN 1 VIEW: CPT

## 2024-07-29 PROCEDURE — 99232 SBSQ HOSP IP/OBS MODERATE 35: CPT | Performed by: INTERNAL MEDICINE

## 2024-07-29 PROCEDURE — 94761 N-INVAS EAR/PLS OXIMETRY MLT: CPT

## 2024-07-29 PROCEDURE — 95714 VEEG EA 12-26 HR UNMNTR: CPT

## 2024-07-29 PROCEDURE — 82947 ASSAY GLUCOSE BLOOD QUANT: CPT

## 2024-07-29 PROCEDURE — 83605 ASSAY OF LACTIC ACID: CPT

## 2024-07-29 PROCEDURE — 99213 OFFICE O/P EST LOW 20 MIN: CPT

## 2024-07-29 PROCEDURE — 6360000004 HC RX CONTRAST MEDICATION

## 2024-07-29 PROCEDURE — 99233 SBSQ HOSP IP/OBS HIGH 50: CPT | Performed by: INTERNAL MEDICINE

## 2024-07-29 PROCEDURE — 85025 COMPLETE CBC W/AUTO DIFF WBC: CPT

## 2024-07-29 PROCEDURE — 37799 UNLISTED PX VASCULAR SURGERY: CPT

## 2024-07-29 PROCEDURE — 95720 EEG PHY/QHP EA INCR W/VEEG: CPT | Performed by: PSYCHIATRY & NEUROLOGY

## 2024-07-29 PROCEDURE — 2709999900 MRI BRAIN W WO CONTRAST

## 2024-07-29 PROCEDURE — A9579 GAD-BASE MR CONTRAST NOS,1ML: HCPCS

## 2024-07-29 PROCEDURE — 95718 EEG PHYS/QHP 2-12 HR W/VEEG: CPT | Performed by: PSYCHIATRY & NEUROLOGY

## 2024-07-29 PROCEDURE — 84156 ASSAY OF PROTEIN URINE: CPT

## 2024-07-29 PROCEDURE — 94640 AIRWAY INHALATION TREATMENT: CPT

## 2024-07-29 PROCEDURE — 82803 BLOOD GASES ANY COMBINATION: CPT

## 2024-07-29 PROCEDURE — 83880 ASSAY OF NATRIURETIC PEPTIDE: CPT

## 2024-07-29 PROCEDURE — 99232 SBSQ HOSP IP/OBS MODERATE 35: CPT | Performed by: PSYCHIATRY & NEUROLOGY

## 2024-07-29 PROCEDURE — 2580000003 HC RX 258

## 2024-07-29 PROCEDURE — 94003 VENT MGMT INPAT SUBQ DAY: CPT

## 2024-07-29 PROCEDURE — 6360000002 HC RX W HCPCS

## 2024-07-29 PROCEDURE — 6370000000 HC RX 637 (ALT 250 FOR IP)

## 2024-07-29 PROCEDURE — 2000000000 HC ICU R&B

## 2024-07-29 PROCEDURE — 2700000000 HC OXYGEN THERAPY PER DAY

## 2024-07-29 PROCEDURE — APPSS30 APP SPLIT SHARED TIME 16-30 MINUTES: Performed by: NURSE PRACTITIONER

## 2024-07-29 PROCEDURE — 82570 ASSAY OF URINE CREATININE: CPT

## 2024-07-29 PROCEDURE — 6370000000 HC RX 637 (ALT 250 FOR IP): Performed by: INTERNAL MEDICINE

## 2024-07-29 PROCEDURE — 80053 COMPREHEN METABOLIC PANEL: CPT

## 2024-07-29 RX ORDER — SPIRONOLACTONE 25 MG/1
25 TABLET ORAL DAILY
Status: DISCONTINUED | OUTPATIENT
Start: 2024-07-29 | End: 2024-08-01

## 2024-07-29 RX ORDER — SODIUM CHLORIDE 0.9 % (FLUSH) 0.9 %
5-40 SYRINGE (ML) INJECTION 2 TIMES DAILY
Status: DISCONTINUED | OUTPATIENT
Start: 2024-07-29 | End: 2024-08-05 | Stop reason: HOSPADM

## 2024-07-29 RX ORDER — CLONAZEPAM 1 MG/1
2 TABLET ORAL EVERY 12 HOURS
Status: DISCONTINUED | OUTPATIENT
Start: 2024-07-29 | End: 2024-08-05

## 2024-07-29 RX ADMIN — FUROSEMIDE 40 MG: 10 INJECTION, SOLUTION INTRAMUSCULAR; INTRAVENOUS at 07:43

## 2024-07-29 RX ADMIN — IPRATROPIUM BROMIDE AND ALBUTEROL SULFATE 1 DOSE: 2.5; .5 SOLUTION RESPIRATORY (INHALATION) at 15:50

## 2024-07-29 RX ADMIN — DEXMEDETOMIDINE HYDROCHLORIDE 0.5 MCG/KG/HR: 400 INJECTION, SOLUTION INTRAVENOUS at 20:59

## 2024-07-29 RX ADMIN — FUROSEMIDE 40 MG: 10 INJECTION, SOLUTION INTRAMUSCULAR; INTRAVENOUS at 16:39

## 2024-07-29 RX ADMIN — Medication 7 MG/HR: at 11:52

## 2024-07-29 RX ADMIN — GADOTERIDOL 20 ML: 279.3 INJECTION, SOLUTION INTRAVENOUS at 20:44

## 2024-07-29 RX ADMIN — IPRATROPIUM BROMIDE AND ALBUTEROL SULFATE 1 DOSE: 2.5; .5 SOLUTION RESPIRATORY (INHALATION) at 04:11

## 2024-07-29 RX ADMIN — Medication 150 MCG/HR: at 08:24

## 2024-07-29 RX ADMIN — SODIUM CHLORIDE, PRESERVATIVE FREE 10 ML: 5 INJECTION INTRAVENOUS at 21:06

## 2024-07-29 RX ADMIN — THIAMINE HYDROCHLORIDE 100 MG: 100 INJECTION, SOLUTION INTRAMUSCULAR; INTRAVENOUS at 10:19

## 2024-07-29 RX ADMIN — LINEZOLID 600 MG: 600 INJECTION, SOLUTION INTRAVENOUS at 21:11

## 2024-07-29 RX ADMIN — HEPARIN SODIUM 5000 UNITS: 5000 INJECTION INTRAVENOUS; SUBCUTANEOUS at 13:09

## 2024-07-29 RX ADMIN — SPIRONOLACTONE 25 MG: 25 TABLET, FILM COATED ORAL at 09:37

## 2024-07-29 RX ADMIN — VALPROIC ACID 250 MG: 250 SOLUTION ORAL at 12:55

## 2024-07-29 RX ADMIN — IPRATROPIUM BROMIDE AND ALBUTEROL SULFATE 1 DOSE: 2.5; .5 SOLUTION RESPIRATORY (INHALATION) at 20:58

## 2024-07-29 RX ADMIN — DEXMEDETOMIDINE HYDROCHLORIDE 0.6 MCG/KG/HR: 400 INJECTION, SOLUTION INTRAVENOUS at 07:28

## 2024-07-29 RX ADMIN — DEXMEDETOMIDINE HYDROCHLORIDE 0.6 MCG/KG/HR: 400 INJECTION, SOLUTION INTRAVENOUS at 01:30

## 2024-07-29 RX ADMIN — HEPARIN SODIUM 5000 UNITS: 5000 INJECTION INTRAVENOUS; SUBCUTANEOUS at 06:06

## 2024-07-29 RX ADMIN — ASPIRIN 81 MG 81 MG: 81 TABLET ORAL at 07:42

## 2024-07-29 RX ADMIN — Medication 6 MG/HR: at 00:33

## 2024-07-29 RX ADMIN — VALPROIC ACID 250 MG: 250 SOLUTION ORAL at 07:42

## 2024-07-29 RX ADMIN — ACETAMINOPHEN 650 MG: 325 TABLET ORAL at 07:42

## 2024-07-29 RX ADMIN — SODIUM CHLORIDE, PRESERVATIVE FREE 10 ML: 5 INJECTION INTRAVENOUS at 20:45

## 2024-07-29 RX ADMIN — SODIUM CHLORIDE, PRESERVATIVE FREE 10 ML: 5 INJECTION INTRAVENOUS at 07:43

## 2024-07-29 RX ADMIN — ANTI-FUNGAL POWDER MICONAZOLE NITRATE TALC FREE: 1.42 POWDER TOPICAL at 07:44

## 2024-07-29 RX ADMIN — VALPROIC ACID 250 MG: 250 SOLUTION ORAL at 21:05

## 2024-07-29 RX ADMIN — FOLIC ACID 1 MG: 1 TABLET ORAL at 07:42

## 2024-07-29 RX ADMIN — LINEZOLID 600 MG: 600 INJECTION, SOLUTION INTRAVENOUS at 10:30

## 2024-07-29 RX ADMIN — LEVETIRACETAM 500 MG: 100 INJECTION INTRAVENOUS at 21:06

## 2024-07-29 RX ADMIN — VALPROIC ACID 250 MG: 250 SOLUTION ORAL at 16:39

## 2024-07-29 RX ADMIN — CLONAZEPAM 2 MG: 1 TABLET ORAL at 14:48

## 2024-07-29 RX ADMIN — DEXMEDETOMIDINE HYDROCHLORIDE 0.6 MCG/KG/HR: 400 INJECTION, SOLUTION INTRAVENOUS at 12:57

## 2024-07-29 RX ADMIN — Medication 150 MCG/HR: at 00:31

## 2024-07-29 RX ADMIN — SODIUM CHLORIDE, PRESERVATIVE FREE 40 MG: 5 INJECTION INTRAVENOUS at 07:42

## 2024-07-29 RX ADMIN — HEPARIN SODIUM 5000 UNITS: 5000 INJECTION INTRAVENOUS; SUBCUTANEOUS at 21:31

## 2024-07-29 RX ADMIN — ANTI-FUNGAL POWDER MICONAZOLE NITRATE TALC FREE: 1.42 POWDER TOPICAL at 21:06

## 2024-07-29 RX ADMIN — LEVETIRACETAM 500 MG: 100 INJECTION INTRAVENOUS at 07:42

## 2024-07-29 RX ADMIN — AMLODIPINE BESYLATE 10 MG: 10 TABLET ORAL at 07:42

## 2024-07-29 RX ADMIN — IPRATROPIUM BROMIDE AND ALBUTEROL SULFATE 1 DOSE: 2.5; .5 SOLUTION RESPIRATORY (INHALATION) at 08:56

## 2024-07-29 ASSESSMENT — PULMONARY FUNCTION TESTS
PIF_VALUE: 19
PIF_VALUE: 22
PIF_VALUE: 21
PIF_VALUE: 20
PIF_VALUE: 17
PIF_VALUE: 18
PIF_VALUE: 21
PIF_VALUE: 14

## 2024-07-29 ASSESSMENT — PAIN SCALES - GENERAL
PAINLEVEL_OUTOF10: 0
PAINLEVEL_OUTOF10: 0

## 2024-07-29 NOTE — PROGRESS NOTES
Results   Component Value Date/Time    NITRU NEGATIVE 07/27/2024 08:03 PM    COLORU Rowan 07/27/2024 08:03 PM    PHUR 5.0 07/27/2024 08:03 PM    WBCUA TOO NUMEROUS TO COUNT 07/27/2024 08:03 PM    RBCUA TOO NUMEROUS TO COUNT 07/27/2024 08:03 PM    BACTERIA None 07/27/2024 08:03 PM    LEUKOCYTESUR LARGE 07/27/2024 08:03 PM    UROBILINOGEN Normal 07/27/2024 08:03 PM    BILIRUBINUR NEGATIVE  Verified by ictotest. 07/27/2024 08:03 PM    GLUCOSEU NEGATIVE 07/27/2024 08:03 PM    KETUA NEGATIVE 07/27/2024 08:03 PM     Urine Sodium:   No components found for: \"ELENA\"  Urine Potassium:  No results found for: \"KUR\"  Urine Chloride:  No results found for: \"CLUR\"  Urine Osmolarity: No results found for: \"OSMOU\"  Urine Protein:   No results found for: \"TPU\"  Urine Creatinine:   No results found for: \"LABCREA\"  Urine Eosinophils:  No components found for: \"UEOS\"    Radiology:     CXR:     Assessment:     1. Acute Kidney Injury: Secondary to ischemic ATN from systemic inflammatory response syndrome related to bilateral pneumonia, suspected vancomycin toxicity, baseline creatinine 0.8-0.9 peaked up to 2.0 now resolving creatinine down to 1.5  2.  Hypercapnic respiratory secondary to suspected aspiration pneumonia, alcohol intoxication now on the ventilator  3.  Respiratory acidosis and metabolic alkalosis from above and contraction  4.  Volume overload likely from third spacing and capillary leak  5.  Labile hypertension does not take any medicines on a regular basis    Plan:   1.  Add Aldactone 25 mg daily  2.  Continue Lasix 40 mg IV twice a day  3.  Check ANCA titers and anti-GBM antibodies  4.  Leave Arrington in  5.  Monitor renal function  6.  Continue monitoring daily weights  7.  No need for renal placement therapy    Nutrition   Please ensure that patient is on a renal diet/TF. Avoid nephrotoxic drugs/contrast exposure.    We will continue to follow along with you.

## 2024-07-29 NOTE — PROGRESS NOTES
07/29/24 0901   Ventilator Settings   Resp Rate (Set) 20 bpm  (pot abg)   FiO2  (S)  50 %  (SpO2 97%)

## 2024-07-29 NOTE — PROGRESS NOTES
Demonstrated understanding       [x] No evidence of learning  [] Refused teaching         [] N/A    Contact the Wound Ostomy RN on-call during working hours Monday-Friday [id number] via [employer] by searching \"wound\" under \"groups\" and selecting the on-call clinician. Sending messages via individual names will not reach a clinician.        Electronically signed by Radha Vilchis RN, CWON on 7/29/2024 at 1:11 PM

## 2024-07-29 NOTE — PROGRESS NOTES
NEUROLOGY INPATIENT PROGRESS NOTE    7/29/2024         Current Exam:     Chart reviewed. Discussed with RN. For nursing staff earlier today the patient was moving all limbs; not presently moving or following commands for my assessment. He remains intubated and sedated. No seizure activity. MRI brain is pending. Overnight LTME with slowing, no seizures.         Brief History:    John Rivas is a  49 y.o. male with H/O HTN, CAD, homelessness, smoking, alcoholism, medication noncompliance, who was admitted as a transfer from [facility] on 7/27/2024 where he initially presented with acute alcohol intoxication with seizure.  Patient was reportedly observed by a bystander as having seizure-like activity at [facility]; he was drinking just prior to this seizure event and ethanol level on arrival was 423; UDS positive for cannabinoids and cocaine.  He was intubated and sedated.  He had another seizure event while inpatient and ultimately was transferred to [facility] for further evaluation and care.  He was given a Keppra bolus and started on phenobarb infusion.  He was maintained on Keppra and Depakote was also added due to agitation.  EEG was done showing severe encephalopathy with no seizures.  Patient is reportedly homeless for the last several years after being released from half-way; he is a daily drinker for the last several years as well.  Family does not report any history of seizures.  He was started on LTME with MRI brain ordered.       No current facility-administered medications on file prior to encounter.     No current outpatient medications on file prior to encounter.       Allergies: John Rivas has No Known Allergies.    Past Medical History:   Diagnosis Date    Alcohol abuse     CAD (coronary artery disease)     multiple stents in place    Essential hypertension     Homeless single person        Past Surgical History:   Procedure Laterality Date    CORONARY ANGIOPLASTY WITH STENT PLACEMENT

## 2024-07-29 NOTE — PROGRESS NOTES
-Daily CBC, monitor leukocytosis  - Syphilis negative  - Tmax: 38.4  - Antimicrobials: Currently on Zyvox, previously on vancomycin  - Infectious disease following.  Awaiting sensitivity previously recommended 7 to 10-day course of antibiotics.  Patient was on meropenem on 7/24 and Zosyn from 7/15-7/24. Vancomycin 7/16-7/18 and presumed 7/22-7/27  - Blood cultures negative     Hematology:  -Daily CBC  Recent Labs     07/27/24  1925 07/28/24  0449 07/29/24  0601   HGB 12.9* 12.8* 12.1*          Endocrine:   - Blood Glucose controlled - most recent BGL is   Recent Labs     07/27/24  0405 07/28/24  0449 07/29/24  0601   GLUCOSE 107* 100* 94     - TSH WNL    DVT Prophylaxis  -Heparin every 8 hours    Discharge Needs:  PT, OT, ST, SW, and Case Management      CODE STATUS: Full Code    DISPOSITION:  [x] To remain ICU:   [] OK for out of ICU from Critical Care standpoint    Niurka Cobian DO  Emergency Medicine Resident, PGY3  Critical Care Service   07/29/24 7:50 AM    Attending Physician Statement  I have discussed the care of John Rivas, including pertinent history and exam findings with the resident. I have reviewed the key elements of all parts of the encounter with the resident. I have seen and examined the patient with the resident.  I agree with the assessment and plan and status of the problem list as documented.    Saw the patient during the rounds this morning.  I saw the ventilator setting arterial blood gases.  I have also reviewed LTM results and infectious disease  Patient is currently on Zyvox for MRSA pneumonia of the right lower lobe and has been followed by infectious disease.  Patient is on multiple drips including fentanyl drip Versed drip and on Precedex drip.  Reposition NG tube.  Start tube feeding.  Advance endotracheal tube 2 cm.  Follow mentation neurological status follow-up with obtaining currently nonspecific encephalopathy and LTM.      Discussed with nursing staff, treatment and plan

## 2024-07-30 ENCOUNTER — APPOINTMENT (OUTPATIENT)
Dept: GENERAL RADIOLOGY | Age: 49
DRG: 130 | End: 2024-07-30
Attending: INTERNAL MEDICINE
Payer: MEDICAID

## 2024-07-30 PROBLEM — R56.9 SEIZURE-LIKE ACTIVITY (HCC): Status: ACTIVE | Noted: 2024-07-30

## 2024-07-30 PROBLEM — G93.41 ACUTE METABOLIC ENCEPHALOPATHY: Status: ACTIVE | Noted: 2024-07-30

## 2024-07-30 PROBLEM — F10.10 ALCOHOL ABUSE: Status: ACTIVE | Noted: 2024-07-30

## 2024-07-30 PROBLEM — F14.10 COCAINE ABUSE (HCC): Status: ACTIVE | Noted: 2024-07-30

## 2024-07-30 LAB
ALBUMIN SERPL-MCNC: 3.2 G/DL (ref 3.5–5.2)
ALBUMIN/GLOB SERPL: 1 {RATIO} (ref 1–2.5)
ALP SERPL-CCNC: 104 U/L (ref 40–129)
ALT SERPL-CCNC: 23 U/L (ref 10–50)
ANION GAP SERPL CALCULATED.3IONS-SCNC: 11 MMOL/L (ref 9–16)
AST SERPL-CCNC: 63 U/L (ref 10–50)
BASOPHILS # BLD: 0 K/UL (ref 0–0.2)
BASOPHILS NFR BLD: 0 % (ref 0–2)
BILIRUB SERPL-MCNC: 0.6 MG/DL (ref 0–1.2)
BUN SERPL-MCNC: 28 MG/DL (ref 6–20)
CALCIUM SERPL-MCNC: 9.7 MG/DL (ref 8.6–10.4)
CHLORIDE SERPL-SCNC: 92 MMOL/L (ref 98–107)
CO2 SERPL-SCNC: 33 MMOL/L (ref 20–31)
CREAT SERPL-MCNC: 1.4 MG/DL (ref 0.7–1.2)
EOSINOPHIL # BLD: 0.13 K/UL (ref 0–0.4)
EOSINOPHILS RELATIVE PERCENT: 1 % (ref 1–4)
ERYTHROCYTE [DISTWIDTH] IN BLOOD BY AUTOMATED COUNT: 13.4 % (ref 11.8–14.4)
FIO2: 50
GFR, ESTIMATED: 61 ML/MIN/1.73M2
GLUCOSE BLD-MCNC: 126 MG/DL (ref 75–110)
GLUCOSE BLD-MCNC: 127 MG/DL (ref 75–110)
GLUCOSE BLD-MCNC: 97 MG/DL (ref 74–100)
GLUCOSE SERPL-MCNC: 88 MG/DL (ref 74–99)
HCT VFR BLD AUTO: 37.5 % (ref 40.7–50.3)
HGB BLD-MCNC: 12.3 G/DL (ref 13–17)
IMM GRANULOCYTES # BLD AUTO: 0 K/UL (ref 0–0.3)
IMM GRANULOCYTES NFR BLD: 0 %
LYMPHOCYTES NFR BLD: 2.69 K/UL (ref 1–4.8)
LYMPHOCYTES RELATIVE PERCENT: 21 % (ref 24–44)
MCH RBC QN AUTO: 33.9 PG (ref 25.2–33.5)
MCHC RBC AUTO-ENTMCNC: 32.8 G/DL (ref 28.4–34.8)
MCV RBC AUTO: 103.3 FL (ref 82.6–102.9)
MICROORGANISM SPEC CULT: NORMAL
MICROORGANISM SPEC CULT: NORMAL
MODE: ABNORMAL
MONOCYTES NFR BLD: 0.77 K/UL (ref 0.1–0.8)
MONOCYTES NFR BLD: 6 % (ref 1–7)
MORPHOLOGY: ABNORMAL
NEUTROPHILS NFR BLD: 72 % (ref 36–66)
NEUTS SEG NFR BLD: 9.21 K/UL (ref 1.8–7.7)
NRBC BLD-RTO: 0 PER 100 WBC
PLATELET # BLD AUTO: 619 K/UL (ref 138–453)
PMV BLD AUTO: 10.5 FL (ref 8.1–13.5)
POC HCO3: 39.6 MMOL/L (ref 21–28)
POC O2 SATURATION: 95.3 % (ref 94–98)
POC PCO2: 57.9 MM HG (ref 35–48)
POC PH: 7.44 (ref 7.35–7.45)
POC PO2: 76.9 MM HG (ref 83–108)
POSITIVE BASE EXCESS, ART: 12.9 MMOL/L (ref 0–3)
POTASSIUM SERPL-SCNC: 3.7 MMOL/L (ref 3.7–5.3)
PROT SERPL-MCNC: 7.7 G/DL (ref 6.6–8.7)
RBC # BLD AUTO: 3.63 M/UL (ref 4.21–5.77)
SAMPLE SITE: ABNORMAL
SERVICE CMNT-IMP: NORMAL
SERVICE CMNT-IMP: NORMAL
SODIUM SERPL-SCNC: 136 MMOL/L (ref 136–145)
SPECIMEN DESCRIPTION: NORMAL
SPECIMEN DESCRIPTION: NORMAL
WBC OTHER # BLD: 12.8 K/UL (ref 3.5–11.3)

## 2024-07-30 PROCEDURE — 37799 UNLISTED PX VASCULAR SURGERY: CPT

## 2024-07-30 PROCEDURE — 99232 SBSQ HOSP IP/OBS MODERATE 35: CPT | Performed by: INTERNAL MEDICINE

## 2024-07-30 PROCEDURE — 2500000003 HC RX 250 WO HCPCS

## 2024-07-30 PROCEDURE — 82803 BLOOD GASES ANY COMBINATION: CPT

## 2024-07-30 PROCEDURE — 99233 SBSQ HOSP IP/OBS HIGH 50: CPT | Performed by: INTERNAL MEDICINE

## 2024-07-30 PROCEDURE — 2580000003 HC RX 258

## 2024-07-30 PROCEDURE — 94640 AIRWAY INHALATION TREATMENT: CPT

## 2024-07-30 PROCEDURE — 2000000000 HC ICU R&B

## 2024-07-30 PROCEDURE — APPSS30 APP SPLIT SHARED TIME 16-30 MINUTES: Performed by: NURSE PRACTITIONER

## 2024-07-30 PROCEDURE — 82947 ASSAY GLUCOSE BLOOD QUANT: CPT

## 2024-07-30 PROCEDURE — 6370000000 HC RX 637 (ALT 250 FOR IP)

## 2024-07-30 PROCEDURE — 6370000000 HC RX 637 (ALT 250 FOR IP): Performed by: INTERNAL MEDICINE

## 2024-07-30 PROCEDURE — 2700000000 HC OXYGEN THERAPY PER DAY

## 2024-07-30 PROCEDURE — 71045 X-RAY EXAM CHEST 1 VIEW: CPT

## 2024-07-30 PROCEDURE — 99291 CRITICAL CARE FIRST HOUR: CPT | Performed by: INTERNAL MEDICINE

## 2024-07-30 PROCEDURE — 6370000000 HC RX 637 (ALT 250 FOR IP): Performed by: EMERGENCY MEDICINE

## 2024-07-30 PROCEDURE — 6360000002 HC RX W HCPCS: Performed by: EMERGENCY MEDICINE

## 2024-07-30 PROCEDURE — 85025 COMPLETE CBC W/AUTO DIFF WBC: CPT

## 2024-07-30 PROCEDURE — 99232 SBSQ HOSP IP/OBS MODERATE 35: CPT | Performed by: PSYCHIATRY & NEUROLOGY

## 2024-07-30 PROCEDURE — 80053 COMPREHEN METABOLIC PANEL: CPT

## 2024-07-30 PROCEDURE — 94003 VENT MGMT INPAT SUBQ DAY: CPT

## 2024-07-30 PROCEDURE — 6360000002 HC RX W HCPCS

## 2024-07-30 PROCEDURE — 94761 N-INVAS EAR/PLS OXIMETRY MLT: CPT

## 2024-07-30 RX ORDER — TORSEMIDE 20 MG/1
40 TABLET ORAL DAILY
Status: DISCONTINUED | OUTPATIENT
Start: 2024-07-31 | End: 2024-08-02

## 2024-07-30 RX ORDER — AMLODIPINE BESYLATE 5 MG/1
5 TABLET ORAL DAILY
Status: DISCONTINUED | OUTPATIENT
Start: 2024-07-31 | End: 2024-08-05

## 2024-07-30 RX ADMIN — IPRATROPIUM BROMIDE AND ALBUTEROL SULFATE 1 DOSE: 2.5; .5 SOLUTION RESPIRATORY (INHALATION) at 20:03

## 2024-07-30 RX ADMIN — DEXMEDETOMIDINE HYDROCHLORIDE 0.5 MCG/KG/HR: 400 INJECTION, SOLUTION INTRAVENOUS at 02:20

## 2024-07-30 RX ADMIN — SODIUM CHLORIDE, PRESERVATIVE FREE 10 ML: 5 INJECTION INTRAVENOUS at 07:33

## 2024-07-30 RX ADMIN — ASPIRIN 81 MG 81 MG: 81 TABLET ORAL at 07:32

## 2024-07-30 RX ADMIN — CLONAZEPAM 2 MG: 1 TABLET ORAL at 13:21

## 2024-07-30 RX ADMIN — VALPROIC ACID 250 MG: 250 SOLUTION ORAL at 07:32

## 2024-07-30 RX ADMIN — VALPROIC ACID 250 MG: 250 SOLUTION ORAL at 13:21

## 2024-07-30 RX ADMIN — FUROSEMIDE 40 MG: 10 INJECTION, SOLUTION INTRAMUSCULAR; INTRAVENOUS at 07:32

## 2024-07-30 RX ADMIN — SPIRONOLACTONE 25 MG: 25 TABLET, FILM COATED ORAL at 07:36

## 2024-07-30 RX ADMIN — LEVETIRACETAM 500 MG: 100 INJECTION INTRAVENOUS at 19:58

## 2024-07-30 RX ADMIN — IPRATROPIUM BROMIDE AND ALBUTEROL SULFATE 1 DOSE: 2.5; .5 SOLUTION RESPIRATORY (INHALATION) at 03:26

## 2024-07-30 RX ADMIN — LEVETIRACETAM 500 MG: 100 INJECTION INTRAVENOUS at 07:32

## 2024-07-30 RX ADMIN — LINEZOLID 600 MG: 600 INJECTION, SOLUTION INTRAVENOUS at 21:24

## 2024-07-30 RX ADMIN — SODIUM CHLORIDE, PRESERVATIVE FREE 10 ML: 5 INJECTION INTRAVENOUS at 19:57

## 2024-07-30 RX ADMIN — SODIUM CHLORIDE, PRESERVATIVE FREE 40 MG: 5 INJECTION INTRAVENOUS at 07:32

## 2024-07-30 RX ADMIN — ANTI-FUNGAL POWDER MICONAZOLE NITRATE TALC FREE: 1.42 POWDER TOPICAL at 19:57

## 2024-07-30 RX ADMIN — Medication 5 MG/HR: at 15:31

## 2024-07-30 RX ADMIN — DEXMEDETOMIDINE HYDROCHLORIDE 0.5 MCG/KG/HR: 400 INJECTION, SOLUTION INTRAVENOUS at 10:35

## 2024-07-30 RX ADMIN — SODIUM CHLORIDE, PRESERVATIVE FREE 10 ML: 5 INJECTION INTRAVENOUS at 19:58

## 2024-07-30 RX ADMIN — THIAMINE HYDROCHLORIDE 100 MG: 100 INJECTION, SOLUTION INTRAMUSCULAR; INTRAVENOUS at 10:31

## 2024-07-30 RX ADMIN — Medication 150 MCG/HR: at 11:56

## 2024-07-30 RX ADMIN — DEXMEDETOMIDINE HYDROCHLORIDE 0.5 MCG/KG/HR: 400 INJECTION, SOLUTION INTRAVENOUS at 17:59

## 2024-07-30 RX ADMIN — HEPARIN SODIUM 5000 UNITS: 5000 INJECTION INTRAVENOUS; SUBCUTANEOUS at 21:41

## 2024-07-30 RX ADMIN — Medication 150 MCG/HR: at 01:09

## 2024-07-30 RX ADMIN — IPRATROPIUM BROMIDE AND ALBUTEROL SULFATE 1 DOSE: 2.5; .5 SOLUTION RESPIRATORY (INHALATION) at 08:35

## 2024-07-30 RX ADMIN — LINEZOLID 600 MG: 600 INJECTION, SOLUTION INTRAVENOUS at 08:30

## 2024-07-30 RX ADMIN — Medication 7 MG/HR: at 01:10

## 2024-07-30 RX ADMIN — AMLODIPINE BESYLATE 10 MG: 10 TABLET ORAL at 07:32

## 2024-07-30 RX ADMIN — CLONAZEPAM 2 MG: 1 TABLET ORAL at 02:17

## 2024-07-30 RX ADMIN — IPRATROPIUM BROMIDE AND ALBUTEROL SULFATE 1 DOSE: 2.5; .5 SOLUTION RESPIRATORY (INHALATION) at 15:56

## 2024-07-30 RX ADMIN — ANTI-FUNGAL POWDER MICONAZOLE NITRATE TALC FREE: 1.42 POWDER TOPICAL at 07:33

## 2024-07-30 RX ADMIN — HEPARIN SODIUM 5000 UNITS: 5000 INJECTION INTRAVENOUS; SUBCUTANEOUS at 06:14

## 2024-07-30 RX ADMIN — FOLIC ACID 1 MG: 1 TABLET ORAL at 07:32

## 2024-07-30 RX ADMIN — HEPARIN SODIUM 5000 UNITS: 5000 INJECTION INTRAVENOUS; SUBCUTANEOUS at 13:21

## 2024-07-30 ASSESSMENT — PAIN SCALES - GENERAL
PAINLEVEL_OUTOF10: 0
PAINLEVEL_OUTOF10: 0

## 2024-07-30 ASSESSMENT — PULMONARY FUNCTION TESTS
PIF_VALUE: 14
PIF_VALUE: 14
PIF_VALUE: 17
PIF_VALUE: 36
PIF_VALUE: 21
PIF_VALUE: 23
PIF_VALUE: 19
PIF_VALUE: 22
PIF_VALUE: 21

## 2024-07-30 NOTE — PROGRESS NOTES
07/30/24  0507   GLUCOSE 100* 94 88     - TSH WNL    DVT Prophylaxis  -Heparin every 8 hours    Discharge Needs:  PT, OT, ST, SW, and Case Management      CODE STATUS: Full Code    DISPOSITION:  [x] To remain ICU:   [] OK for out of ICU from Critical Care standpoint    Celio Jarvis MD  Emergency Medicine Resident, PGY3  Critical Care Service   07/30/24 8:41 AM    Attending Physician Statement  I have discussed the care of John Rivas, including pertinent history and exam findings with the resident. I have reviewed the key elements of all parts of the encounter with the resident. I have seen and examined the patient with the resident.  I agree with the assessment and plan and status of the problem list as documented.    I saw the patient during the rounds today, chart reviewed overnight events noted.  There is no overall change noted he is continued on fentanyl drip and he is on Versed drip he was started on Klonopin by cardiology yesterday.  He is on Zyvox for MRSA pneumonia.  Ventilator settings seen he is on PEEP of 10 FiO2 is 50% and blood gases 7.4 4/58/77/39.  His urine output is 3 L in last 24 hours his BUN is 28 creatinine is 1.4.  WBC count is 12.8.  Chest x-ray shows similar finding with right consolidation possible pleural effusion.  Will try to wean the Versed drip down continue with Klonopin and on fentanyl drip  He is currently on DuoNeb aerosol currently not on any steroids.  Will follow-up mentation and neurological status  Will try to increase the Precedex drip and wean down Versed drip.  Follow-up mentation and neurological status.        Discussed with nursing staff, treatment and plan discussed.  Discussed with respiratory therapist.    Total critical care time caring for this patient with life threatening, unstable organ failure, including direct patient contact, management of life support systems, review of data including imaging and labs, discussions with other team members and

## 2024-07-30 NOTE — PROGRESS NOTES
Max:155       Diastolic (24hrs), Av, Min:58, Max:73      Physical Examination:     General:  Sedated on ventilator. FiO2 50% with a PEEP of 10  HEENT:  Atraumatic, normocephalic, no throat congestion, moist mucosa.  Eyes:   Pupils equal, round and reactive to light, pallor, no icterus.  Neck:   No JVD, no thyromegaly, no lymphadenopathy.  Chest:  Air entry fair bilaterally, basal crackles  Cardiac: S1 S2 RR no murmurs  Abdomen:  Soft, non-tender, no masses or organomegally appreciable, BS sluggish.  :   No suprapubic or flank tenderness.  Neuro:  Sedated on ventilator.  Skin:   No rashes, good skin turgor.  Extremities:  + edema, palpable peripheral pulses, no cyanosis, no calf tenderness.      Labs:       Recent Labs     24  0601 24  0507   WBC 15.9* 14.5* 12.8*   RBC 3.76* 3.52* 3.63*   HGB 12.8* 12.1* 12.3*   HCT 38.3* 36.2* 37.5*   .9 102.8 103.3*   MCH 34.0* 34.4* 33.9*   MCHC 33.4 33.4 32.8   RDW 13.8 13.6 13.4   * 618* 619*   MPV 10.7 10.2 10.5        BMP:   Recent Labs     24  04424  0601 24  0507    138 136   K 3.9 4.0 3.7   CL 94* 95* 92*   CO2 31 32* 33*   BUN 32* 31* 28*   CREATININE 1.6* 1.5* 1.4*   GLUCOSE 100* 94 88   CALCIUM 10.1 9.7 9.7        Phosphorus:     No results for input(s): \"PHOS\" in the last 72 hours.    Magnesium:    No results for input(s): \"MG\" in the last 72 hours.    Albumin:  No results for input(s): \"LABALBU\" in the last 72 hours.  BNP:    No results found for: \"BNP\"  DOMINGA:      Lab Results   Component Value Date/Time    DOMINGA NEGATIVE 2024 01:15 PM     SPEP:No results found for: \"ALBCAL\", \"ALBPCT\", \"LABALPH\", \"A1PCT\", \"A2PCT\", \"LABBETA\", \"BETAPCT\", \"GAMGLOB\", \"GGPCT\", \"PATH\"  UPEP:   No results found for: \"LABPE\"  C3:     Lab Results   Component Value Date/Time    C3 289 2024 01:15 PM     C4:     Lab Results   Component Value Date/Time    C4 64 2024 01:15 PM     MPO ANCA:   No results found

## 2024-07-30 NOTE — CARE COORDINATION
Case Management Assessment  Initial Evaluation    Date/Time of Evaluation: 7/30/2024 3:00 PM  Assessment Completed by: TANESHA FORMAN RN    If patient is discharged prior to next notation, then this note serves as note for discharge by case management.    Patient Name: John Rivas                   YOB: 1975  Diagnosis: Acute hypoxic respiratory failure (HCC) [J96.01]                   Date / Time: 7/27/2024  6:08 PM    Patient Admission Status: Inpatient   Readmission Risk (Low < 19, Mod (19-27), High > 27): Readmission Risk Score: 13.4    Current PCP: No primary care provider on file.  PCP verified by CM? (P) No (pt's sister states he does not have PCP)    Chart Reviewed: Yes      History Provided by: (P) Child/Family (pt's sister, Dena Mendez)  Patient Orientation: (P) Sedated    Patient Cognition: (P) Other (see comment) (Intubated/ Sedated)    Hospitalization in the last 30 days (Readmission):  No    If yes, Readmission Assessment in  Navigator will be completed.    Advance Directives:      Code Status: Full Code   Patient's Primary Decision Maker is: (P) Legal Next of Kin      Discharge Planning:    Patient lives with: (P) Alone Type of Home: (P) Homeless  Primary Care Giver: (P) Self  Patient Support Systems include: (P) Family Members   Current Financial resources: (P) HELP (Pending Medicaid)  Current community resources: (P) None  Current services prior to admission: (P) None            Current DME:              Type of Home Care services:       ADLS  Prior functional level: (P) Independent in ADLs/IADLs  Current functional level: (P) Assistance with the following:, Bathing, Dressing, Toileting, Mobility, Other (see comment) (Intubated/ Sedated)    PT AM-PAC:   /24  OT AM-PAC:   /24    Family can provide assistance at DC: (P) No  Would you like Case Management to discuss the discharge plan with any other family members/significant others, and if so, who? (P) Yes (pt's sister,

## 2024-07-30 NOTE — CARE COORDINATION
Transitional planning. Pt's sister, Dena is staying at [facility]. She informed CM that pt has never been , does not have any children and both of his parents are . Pt does have another sibling, a brother, Aaron Armstrong, sister will look for contact information and provide it to CM or nursing.

## 2024-07-30 NOTE — PROGRESS NOTES
aspirin  81 mg Oral Daily    folic acid  1 mg Oral Daily    ipratropium 0.5 mg-albuterol 2.5 mg  1 Dose Inhalation Q6H RT    sodium chloride flush  5-40 mL IntraVENous 2 times per day    miconazole   Topical BID    linezolid  600 mg IntraVENous Q12H    levETIRAcetam  500 mg IntraVENous Q12H    valproic acid  250 mg Oral 4x Daily    pantoprazole (PROTONIX) 40 mg in sodium chloride (PF) 0.9 % 10 mL injection  40 mg IntraVENous Daily    heparin (porcine)  5,000 Units SubCUTAneous 3 times per day       Electronically signed by Abbie Turk MD on 7/30/2024 at 1:30 PM      [facility]  Abbie Turk MD  Perfect Serve messaging  OFFICE: [phone]    Thank you for allowing us to participate in the care of this patient. Please call with questions.    This note is created with the assistance of a speech recognition program.  While intending to generate a document that actually reflects the content of the visit, the document can still have some errors including those of syntax and sound a like substitutions which may escape proof reading.  In such instances, actual meaning can be extrapolated by contextual diversion.

## 2024-07-30 NOTE — PROCEDURES
PROCEDURE NOTE  Date: 7/28/2024   Name: John Rivas  YOB: 1975    Procedures        PROCEDURE NOTE  Date: 7/28/2024   Name: John Rivas  YOB: 1975    Procedures              Referring physician: Dr. Francis  Date: 7/29/2024  Start Time: 7/28/2024 @ 1610  End Time: 7/29/2024 @ 0730    Indication  Patient with encephalopathy, EEG done to rule out subclinical seizures.       Introduction  This continuous video-EEG was acquired using a Gigma[employer] workstation at 256 samples/s. Electrodes were placed according to the International 10-20 system. Automated spike and seizure detection algorithms were applied. Video was recorded during this study.    Description  During the maximal alert state, a well-regulated 7 Hz posterior dominant rhythm was seen which was symmetrical and attenuated to eye opening. No consistent focal slowing or interhemispheric asymmetry was noted. Normal sleep structures were observed. There were no interictal epileptiform discharges or electrographic seizures.    There is frontally predominant fast beta activity.     Events  No events reported.       Impression  Abnormal continuous vEEG recording, the slowing mentioned above suggests mild non specific encephalopathy.     No epileptiform discharges were identified.  Please note the absence of   such activity in this record cannot conclusively rule out an epileptic   disorder.  If such is still clinically suspected, a repeat study with   prolonged sampling may be helpful.    Dolly Turner MD  Epilepsy Board Certified.  Neurology Board Certified.    Electronically Signed  
sodium chloride (PF) 0.9 % 10 mL injection  40 mg IntraVENous Daily Diandra Hannah MD   40 mg at 07/29/24 0742    furosemide (LASIX) injection 40 mg  40 mg IntraVENous BID Diandra Hannah MD   40 mg at 07/29/24 1639    heparin (porcine) injection 5,000 Units  5,000 Units SubCUTAneous 3 times per day Diandra Hannah MD   5,000 Units at 07/29/24 2131     Technical Description: This is a 21-channel digital EEG recording with time-locked video. Electrodes were placed in accordance with the 10-20 International System of Electrode Placement. Single lead EKG monitoring was included.    Baseline EEG Recording:  A formal baseline EEG recording was not obtained.     Day - 7/29/24, starting at 7:30 and reviewed upto 5:40 pm on 7/29/24    Interictal EEG Samples:  In the alert state, the posterior background rhythm was a symmetric, well-modulated, 7-8 Hz, 20-40 uV rhythm which reacted symmetrically to eye opening and had a normal frequency-amplitude gradient with an age-appropriate mixture of frequencies. During drowsiness, there were bursts of diffuse slowing and waxing and waning of the posterior dominant rhythm. The EKG channel revealed no abnormalities.    Ictal EEG Recording / Patient Events: During this period the patient had no events or seizures.    Summary: During this day of recording no events were recorded. The interictal EEG was abnormal due to mild encephalopathy. Monitoring was continued in order to record the patient's typical events. The EKG channel revealed no abnormalities.    Jossue Leong MD  [facility]

## 2024-07-30 NOTE — PROGRESS NOTES
[facility]  Progress Note    John iRvas  MRN: [id number]  Date: 7/30/2024  LOS: 3     Reason for F/U :   Pneumonia    Impression :   Acute respiratory failure with hypoxia, intubated   Bilateral  LL MRSA pneumonia   BAL 7/24/2026  Fevers -likely related to above  Polysubstance abuse with acute alcohol intoxication  Possible seizure   EEG with significant encephalopathy  Chronic kidney disease stage III    Recommendations:   MRSA bilat LL  pneumonia transferred form [facility] to [facility] due to desaturation and concern for mucous plug  CT chest 7/28 improved infiltrates if any  Remains on 90% then 60 and has a fever  Plug stuck on the tip of the ETT leading to the high FIO2    Was on vanco, then  7/27 on zyvox till 8/3   heparin for possible pulmonary embolism       Previous antibiotics:  Piperacillin/tazobactam 7/15/2024 through 7/24/2024 [ #10 days of therapy]  Meropenem for 1 day 7/24/2024  Vanco stopped 7/27      Infection Control Recommendations:   Contact precautions        Medical Decision making / Summary of Stay:   John Rivas is a 49 y.o.-year-old male who was initially admitted on 7/15/2024.      John is seen in consultation on 7/24/2024     He was admitted 7/15/2024 due to concerns for alcohol intoxication and he was found on the side of a fast food restaurant and EMS was called.  The patient admitted to drinking 1/5 of alcohol was verbally abusive and aggressive in the emergency department and the patient was intubated in the emergency department for airway protection     Alcohol level was at 423.43 with a sodium 133 potassium 2.9 total CPK of 330 and he was also positive on the urine drug screen for cocaine and cannabinoids.  LFTs were abnormal with an ALT of 56 AST of 123 and arterial blood gas with a pH of 7.218 with a pCO2 of 68.5 and a pO2 of 113 point  CT imaging of the head did not show any acute intracranial abnormality, CT of the cervical spine with no osseous abnormality of the

## 2024-07-30 NOTE — PROGRESS NOTES
Neurology Nurse Practitioner Progress Note      INTERVAL HISTORY: This is a 49 y.o.  male admitted 7/27/2024 for Acute hypoxic respiratory failure (HCC) [J96.01]. This is a follow-up neurology progress note. The patient was examined and the chart was reviewed. Discussed with the RN. There were no acute events overnight. Pt stays intubated, is on Precedex, fentanyl and Versed infusions; he was sleepy but easily arousable; thought he was at his home; did follow simple commands.    HPI: John Rivas is a 49 y.o. male with H/O HTN, CAD s/p stent placement, CKD, alcohol and cocaine abuse, who was admitted as a transfer from [facility] on 7/27/2024 for higher level of care for Acute hypoxic respiratory failure (HCC) [J96.01].  As per medical records patient was initially taken to [facility] on 7/15/2024 after patient had seizure-like activity while at [facility].  Reportedly patient was drinking vodka before the seizure occurred.  Bystanders called EMS who transported the patient to ED.  Upon arrival patient was unresponsive with alcohol level of 423, cocaine was positive.  CT head negative.  Patient was intubated and started on antibiotics for bilateral aspiration pneumonia.  Rapid response was called on 7/21 patient had tonic-clonic activity with horizontal nystagmus and unresponsiveness.  Patient received Ativan 2 mg and was loaded with Keppra 1500 mg and was continued on 500 mg twice daily.  Patient was evaluated by neurology team.  Phenobarbital infusion was started.  EEG showed severe encephalopathy and no seizures.  Patient was transferred to [facility] on 7/27 for higher level of care.  Neurology was on board for continuation of care.     [START ON 7/31/2024] amLODIPine  5 mg Oral Daily    [START ON 7/31/2024] torsemide  40 mg Oral Daily    spironolactone  25 mg Oral Daily    clonazePAM  2 mg Oral Q12H    sodium chloride flush  5-40 mL IntraVENous BID    fentanNYL  50 mcg IntraVENous Once

## 2024-07-30 NOTE — CARE COORDINATION
07/30/24 1450   Readmission Assessment   Number of Days since last admission? 1-7 days  (Not a RAC, [place] from [facility])   Previous Disposition Other (comment)  (Not a RAC, [place] from [facility])   Who is being Interviewed Unable to Complete  (Not a RAC, [place] from [facility])   What was the patient's/caregiver's perception as to why they think they needed to return back to the hospital? Other (Comment)  (Not a RAC, [place] from [facility])   Did you visit your Primary Care Physician after you left the hospital, before you returned this time? No  (Not a RAC, [place] from [facility])   Why weren't you able to visit your PCP? Other (Comment)  (Not a RAC, [place] from [facility])   Did you see a specialist, such as Cardiac, Pulmonary, Orthopedic Physician, etc. after you left the hospital? Other (Comment)  (Not a RAC, [place] from [facility])   Who advised the patient to return to the hospital? Other (Comment)  (Not a RAC, [place] from [facility])   Does the patient report anything that got in the way of taking their medications? No  (Not a RAC, [place] from [facility])   In our efforts to provide the best possible care to you and others like you, can you think of anything that we could have done to help you after you left the hospital the first time, so that you might not have needed to return so soon? Other (Comment)  (Not a RAC, [place] from [facility])

## 2024-07-31 ENCOUNTER — APPOINTMENT (OUTPATIENT)
Dept: GENERAL RADIOLOGY | Age: 49
DRG: 130 | End: 2024-07-31
Attending: INTERNAL MEDICINE
Payer: MEDICAID

## 2024-07-31 PROBLEM — J96.02 ACUTE RESPIRATORY FAILURE WITH HYPERCAPNIA (HCC): Status: ACTIVE | Noted: 2024-07-15

## 2024-07-31 LAB
ALBUMIN SERPL-MCNC: 3.2 G/DL (ref 3.5–5.2)
ALBUMIN/GLOB SERPL: 1 {RATIO} (ref 1–2.5)
ALP SERPL-CCNC: 131 U/L (ref 40–129)
ALT SERPL-CCNC: 27 U/L (ref 10–50)
ANION GAP SERPL CALCULATED.3IONS-SCNC: 9 MMOL/L (ref 9–16)
AST SERPL-CCNC: 66 U/L (ref 10–50)
BASOPHILS # BLD: 0.12 K/UL (ref 0–0.2)
BASOPHILS NFR BLD: 1 % (ref 0–2)
BILIRUB SERPL-MCNC: 0.6 MG/DL (ref 0–1.2)
BUN BLD-MCNC: 26 MG/DL (ref 8–26)
BUN SERPL-MCNC: 27 MG/DL (ref 6–20)
CA-I BLD-SCNC: 1.23 MMOL/L (ref 1.15–1.33)
CALCIUM SERPL-MCNC: 9.4 MG/DL (ref 8.6–10.4)
CHLORIDE BLD-SCNC: 93 MMOL/L (ref 98–107)
CHLORIDE SERPL-SCNC: 93 MMOL/L (ref 98–107)
CO2 BLD CALC-SCNC: 43 MMOL/L (ref 22–30)
CO2 SERPL-SCNC: 34 MMOL/L (ref 20–31)
CREAT SERPL-MCNC: 1.2 MG/DL (ref 0.7–1.2)
CRITICAL ACTION: NORMAL
CRITICAL NOTIFICATION DATE/TIME: NORMAL
CRITICAL NOTIFICATION: NORMAL
CRITICAL VALUE READ BACK: YES
EGFR, POC: 67 ML/MIN/1.73M2
EOSINOPHIL # BLD: 0.47 K/UL (ref 0–0.44)
EOSINOPHILS RELATIVE PERCENT: 3 % (ref 1–4)
ERYTHROCYTE [DISTWIDTH] IN BLOOD BY AUTOMATED COUNT: 13.2 % (ref 11.8–14.4)
FIO2: 50
GFR, ESTIMATED: 72 ML/MIN/1.73M2
GLUCOSE BLD-MCNC: 126 MG/DL (ref 74–100)
GLUCOSE SERPL-MCNC: 124 MG/DL (ref 74–99)
HCT VFR BLD AUTO: 37.3 % (ref 40.7–50.3)
HCT VFR BLD AUTO: 40 % (ref 41–53)
HGB BLD-MCNC: 12.4 G/DL (ref 13–17)
IMM GRANULOCYTES # BLD AUTO: 0.07 K/UL (ref 0–0.3)
IMM GRANULOCYTES NFR BLD: 1 %
LYMPHOCYTES NFR BLD: 1.93 K/UL (ref 1.1–3.7)
LYMPHOCYTES RELATIVE PERCENT: 14 % (ref 24–43)
MCH RBC QN AUTO: 34.3 PG (ref 25.2–33.5)
MCHC RBC AUTO-ENTMCNC: 33.2 G/DL (ref 28.4–34.8)
MCV RBC AUTO: 103.3 FL (ref 82.6–102.9)
MONOCYTES NFR BLD: 0.77 K/UL (ref 0.1–1.2)
MONOCYTES NFR BLD: 6 % (ref 3–12)
NEUTROPHILS NFR BLD: 75 % (ref 36–65)
NEUTS SEG NFR BLD: 10.47 K/UL (ref 1.5–8.1)
NRBC BLD-RTO: 0 PER 100 WBC
O2 DELIVERY DEVICE: ABNORMAL
PLATELET # BLD AUTO: 601 K/UL (ref 138–453)
PMV BLD AUTO: 10.6 FL (ref 8.1–13.5)
POC ANION GAP: 4 MMOL/L (ref 7–16)
POC CREATININE: 1.3 MG/DL (ref 0.51–1.19)
POC HCO3: 41.9 MMOL/L (ref 21–28)
POC HEMOGLOBIN (CALC): 13.6 G/DL (ref 13.5–17.5)
POC LACTIC ACID: 0.7 MMOL/L (ref 0.56–1.39)
POC O2 SATURATION: 94.2 % (ref 94–98)
POC PCO2: 60.7 MM HG (ref 35–48)
POC PH: 7.45 (ref 7.35–7.45)
POC PO2: 71.7 MM HG (ref 83–108)
POSITIVE BASE EXCESS, ART: 14.9 MMOL/L (ref 0–3)
POTASSIUM BLD-SCNC: 3.7 MMOL/L (ref 3.5–4.5)
POTASSIUM SERPL-SCNC: 3.7 MMOL/L (ref 3.7–5.3)
PROT SERPL-MCNC: 7.8 G/DL (ref 6.6–8.7)
RBC # BLD AUTO: 3.61 M/UL (ref 4.21–5.77)
RBC # BLD: ABNORMAL 10*6/UL
SAMPLE SITE: ABNORMAL
SODIUM BLD-SCNC: 139 MMOL/L (ref 138–146)
SODIUM SERPL-SCNC: 136 MMOL/L (ref 136–145)
WBC OTHER # BLD: 13.8 K/UL (ref 3.5–11.3)

## 2024-07-31 PROCEDURE — APPSS30 APP SPLIT SHARED TIME 16-30 MINUTES: Performed by: NURSE PRACTITIONER

## 2024-07-31 PROCEDURE — 2500000003 HC RX 250 WO HCPCS

## 2024-07-31 PROCEDURE — 80051 ELECTROLYTE PANEL: CPT

## 2024-07-31 PROCEDURE — 94640 AIRWAY INHALATION TREATMENT: CPT

## 2024-07-31 PROCEDURE — 82330 ASSAY OF CALCIUM: CPT

## 2024-07-31 PROCEDURE — 80053 COMPREHEN METABOLIC PANEL: CPT

## 2024-07-31 PROCEDURE — 85025 COMPLETE CBC W/AUTO DIFF WBC: CPT

## 2024-07-31 PROCEDURE — 2700000000 HC OXYGEN THERAPY PER DAY

## 2024-07-31 PROCEDURE — 82947 ASSAY GLUCOSE BLOOD QUANT: CPT

## 2024-07-31 PROCEDURE — 6370000000 HC RX 637 (ALT 250 FOR IP)

## 2024-07-31 PROCEDURE — 6360000002 HC RX W HCPCS: Performed by: EMERGENCY MEDICINE

## 2024-07-31 PROCEDURE — 2580000003 HC RX 258

## 2024-07-31 PROCEDURE — 99233 SBSQ HOSP IP/OBS HIGH 50: CPT | Performed by: INTERNAL MEDICINE

## 2024-07-31 PROCEDURE — 71045 X-RAY EXAM CHEST 1 VIEW: CPT

## 2024-07-31 PROCEDURE — 99232 SBSQ HOSP IP/OBS MODERATE 35: CPT | Performed by: PSYCHIATRY & NEUROLOGY

## 2024-07-31 PROCEDURE — 84520 ASSAY OF UREA NITROGEN: CPT

## 2024-07-31 PROCEDURE — 6370000000 HC RX 637 (ALT 250 FOR IP): Performed by: EMERGENCY MEDICINE

## 2024-07-31 PROCEDURE — 6360000002 HC RX W HCPCS

## 2024-07-31 PROCEDURE — 94003 VENT MGMT INPAT SUBQ DAY: CPT

## 2024-07-31 PROCEDURE — 83605 ASSAY OF LACTIC ACID: CPT

## 2024-07-31 PROCEDURE — 99291 CRITICAL CARE FIRST HOUR: CPT | Performed by: INTERNAL MEDICINE

## 2024-07-31 PROCEDURE — 99232 SBSQ HOSP IP/OBS MODERATE 35: CPT | Performed by: INTERNAL MEDICINE

## 2024-07-31 PROCEDURE — 2000000000 HC ICU R&B

## 2024-07-31 PROCEDURE — 85014 HEMATOCRIT: CPT

## 2024-07-31 PROCEDURE — 36415 COLL VENOUS BLD VENIPUNCTURE: CPT

## 2024-07-31 PROCEDURE — 82803 BLOOD GASES ANY COMBINATION: CPT

## 2024-07-31 PROCEDURE — 37799 UNLISTED PX VASCULAR SURGERY: CPT

## 2024-07-31 PROCEDURE — 82565 ASSAY OF CREATININE: CPT

## 2024-07-31 PROCEDURE — 6370000000 HC RX 637 (ALT 250 FOR IP): Performed by: INTERNAL MEDICINE

## 2024-07-31 PROCEDURE — 94761 N-INVAS EAR/PLS OXIMETRY MLT: CPT

## 2024-07-31 RX ORDER — MIDAZOLAM HYDROCHLORIDE 2 MG/2ML
2 INJECTION, SOLUTION INTRAMUSCULAR; INTRAVENOUS ONCE
Status: DISCONTINUED | OUTPATIENT
Start: 2024-07-31 | End: 2024-07-31

## 2024-07-31 RX ORDER — LANOLIN ALCOHOL/MO/W.PET/CERES
100 CREAM (GRAM) TOPICAL DAILY
Status: DISCONTINUED | OUTPATIENT
Start: 2024-08-01 | End: 2024-08-02

## 2024-07-31 RX ORDER — LANSOPRAZOLE 30 MG/1
30 TABLET, ORALLY DISINTEGRATING, DELAYED RELEASE ORAL
Status: DISCONTINUED | OUTPATIENT
Start: 2024-08-01 | End: 2024-08-02

## 2024-07-31 RX ORDER — LEVETIRACETAM 100 MG/ML
500 SOLUTION ORAL 2 TIMES DAILY
Status: DISCONTINUED | OUTPATIENT
Start: 2024-07-31 | End: 2024-08-05 | Stop reason: HOSPADM

## 2024-07-31 RX ADMIN — DEXMEDETOMIDINE HYDROCHLORIDE 1.5 MCG/KG/HR: 400 INJECTION, SOLUTION INTRAVENOUS at 20:28

## 2024-07-31 RX ADMIN — HEPARIN SODIUM 5000 UNITS: 5000 INJECTION INTRAVENOUS; SUBCUTANEOUS at 05:40

## 2024-07-31 RX ADMIN — LEVETIRACETAM 500 MG: 500 SOLUTION ORAL at 21:37

## 2024-07-31 RX ADMIN — ACETAMINOPHEN 650 MG: 325 TABLET ORAL at 04:33

## 2024-07-31 RX ADMIN — SODIUM CHLORIDE, PRESERVATIVE FREE 10 ML: 5 INJECTION INTRAVENOUS at 08:31

## 2024-07-31 RX ADMIN — DEXMEDETOMIDINE HYDROCHLORIDE 1.3 MCG/KG/HR: 400 INJECTION, SOLUTION INTRAVENOUS at 13:51

## 2024-07-31 RX ADMIN — CLONAZEPAM 2 MG: 1 TABLET ORAL at 01:31

## 2024-07-31 RX ADMIN — THIAMINE HYDROCHLORIDE 100 MG: 100 INJECTION, SOLUTION INTRAMUSCULAR; INTRAVENOUS at 10:11

## 2024-07-31 RX ADMIN — Medication 200 MCG/HR: at 03:15

## 2024-07-31 RX ADMIN — CLONAZEPAM 2 MG: 1 TABLET ORAL at 13:28

## 2024-07-31 RX ADMIN — DEXMEDETOMIDINE HYDROCHLORIDE 1.3 MCG/KG/HR: 400 INJECTION, SOLUTION INTRAVENOUS at 17:01

## 2024-07-31 RX ADMIN — Medication 150 MCG/HR: at 17:44

## 2024-07-31 RX ADMIN — ASPIRIN 81 MG 81 MG: 81 TABLET ORAL at 08:31

## 2024-07-31 RX ADMIN — IPRATROPIUM BROMIDE AND ALBUTEROL SULFATE 1 DOSE: 2.5; .5 SOLUTION RESPIRATORY (INHALATION) at 08:49

## 2024-07-31 RX ADMIN — LEVETIRACETAM 500 MG: 100 INJECTION INTRAVENOUS at 08:27

## 2024-07-31 RX ADMIN — ACETAMINOPHEN 650 MG: 325 TABLET ORAL at 18:07

## 2024-07-31 RX ADMIN — LINEZOLID 600 MG: 600 INJECTION, SOLUTION INTRAVENOUS at 08:30

## 2024-07-31 RX ADMIN — ANTI-FUNGAL POWDER MICONAZOLE NITRATE TALC FREE: 1.42 POWDER TOPICAL at 08:31

## 2024-07-31 RX ADMIN — SODIUM CHLORIDE, PRESERVATIVE FREE 40 MG: 5 INJECTION INTRAVENOUS at 08:38

## 2024-07-31 RX ADMIN — SODIUM CHLORIDE: 9 INJECTION, SOLUTION INTRAVENOUS at 21:45

## 2024-07-31 RX ADMIN — AMLODIPINE BESYLATE 5 MG: 5 TABLET ORAL at 08:31

## 2024-07-31 RX ADMIN — DEXMEDETOMIDINE HYDROCHLORIDE 0.7 MCG/KG/HR: 400 INJECTION, SOLUTION INTRAVENOUS at 02:08

## 2024-07-31 RX ADMIN — TORSEMIDE 40 MG: 20 TABLET ORAL at 08:31

## 2024-07-31 RX ADMIN — HEPARIN SODIUM 5000 UNITS: 5000 INJECTION INTRAVENOUS; SUBCUTANEOUS at 13:27

## 2024-07-31 RX ADMIN — IPRATROPIUM BROMIDE AND ALBUTEROL SULFATE 1 DOSE: 2.5; .5 SOLUTION RESPIRATORY (INHALATION) at 16:16

## 2024-07-31 RX ADMIN — LINEZOLID 600 MG: 600 INJECTION, SOLUTION INTRAVENOUS at 21:46

## 2024-07-31 RX ADMIN — SPIRONOLACTONE 25 MG: 25 TABLET, FILM COATED ORAL at 08:31

## 2024-07-31 RX ADMIN — DEXMEDETOMIDINE HYDROCHLORIDE 0.9 MCG/KG/HR: 400 INJECTION, SOLUTION INTRAVENOUS at 07:12

## 2024-07-31 RX ADMIN — IPRATROPIUM BROMIDE AND ALBUTEROL SULFATE 1 DOSE: 2.5; .5 SOLUTION RESPIRATORY (INHALATION) at 02:16

## 2024-07-31 RX ADMIN — DEXMEDETOMIDINE HYDROCHLORIDE 1.5 MCG/KG/HR: 400 INJECTION, SOLUTION INTRAVENOUS at 22:40

## 2024-07-31 RX ADMIN — MIDAZOLAM HYDROCHLORIDE 2 MG: 2 INJECTION, SOLUTION INTRAMUSCULAR; INTRAVENOUS at 00:27

## 2024-07-31 RX ADMIN — ANTI-FUNGAL POWDER MICONAZOLE NITRATE TALC FREE: 1.42 POWDER TOPICAL at 21:38

## 2024-07-31 RX ADMIN — FOLIC ACID 1 MG: 1 TABLET ORAL at 08:31

## 2024-07-31 RX ADMIN — HEPARIN SODIUM 5000 UNITS: 5000 INJECTION INTRAVENOUS; SUBCUTANEOUS at 21:37

## 2024-07-31 RX ADMIN — SODIUM CHLORIDE, PRESERVATIVE FREE 10 ML: 5 INJECTION INTRAVENOUS at 21:37

## 2024-07-31 RX ADMIN — IPRATROPIUM BROMIDE AND ALBUTEROL SULFATE 1 DOSE: 2.5; .5 SOLUTION RESPIRATORY (INHALATION) at 20:14

## 2024-07-31 RX ADMIN — DEXMEDETOMIDINE HYDROCHLORIDE 1.3 MCG/KG/HR: 400 INJECTION, SOLUTION INTRAVENOUS at 10:49

## 2024-07-31 ASSESSMENT — PULMONARY FUNCTION TESTS
PIF_VALUE: 28
PIF_VALUE: 24
PIF_VALUE: 52
PIF_VALUE: 33
PIF_VALUE: 36
PIF_VALUE: 27
PIF_VALUE: 23
PIF_VALUE: 27
PIF_VALUE: 23
PIF_VALUE: 51
PIF_VALUE: 27
PIF_VALUE: 21
PIF_VALUE: 22
PIF_VALUE: 19
PIF_VALUE: 32
PIF_VALUE: 29
PIF_VALUE: 24
PIF_VALUE: 36
PIF_VALUE: 21
PIF_VALUE: 37
PIF_VALUE: 35
PIF_VALUE: 16
PIF_VALUE: 33
PIF_VALUE: 41
PIF_VALUE: 41
PIF_VALUE: 24

## 2024-07-31 ASSESSMENT — PAIN SCALES - GENERAL
PAINLEVEL_OUTOF10: 0
PAINLEVEL_OUTOF10: 0

## 2024-07-31 NOTE — PROGRESS NOTES
INTENSIVE CARE UNIT  Resident Physician Progress Note    Patient - John Rivas  Date of Admission -  7/27/2024  6:08 PM  Date of Evaluation -  7/31/2024  Room and Bed Number -  3018/3018-01   Hospital Day - 4    SUBJECTIVE:     OVERNIGHT EVENTS:      No acute events overnight.  Tmax 101.2, tylenol given.  Heart rate 50s to 90s, sinus rhythm.  No pressor support.  Blood pressure is labile.    Remains on fentanyl 150 mcg/h, Versed and Precedex 0.5 mcg/kg/h, weaning Versed as tolerated    Vent settings 18/450/10/50%  ABG 7.443/76.9/39.6/12.9/95.3, HCO3 remains elevated, PO2 decreased  PF ratio 152.5    Urine output 2630 1.2 mL/kg/hr  Net -4242 since admit    WBC 1.8 (12.8), hemoglobin 12.4(12.3), platelets 601  Sodium 136(138), K3.7(3.7), chloride 95, glucose 94, creatinine 1.2(1.4), BUN 27    CT chest showing multifocal, multilobular pna, CXR yesterday with RLL infiltrate, reordered and pending    Respiratory culture 7/21 growing MRSA, again positive on 7/24 from BAL  Fungal culture from bronchoscopy growing yeast. Wa on meropenem, zosyn, vanc, now on linezolid. WBC decreasing. No blood culture growth, no urine culture growth.     Nephrology recommending continuing aldactone, start demedex 40 daily from lasix, otherwise continue following.    HISTORY OF PRESENT ILLNESS:    John Rivas is a 49 y.o. who presented initially to [facility] for management of acute alcohol intoxication.  Patient was in a [employer] when he was found by bystander to have seizure..  Squad was called and patient was taken to [facility].  Patient was actively drinking vodka before that seizure episode.  He was taken to [facility] ER and oxygen saturation was 93% on 2 L nasal cannula.  Serum ethanol level was 42%, potassium 2.9, sodium 133, U tox was positive for cocaine.  CT chest that was done in [facility] showed dense bilateral pneumonic consolidation with air bronchogram and major portion of the bilateral pulmonary

## 2024-07-31 NOTE — CARE COORDINATION
Post Acute Facility/Agency List     Provided  Dena rousseau  with the following list, the list includes the overall star ratings obtained from CMS per the Medicare Web site (www.Medicare.gov):     [x] Long Term Acute Care Facilities  [] Acute Inpatient Rehabilitation Facilities  [x] Skilled Nursing Facilities  [] Home Care    Provided verbal instructions on how to utilize the QR Code to obtain additional detailed star ratings from www.Medicare.gov

## 2024-07-31 NOTE — PROGRESS NOTES
Renal Progress Note    Patient :  John Rivas; 49 y.o. MRN# [id number]  Location:  3018/3018-01  Attending:  Amrit Francis MD  Admit Date:  7/27/2024   Hospital Day: 4    Subjective:     Continues on ventilator support.  FiO2 60% PEEP 10.  Difficult to wean. Continues to have respiratory acidosis with a pCO2 in the 57-60 range.  Urine output continues to be adequate, received 40mg IV yesterday, Arrington catheter in place. 40mg PO Demadex given this AM. Hemodynamically stable.  Spiked a fever overnight, Tmax of 38.4.  Intravenous Zyvox continues.  Continues on sedation with fentanyl, Versed and Precedex.  Off all pressors.  Aldactone was started 7/29.  Currently on tube feeds, at goal 50cc/hr   Labs today showed a sodium 136 potassium 3.7 chloride 93 bicarb 34 BUN 27 creatinine 1.2 glucose 124.  ABG pH 7.45 pCO2 60.7 pO2 71.7 bicarb 41.9    History reviewed  Known history of substance abuse including alcohol and cocaine.  Came into the hospital after he was found having involuntary movements at a fast food restaurant in [facility].  He was drinking heavily prior to that.  In the ER he was found to be aggressive and verbally abusive.  Labs showed that he had an alcohol level of the 42%.  He was initially admitted for acute alcohol intoxication.  Urine drug screen came back positive for cannabis and cocaine.  Imaging studies showed dense pneumonic consolidation with air bronchograms in the basilar lobes.  His respiratory status status then decline and he was intubated.  He required pressors for short while thereafter became hypertensive.  On 7/20/2024 he also had tonic-clonic seizure and horizontal nystagmus.  He was placed on vancomycin for suspected pneumonia.  Thereafter he developed acute kidney injury.  Creatinine which was 0.8 on presentation peaked up to 2.0.  Urine studies did show 10-20 RBCs on initial presentation.  Serological workup negative so far.  Ultrasound of the kidney pending.  Due to worsening neurological

## 2024-07-31 NOTE — PROGRESS NOTES
[facility]  Progress Note    John Rivas  MRN: [id number]  Date: 7/31/2024  LOS: 4     Reason for F/U :   Pneumonia    Impression :   Acute respiratory failure with hypoxia, intubated   Bilateral  LL MRSA pneumonia   BAL 7/24/2026  Fevers -likely related to above  Polysubstance abuse with acute alcohol intoxication  Possible seizure   EEG with significant encephalopathy  Chronic kidney disease stage III    Recommendations:   MRSA bilat LL  pneumonia transferred form [facility] to [facility] due to desaturation and concern for mucous plug  CT chest 7/28 improved infiltrates if any  FIo2 90% then 60 - 50 slowly better  Fever 7/27 then rersolved  Plug stuck on the tip of the ETT leading to the high FIO2    Was on vanco, then  7/27 on zyvox till 8/3   heparin for possible pulmonary embolism  FIO2 slowly down, not tolerating lower FIO2         Previous antibiotics:  Piperacillin/tazobactam 7/15/2024 through 7/24/2024 [ #10 days of therapy]  Meropenem for 1 day 7/24/2024  Vanco stopped 7/27      Infection Control Recommendations:   Contact precautions        Medical Decision making / Summary of Stay:   John Rivas is a 49 y.o.-year-old male who was initially admitted on 7/15/2024.      John is seen in consultation on 7/24/2024     He was admitted 7/15/2024 due to concerns for alcohol intoxication and he was found on the side of a fast food restaurant and EMS was called.  The patient admitted to drinking 1/5 of alcohol was verbally abusive and aggressive in the emergency department and the patient was intubated in the emergency department for airway protection     Alcohol level was at 423.43 with a sodium 133 potassium 2.9 total CPK of 330 and he was also positive on the urine drug screen for cocaine and cannabinoids.  LFTs were abnormal with an ALT of 56 AST of 123 and arterial blood gas with a pH of 7.218 with a pCO2 of 68.5 and a pO2 of 113 point  CT imaging of the head did not show any acute intracranial

## 2024-07-31 NOTE — PROGRESS NOTES
Comprehensive Nutrition Assessment    Type and Reason for Visit:  Reassess    Nutrition Recommendations/Plan:   Continue current TF of Standard with Fiber formula at goal 50 mL/hr + 1 Protein modular daily.    Monitor TF tolerance and residuals.  Suggest starting pt on a bowel regimen due to last recorded BM on 7/27.     Malnutrition Assessment:  Malnutrition Status:  Insufficient data (07/28/24 1406)    Context:  Acute Illness       Nutrition Assessment:    Pt remains intubated and sedated.  Pt receiving TF of Standard with Fiber formula at goal 50 mL/hr - protein modulars ordered x 2 per day as well.  RN reports pt has been continuing to have residuals - 130 mL residual this morning per RN.  Noted last recorded BM on 7/27.  Suggest starting pt on an increased bowel regimen.  Will continue to monitor TF tolerance.  Weight fluctuations since admission noted - ? due to fluids.  Labs/Meds reviewed.    Nutrition Related Findings:    Distended abdomen.  +1 generalized, +1 pitting bilateral UE, and +1 non-pitting bilateral LE edema. Last BM 7/27. Wound Type: Pressure Injury, Stage II       Current Nutrition Intake & Therapies:    Average Meal Intake: NPO  Average Supplements Intake: NPO  Diet NPO  ADULT TUBE FEEDING; Orogastric; Standard with Fiber; Continuous; 10; Yes; 10; Q 6 hours; 50; 30; Q 4 hours; Protein; 1 Dose; Daily  Current Tube Feeding (TF) Orders:  Feeding Route: Orogastric  Formula: Standard with Fiber  Schedule: Continuous  Feeding Regimen: 50 mL/hr  Additives/Modulars: Protein (x 1 per day)  Water Flushes: 30 mL q 4 hrs  Current TF & Flush Orders Provides: At 50 mL/hr + 1 Protein modular = 1904 kcals, 103 gm protein  Goal TF & Flush Orders Provides: Standard with Fiber at 50 mL/hr + 1 Protein modular = 1904 kcals, 103 gm protein    Additional Calorie Sources:  None    Anthropometric Measures:  Height: 180.3 cm (5' 10.98\")  Ideal Body Weight (IBW): 172 lbs (78 kg)    Admission Body Weight: 99.7 kg (219 lb

## 2024-07-31 NOTE — CARE COORDINATION
Transitional planning. I/S FiO2 50%, PEEP of 10, follows commands. OG for TF, Homeless, PT/OT ordered. Sister is staying at [facility], she is reviewing LTACH and SNF lists for choices.     Explained the difference in LOC between ICU/LTACH/SNF to pt's sister, Dena. Provided her with LTACH and SNF lists. She will review and provide choices.

## 2024-07-31 NOTE — PROGRESS NOTES
Neurology Nurse Practitioner Progress Note      INTERVAL HISTORY: This is a 49 y.o.  male admitted 7/27/2024 for Acute hypoxic respiratory failure (HCC) [J96.01]. This is a follow-up neurology progress note. The patient was examined and the chart was reviewed. Discussed with the RN. There were no acute events overnight. Pt stays intubated, is on Precedex, fentanyl and Versed infusions; he was awake; nodded head appropriately; did follow few simple commands.  Sister at the bedside; answered all her questions.    HPI: John Rivas is a 49 y.o. male with H/O HTN, CAD s/p stent placement, CKD, alcohol and cocaine abuse, who was admitted as a transfer from [facility] on 7/27/2024 for higher level of care for Acute hypoxic respiratory failure (HCC) [J96.01].  As per medical records patient was initially taken to [facility] on 7/15/2024 after patient had seizure-like activity while at [facility].  Reportedly patient was drinking vodka before the seizure occurred.  Bystanders called EMS who transported the patient to ED.  Upon arrival patient was unresponsive with alcohol level of 423, cocaine was positive.  CT head negative.  Patient was intubated and started on antibiotics for bilateral aspiration pneumonia.  Rapid response was called on 7/21 patient had tonic-clonic activity with horizontal nystagmus and unresponsiveness.  Patient received Ativan 2 mg and was loaded with Keppra 1500 mg and was continued on 500 mg twice daily.  Patient was evaluated by neurology team.  Phenobarbital infusion was started.  EEG showed severe encephalopathy and no seizures.  Patient was transferred to [facility] on 7/27 for higher level of care.  Neurology was on board for continuation of care.     midazolam  2 mg IntraVENous Once    amLODIPine  5 mg Oral Daily    torsemide  40 mg Oral Daily    spironolactone  25 mg Oral Daily    clonazePAM  2 mg Oral Q12H    sodium chloride flush  5-40 mL IntraVENous BID    fentanNYL  50 mcg

## 2024-08-01 ENCOUNTER — APPOINTMENT (OUTPATIENT)
Dept: GENERAL RADIOLOGY | Age: 49
DRG: 130 | End: 2024-08-01
Attending: INTERNAL MEDICINE
Payer: MEDICAID

## 2024-08-01 LAB
ALBUMIN SERPL-MCNC: 3.4 G/DL (ref 3.5–5.2)
ALBUMIN/GLOB SERPL: 1 {RATIO} (ref 1–2.5)
ALLEN TEST: ABNORMAL
ALP SERPL-CCNC: 146 U/L (ref 40–129)
ALT SERPL-CCNC: 34 U/L (ref 10–50)
ANCA MYELOPEROXIDASE: 1.1 AU/ML (ref 0–3.5)
ANCA PROTEINASE 3: <0.7 AU/ML (ref 0–2)
ANION GAP SERPL CALCULATED.3IONS-SCNC: 10 MMOL/L (ref 9–16)
AST SERPL-CCNC: 67 U/L (ref 10–50)
BASOPHILS # BLD: 0.12 K/UL (ref 0–0.2)
BASOPHILS NFR BLD: 1 % (ref 0–2)
BILIRUB SERPL-MCNC: 0.6 MG/DL (ref 0–1.2)
BUN SERPL-MCNC: 25 MG/DL (ref 6–20)
CALCIUM SERPL-MCNC: 9.3 MG/DL (ref 8.6–10.4)
CHLORIDE SERPL-SCNC: 94 MMOL/L (ref 98–107)
CO2 SERPL-SCNC: 36 MMOL/L (ref 20–31)
CREAT SERPL-MCNC: 1.2 MG/DL (ref 0.7–1.2)
EOSINOPHIL # BLD: 0.3 K/UL (ref 0–0.44)
EOSINOPHILS RELATIVE PERCENT: 2 % (ref 1–4)
ERYTHROCYTE [DISTWIDTH] IN BLOOD BY AUTOMATED COUNT: 13.1 % (ref 11.8–14.4)
FIO2: 40
GBM AB SER-ACNC: 2.1 AU/ML (ref 0–7)
GFR, ESTIMATED: 70 ML/MIN/1.73M2
GLUCOSE BLD-MCNC: 148 MG/DL (ref 74–100)
GLUCOSE SERPL-MCNC: 140 MG/DL (ref 74–99)
HCT VFR BLD AUTO: 39.2 % (ref 40.7–50.3)
HGB BLD-MCNC: 13.1 G/DL (ref 13–17)
IMM GRANULOCYTES # BLD AUTO: 0.07 K/UL (ref 0–0.3)
IMM GRANULOCYTES NFR BLD: 1 %
LYMPHOCYTES NFR BLD: 2.53 K/UL (ref 1.1–3.7)
LYMPHOCYTES RELATIVE PERCENT: 17 % (ref 24–43)
MAGNESIUM SERPL-MCNC: 2 MG/DL (ref 1.6–2.6)
MCH RBC QN AUTO: 34.1 PG (ref 25.2–33.5)
MCHC RBC AUTO-ENTMCNC: 33.4 G/DL (ref 28.4–34.8)
MCV RBC AUTO: 102.1 FL (ref 82.6–102.9)
MICROORGANISM SPEC CULT: NORMAL
MODE: ABNORMAL
MONOCYTES NFR BLD: 0.98 K/UL (ref 0.1–1.2)
MONOCYTES NFR BLD: 7 % (ref 3–12)
NEUTROPHILS NFR BLD: 72 % (ref 36–65)
NEUTS SEG NFR BLD: 10.97 K/UL (ref 1.5–8.1)
NRBC BLD-RTO: 0 PER 100 WBC
O2 DELIVERY DEVICE: ABNORMAL
PLATELET # BLD AUTO: 550 K/UL (ref 138–453)
PMV BLD AUTO: 10.2 FL (ref 8.1–13.5)
POC HCO3: 43.9 MMOL/L (ref 21–28)
POC LACTIC ACID: 0.8 MMOL/L (ref 0.56–1.39)
POC O2 SATURATION: 93.2 % (ref 94–98)
POC PCO2: 56.2 MM HG (ref 35–48)
POC PH: 7.5 (ref 7.35–7.45)
POC PO2: 64 MM HG (ref 83–108)
POSITIVE BASE EXCESS, ART: 17.4 MMOL/L (ref 0–3)
POTASSIUM SERPL-SCNC: 3.5 MMOL/L (ref 3.7–5.3)
PROT SERPL-MCNC: 8.1 G/DL (ref 6.6–8.7)
RBC # BLD AUTO: 3.84 M/UL (ref 4.21–5.77)
SAMPLE SITE: ABNORMAL
SERVICE CMNT-IMP: NORMAL
SODIUM SERPL-SCNC: 140 MMOL/L (ref 136–145)
SPECIMEN DESCRIPTION: NORMAL
WBC OTHER # BLD: 15 K/UL (ref 3.5–11.3)

## 2024-08-01 PROCEDURE — 85025 COMPLETE CBC W/AUTO DIFF WBC: CPT

## 2024-08-01 PROCEDURE — 6370000000 HC RX 637 (ALT 250 FOR IP)

## 2024-08-01 PROCEDURE — 6360000002 HC RX W HCPCS: Performed by: EMERGENCY MEDICINE

## 2024-08-01 PROCEDURE — 6370000000 HC RX 637 (ALT 250 FOR IP): Performed by: EMERGENCY MEDICINE

## 2024-08-01 PROCEDURE — 6370000000 HC RX 637 (ALT 250 FOR IP): Performed by: INTERNAL MEDICINE

## 2024-08-01 PROCEDURE — 99232 SBSQ HOSP IP/OBS MODERATE 35: CPT | Performed by: PSYCHIATRY & NEUROLOGY

## 2024-08-01 PROCEDURE — 94761 N-INVAS EAR/PLS OXIMETRY MLT: CPT

## 2024-08-01 PROCEDURE — 2700000000 HC OXYGEN THERAPY PER DAY

## 2024-08-01 PROCEDURE — 2000000000 HC ICU R&B

## 2024-08-01 PROCEDURE — 82803 BLOOD GASES ANY COMBINATION: CPT

## 2024-08-01 PROCEDURE — 2580000003 HC RX 258

## 2024-08-01 PROCEDURE — 2500000003 HC RX 250 WO HCPCS

## 2024-08-01 PROCEDURE — 83605 ASSAY OF LACTIC ACID: CPT

## 2024-08-01 PROCEDURE — 83735 ASSAY OF MAGNESIUM: CPT

## 2024-08-01 PROCEDURE — 71045 X-RAY EXAM CHEST 1 VIEW: CPT

## 2024-08-01 PROCEDURE — 6360000002 HC RX W HCPCS

## 2024-08-01 PROCEDURE — 80053 COMPREHEN METABOLIC PANEL: CPT

## 2024-08-01 PROCEDURE — 99291 CRITICAL CARE FIRST HOUR: CPT | Performed by: INTERNAL MEDICINE

## 2024-08-01 PROCEDURE — 99232 SBSQ HOSP IP/OBS MODERATE 35: CPT | Performed by: INTERNAL MEDICINE

## 2024-08-01 PROCEDURE — 82947 ASSAY GLUCOSE BLOOD QUANT: CPT

## 2024-08-01 PROCEDURE — 94003 VENT MGMT INPAT SUBQ DAY: CPT

## 2024-08-01 PROCEDURE — 94640 AIRWAY INHALATION TREATMENT: CPT

## 2024-08-01 PROCEDURE — 37799 UNLISTED PX VASCULAR SURGERY: CPT

## 2024-08-01 RX ORDER — MIDAZOLAM HYDROCHLORIDE 2 MG/2ML
2 INJECTION, SOLUTION INTRAMUSCULAR; INTRAVENOUS EVERY 4 HOURS PRN
Status: DISCONTINUED | OUTPATIENT
Start: 2024-08-01 | End: 2024-08-01

## 2024-08-01 RX ORDER — MIDAZOLAM HYDROCHLORIDE 2 MG/2ML
2 INJECTION, SOLUTION INTRAMUSCULAR; INTRAVENOUS
Status: DISCONTINUED | OUTPATIENT
Start: 2024-08-01 | End: 2024-08-05 | Stop reason: HOSPADM

## 2024-08-01 RX ORDER — ACETAZOLAMIDE 250 MG/1
250 TABLET ORAL 2 TIMES DAILY
Status: COMPLETED | OUTPATIENT
Start: 2024-08-01 | End: 2024-08-02

## 2024-08-01 RX ORDER — HYDRALAZINE HYDROCHLORIDE 20 MG/ML
10 INJECTION INTRAMUSCULAR; INTRAVENOUS ONCE
Status: COMPLETED | OUTPATIENT
Start: 2024-08-01 | End: 2024-08-01

## 2024-08-01 RX ORDER — SPIRONOLACTONE 25 MG/1
25 TABLET ORAL 2 TIMES DAILY
Status: DISCONTINUED | OUTPATIENT
Start: 2024-08-01 | End: 2024-08-05 | Stop reason: HOSPADM

## 2024-08-01 RX ORDER — LABETALOL HYDROCHLORIDE 5 MG/ML
10 INJECTION, SOLUTION INTRAVENOUS EVERY 4 HOURS PRN
Status: DISCONTINUED | OUTPATIENT
Start: 2024-08-01 | End: 2024-08-05 | Stop reason: HOSPADM

## 2024-08-01 RX ADMIN — ANTI-FUNGAL POWDER MICONAZOLE NITRATE TALC FREE: 1.42 POWDER TOPICAL at 20:46

## 2024-08-01 RX ADMIN — HYDRALAZINE HYDROCHLORIDE 10 MG: 20 INJECTION INTRAMUSCULAR; INTRAVENOUS at 01:54

## 2024-08-01 RX ADMIN — ANTI-FUNGAL POWDER MICONAZOLE NITRATE TALC FREE: 1.42 POWDER TOPICAL at 08:31

## 2024-08-01 RX ADMIN — SODIUM CHLORIDE, PRESERVATIVE FREE 10 ML: 5 INJECTION INTRAVENOUS at 08:32

## 2024-08-01 RX ADMIN — CLONAZEPAM 2 MG: 1 TABLET ORAL at 01:33

## 2024-08-01 RX ADMIN — TORSEMIDE 40 MG: 20 TABLET ORAL at 08:24

## 2024-08-01 RX ADMIN — DEXMEDETOMIDINE HYDROCHLORIDE 1.5 MCG/KG/HR: 400 INJECTION, SOLUTION INTRAVENOUS at 06:41

## 2024-08-01 RX ADMIN — ACETAMINOPHEN 650 MG: 325 TABLET ORAL at 06:01

## 2024-08-01 RX ADMIN — HEPARIN SODIUM 5000 UNITS: 5000 INJECTION INTRAVENOUS; SUBCUTANEOUS at 20:43

## 2024-08-01 RX ADMIN — IPRATROPIUM BROMIDE AND ALBUTEROL SULFATE 1 DOSE: 2.5; .5 SOLUTION RESPIRATORY (INHALATION) at 20:50

## 2024-08-01 RX ADMIN — SPIRONOLACTONE 25 MG: 25 TABLET, FILM COATED ORAL at 08:31

## 2024-08-01 RX ADMIN — ASPIRIN 81 MG 81 MG: 81 TABLET ORAL at 08:24

## 2024-08-01 RX ADMIN — SODIUM CHLORIDE, PRESERVATIVE FREE 10 ML: 5 INJECTION INTRAVENOUS at 20:43

## 2024-08-01 RX ADMIN — HEPARIN SODIUM 5000 UNITS: 5000 INJECTION INTRAVENOUS; SUBCUTANEOUS at 05:31

## 2024-08-01 RX ADMIN — DEXMEDETOMIDINE HYDROCHLORIDE 0.7 MCG/KG/HR: 400 INJECTION, SOLUTION INTRAVENOUS at 16:19

## 2024-08-01 RX ADMIN — HEPARIN SODIUM 5000 UNITS: 5000 INJECTION INTRAVENOUS; SUBCUTANEOUS at 13:20

## 2024-08-01 RX ADMIN — LABETALOL HYDROCHLORIDE 10 MG: 5 INJECTION, SOLUTION INTRAVENOUS at 20:54

## 2024-08-01 RX ADMIN — IPRATROPIUM BROMIDE AND ALBUTEROL SULFATE 1 DOSE: 2.5; .5 SOLUTION RESPIRATORY (INHALATION) at 08:30

## 2024-08-01 RX ADMIN — DEXMEDETOMIDINE HYDROCHLORIDE 1.5 MCG/KG/HR: 400 INJECTION, SOLUTION INTRAVENOUS at 12:12

## 2024-08-01 RX ADMIN — AMLODIPINE BESYLATE 5 MG: 5 TABLET ORAL at 08:24

## 2024-08-01 RX ADMIN — LANSOPRAZOLE 30 MG: 30 TABLET, ORALLY DISINTEGRATING, DELAYED RELEASE ORAL at 08:24

## 2024-08-01 RX ADMIN — LEVETIRACETAM 500 MG: 500 SOLUTION ORAL at 20:42

## 2024-08-01 RX ADMIN — LINEZOLID 600 MG: 600 INJECTION, SOLUTION INTRAVENOUS at 20:46

## 2024-08-01 RX ADMIN — LINEZOLID 600 MG: 600 INJECTION, SOLUTION INTRAVENOUS at 08:30

## 2024-08-01 RX ADMIN — FOLIC ACID 1 MG: 1 TABLET ORAL at 08:24

## 2024-08-01 RX ADMIN — MIDAZOLAM HYDROCHLORIDE 2 MG: 1 INJECTION, SOLUTION INTRAMUSCULAR; INTRAVENOUS at 10:25

## 2024-08-01 RX ADMIN — ACETAZOLAMIDE 250 MG: 250 TABLET ORAL at 10:41

## 2024-08-01 RX ADMIN — Medication 100 MG: at 08:24

## 2024-08-01 RX ADMIN — CLONAZEPAM 2 MG: 1 TABLET ORAL at 13:20

## 2024-08-01 RX ADMIN — DEXMEDETOMIDINE HYDROCHLORIDE 1.3 MCG/KG/HR: 400 INJECTION, SOLUTION INTRAVENOUS at 09:16

## 2024-08-01 RX ADMIN — LABETALOL HYDROCHLORIDE 10 MG: 5 INJECTION, SOLUTION INTRAVENOUS at 07:33

## 2024-08-01 RX ADMIN — IPRATROPIUM BROMIDE AND ALBUTEROL SULFATE 1 DOSE: 2.5; .5 SOLUTION RESPIRATORY (INHALATION) at 13:48

## 2024-08-01 RX ADMIN — DEXMEDETOMIDINE HYDROCHLORIDE 1.5 MCG/KG/HR: 400 INJECTION, SOLUTION INTRAVENOUS at 01:34

## 2024-08-01 RX ADMIN — Medication 200 MCG/HR: at 05:55

## 2024-08-01 RX ADMIN — ACETAZOLAMIDE 250 MG: 250 TABLET ORAL at 20:42

## 2024-08-01 RX ADMIN — LEVETIRACETAM 500 MG: 500 SOLUTION ORAL at 08:24

## 2024-08-01 RX ADMIN — SPIRONOLACTONE 25 MG: 25 TABLET, FILM COATED ORAL at 17:40

## 2024-08-01 RX ADMIN — DEXMEDETOMIDINE HYDROCHLORIDE 1.5 MCG/KG/HR: 400 INJECTION, SOLUTION INTRAVENOUS at 03:58

## 2024-08-01 RX ADMIN — IPRATROPIUM BROMIDE AND ALBUTEROL SULFATE 1 DOSE: 2.5; .5 SOLUTION RESPIRATORY (INHALATION) at 03:30

## 2024-08-01 RX ADMIN — HYDRALAZINE HYDROCHLORIDE 10 MG: 20 INJECTION INTRAMUSCULAR; INTRAVENOUS at 04:27

## 2024-08-01 ASSESSMENT — PULMONARY FUNCTION TESTS
PIF_VALUE: 18
PIF_VALUE: 16
PIF_VALUE: 15
PIF_VALUE: 17
PIF_VALUE: 17
PIF_VALUE: 22
PIF_VALUE: 22
PIF_VALUE: 25

## 2024-08-01 ASSESSMENT — PAIN SCALES - GENERAL: PAINLEVEL_OUTOF10: 0

## 2024-08-01 NOTE — PROGRESS NOTES
Renal Progress Note    Patient :  John Rivas; 49 y.o. MRN# [id number]  Location:  3018/3018-01  Attending:  Amrit Francis MD  Admit Date:  7/27/2024   Hospital Day: 5    Subjective:     Continues on ventilator support.  FiO2 45% PEEP 8. Has come down slightly on vent support. Continues to have respiratory acidosis with a pCO2 in the 57-60 range, with metabolic alkalosis.  Urine output continues to be adequate ~3L, received 40mg PO Demadex yesterday. Arrington catheter in place. Hemodynamically stable.  Again spiked a low grade fever overnight, Tmax of 38.3.  Intravenous Zyvox continues, Abx day 6.  Continues on sedation with fentanyl and Precedex has been weaned off versed.  Off all pressors.  Aldactone was started 7/29.  Currently on tube feeds, at goal 50cc/hr   Labs today showed a sodium 140 potassium 3.5 chloride 94 bicarb 36 BUN 25 creatinine 1.2 glucose 140.  ABG pH 7.50 pCO2 56.2 pO2 64.0 bicarb 43.9  Chest x-ray showing improving pulmonary edema    History reviewed  Known history of substance abuse including alcohol and cocaine.  Came into the hospital after he was found having involuntary movements at a fast food restaurant in [facility].  He was drinking heavily prior to that.  In the ER he was found to be aggressive and verbally abusive.  Labs showed that he had an alcohol level of the 42%.  He was initially admitted for acute alcohol intoxication.  Urine drug screen came back positive for cannabis and cocaine.  Imaging studies showed dense pneumonic consolidation with air bronchograms in the basilar lobes.  His respiratory status status then decline and he was intubated.  He required pressors for short while thereafter became hypertensive.  On 7/20/2024 he also had tonic-clonic seizure and horizontal nystagmus.  He was placed on vancomycin for suspected pneumonia.  Thereafter he developed acute kidney injury.  Creatinine which was 0.8 on presentation peaked up to 2.0.  Urine studies did show 10-20 RBCs on initial

## 2024-08-01 NOTE — PROGRESS NOTES
NEUROLOGY INPATIENT PROGRESS NOTE    8/1/2024         Subjective: John Rivas is a  49 y.o. male admitted on 7/27/2024 with Acute hypoxic respiratory failure (HCC) [J96.01]    Briefly, this is a  49 y.o. male with hx of HTN, CKD, CAD s/p stent and alcohol and cocaine abuse was admitted as a transfer from [facility] on 7/27/2024 for higher level of care with acute respiratory failure and for continuous EEG monitoring.  As per medical records, patient was initially taken to [facility] on 7/15/24 after patient having had seizure-like activity while at [facility].  Reportedly patient was drinking vodka before seizure occurrence.  Bystanders called EMS and then was transported to ER.  Upon arrival, patient was unresponsive with alcohol level 423 and UDS positive for cocaine.  On exam: Intubated and on Precedex and fentanyl sedation.,  With sedation holidays, becoming more alert and awake and answering questions by nodding head appropriately.  Oriented to name call, place.  Followed simple commands well.  Pupils are bilaterally reactive.  No nystagmus noted.  Blinks to threat bilaterally.  EOMs intact.  Moving both upper and lower extremities spontaneously and purposefully.  Withdraws to pinprick equally well.  Plantar responses mute.    CT head 7/19/24 : Negative.  MRI brain (with/without) 7/29/2024: No acute intracranial abnormalities.  Continuous EEG monitoring for 10 hr on 7/29/2024: No events were recorded.  Interictal EEG was abnormal due to mild encephalopathy.  8/1/2024: Chart reviewed and discussed with caregivers.  Patient remained intubated and sedated.  But he has been more alert and following commands.  Moving all extremities equally well.        No current facility-administered medications on file prior to encounter.     No current outpatient medications on file prior to encounter.       Allergies: John Rivas has No Known Allergies.    Past Medical History:   Diagnosis Date    Alcohol abuse     CAD

## 2024-08-01 NOTE — PROGRESS NOTES
Order obtained for extubation.  SpO2 of 35 on 95% FiO2.   Patient extubated and placed on 5L liters/min via nasal cannula.   Post extubation SpO2 is 94% with HR  98 bpm and RR 24 breaths/min.    Patient had strong cough that was productive of clear  sputum.  Extubation Well tolerated by patient..   Breath Sounds: Bilateral breath sounds, no stridor noted. Encouraging pt to cough and deep breath.    Marely Trujillo RCP   1:51 PM

## 2024-08-01 NOTE — PROGRESS NOTES
INTENSIVE CARE UNIT  Resident Physician Progress Note    Patient - John Rivas  Date of Admission -  7/27/2024  6:08 PM  Date of Evaluation -  8/1/2024  Room and Bed Number -  3018/3018-01   Hospital Day - 5    SUBJECTIVE:     OVERNIGHT EVENTS:      No acute events overnight.  Tmax 101.2, tylenol given.  Heart rate 50s to 90s, sinus rhythm.  No pressor support.  Blood pressure is labile. PRN labetalol,     Remains on fentanyl , Versed and Precedex 0.5 mcg/kg/h, weaning Versed as tolerated    Vent settings 16/450/8/40%  ABG 7.443/76.9/39.6/12.9/95.3, HCO3 remains elevated, PO2 decreased  PF ratio 152.5    Urine output 3910 1.8 mL/kg/hr  Net -5000 since admit    WBC 1.8 (12.8), hemoglobin 12.4(12.3), platelets 601  Sodium 136(138), K3.7(3.7), chloride 95, glucose 94, creatinine 1.2(1.4), BUN 27    CT chest showing multifocal, multilobular pna, CXR improving    Respiratory culture 7/21 growing MRSA, again positive on 7/24 from BAL  Fungal culture from bronchoscopy growing yeast. Wa on meropenem, zosyn, vanc, now on linezolid. WBC decreasing. No blood culture growth, no urine culture growth.     Nephrology recommending continuing aldactone, demadex 20 daily, aldactone 25 bid, diamox 50 bid 4 doses    HISTORY OF PRESENT ILLNESS:    John Rivas is a 49 y.o. who presented initially to [facility] for management of acute alcohol intoxication.  Patient was in a [employer] when he was found by bystander to have seizure..  Squad was called and patient was taken to [facility].  Patient was actively drinking vodka before that seizure episode.  He was taken to [facility] ER and oxygen saturation was 93% on 2 L nasal cannula.  Serum ethanol level was 42%, potassium 2.9, sodium 133, U tox was positive for cocaine.  CT chest that was done in [facility] showed dense bilateral pneumonic consolidation with air bronchogram and major portion of the bilateral pulmonary lower lobe showing evidence of dense

## 2024-08-02 ENCOUNTER — APPOINTMENT (OUTPATIENT)
Dept: GENERAL RADIOLOGY | Age: 49
DRG: 130 | End: 2024-08-02
Attending: INTERNAL MEDICINE
Payer: MEDICAID

## 2024-08-02 PROBLEM — R29.898 RIGHT ARM WEAKNESS: Status: ACTIVE | Noted: 2024-08-02

## 2024-08-02 LAB
ALBUMIN SERPL-MCNC: 3.5 G/DL (ref 3.5–5.2)
ALBUMIN/GLOB SERPL: 1 {RATIO} (ref 1–2.5)
ALP SERPL-CCNC: 123 U/L (ref 40–129)
ALT SERPL-CCNC: 27 U/L (ref 10–50)
ANION GAP SERPL CALCULATED.3IONS-SCNC: 11 MMOL/L (ref 9–16)
AST SERPL-CCNC: 50 U/L (ref 10–50)
BASOPHILS # BLD: 0.21 K/UL (ref 0–0.2)
BASOPHILS NFR BLD: 1 % (ref 0–2)
BILIRUB SERPL-MCNC: 0.6 MG/DL (ref 0–1.2)
BUN SERPL-MCNC: 20 MG/DL (ref 6–20)
CALCIUM SERPL-MCNC: 9.7 MG/DL (ref 8.6–10.4)
CHLORIDE SERPL-SCNC: 96 MMOL/L (ref 98–107)
CO2 SERPL-SCNC: 29 MMOL/L (ref 20–31)
CREAT SERPL-MCNC: 1.1 MG/DL (ref 0.7–1.2)
EOSINOPHIL # BLD: 0.21 K/UL (ref 0–0.44)
EOSINOPHILS RELATIVE PERCENT: 1 % (ref 1–4)
ERYTHROCYTE [DISTWIDTH] IN BLOOD BY AUTOMATED COUNT: 12.8 % (ref 11.8–14.4)
GFR, ESTIMATED: 84 ML/MIN/1.73M2
GLUCOSE SERPL-MCNC: 106 MG/DL (ref 74–99)
HCT VFR BLD AUTO: 40.9 % (ref 40.7–50.3)
HGB BLD-MCNC: 13.7 G/DL (ref 13–17)
IMM GRANULOCYTES # BLD AUTO: 0.06 K/UL (ref 0–0.3)
IMM GRANULOCYTES NFR BLD: 0 %
LYMPHOCYTES NFR BLD: 2.92 K/UL (ref 1.1–3.7)
LYMPHOCYTES RELATIVE PERCENT: 18 % (ref 24–43)
MAGNESIUM SERPL-MCNC: 2.2 MG/DL (ref 1.6–2.6)
MCH RBC QN AUTO: 34.4 PG (ref 25.2–33.5)
MCHC RBC AUTO-ENTMCNC: 33.5 G/DL (ref 28.4–34.8)
MCV RBC AUTO: 102.8 FL (ref 82.6–102.9)
MONOCYTES NFR BLD: 1.02 K/UL (ref 0.1–1.2)
MONOCYTES NFR BLD: 6 % (ref 3–12)
NEUTROPHILS NFR BLD: 74 % (ref 36–65)
NEUTS SEG NFR BLD: 11.5 K/UL (ref 1.5–8.1)
NRBC BLD-RTO: 0 PER 100 WBC
PLATELET # BLD AUTO: 584 K/UL (ref 138–453)
PMV BLD AUTO: 10.3 FL (ref 8.1–13.5)
POTASSIUM SERPL-SCNC: 3 MMOL/L (ref 3.7–5.3)
POTASSIUM SERPL-SCNC: 3.5 MMOL/L (ref 3.7–5.3)
PROT SERPL-MCNC: 8.1 G/DL (ref 6.6–8.7)
RBC # BLD AUTO: 3.98 M/UL (ref 4.21–5.77)
SODIUM SERPL-SCNC: 136 MMOL/L (ref 136–145)
WBC OTHER # BLD: 15.9 K/UL (ref 3.5–11.3)

## 2024-08-02 PROCEDURE — 83735 ASSAY OF MAGNESIUM: CPT

## 2024-08-02 PROCEDURE — 99291 CRITICAL CARE FIRST HOUR: CPT | Performed by: INTERNAL MEDICINE

## 2024-08-02 PROCEDURE — 6370000000 HC RX 637 (ALT 250 FOR IP): Performed by: INTERNAL MEDICINE

## 2024-08-02 PROCEDURE — 80053 COMPREHEN METABOLIC PANEL: CPT

## 2024-08-02 PROCEDURE — 6360000002 HC RX W HCPCS: Performed by: INTERNAL MEDICINE

## 2024-08-02 PROCEDURE — 97162 PT EVAL MOD COMPLEX 30 MIN: CPT

## 2024-08-02 PROCEDURE — 99232 SBSQ HOSP IP/OBS MODERATE 35: CPT | Performed by: PSYCHIATRY & NEUROLOGY

## 2024-08-02 PROCEDURE — 6370000000 HC RX 637 (ALT 250 FOR IP)

## 2024-08-02 PROCEDURE — 99222 1ST HOSP IP/OBS MODERATE 55: CPT | Performed by: PHYSICAL MEDICINE & REHABILITATION

## 2024-08-02 PROCEDURE — 84132 ASSAY OF SERUM POTASSIUM: CPT

## 2024-08-02 PROCEDURE — 2700000000 HC OXYGEN THERAPY PER DAY

## 2024-08-02 PROCEDURE — 2580000003 HC RX 258

## 2024-08-02 PROCEDURE — 85025 COMPLETE CBC W/AUTO DIFF WBC: CPT

## 2024-08-02 PROCEDURE — 97530 THERAPEUTIC ACTIVITIES: CPT

## 2024-08-02 PROCEDURE — APPSS30 APP SPLIT SHARED TIME 16-30 MINUTES: Performed by: NURSE PRACTITIONER

## 2024-08-02 PROCEDURE — 99254 IP/OBS CNSLTJ NEW/EST MOD 60: CPT | Performed by: PHYSICAL MEDICINE & REHABILITATION

## 2024-08-02 PROCEDURE — 6370000000 HC RX 637 (ALT 250 FOR IP): Performed by: EMERGENCY MEDICINE

## 2024-08-02 PROCEDURE — 94761 N-INVAS EAR/PLS OXIMETRY MLT: CPT

## 2024-08-02 PROCEDURE — 2500000003 HC RX 250 WO HCPCS

## 2024-08-02 PROCEDURE — 6360000002 HC RX W HCPCS

## 2024-08-02 PROCEDURE — 36415 COLL VENOUS BLD VENIPUNCTURE: CPT

## 2024-08-02 PROCEDURE — 99232 SBSQ HOSP IP/OBS MODERATE 35: CPT | Performed by: INTERNAL MEDICINE

## 2024-08-02 PROCEDURE — 99233 SBSQ HOSP IP/OBS HIGH 50: CPT | Performed by: INTERNAL MEDICINE

## 2024-08-02 PROCEDURE — 2060000000 HC ICU INTERMEDIATE R&B

## 2024-08-02 PROCEDURE — 94640 AIRWAY INHALATION TREATMENT: CPT

## 2024-08-02 PROCEDURE — 97166 OT EVAL MOD COMPLEX 45 MIN: CPT

## 2024-08-02 RX ORDER — LANOLIN ALCOHOL/MO/W.PET/CERES
100 CREAM (GRAM) TOPICAL DAILY
Status: DISCONTINUED | OUTPATIENT
Start: 2024-08-02 | End: 2024-08-05 | Stop reason: HOSPADM

## 2024-08-02 RX ORDER — POTASSIUM CHLORIDE 20 MEQ/1
40 TABLET, EXTENDED RELEASE ORAL ONCE
Status: COMPLETED | OUTPATIENT
Start: 2024-08-02 | End: 2024-08-02

## 2024-08-02 RX ORDER — IPRATROPIUM BROMIDE AND ALBUTEROL SULFATE 2.5; .5 MG/3ML; MG/3ML
1 SOLUTION RESPIRATORY (INHALATION) EVERY 4 HOURS PRN
Status: DISCONTINUED | OUTPATIENT
Start: 2024-08-02 | End: 2024-08-02

## 2024-08-02 RX ORDER — PANTOPRAZOLE SODIUM 40 MG/1
40 TABLET, DELAYED RELEASE ORAL
Status: DISCONTINUED | OUTPATIENT
Start: 2024-08-02 | End: 2024-08-05 | Stop reason: HOSPADM

## 2024-08-02 RX ORDER — IPRATROPIUM BROMIDE AND ALBUTEROL SULFATE 2.5; .5 MG/3ML; MG/3ML
1 SOLUTION RESPIRATORY (INHALATION)
Status: DISCONTINUED | OUTPATIENT
Start: 2024-08-02 | End: 2024-08-05

## 2024-08-02 RX ORDER — CARVEDILOL 6.25 MG/1
6.25 TABLET ORAL 2 TIMES DAILY WITH MEALS
Status: DISCONTINUED | OUTPATIENT
Start: 2024-08-02 | End: 2024-08-03

## 2024-08-02 RX ORDER — TORSEMIDE 20 MG/1
20 TABLET ORAL
Status: DISCONTINUED | OUTPATIENT
Start: 2024-08-05 | End: 2024-08-05 | Stop reason: HOSPADM

## 2024-08-02 RX ADMIN — PANTOPRAZOLE SODIUM 40 MG: 40 TABLET, DELAYED RELEASE ORAL at 09:02

## 2024-08-02 RX ADMIN — ACETAZOLAMIDE 250 MG: 250 TABLET ORAL at 08:52

## 2024-08-02 RX ADMIN — ACETAZOLAMIDE 250 MG: 250 TABLET ORAL at 20:45

## 2024-08-02 RX ADMIN — CLONAZEPAM 2 MG: 1 TABLET ORAL at 01:40

## 2024-08-02 RX ADMIN — AMLODIPINE BESYLATE 5 MG: 5 TABLET ORAL at 08:52

## 2024-08-02 RX ADMIN — SPIRONOLACTONE 25 MG: 25 TABLET, FILM COATED ORAL at 17:36

## 2024-08-02 RX ADMIN — IPRATROPIUM BROMIDE AND ALBUTEROL SULFATE 1 DOSE: 2.5; .5 SOLUTION RESPIRATORY (INHALATION) at 08:43

## 2024-08-02 RX ADMIN — HEPARIN SODIUM 5000 UNITS: 5000 INJECTION INTRAVENOUS; SUBCUTANEOUS at 14:11

## 2024-08-02 RX ADMIN — CARVEDILOL 6.25 MG: 6.25 TABLET, FILM COATED ORAL at 10:34

## 2024-08-02 RX ADMIN — LINEZOLID 600 MG: 600 INJECTION, SOLUTION INTRAVENOUS at 21:33

## 2024-08-02 RX ADMIN — ASPIRIN 81 MG 81 MG: 81 TABLET ORAL at 08:52

## 2024-08-02 RX ADMIN — SPIRONOLACTONE 25 MG: 25 TABLET, FILM COATED ORAL at 08:52

## 2024-08-02 RX ADMIN — ANTI-FUNGAL POWDER MICONAZOLE NITRATE TALC FREE: 1.42 POWDER TOPICAL at 20:44

## 2024-08-02 RX ADMIN — SODIUM CHLORIDE, PRESERVATIVE FREE 10 ML: 5 INJECTION INTRAVENOUS at 08:59

## 2024-08-02 RX ADMIN — HEPARIN SODIUM 5000 UNITS: 5000 INJECTION INTRAVENOUS; SUBCUTANEOUS at 04:18

## 2024-08-02 RX ADMIN — DEXMEDETOMIDINE HYDROCHLORIDE 0.3 MCG/KG/HR: 400 INJECTION, SOLUTION INTRAVENOUS at 04:18

## 2024-08-02 RX ADMIN — LEVETIRACETAM 500 MG: 500 SOLUTION ORAL at 08:52

## 2024-08-02 RX ADMIN — HEPARIN SODIUM 5000 UNITS: 5000 INJECTION INTRAVENOUS; SUBCUTANEOUS at 20:44

## 2024-08-02 RX ADMIN — LEVETIRACETAM 500 MG: 500 SOLUTION ORAL at 20:45

## 2024-08-02 RX ADMIN — CARVEDILOL 6.25 MG: 6.25 TABLET, FILM COATED ORAL at 17:36

## 2024-08-02 RX ADMIN — CLONAZEPAM 2 MG: 1 TABLET ORAL at 14:10

## 2024-08-02 RX ADMIN — SODIUM CHLORIDE: 9 INJECTION, SOLUTION INTRAVENOUS at 08:49

## 2024-08-02 RX ADMIN — IPRATROPIUM BROMIDE AND ALBUTEROL SULFATE 1 DOSE: 2.5; .5 SOLUTION RESPIRATORY (INHALATION) at 01:32

## 2024-08-02 RX ADMIN — IPRATROPIUM BROMIDE AND ALBUTEROL SULFATE 1 DOSE: 2.5; .5 SOLUTION RESPIRATORY (INHALATION) at 20:36

## 2024-08-02 RX ADMIN — POTASSIUM CHLORIDE 40 MEQ: 1500 TABLET, EXTENDED RELEASE ORAL at 17:36

## 2024-08-02 RX ADMIN — Medication 100 MG: at 08:52

## 2024-08-02 RX ADMIN — SODIUM CHLORIDE, PRESERVATIVE FREE 10 ML: 5 INJECTION INTRAVENOUS at 20:43

## 2024-08-02 RX ADMIN — FOLIC ACID 1 MG: 1 TABLET ORAL at 08:52

## 2024-08-02 RX ADMIN — IPRATROPIUM BROMIDE AND ALBUTEROL SULFATE 1 DOSE: 2.5; .5 SOLUTION RESPIRATORY (INHALATION) at 16:03

## 2024-08-02 RX ADMIN — SODIUM CHLORIDE, PRESERVATIVE FREE 10 ML: 5 INJECTION INTRAVENOUS at 11:34

## 2024-08-02 RX ADMIN — TORSEMIDE 40 MG: 20 TABLET ORAL at 08:52

## 2024-08-02 RX ADMIN — POTASSIUM BICARBONATE 20 MEQ: 782 TABLET, EFFERVESCENT ORAL at 12:11

## 2024-08-02 RX ADMIN — ANTI-FUNGAL POWDER MICONAZOLE NITRATE TALC FREE: 1.42 POWDER TOPICAL at 08:10

## 2024-08-02 RX ADMIN — POTASSIUM BICARBONATE 40 MEQ: 782 TABLET, EFFERVESCENT ORAL at 08:52

## 2024-08-02 RX ADMIN — LINEZOLID 600 MG: 600 INJECTION, SOLUTION INTRAVENOUS at 08:51

## 2024-08-02 ASSESSMENT — PAIN SCALES - GENERAL
PAINLEVEL_OUTOF10: 0

## 2024-08-02 NOTE — RT PROTOCOL NOTE
RT Inhaler-Nebulizer Bronchodilator Protocol Note    There is a bronchodilator order in the chart from a provider indicating to follow the RT Bronchodilator Protocol and there is an “Initiate RT Inhaler-Nebulizer Bronchodilator Protocol” order as well (see protocol at bottom of note).    CXR Findings:  XR CHEST PORTABLE    Result Date: 8/1/2024  Cardiomegaly with improving airspace opacities noted in both lungs likely representing improving pulmonary edema.  Bibasilar opacities have improved as well.       The findings from the last RT Protocol Assessment were as follows:   History Pulmonary Disease: Smoker 15 pack years or more  Respiratory Pattern: Regular pattern and RR 12-20 bpm  Breath Sounds: Clear breath sounds  Cough: Strong, spontaneous, non-productive  Indication for Bronchodilator Therapy: None  Bronchodilator Assessment Score: 1    Aerosolized bronchodilator medication orders have been revised according to the RT Inhaler-Nebulizer Bronchodilator Protocol below.    Respiratory Therapist to perform RT Therapy Protocol Assessment initially then follow the protocol.  Repeat RT Therapy Protocol Assessment PRN for score 0-3 or on second treatment, BID, and PRN for scores above 3.    No Indications - adjust the frequency to every 6 hours PRN wheezing or bronchospasm, if no treatments needed after 48 hours then discontinue using Per Protocol order mode.     If indication present, adjust the RT bronchodilator orders based on the Bronchodilator Assessment Score as indicated below.  Use Inhaler orders unless patient has one or more of the following: on home nebulizer, not able to hold breath for 10 seconds, is not alert and oriented, cannot activate and use MDI correctly, or respiratory rate 25 breaths per minute or more, then use the equivalent nebulizer order(s) with same Frequency and PRN reasons based on the score.  If a patient is on this medication at home then do not decrease Frequency below that used at

## 2024-08-02 NOTE — PROGRESS NOTES
bed  Restraints  Restraints Initially in Place: No     Restrictions  Restrictions/Precautions  Restrictions/Precautions: Up as Tolerated  Required Braces or Orthoses?: No  Position Activity Restriction  Other position/activity restrictions: 7 lpm nasal canula     Subjective   General  Chart Reviewed: Yes  Patient assessed for rehabilitation services?: Yes  Family / Caregiver Present: No  Follows Commands: Within Functional Limits  General Comment  Comments: RN and pt agreeable to treatment session  Subjective  Subjective: Pt reports no pain, numbness or tingling.         Social/Functional History  Social/Functional History  Lives With:  (homeless)  Type of Home: Homeless  Home Layout: One level  ADL Assistance: Independent  Homemaking Assistance: Independent  Ambulation Assistance: Independent  Transfer Assistance: Independent  Active : No  Mode of Transportation: Walk  Occupation: Unemployed  Additional Comments: Pt reports having a [place]ite [place] the woods, walks 3-4 miles a day to fast food or library  Vision/Hearing  Vision  Vision: Within Functional Limits  Hearing  Hearing: Within functional limits    Cognition   Orientation  Overall Orientation Status: Within Functional Limits  Cognition  Overall Cognitive Status: Exceptions  Arousal/Alertness: Appears intact  Following Commands: Appears intact  Attention Span: Difficulty dividing attention  Safety Judgement: Decreased awareness of need for assistance  Problem Solving: Assistance required to identify errors made  Insights: Impaired  Initiation: Impaired  Sequencing: Impaired         AROM RLE (degrees)  RLE AROM: WFL  AROM LLE (degrees)  LLE AROM : WFL  Strength RLE  Comment: 4-/5 grossly  Strength LLE  Comment: 4-/5 grossly           Bed mobility  Rolling to Left: Moderate assistance  Supine to Sit: Moderate assistance  Sit to Supine: Moderate assistance  Bed Mobility Comments: assist to progress trunk for supine to sit and assist with legs for sit to

## 2024-08-02 NOTE — PROGRESS NOTES
Renal Progress Note    Patient :  John Rivas; 49 y.o. MRN# [id number]  Location:  3018/3018-01  Attending:  Amrit Francis MD  Admit Date:  7/27/2024   Hospital Day: 6    Subjective:     Extubated last night.  Oral intake improving.  Urine output excellent.  Creatinine down to 1.1.  Getting oral Demadex.  Continues on Zyvox.  Metabolic alkalosis much improved.  Bicarbonate 29.  POTASSIUM STILL RUNNING LOW WHICH IS BEING REPLACED.  PULIDO CATHETER HAS BEEN REMOVED, EXTERNAL CATHETER IN PLACE Aldactone dose increased yesterday to 25 twice daily.  Labs today showed a sodium 136 potassium 3.0 chloride 96 bicarb 29 BUN 20 creatinine 1.1 magnesium 2.2 glucose 106 calcium 9.7  Chest x-ray yesterday showing improving pulmonary edema    History reviewed  Known history of substance abuse including alcohol and cocaine.  Came into the hospital after he was found having involuntary movements at a fast food restaurant in [facility].  He was drinking heavily prior to that.  In the ER he was found to be aggressive and verbally abusive.  Labs showed that he had an alcohol level of the 42%.  He was initially admitted for acute alcohol intoxication.  Urine drug screen came back positive for cannabis and cocaine.  Imaging studies showed dense pneumonic consolidation with air bronchograms in the basilar lobes.  His respiratory status status then decline and he was intubated.  He required pressors for short while thereafter became hypertensive.  On 7/20/2024 he also had tonic-clonic seizure and horizontal nystagmus.  He was placed on vancomycin for suspected pneumonia.  Thereafter he developed acute kidney injury.  Creatinine which was 0.8 on presentation peaked up to 2.0.  Urine studies did show 10-20 RBCs on initial presentation.  Serological workup negative so far.  Ultrasound of the kidney pending.  Due to worsening neurological status and worsening respiratory status he was transferred to [facility] from [facility].  Since admission he

## 2024-08-02 NOTE — PROGRESS NOTES
Occupational Therapy  Facility/Department: [facility] CAR 3- MICU  Occupational Therapy Initial Assessment    Name: John Rivas  : 1975  MRN: [id number]  Date of Service: 2024    Discharge Recommendations:  Patient would benefit from continued therapy after discharge  OT Equipment Recommendations  Equipment Needed:  (CTA)       Patient Diagnosis(es): There were no encounter diagnoses.  Past Medical History:  has a past medical history of Alcohol abuse, CAD (coronary artery disease), Essential hypertension, and Homeless single person.  Past Surgical History:  has a past surgical history that includes Coronary angioplasty with stent.           Assessment   Performance deficits / Impairments: Decreased functional mobility ;Decreased ADL status;Decreased ROM;Decreased strength;Decreased endurance;Decreased balance;Decreased high-level IADLs  Assessment: Pt independent in ADL/IADLs prior to admission. Pt would continue to benefit from OT services to address deficits listed above and improve overall functional performance prior to discharge.  Prognosis: Good  Decision Making: Medium Complexity  REQUIRES OT FOLLOW-UP: Yes  Activity Tolerance  Activity Tolerance: Patient Tolerated treatment well;Treatment limited secondary to decreased cognition        Plan   Occupational Therapy Plan  Times Per Week: 4-5x/wk  Current Treatment Recommendations: Strengthening, ROM, Balance training, Functional mobility training, Endurance training, Pain management, Safety education & training, Patient/Caregiver education & training, Equipment evaluation, education, & procurement, Self-Care / ADL, Home management training, Cognitive/Perceptual training, Coordination training     Restrictions  Restrictions/Precautions  Restrictions/Precautions: Up as Tolerated  Required Braces or Orthoses?: No  Position Activity Restriction  Other position/activity restrictions: 7 lpm nasal canula    Subjective   General  Patient assessed for

## 2024-08-02 NOTE — CONSULTS
Nutrition Note    Consulted for Tube Feedings Order and Management.  Pt remains intubated and sedated.      Suggest starting TF of Standard with Fiber (Jevity 1.2) formula goal 50 mL/hr + 1 Protein modular per day. Suggest starting at a trickle feeding at 10 mL/hr with slow advancement as tolerated.     For additional information, refer to full RD assessment from 7/28/24.    Electronically signed by Louisa Brody RD, LD on 7/29/24 at 2:16 PM EDT    Contact: [phone]  
HOURS     Procedure Component Value Units Date/Time   Culture, Blood 1 [[id number]] Collected: 07/25/24 1302   Order Status: Completed Specimen: Arm, Forearm from Blood Updated: 07/26/24 0539    Specimen Description .BLOOD    Special Requests L HAND 13 ML    Culture NO GROWTH 12 HOURS   Culture, Blood 2 [[id number]] Collected: 07/22/24 1712   Order Status: Completed Specimen: Blood Updated: 07/25/24 2217    Specimen Description .BLOOD    Special Requests LT WRIST, 9 ML    Culture NO GROWTH 3 DAYS   Culture, Blood 1 [[id number]] Collected: 07/22/24 1317   Order Status: Completed Specimen: Blood Updated: 07/25/24 2046    Specimen Description .BLOOD    Special Requests LW 1.5ML    Culture NO GROWTH 3 DAYS   Culture, Respiratory [[id number]] (Abnormal) Collected: 07/24/24 1351   Order Status: Completed Specimen: Sputum from BAL- Bronch. Lavage Updated: 07/25/24 1143    Specimen Description .BRONCHIAL ALVEOLAR LAVAGE RLL    Special Requests Site: Sputum    Direct Exam MANY NEUTROPHILS     MODERATE GRAM POSITIVE COCCI IN CLUSTERS Abnormal     Culture STAPHYLOCOCCUS AUREUS <10,000 CFU/ML Abnormal    Culture, Fungus [[id number]] Collected: 07/24/24 1628   Order Status: Completed Specimen: BAL- Bronch. Lavage Updated: 07/25/24 1015    Specimen Description .BRONCHIAL ALVEOLAR LAVAGE RLL    Direct Exam NO FUNGAL ELEMENTS SEEN    Culture PENDING   Culture with Smear, Acid Fast Bacillius [[id number]] Collected: 07/24/24 1628   Order Status: Completed Specimen: BAL- Bronch. Lavage Updated: 07/25/24 1015    Specimen Description .BRONCHIAL ALVEOLAR LAVAGE .RIGHT LOWER LOBE    Direct Exam NO ACID FAST BACILLI SEEN (CONCENTRATED SMEAR)    Culture PENDING   Culture, Respiratory [[id number]] (Abnormal) Collected: 07/21/24 1815   Order Status: Completed Specimen: Tracheal Aspirate Updated: 07/24/24 1241    Specimen Description .TRACHEAL ASPIRATE    Special Requests Site: Tracheal Aspirate    Direct Exam < 10 EPITHELIAL CELLS/LPF     <10 
Sample Site Arterial Line     Mode Pressure Control     FIO2 100.0    Lactic Acid, POC    Collection Time: 07/27/24  8:09 PM   Result Value Ref Range    POC Lactic Acid 0.7 0.56 - 1.39 mmol/L   POCT Glucose    Collection Time: 07/27/24  8:09 PM   Result Value Ref Range    POC Glucose 117 (H) 74 - 100 mg/dL         Assessment :      Primary Problem  Acute hypoxic respiratory failure (HCC)    Active Hospital Problems    Diagnosis Date Noted    Acute hypoxic respiratory failure (HCC) [J96.01] 07/27/2024     Priority: High     49-year-old male with past medical history HTN, CAD, homelessness, smoking, alcoholism, medication noncompliance, CKD, CHF who was admitted for acute alcohol intoxication with complication. Per chart, the patient was initially brought to the outside facility after being observed to have seizure-like activity at a [facility]. EtOH level of 0.423%, U tox is positive for cannabinoids, cocaine.  K+ was 2.9. He had further seizure activity while on max dose propofol and Versed continuous infusions.  He was given 2 mg Ativan IV, Keppra 1500 mg bolus he was started on continuous phenobarb infusion.  The tremor subsided after this.  EEG completed and showed severe encephalopathy, no seizures.    Plan:     Seizure like activity likely 2/2 to polysubstance and ETOH abuse  Myoclonic jerking movements   Acute toxic metabolic encephalopathy   -MRI Brain w and wo contrast  -Continuous LTME monitoring  -Continue Keppra 500 q 12 hours  -Continue Depakene 250 q 4 x per day  -Continue Midazolam gtt   -B1 pending, TSH 1.47 wnl, ammonia 40 wnl, Vit D 709 wnl, T. Pallidum non-reactive    -LFTs 7/25/24: ALT 19, AST 37   -[facility] protocol   -Thiamine, folate supplements  -Extubation per primary team  -Seizure precautions    Consultations:   IP CONSULT TO INFECTIOUS DISEASES  IP CONSULT TO NEPHROLOGY  IP CONSULT TO NEUROLOGY      Follow-up further recommendations after discussing the case with attending  The plan was 
cannabinoids in the urine tox.  Nephrology is consulted due to worsening renal function.  He had suffered acute kidney injury due to ATN with underlying infection/pneumonia, variable blood pressures along with vancomycin usage.  BMP results from today reviewed sodium 137, potassium 3.9, chloride 94, bicarb 21, calcium 10.1, BUN 22, creatinine 1.6 mg/dl.  Assessment: Acute kidney injury baseline creatinine seems to be normal peak creatinine this admission 2.2 mg/dl.  Volume overload.  Acute alcohol intoxication along with cocaine and cannabinoid positivity.  Bilateral lower lobe aspiration pneumonia.  Labile blood pressure.  Acute hypoxic respiratory failure Akbani mechanical ventilation.  Coronary disease with history of stents.  Plan: Continue Lasix 40 mg IV twice daily.  Monitor strict I's and O's and renal function.  Vent management and pneumonia management as per primary.  BMP in AM.  Will follow.    Ned Jefferson MD   [facility]    This note is created with the assistance of a speech-recognition program. While intending to generate a document that actually reflects the content of the visit, no guarantees can be provided that every mistake has been identified and corrected by editing.      
extremity  Motor: Muscle tone and bulk are normal bilaterally. No pronator drift.  1/5 right upper extremity otherwise appears at antigravity left upper and bilateral lower extremity          Diagnostics:  CBC   Lab Results   Component Value Date/Time    WBC 15.9 08/02/2024 05:20 AM    RBC 3.98 08/02/2024 05:20 AM    HGB 13.7 08/02/2024 05:20 AM    HCT 40.9 08/02/2024 05:20 AM    .8 08/02/2024 05:20 AM    RDW 12.8 08/02/2024 05:20 AM     08/02/2024 05:20 AM     BMP    Lab Results   Component Value Date/Time     08/02/2024 05:20 AM    K 3.0 08/02/2024 05:20 AM    CL 96 08/02/2024 05:20 AM    CO2 29 08/02/2024 05:20 AM    BUN 20 08/02/2024 05:20 AM     Uric Acid  No components found for: \"URIC\"  VITAMIN B12 No components found for: \"B12\"  PT/INR  No results found for: \"PTINR\"      Impression: Mr. John Rivas is a 49 y.o. male with a history of Acute hypoxemic respiratory failure (HCC)    Debility acute hypoxic/hypercapnic respite failure secondary aspiration pneumonia/R associated pneumonia extubated 8/2-Diamox, Norvasc, Coreg, spironolactone, Demadex, DuoNeb  MRSA pneumonia-Zyvox  Possible provoked seizure-Keppra, Klonopin  Polysubstance abuse-alcohol/cocaine/marijuana-folic acid, thiamine  MAIK  Cardiomyopathy  Coronary artery disease-multiple stents per past medical history-aspirin  Hypokalemia  Right upper extremity weakness of question etiology MRI brain 7/29 negative, MRI C-spine pending    Recommendations:  1. Diagnosis: Debility secondary acute hypoxic/hypercapnic respiratory failure  2. Therapy: Has PT and OT need-consult speech  3. Medical  Necessity: As above  4. Support: Clarify currently homeless but possibly discharged to sisters home  5. Rehab recommendation: Suspect would benefit from acute inpatient potential medically ready depending on disposition-currently noted homeless, however sister present notes possibly able to go to her home discussed possible need for 24/7 care-she

## 2024-08-02 NOTE — TRANSITION OF CARE
Critical care team - Resident sign-out to medicine service      Date and time: 8/2/2024 4:00 PM  Patient's name:  John Rivas  Medical Record Number: [id number]  Patient's account/billing number: [id number]  Patient's YOB: 1975  Age: 49 y.o.  Date of Admission: 7/27/2024  6:08 PM  Length of stay during current admission: 6    Primary Care Physician: No primary care provider on file.    Code Status: Full Code    Mode of physician to physician communication:        [] Via telephone   [] In person     Date and time of sign-out: 8/2/2024 4:00 PM    Accepting Internal Medicine resident: Dr. Flores    Accepting Medicine team: IM Team med 2    Accepting team's attending: Dr. Franklin    Patient's current ICU Bed:  3018    Patient's assigned bed on floor:  2010        [] Med-Surg Monitored [] Step-down       [] Psychiatry ICU       [] Psych floor     Reason for ICU admission:     Further evaluation for worsening breathing and desaturation on oxygen FiO2 and 14 of PEEP after being intubated at [facility].    ICU course summary:     49-year-old male who initially presented to [facility] for management of acute alcohol intoxication.  Actively drinking outside prior to admission and was found by bystander to have seizure-like activity.  Taken to the ER, oxygen saturation 93% on 2 L nasal cannula.  Patient was seen to have high ethanol level, hypobulimic, U tox positive for cocaine.  CT of the chest done that shows evidence of dense pneumonia.  Intubated due to worsening respiratory status while in the ED.  Patient was started on Versed, fentanyl, propofol for sedation.  CIWA protocol started with phenobarb taper.  Also had hypertensive and hypertensive emergencies.    While admitted at [facility], patient was found to have tonic-clonic activity, started on antiepileptics, no seizure found on EEG.  Echocardiogram was obtained which showed an EF of 55 to 60% with diastolic dysfunction.

## 2024-08-02 NOTE — PROGRESS NOTES
Comprehensive Nutrition Assessment    Type and Reason for Visit:  Reassess    Nutrition Recommendations/Plan:   Continue current diet.  Will provide Ensure supplements x 2 per day.  Encourage intakes as tolerated.  Monitor labs, weights, and plan of care.     Malnutrition Assessment:  Malnutrition Status:  Insufficient data (07/28/24 1406)    Context:  Acute Illness       Nutrition Assessment:    Pt extubated yesterday.  Started on an Easy to Chew diet.  For lunch pt had bites of his meal.  Will provide oral supplements for additional nutrition.  Labs reviewed: K 3.0 mmol/L.  Meds reviewed.    Nutrition Related Findings:    Last BM 8/2. Wound Type: Pressure Injury, Stage II       Current Nutrition Intake & Therapies:    Average Meal Intake: 1-25%  Average Supplements Intake: None Ordered  ADULT DIET; Easy to Chew    Anthropometric Measures:  Height: 180.3 cm (5' 10.98\")  Ideal Body Weight (IBW): 172 lbs (78 kg)    Admission Body Weight: 99.7 kg (219 lb 12.8 oz)  Current Body Weight: 88.7 kg (195 lb 8.8 oz), 113.7 % IBW. Weight Source: Bed Scale  Current BMI (kg/m2): 27.3  Usual Body Weight: 91.7 kg (202 lb 3 oz) (7/18/24 bed scale per chart review)  % Weight Change (Calculated): 8.7  Weight Adjustment For: No Adjustment                 BMI Categories: Overweight (BMI 25.0-29.9)    Estimated Daily Nutrient Needs:  Energy Requirements Based On: Kcal/kg  Weight Used for Energy Requirements: Current  Energy (kcal/day): 7109-2287 kcals/day  Weight Used for Protein Requirements: Ideal  Protein (g/day):  gm pro/day  Method Used for Fluid Requirements: Other (Comment)  Fluid (ml/day): per MD    Nutrition Diagnosis:   Inadequate oral intake related to  (recent extubation; current condition) as evidenced by intake 0-25% (need for oral supplements)    Nutrition Interventions:   Food and/or Nutrient Delivery: Continue Current Diet, Start Oral Nutrition Supplement  Nutrition Education/Counseling: No recommendation at this

## 2024-08-02 NOTE — CARE COORDINATION
Post Acute Facility/Agency List     Provided  sister  with the following list, the list includes the overall star ratings obtained from CMS per the Medicare Web site (www.Medicare.gov):     [] Long Term Acute Care Facilities  [x] Acute Inpatient Rehabilitation Facilities  [] Skilled Nursing Facilities  [] Home Care    Provided verbal instructions on how to utilize the QR Code to obtain additional detailed star ratings from www.Medicare.gov

## 2024-08-02 NOTE — PROGRESS NOTES
Neurology Nurse Practitioner Progress Note      INTERVAL HISTORY: This is a 49 y.o.  male admitted 7/27/2024 for Acute hypoxic respiratory failure (HCC) [J96.01]. This is a follow-up neurology progress note. The patient was examined and the chart was reviewed. Discussed with the RN. There were no acute events overnight. Pt got extubated (8/1). On examination pt was awake, alert, confused about hospital's name & month; was unable to raise RUE; good strength distally.     HPI: John Rivas is a 49 y.o. male with H/O HTN, CAD s/p stent placement, CKD, alcohol and cocaine abuse, who was admitted as a transfer from [facility] on 7/27/2024 for higher level of care for Acute hypoxic respiratory failure (HCC) [J96.01].  As per medical records patient was initially taken to [facility] on 7/15/2024 after patient had seizure-like activity while at [facility].  Reportedly patient was drinking vodka before the seizure occurred.  Bystanders called EMS who transported the patient to ED.  Upon arrival patient was unresponsive with alcohol level of 423, cocaine was positive.  CT head negative.  Patient was intubated and started on antibiotics for bilateral aspiration pneumonia.  Rapid response was called on 7/21 patient had tonic-clonic activity with horizontal nystagmus and unresponsiveness.  Patient received Ativan 2 mg and was loaded with Keppra 1500 mg and was continued on 500 mg twice daily.  Patient was evaluated by neurology team.  Phenobarbital infusion was started.  EEG showed severe encephalopathy and no seizures.  Patient was transferred to [facility] on 7/27 for higher level of care.  Neurology was on board for continuation of care.     pantoprazole  40 mg Oral QAM AC    thiamine  100 mg Oral Daily    potassium bicarb-citric acid  20 mEq Oral Once    [START ON 8/5/2024] torsemide  20 mg Oral Once per day on Mon Wed Fri    carvedilol  6.25 mg Oral BID WC    spironolactone  25 mg Oral BID    acetaZOLAMIDE

## 2024-08-02 NOTE — CARE COORDINATION
Transitional planning: Extubated 8/1, 6L [place] HF O2, A & O X 2, PT/OT ordered. Pt's sister Dena will review ARU/SNF lists w/ pt. Will need PM & R consult. Cervical MRI ordered. Plans are for pt to go home w/ sister when he is done w/ rehab.     PM & R Consulted    1706 pt's sister Dena spoke w/ pt and provided 2 ARU choices, order of choice is:  1) [facility]  2) [facility]  Referrals faxed to both.     They are still reviewing SNF lists.

## 2024-08-02 NOTE — PROGRESS NOTES
[facility]  Occupational Therapy Not Seen Note    DATE: 2024    NAME: John Rivas  MRN: [id number]   : 1975      Patient not seen this date for Occupational Therapy due to:    Patient is not appropriate for active participation in OT evaluation/treatment at this time d/t appropriate for ROM only per nursing staff    Next Scheduled Treatment: Ck 8/3     Electronically signed by Janelle Ramirez OT on 2024 at 11:04 AM

## 2024-08-03 ENCOUNTER — APPOINTMENT (OUTPATIENT)
Dept: GENERAL RADIOLOGY | Age: 49
DRG: 130 | End: 2024-08-03
Attending: INTERNAL MEDICINE
Payer: MEDICAID

## 2024-08-03 ENCOUNTER — APPOINTMENT (OUTPATIENT)
Dept: MRI IMAGING | Age: 49
DRG: 130 | End: 2024-08-03
Attending: INTERNAL MEDICINE
Payer: MEDICAID

## 2024-08-03 LAB
ALBUMIN SERPL-MCNC: 3.9 G/DL (ref 3.5–5.2)
ALBUMIN/GLOB SERPL: 1 {RATIO} (ref 1–2.5)
ALP SERPL-CCNC: 128 U/L (ref 40–129)
ALT SERPL-CCNC: 33 U/L (ref 10–50)
ANION GAP SERPL CALCULATED.3IONS-SCNC: 14 MMOL/L (ref 9–16)
AST SERPL-CCNC: 63 U/L (ref 10–50)
BASOPHILS # BLD: 0.15 K/UL (ref 0–0.2)
BASOPHILS NFR BLD: 1 % (ref 0–2)
BILIRUB SERPL-MCNC: 0.6 MG/DL (ref 0–1.2)
BUN SERPL-MCNC: 25 MG/DL (ref 6–20)
CALCIUM SERPL-MCNC: 10.2 MG/DL (ref 8.6–10.4)
CHLORIDE SERPL-SCNC: 100 MMOL/L (ref 98–107)
CO2 SERPL-SCNC: 25 MMOL/L (ref 20–31)
CREAT SERPL-MCNC: 1.2 MG/DL (ref 0.7–1.2)
EOSINOPHIL # BLD: 0.36 K/UL (ref 0–0.44)
EOSINOPHILS RELATIVE PERCENT: 3 % (ref 1–4)
ERYTHROCYTE [DISTWIDTH] IN BLOOD BY AUTOMATED COUNT: 12.7 % (ref 11.8–14.4)
GFR, ESTIMATED: 74 ML/MIN/1.73M2
GLUCOSE SERPL-MCNC: 133 MG/DL (ref 74–99)
HCT VFR BLD AUTO: 45.8 % (ref 40.7–50.3)
HGB BLD-MCNC: 15.2 G/DL (ref 13–17)
IMM GRANULOCYTES # BLD AUTO: 0.07 K/UL (ref 0–0.3)
IMM GRANULOCYTES NFR BLD: 1 %
LYMPHOCYTES NFR BLD: 2.75 K/UL (ref 1.1–3.7)
LYMPHOCYTES RELATIVE PERCENT: 20 % (ref 24–43)
MAGNESIUM SERPL-MCNC: 2.2 MG/DL (ref 1.6–2.6)
MCH RBC QN AUTO: 33.3 PG (ref 25.2–33.5)
MCHC RBC AUTO-ENTMCNC: 33.2 G/DL (ref 28.4–34.8)
MCV RBC AUTO: 100.4 FL (ref 82.6–102.9)
MONOCYTES NFR BLD: 0.86 K/UL (ref 0.1–1.2)
MONOCYTES NFR BLD: 6 % (ref 3–12)
NEUTROPHILS NFR BLD: 69 % (ref 36–65)
NEUTS SEG NFR BLD: 9.75 K/UL (ref 1.5–8.1)
NRBC BLD-RTO: 0 PER 100 WBC
PLATELET # BLD AUTO: 613 K/UL (ref 138–453)
PMV BLD AUTO: 10.4 FL (ref 8.1–13.5)
POTASSIUM SERPL-SCNC: 3.3 MMOL/L (ref 3.7–5.3)
PROT SERPL-MCNC: 8.8 G/DL (ref 6.6–8.7)
RBC # BLD AUTO: 4.56 M/UL (ref 4.21–5.77)
SODIUM SERPL-SCNC: 139 MMOL/L (ref 136–145)
WBC OTHER # BLD: 13.9 K/UL (ref 3.5–11.3)

## 2024-08-03 PROCEDURE — 83735 ASSAY OF MAGNESIUM: CPT

## 2024-08-03 PROCEDURE — 36415 COLL VENOUS BLD VENIPUNCTURE: CPT

## 2024-08-03 PROCEDURE — 2580000003 HC RX 258: Performed by: NURSE PRACTITIONER

## 2024-08-03 PROCEDURE — 2580000003 HC RX 258

## 2024-08-03 PROCEDURE — 6370000000 HC RX 637 (ALT 250 FOR IP)

## 2024-08-03 PROCEDURE — 6370000000 HC RX 637 (ALT 250 FOR IP): Performed by: EMERGENCY MEDICINE

## 2024-08-03 PROCEDURE — 2700000000 HC OXYGEN THERAPY PER DAY

## 2024-08-03 PROCEDURE — 6360000002 HC RX W HCPCS: Performed by: INTERNAL MEDICINE

## 2024-08-03 PROCEDURE — 6370000000 HC RX 637 (ALT 250 FOR IP): Performed by: INTERNAL MEDICINE

## 2024-08-03 PROCEDURE — 92523 SPEECH SOUND LANG COMPREHEN: CPT

## 2024-08-03 PROCEDURE — 94640 AIRWAY INHALATION TREATMENT: CPT

## 2024-08-03 PROCEDURE — A9579 GAD-BASE MR CONTRAST NOS,1ML: HCPCS | Performed by: NURSE PRACTITIONER

## 2024-08-03 PROCEDURE — 99232 SBSQ HOSP IP/OBS MODERATE 35: CPT | Performed by: PSYCHIATRY & NEUROLOGY

## 2024-08-03 PROCEDURE — 2060000000 HC ICU INTERMEDIATE R&B

## 2024-08-03 PROCEDURE — 6360000002 HC RX W HCPCS

## 2024-08-03 PROCEDURE — 72156 MRI NECK SPINE W/O & W/DYE: CPT

## 2024-08-03 PROCEDURE — 71045 X-RAY EXAM CHEST 1 VIEW: CPT

## 2024-08-03 PROCEDURE — 6360000004 HC RX CONTRAST MEDICATION: Performed by: NURSE PRACTITIONER

## 2024-08-03 PROCEDURE — 85025 COMPLETE CBC W/AUTO DIFF WBC: CPT

## 2024-08-03 PROCEDURE — 80053 COMPREHEN METABOLIC PANEL: CPT

## 2024-08-03 PROCEDURE — 99232 SBSQ HOSP IP/OBS MODERATE 35: CPT | Performed by: STUDENT IN AN ORGANIZED HEALTH CARE EDUCATION/TRAINING PROGRAM

## 2024-08-03 RX ORDER — SODIUM CHLORIDE 0.9 % (FLUSH) 0.9 %
10 SYRINGE (ML) INJECTION PRN
Status: DISCONTINUED | OUTPATIENT
Start: 2024-08-03 | End: 2024-08-05 | Stop reason: HOSPADM

## 2024-08-03 RX ORDER — CARVEDILOL 12.5 MG/1
12.5 TABLET ORAL 2 TIMES DAILY WITH MEALS
Status: DISCONTINUED | OUTPATIENT
Start: 2024-08-03 | End: 2024-08-03

## 2024-08-03 RX ORDER — CARVEDILOL 25 MG/1
25 TABLET ORAL 2 TIMES DAILY WITH MEALS
Status: DISCONTINUED | OUTPATIENT
Start: 2024-08-03 | End: 2024-08-05 | Stop reason: HOSPADM

## 2024-08-03 RX ADMIN — ANTI-FUNGAL POWDER MICONAZOLE NITRATE TALC FREE: 1.42 POWDER TOPICAL at 20:54

## 2024-08-03 RX ADMIN — SODIUM CHLORIDE, PRESERVATIVE FREE 10 ML: 5 INJECTION INTRAVENOUS at 11:14

## 2024-08-03 RX ADMIN — IPRATROPIUM BROMIDE AND ALBUTEROL SULFATE 1 DOSE: 2.5; .5 SOLUTION RESPIRATORY (INHALATION) at 19:51

## 2024-08-03 RX ADMIN — Medication 100 MG: at 08:31

## 2024-08-03 RX ADMIN — CLONAZEPAM 2 MG: 1 TABLET ORAL at 23:36

## 2024-08-03 RX ADMIN — CLONAZEPAM 2 MG: 1 TABLET ORAL at 13:59

## 2024-08-03 RX ADMIN — LINEZOLID 600 MG: 600 INJECTION, SOLUTION INTRAVENOUS at 21:12

## 2024-08-03 RX ADMIN — FOLIC ACID 1 MG: 1 TABLET ORAL at 08:31

## 2024-08-03 RX ADMIN — SODIUM CHLORIDE, PRESERVATIVE FREE 10 ML: 5 INJECTION INTRAVENOUS at 20:54

## 2024-08-03 RX ADMIN — AMLODIPINE BESYLATE 5 MG: 5 TABLET ORAL at 08:31

## 2024-08-03 RX ADMIN — HEPARIN SODIUM 5000 UNITS: 5000 INJECTION INTRAVENOUS; SUBCUTANEOUS at 13:59

## 2024-08-03 RX ADMIN — CARVEDILOL 25 MG: 25 TABLET, FILM COATED ORAL at 08:31

## 2024-08-03 RX ADMIN — SODIUM CHLORIDE, PRESERVATIVE FREE 10 ML: 5 INJECTION INTRAVENOUS at 08:32

## 2024-08-03 RX ADMIN — CARVEDILOL 25 MG: 25 TABLET, FILM COATED ORAL at 16:35

## 2024-08-03 RX ADMIN — ANTI-FUNGAL POWDER MICONAZOLE NITRATE TALC FREE: 1.42 POWDER TOPICAL at 08:31

## 2024-08-03 RX ADMIN — HEPARIN SODIUM 5000 UNITS: 5000 INJECTION INTRAVENOUS; SUBCUTANEOUS at 21:07

## 2024-08-03 RX ADMIN — ASPIRIN 81 MG 81 MG: 81 TABLET ORAL at 08:31

## 2024-08-03 RX ADMIN — IPRATROPIUM BROMIDE AND ALBUTEROL SULFATE 1 DOSE: 2.5; .5 SOLUTION RESPIRATORY (INHALATION) at 11:49

## 2024-08-03 RX ADMIN — CLONAZEPAM 2 MG: 1 TABLET ORAL at 01:26

## 2024-08-03 RX ADMIN — HEPARIN SODIUM 5000 UNITS: 5000 INJECTION INTRAVENOUS; SUBCUTANEOUS at 05:13

## 2024-08-03 RX ADMIN — LINEZOLID 600 MG: 600 INJECTION, SOLUTION INTRAVENOUS at 08:41

## 2024-08-03 RX ADMIN — LEVETIRACETAM 500 MG: 500 SOLUTION ORAL at 20:54

## 2024-08-03 RX ADMIN — IPRATROPIUM BROMIDE AND ALBUTEROL SULFATE 1 DOSE: 2.5; .5 SOLUTION RESPIRATORY (INHALATION) at 15:31

## 2024-08-03 RX ADMIN — GADOTERIDOL 16 ML: 279.3 INJECTION, SOLUTION INTRAVENOUS at 11:14

## 2024-08-03 RX ADMIN — POTASSIUM BICARBONATE 40 MEQ: 782 TABLET, EFFERVESCENT ORAL at 16:35

## 2024-08-03 RX ADMIN — IPRATROPIUM BROMIDE AND ALBUTEROL SULFATE 1 DOSE: 2.5; .5 SOLUTION RESPIRATORY (INHALATION) at 08:55

## 2024-08-03 RX ADMIN — PANTOPRAZOLE SODIUM 40 MG: 40 TABLET, DELAYED RELEASE ORAL at 08:31

## 2024-08-03 RX ADMIN — LEVETIRACETAM 500 MG: 500 SOLUTION ORAL at 08:31

## 2024-08-03 RX ADMIN — SPIRONOLACTONE 25 MG: 25 TABLET, FILM COATED ORAL at 08:31

## 2024-08-03 RX ADMIN — SPIRONOLACTONE 25 MG: 25 TABLET, FILM COATED ORAL at 16:35

## 2024-08-03 ASSESSMENT — PAIN SCALES - GENERAL: PAINLEVEL_OUTOF10: 0

## 2024-08-03 NOTE — PROGRESS NOTES
INTERNAL MEDICINE                                                                             ICU PATIENT TRANSFER NOTE        Patient: John Rivas  YOB: 1975  MRN: [id number]     Acct: [id number]     Admit date: 7/27/2024    Code Status: Full Code      Reason for ICU Admission: Further evaluation for worsening breathing and desaturation on oxygen FiO2 and 14 of PEEP after being intubated at [facility].     SUPPORT DEVICES: [] Ventilator  [] BIPAP  [] Nasal Cannula [] Room Air    Consultations: [] Cardiology [x] Nephrology  [] Hemo onco  [] GI                               [x] ID [] ENT  [] Rheum [x] Neuro  []Physiotherapy       NUTRITION: [] NPO [] Tube Feeding (Specify: ) [] TPN [x] PO    Central Lines:  [x] No  [] Yes           If yes - Days/Date of Insertion.      Pt seen and examined at bedside. Chart and results reviewed.      ICU COURSE:     49-year-old male who initially presented to [facility] for management of acute alcohol intoxication.  Actively drinking outside prior to admission and was found by bystander to have seizure-like activity.  Taken to the ER, oxygen saturation 93% on 2 L nasal cannula.  Patient was seen to have high ethanol level, hypobulimic, U tox positive for cocaine.  CT of the chest done that shows evidence of dense pneumonia.  Intubated due to worsening respiratory status while in the ED.  Patient was started on Versed, fentanyl, propofol for sedation.  CIWA protocol started with phenobarb taper.  Also had hypertensive and hypertensive emergencies.     While admitted at [facility], patient was found to have tonic-clonic activity, started on antiepileptics, no seizure found on EEG.  Echocardiogram was obtained which showed an EF of 55 to 60% with diastolic dysfunction.  Bronchoscopy with bronchoalveolar lavage was done,

## 2024-08-03 NOTE — PROGRESS NOTES
from the ICU overnight.  The patient initially treated with acute hypoxemic respiratory failure secondary to MRSA pneumonia and seizure required to be on mechanical ventilation, eventually extubated and downgraded to the medical floor.  Plan:  Continue seizure medication with Keppra.  Continue linezolid for MRSA pneumonia.  Bronchodilators as needed.  Continue oxygen supplement to maintain SpO2 more than 90%.  Continue antihypertension medication.  PT/OT/discharge planning.      Electronically signed by ERENDIRA HARPER MD on   8/3/24 at 8:32 PM EDT    Please note that this chart was generated using voice recognition Dragon dictation software.  Although every effort was made to ensure the accuracy of this automated transcription, some errors in transcription may have occurred.

## 2024-08-03 NOTE — PROGRESS NOTES
SLP ALL NOTES  Facility/Department: [facility] CAR 2- STEPDOWN  Initial Speech/Language/Cognitive Assessment    NAME: John Rivas  : 1975   MRN: [id number]    ADMISSION DATE: 2024    ADMITTING DIAGNOSIS: has Acute alcoholic intoxication with complication (HCC); Alcohol intoxication in alcoholism with blood level over 0.3 with delirium (HCC); Aspiration pneumonia of both upper lobes due to vomit (HCC); Acute respiratory failure with hypoxia (HCC); On mechanically assisted ventilation (HCC); Encephalopathy; Acute renal failure with tubular necrosis (HCC); Seizure due to alcohol withdrawal, with unspecified complication (HCC); Essential hypertension; Acute systolic CHF (congestive heart failure) (HCC); Acute hypoxemic respiratory failure (HCC); Hypervolemia; Pneumonia of both lower lobes due to methicillin resistant Staphylococcus aureus (MRSA) (HCC); Seizure (HCC); Toxic metabolic encephalopathy; Seizures (HCC); Alcohol withdrawal syndrome, with delirium (HCC); Alcohol abuse; Cocaine abuse (HCC); Seizure-like activity (HCC); Acute metabolic encephalopathy; and Right arm weakness on their problem list.    DATE ONSET: 2024      Date of Eval: 8/3/2024   Evaluating Therapist: YARIEL Rivera      RECENT RESULTS  CT OF HEAD/MRI:   MRI Brain 2024  1.  No acute intracranial abnormality or finding to suggest etiology of seizures.  2. Mild chronic white matter microvascular ischemic changes      CT Head - 2024  1. No acute intracranial abnormality.  2. Chronic sinusitis      CT Head - 7/15/2024  1. No acute intracranial abnormality.  2. No acute osseous abnormality of the cervical spine. Multilevel  degenerative disc disease and facet arthrosis.      Primary Complaint:   John Rivas is a 48 yo man who was transported to [facility] 7/15/2024 following acute intoxication and seizure.    ETOH found to be 0.423 & urine tox screen positive for cocaine and cannabis      [facility]

## 2024-08-03 NOTE — PROGRESS NOTES
NEUROLOGY INPATIENT PROGRESS NOTE    8/3/2024         Subjective: John Rivas is a  49 y.o. male admitted on 7/27/2024 with Acute hypoxic respiratory failure (HCC) [J96.01]      Briefly, this is a  49 y.o. male with hx of HTN, CKD, CAD s/p stent and alcohol and cocaine abuse was admitted as a transfer from [facility] on 7/27/2024 for higher level of care with acute respiratory failure and for continuous EEG monitoring.  As per medical records, patient was initially taken to [facility] on 7/15/24 after patient having had seizure-like activity while at [facility].  Reportedly patient was drinking vodka before seizure occurrence.  Bystanders called EMS and then was transported to ER.  Upon arrival, patient was unresponsive with alcohol level 423 and UDS positive for cocaine.  8/2/2024: Patient is extubated last night and is presently alert and awake and oriented to self and place and following commands.  He stated that he has had seizures occurring intermittently and not on any antiseizure medications.  Never had any adverse effects with any of the seizure medications.    8/3/2024: Chart reviewed and discussed with caregivers.  Patient had MRI cervical spine and it did not demonstrate any evidence of myelopathy.  Upon review of MRI cervical spine.,  Patient stated that he is able to move right upper extremity but also admits to having right shoulder difficulty limiting his ability to move.  He also admits to having had nerve entrapment symptomatology.          No current facility-administered medications on file prior to encounter.     No current outpatient medications on file prior to encounter.       Allergies: John Rivas has No Known Allergies.    Past Medical History:   Diagnosis Date    Alcohol abuse     CAD (coronary artery disease)     multiple stents in place    Essential hypertension     Homeless single person        Past Surgical History:   Procedure Laterality Date    CORONARY ANGIOPLASTY WITH STENT

## 2024-08-03 NOTE — PROGRESS NOTES
[facility]  Progress Note    John Rivas  MRN: [id number]  Date: 8/2/2024  LOS: 6     Reason for F/U :   Pneumonia    Impression :   Acute respiratory failure with hypoxia, intubated   Bilateral  LL MRSA pneumonia   BAL 7/24/2026  Fevers -likely related to above  Polysubstance abuse with acute alcohol intoxication  Possible seizure   EEG with significant encephalopathy  Chronic kidney disease stage III    Recommendations:   MRSA bilat LL  pneumonia transferred form [facility] to [facility] due to desaturation and concern for mucous plug  CT chest 7/28 improved infiltrates if any  FIo2 90% then 60 - 50 slowly better  Fever 7/27 then resolved  Plug stuck on the tip of the ETT leading to the high FIO2  Extubated 8/2 - on 4 NC    on zyvox till 8/8 tentatively  Course extended due to persistent bandemia and thrombocytosis and severe  pneumonia   CT 7/28 shows  no loculations and no pneumatoceles  heparin for possible pulmonary embolism  RUE weak - MRI C spine ordered           Previous antibiotics:  Piperacillin/tazobactam 7/15/2024 through 7/24/2024 [ #10 days of therapy]  Meropenem for 1 day 7/24/2024  Vanco stopped 7/27      Infection Control Recommendations:   Contact precautions        Medical Decision making / Summary of Stay:   John Rivas is a 49 y.o.-year-old male who was initially admitted on 7/15/2024.      John is seen in consultation on 7/24/2024     He was admitted 7/15/2024 due to concerns for alcohol intoxication and he was found on the side of a fast food restaurant and EMS was called.  The patient admitted to drinking 1/5 of alcohol was verbally abusive and aggressive in the emergency department and the patient was intubated in the emergency department for airway protection     Alcohol level was at 423.43 with a sodium 133 potassium 2.9 total CPK of 330 and he was also positive on the urine drug screen for cocaine and cannabinoids.  LFTs were abnormal with an ALT of 56 AST of 123 and arterial

## 2024-08-04 LAB
ALBUMIN SERPL-MCNC: 3.7 G/DL (ref 3.5–5.2)
ALBUMIN/GLOB SERPL: 1 {RATIO} (ref 1–2.5)
ALP SERPL-CCNC: 108 U/L (ref 40–129)
ALT SERPL-CCNC: 40 U/L (ref 10–50)
ANION GAP SERPL CALCULATED.3IONS-SCNC: 15 MMOL/L (ref 9–16)
AST SERPL-CCNC: 78 U/L (ref 10–50)
BASOPHILS # BLD: 0.26 K/UL (ref 0–0.2)
BASOPHILS NFR BLD: 2 % (ref 0–2)
BILIRUB SERPL-MCNC: 0.6 MG/DL (ref 0–1.2)
BUN SERPL-MCNC: 28 MG/DL (ref 6–20)
CALCIUM SERPL-MCNC: 9.7 MG/DL (ref 8.6–10.4)
CHLORIDE SERPL-SCNC: 102 MMOL/L (ref 98–107)
CO2 SERPL-SCNC: 23 MMOL/L (ref 20–31)
CREAT SERPL-MCNC: 1.2 MG/DL (ref 0.7–1.2)
EOSINOPHIL # BLD: 0.44 K/UL (ref 0–0.44)
EOSINOPHILS RELATIVE PERCENT: 3 % (ref 1–4)
ERYTHROCYTE [DISTWIDTH] IN BLOOD BY AUTOMATED COUNT: 12.7 % (ref 11.8–14.4)
GFR, ESTIMATED: 74 ML/MIN/1.73M2
GLUCOSE SERPL-MCNC: 100 MG/DL (ref 74–99)
HCT VFR BLD AUTO: 43.8 % (ref 40.7–50.3)
HGB BLD-MCNC: 14.6 G/DL (ref 13–17)
IMM GRANULOCYTES # BLD AUTO: 0.09 K/UL (ref 0–0.3)
IMM GRANULOCYTES NFR BLD: 1 %
LYMPHOCYTES NFR BLD: 3.6 K/UL (ref 1.1–3.7)
LYMPHOCYTES RELATIVE PERCENT: 25 % (ref 24–43)
MCH RBC QN AUTO: 33.9 PG (ref 25.2–33.5)
MCHC RBC AUTO-ENTMCNC: 33.3 G/DL (ref 28.4–34.8)
MCV RBC AUTO: 101.6 FL (ref 82.6–102.9)
MONOCYTES NFR BLD: 1.09 K/UL (ref 0.1–1.2)
MONOCYTES NFR BLD: 8 % (ref 3–12)
NEUTROPHILS NFR BLD: 61 % (ref 36–65)
NEUTS SEG NFR BLD: 9.02 K/UL (ref 1.5–8.1)
NRBC BLD-RTO: 0 PER 100 WBC
PLATELET # BLD AUTO: 546 K/UL (ref 138–453)
PMV BLD AUTO: 9.9 FL (ref 8.1–13.5)
POTASSIUM SERPL-SCNC: 3.6 MMOL/L (ref 3.7–5.3)
PROT SERPL-MCNC: 8.6 G/DL (ref 6.6–8.7)
RBC # BLD AUTO: 4.31 M/UL (ref 4.21–5.77)
SODIUM SERPL-SCNC: 140 MMOL/L (ref 136–145)
WBC OTHER # BLD: 14.5 K/UL (ref 3.5–11.3)

## 2024-08-04 PROCEDURE — 6370000000 HC RX 637 (ALT 250 FOR IP): Performed by: EMERGENCY MEDICINE

## 2024-08-04 PROCEDURE — 94761 N-INVAS EAR/PLS OXIMETRY MLT: CPT

## 2024-08-04 PROCEDURE — 2060000000 HC ICU INTERMEDIATE R&B

## 2024-08-04 PROCEDURE — 6370000000 HC RX 637 (ALT 250 FOR IP)

## 2024-08-04 PROCEDURE — 85025 COMPLETE CBC W/AUTO DIFF WBC: CPT

## 2024-08-04 PROCEDURE — 80053 COMPREHEN METABOLIC PANEL: CPT

## 2024-08-04 PROCEDURE — 99232 SBSQ HOSP IP/OBS MODERATE 35: CPT | Performed by: INTERNAL MEDICINE

## 2024-08-04 PROCEDURE — 36415 COLL VENOUS BLD VENIPUNCTURE: CPT

## 2024-08-04 PROCEDURE — 99232 SBSQ HOSP IP/OBS MODERATE 35: CPT | Performed by: STUDENT IN AN ORGANIZED HEALTH CARE EDUCATION/TRAINING PROGRAM

## 2024-08-04 PROCEDURE — 6360000002 HC RX W HCPCS: Performed by: INTERNAL MEDICINE

## 2024-08-04 PROCEDURE — 6360000002 HC RX W HCPCS

## 2024-08-04 PROCEDURE — 2700000000 HC OXYGEN THERAPY PER DAY

## 2024-08-04 PROCEDURE — 6370000000 HC RX 637 (ALT 250 FOR IP): Performed by: INTERNAL MEDICINE

## 2024-08-04 PROCEDURE — 94640 AIRWAY INHALATION TREATMENT: CPT

## 2024-08-04 PROCEDURE — 2580000003 HC RX 258

## 2024-08-04 RX ORDER — ARIPIPRAZOLE 5 MG/1
5 TABLET ORAL ONCE
Status: DISCONTINUED | OUTPATIENT
Start: 2024-08-04 | End: 2024-08-04

## 2024-08-04 RX ORDER — QUETIAPINE FUMARATE 25 MG/1
25 TABLET, FILM COATED ORAL 2 TIMES DAILY
Status: DISCONTINUED | OUTPATIENT
Start: 2024-08-04 | End: 2024-08-05 | Stop reason: HOSPADM

## 2024-08-04 RX ORDER — LANOLIN ALCOHOL/MO/W.PET/CERES
3 CREAM (GRAM) TOPICAL 2 TIMES DAILY PRN
Status: DISCONTINUED | OUTPATIENT
Start: 2024-08-04 | End: 2024-08-05 | Stop reason: HOSPADM

## 2024-08-04 RX ADMIN — LINEZOLID 600 MG: 600 INJECTION, SOLUTION INTRAVENOUS at 09:39

## 2024-08-04 RX ADMIN — QUETIAPINE FUMARATE 25 MG: 25 TABLET ORAL at 19:44

## 2024-08-04 RX ADMIN — ASPIRIN 81 MG 81 MG: 81 TABLET ORAL at 08:59

## 2024-08-04 RX ADMIN — CLONAZEPAM 2 MG: 1 TABLET ORAL at 19:58

## 2024-08-04 RX ADMIN — LABETALOL HYDROCHLORIDE 10 MG: 5 INJECTION, SOLUTION INTRAVENOUS at 03:07

## 2024-08-04 RX ADMIN — SODIUM CHLORIDE, PRESERVATIVE FREE 10 ML: 5 INJECTION INTRAVENOUS at 09:07

## 2024-08-04 RX ADMIN — POTASSIUM BICARBONATE 40 MEQ: 782 TABLET, EFFERVESCENT ORAL at 08:59

## 2024-08-04 RX ADMIN — SODIUM CHLORIDE, PRESERVATIVE FREE 10 ML: 5 INJECTION INTRAVENOUS at 19:58

## 2024-08-04 RX ADMIN — LEVETIRACETAM 500 MG: 500 SOLUTION ORAL at 19:44

## 2024-08-04 RX ADMIN — CLONAZEPAM 2 MG: 1 TABLET ORAL at 11:10

## 2024-08-04 RX ADMIN — AMLODIPINE BESYLATE 5 MG: 5 TABLET ORAL at 08:59

## 2024-08-04 RX ADMIN — CARVEDILOL 25 MG: 25 TABLET, FILM COATED ORAL at 09:00

## 2024-08-04 RX ADMIN — LINEZOLID 600 MG: 600 INJECTION, SOLUTION INTRAVENOUS at 20:03

## 2024-08-04 RX ADMIN — QUETIAPINE FUMARATE 25 MG: 25 TABLET ORAL at 11:04

## 2024-08-04 RX ADMIN — SPIRONOLACTONE 25 MG: 25 TABLET, FILM COATED ORAL at 17:25

## 2024-08-04 RX ADMIN — FOLIC ACID 1 MG: 1 TABLET ORAL at 08:59

## 2024-08-04 RX ADMIN — ANTI-FUNGAL POWDER MICONAZOLE NITRATE TALC FREE: 1.42 POWDER TOPICAL at 19:46

## 2024-08-04 RX ADMIN — IPRATROPIUM BROMIDE AND ALBUTEROL SULFATE 1 DOSE: 2.5; .5 SOLUTION RESPIRATORY (INHALATION) at 11:40

## 2024-08-04 RX ADMIN — CARVEDILOL 25 MG: 25 TABLET, FILM COATED ORAL at 17:25

## 2024-08-04 RX ADMIN — Medication 100 MG: at 08:59

## 2024-08-04 RX ADMIN — SPIRONOLACTONE 25 MG: 25 TABLET, FILM COATED ORAL at 09:00

## 2024-08-04 RX ADMIN — HEPARIN SODIUM 5000 UNITS: 5000 INJECTION INTRAVENOUS; SUBCUTANEOUS at 13:43

## 2024-08-04 RX ADMIN — LEVETIRACETAM 500 MG: 500 SOLUTION ORAL at 09:04

## 2024-08-04 RX ADMIN — Medication 3 MG: at 19:48

## 2024-08-04 RX ADMIN — IPRATROPIUM BROMIDE AND ALBUTEROL SULFATE 1 DOSE: 2.5; .5 SOLUTION RESPIRATORY (INHALATION) at 08:09

## 2024-08-04 RX ADMIN — PANTOPRAZOLE SODIUM 40 MG: 40 TABLET, DELAYED RELEASE ORAL at 08:59

## 2024-08-04 NOTE — PROGRESS NOTES
Patient noted to be more withdrawn while still mildly restless and irritable, related to wanting to work with PT/OT. Sister and patient also report that patient smokes about 10-11 cigarettes/day. Suggested asking MD for nicotine patch. Patient declines. Writer sent perfect serve to IM resident Dr Kasper for options to help with anxiety and restlessness in between doses of clonopin. Awaiting response.    1654: After conversation with IM resident, orders placed for PRN melatonin. Spoke with both patient and sister and educated them about medication timing and how may help in conjunction with other anxiety medications previously ordered. Both in agreement with medication. Patient's sister states that she will speak with patient's lady friend, to see if she can gather any additional information regarding patient's functional and mental status prior to admission.    Patient resting in bed with eyes closed. Agreeable to taking evening medications \"in about an hour\"  and getting back up into the chair to eat.

## 2024-08-04 NOTE — PROGRESS NOTES
[facility]  Internal Medicine Teaching Residency Program  Inpatient Daily Progress Note  ______________________________________________________________________________    Patient: John Rivas  YOB: 1975   MRN:[id number]    Acct: [id number]     Room: 2010/2010-01  Admit date: 7/27/2024  Today's date: 08/04/24  Number of days in the hospital: 8    SUBJECTIVE   CC: No chief complaint on file.    Pt examined at bedside. Chart & results reviewed.   -Patient  awake, alert, oriented X4.    ROS:  General ROS: Completed and except as mentioned above were negative   HEENT ROS: Completed and except as mentioned above were negative   Allergy and Immunology ROS:  Completed and except as mentioned above were negative  Hematological and Lymphatic ROS:  Completed and except as mentioned above were negative  Respiratory ROS:  Completed and except as mentioned above were negative  Cardiovascular ROS:  Completed and except as mentioned above were negative  Gastrointestinal ROS: Completed and except as mentioned above were negative  Genito-Urinary ROS:  Completed and except as mentioned above were negative  Musculoskeletal ROS:  Completed and except as mentioned above were negative  Neurological ROS:  Completed and except as mentioned above were negative  Skin & Dermatological ROS:  Completed and except as mentioned above were negative  Psychological ROS:  Completed and except as mentioned above were negative  BRIEF HISTORY     John Rivas is a 49 y.o. who presented initially to [facility] for management of acute alcohol intoxication.  Patient was in a [employer] when he was found by bystander to have seizure..  Squad was called and patient was taken to [facility].  Patient was actively drinking vodka before that seizure episode.  He was taken to [facility] ER and oxygen saturation was 93% on 2 L nasal cannula.  Serum ethanol level was 42%, potassium 2.9, sodium  Tube in the superior trachea.     MRI BRAIN W WO CONTRAST    Result Date: 7/29/2024  1.  No acute intracranial abnormality or finding to suggest etiology of seizures. 2. Mild chronic white matter microvascular ischemic changes.     XR CHEST PORTABLE    Result Date: 7/29/2024  Enteric tube in the stomach.  ET tube terminates 69 mm above the patricia. Possible right lower lobe infiltrate.     XR ABDOMEN (KUB) (SINGLE AP VIEW)    Result Date: 7/29/2024  No acute disease     XR CHEST PORTABLE    Result Date: 7/29/2024  Cardiomegaly with stable small left pleural effusion.  Findings suggest CHF. Patchy airspace opacities in both lungs are stable possibly representing atelectasis/pulmonary edema versus multifocal pneumonia.     CT CHEST WO CONTRAST    Result Date: 7/28/2024  1. Multifocal airspace consolidation consistent with a multilobar pneumonia. 2. Small right pleural effusion. 3. Cardiomegaly and severe coronary artery atherosclerosis with curvilinear calcification at the cardiac apex consistent with a prior infarct.     XR CHEST PORTABLE    Result Date: 7/27/2024  Mildly improved bibasilar opacification.  ETT and NG tube remain in place.       ASSESSMENT & PLAN     Principal Problem:    Acute hypoxemic respiratory failure (HCC)  Active Problems:    Acute respiratory failure with hypoxia (HCC)    Encephalopathy    MAIK (acute kidney injury) (HCC)    Essential hypertension    Hypervolemia    Pneumonia of both lower lobes due to methicillin resistant Staphylococcus aureus (MRSA) (HCC)    Seizure (HCC)    Toxic metabolic encephalopathy    Seizures (HCC)    Alcohol withdrawal syndrome, with delirium (HCC)    Alcohol abuse    Cocaine abuse (HCC)    Seizure-like activity (HCC)    Acute metabolic encephalopathy    Right arm weakness  Resolved Problems:    * No resolved hospital problems. *    Seizures/Altered mental status/Alcohol abuse/Polysubstance abuse  -Thiamine and folate daily    -Cont Keppra.  Neurology wants

## 2024-08-04 NOTE — PROGRESS NOTES
Writer messaged on call resident that patient is restless, keeps pulling things, cursing and yelling at writer. Patient  tried to get up multiple times and keeps on removing his oxygen but saturation is on 90's. Resident on call informed writer to give his 2am klonopin early. Kept monitored.

## 2024-08-04 NOTE — SIGNIFICANT EVENT
Writer called to room because of patient becoming increasingly agitated and restless. Patient had pulled off telemetry monitor, pulse ox and oxygen. Charge RN called patient's sister, Dena who stated the was on he way to hospital.   Writer asked patient what was wrong, patient stated that he was tired of sitting around in the bed and chair and that he had things to do. Writer attempted to redirect by reminding him that therapy still needs to work with him to get stronger and be able to walk on his own. Patient states that he is tired of waiting around for therapy. Writer reassured patient that I would do my best to get in touch with someone from therapy department to come work with him.  Patient was able to get up to chair with marco steady this morning and in his restlessness, standing with assistance, with notable unsteadiness after a few minutes. When writer and aide attempted to remind patient of observations he would get increasingly agitated and attempt to walk forward, stating he was going to wait for his sister down stairs. On assessment, patient is alert and oriented to person, place and time but disoriented to situation and reality, for example, when writer inquired when patient least walked, he stated that he walked on Friday and Saturday (on Friday stood with extensive 2 assist and needed maxisky to move to chair yesterday.)  Patient did allow writer to administer am meds except antifungal powder and IV antibiotic. Plan of care ongoing

## 2024-08-04 NOTE — PROGRESS NOTES
Patient's sister, Dena at bedside. Patient still agitated and restless but more calm and agreeable to some interventions including administration of IV antibiotics and spot check SPO2. Currently 92% on room air. Message sent to IM resident on call, Dr Rodas to come bedside and evaluate patient. Orders placed for IM Zyprexa. Writer requested MD to come assess patient and his current mentation prior to administration to determine need for additional interventions. Patient's sister also has questions regarding medication due to concern for oversedation on admission. Dr Rodas states enroute to evaluate.

## 2024-08-04 NOTE — PROGRESS NOTES
[facility]  Progress Note    John Rivas  MRN: [id number]  Date: 8/4/2024  LOS: 8     Reason for F/U :   Pneumonia    Impression :   Acute respiratory failure with hypoxia,   intubated   Extubated 8-2-24  Bilateral  LL MRSA pneumonia   BAL 7/24/2026  Fevers -likely related to above  Polysubstance abuse with acute alcohol intoxication  Possible seizure   EEG with significant encephalopathy  Chronic kidney disease stage III    Summary:   MRSA bilat LL  pneumonia transferred form [facility] to [facility] due to desaturation and concern for mucous plugs  CT chest 7/28 improved infiltrates if any  FIo2 90% then 60 - 50 slowly better  Fever 7/27 then resolved  Plug stuck on the tip of the ETT leading to the high FIO2  Extubated 8/2 - on 4 NC    Recommendations:       On zyvox . Stop date 8-8-24  Course extended due to persistent bandemia, thrombocytosis and severe  pneumonia   CT 7/28 shows  no loculations and no pneumatoceles  heparin for possible pulmonary embolism      Previous antibiotics:  Piperacillin/tazobactam 7/15/2024 through 7/24/2024 [ #10 days of therapy]  Meropenem for 1 day 7/24/2024  Vanco stopped 7/27      Infection Control Recommendations:   Contact precautions        Medical Decision making / Summary of Stay:   John Rivas is a 49 y.o.-year-old male who was initially admitted on 7/15/2024.      John is seen in consultation on 7/24/2024     He was admitted 7/15/2024 due to concerns for alcohol intoxication and he was found on the side of a fast food restaurant and EMS was called.  The patient admitted to drinking 1/5 of alcohol was verbally abusive and aggressive in the emergency department and the patient was intubated in the emergency department for airway protection     Alcohol level was at 423.43 with a sodium 133 potassium 2.9 total CPK of 330 and he was also positive on the urine drug screen for cocaine and cannabinoids.  LFTs were abnormal with an ALT of 56 AST of 123 and arterial blood

## 2024-08-04 NOTE — PROGRESS NOTES
Patient awake from sleeping. Still slightly restless and impulsive but less agitated. Assisted patient set up with lunch. Patient much improved since yesterday with feeding self and noted to have increased strength/mobility in right hand. Patient needed assistance with toileting. Wanted to use urinal independently. Reinforced activity limitations, especially since therapy had not worked with him since Friday. Patient agreeable to allowing writer and another RN to assist patient in standing while holding urinal at bedside. Patient able to stand with support from staff x2 and hold urinal to void. Patient noted to become unsteady after about 1 minute. Reinforced progress in mobility since yesterday shift and encouraged to continue with current progress/milestones with staff assistance until able to progress further with therapy. Patient agreeable. Assisted to chair with staff x2, stand pivot from bed. Chair alarm in place and functioning. Call light within reach, sister remains at bedside. Plan of care ongoing.

## 2024-08-05 VITALS
BODY MASS INDEX: 27.35 KG/M2 | RESPIRATION RATE: 17 BRPM | HEIGHT: 71 IN | WEIGHT: 195.33 LBS | OXYGEN SATURATION: 100 % | TEMPERATURE: 98.4 F | DIASTOLIC BLOOD PRESSURE: 85 MMHG | SYSTOLIC BLOOD PRESSURE: 131 MMHG | HEART RATE: 72 BPM

## 2024-08-05 PROBLEM — J96.01 ACUTE HYPOXEMIC RESPIRATORY FAILURE (HCC): Status: RESOLVED | Noted: 2024-07-27 | Resolved: 2024-08-05

## 2024-08-05 PROBLEM — J96.01 ACUTE RESPIRATORY FAILURE WITH HYPOXIA (HCC): Status: RESOLVED | Noted: 2024-07-15 | Resolved: 2024-08-05

## 2024-08-05 PROBLEM — R56.9 SEIZURES (HCC): Status: RESOLVED | Noted: 2024-07-29 | Resolved: 2024-08-05

## 2024-08-05 PROBLEM — E87.70 HYPERVOLEMIA: Status: RESOLVED | Noted: 2024-07-28 | Resolved: 2024-08-05

## 2024-08-05 PROBLEM — G92.8 TOXIC METABOLIC ENCEPHALOPATHY: Status: RESOLVED | Noted: 2024-07-29 | Resolved: 2024-08-05

## 2024-08-05 PROBLEM — G93.41 ACUTE METABOLIC ENCEPHALOPATHY: Status: RESOLVED | Noted: 2024-07-30 | Resolved: 2024-08-05

## 2024-08-05 PROCEDURE — 94761 N-INVAS EAR/PLS OXIMETRY MLT: CPT

## 2024-08-05 PROCEDURE — 97116 GAIT TRAINING THERAPY: CPT

## 2024-08-05 PROCEDURE — 2580000003 HC RX 258

## 2024-08-05 PROCEDURE — 6370000000 HC RX 637 (ALT 250 FOR IP): Performed by: INTERNAL MEDICINE

## 2024-08-05 PROCEDURE — 6370000000 HC RX 637 (ALT 250 FOR IP)

## 2024-08-05 PROCEDURE — 97130 THER IVNTJ EA ADDL 15 MIN: CPT

## 2024-08-05 PROCEDURE — 99233 SBSQ HOSP IP/OBS HIGH 50: CPT | Performed by: INTERNAL MEDICINE

## 2024-08-05 PROCEDURE — 6360000002 HC RX W HCPCS

## 2024-08-05 PROCEDURE — 6360000002 HC RX W HCPCS: Performed by: INTERNAL MEDICINE

## 2024-08-05 PROCEDURE — 97535 SELF CARE MNGMENT TRAINING: CPT

## 2024-08-05 PROCEDURE — 97110 THERAPEUTIC EXERCISES: CPT

## 2024-08-05 PROCEDURE — 2500000003 HC RX 250 WO HCPCS

## 2024-08-05 PROCEDURE — 6370000000 HC RX 637 (ALT 250 FOR IP): Performed by: EMERGENCY MEDICINE

## 2024-08-05 PROCEDURE — 97129 THER IVNTJ 1ST 15 MIN: CPT

## 2024-08-05 PROCEDURE — 2700000000 HC OXYGEN THERAPY PER DAY

## 2024-08-05 PROCEDURE — 94640 AIRWAY INHALATION TREATMENT: CPT

## 2024-08-05 RX ORDER — ALBUTEROL SULFATE 2.5 MG/3ML
2.5 SOLUTION RESPIRATORY (INHALATION) EVERY 4 HOURS PRN
Status: DISCONTINUED | OUTPATIENT
Start: 2024-08-05 | End: 2024-08-05 | Stop reason: HOSPADM

## 2024-08-05 RX ORDER — IPRATROPIUM BROMIDE AND ALBUTEROL SULFATE 2.5; .5 MG/3ML; MG/3ML
1 SOLUTION RESPIRATORY (INHALATION) 2 TIMES DAILY
Status: DISCONTINUED | OUTPATIENT
Start: 2024-08-05 | End: 2024-08-05 | Stop reason: HOSPADM

## 2024-08-05 RX ORDER — CLONAZEPAM 1 MG/1
1 TABLET ORAL EVERY 12 HOURS
Status: DISCONTINUED | OUTPATIENT
Start: 2024-08-05 | End: 2024-08-05 | Stop reason: HOSPADM

## 2024-08-05 RX ORDER — AMLODIPINE BESYLATE 10 MG/1
10 TABLET ORAL DAILY
Status: DISCONTINUED | OUTPATIENT
Start: 2024-08-06 | End: 2024-08-05 | Stop reason: HOSPADM

## 2024-08-05 RX ADMIN — PANTOPRAZOLE SODIUM 40 MG: 40 TABLET, DELAYED RELEASE ORAL at 06:06

## 2024-08-05 RX ADMIN — SODIUM CHLORIDE, PRESERVATIVE FREE 10 ML: 5 INJECTION INTRAVENOUS at 08:34

## 2024-08-05 RX ADMIN — CARVEDILOL 25 MG: 25 TABLET, FILM COATED ORAL at 08:28

## 2024-08-05 RX ADMIN — LEVETIRACETAM 500 MG: 500 SOLUTION ORAL at 08:28

## 2024-08-05 RX ADMIN — HEPARIN SODIUM 5000 UNITS: 5000 INJECTION INTRAVENOUS; SUBCUTANEOUS at 06:06

## 2024-08-05 RX ADMIN — CLONAZEPAM 1 MG: 1 TABLET ORAL at 11:31

## 2024-08-05 RX ADMIN — Medication 100 MG: at 08:29

## 2024-08-05 RX ADMIN — SPIRONOLACTONE 25 MG: 25 TABLET, FILM COATED ORAL at 08:29

## 2024-08-05 RX ADMIN — TORSEMIDE 20 MG: 20 TABLET ORAL at 08:39

## 2024-08-05 RX ADMIN — LINEZOLID 600 MG: 600 INJECTION, SOLUTION INTRAVENOUS at 08:37

## 2024-08-05 RX ADMIN — ASPIRIN 81 MG 81 MG: 81 TABLET ORAL at 08:28

## 2024-08-05 RX ADMIN — QUETIAPINE FUMARATE 25 MG: 25 TABLET ORAL at 08:29

## 2024-08-05 RX ADMIN — AMLODIPINE BESYLATE 5 MG: 5 TABLET ORAL at 08:29

## 2024-08-05 RX ADMIN — IPRATROPIUM BROMIDE AND ALBUTEROL SULFATE 1 DOSE: 2.5; .5 SOLUTION RESPIRATORY (INHALATION) at 07:55

## 2024-08-05 RX ADMIN — FOLIC ACID 1 MG: 1 TABLET ORAL at 08:28

## 2024-08-05 NOTE — PROGRESS NOTES
Speech Language Pathology  [facility]    Cognitive Treatment Note    Date: 2024  Patient’s Name: John Rivas  MRN: [id number]  Patient Active Problem List   Diagnosis Code    Acute alcoholic intoxication with complication ([facility]) F10.929    Alcohol intoxication in alcoholism with blood level over 0.3 with delirium ([facility]) F10.221    Aspiration pneumonia of both upper lobes due to vomit ([facility]) J69.0    On mechanically assisted ventilation ([facility]) Z99.11    Encephalopathy G93.40    MAIK (acute kidney injury) ([facility]) N17.9    Seizure due to alcohol withdrawal, with unspecified complication ([facility]) F10.939, R56.9    Essential hypertension I10    Acute systolic CHF (congestive heart failure) ([facility]) I50.21    Pneumonia of both lower lobes due to methicillin resistant Staphylococcus aureus (MRSA) ([facility]) J15.212    Seizure ([facility]) R56.9    Alcohol withdrawal syndrome, with delirium ([facility]) F10.931    Alcohol abuse F10.10    Cocaine abuse ([facility]) F14.10    Seizure-like activity ([facility]) R56.9    Right arm weakness R29.898       Pain: 0/10    Cognitive Treatment    Treatment time: 5563-4541      Subjective: [x] Alert [x] Cooperative     [] Confused     [] Agitated    [] Lethargic      Objective/Assessment:    Recall: -Delayed recall of 3 units related: 2/3, did not increased with max verbal cues; 1/3 increased to 3/3 with min verbal cues    -Word list Retention:  increased to  with repetition     Pt. Provided with education regarding memory compensatory strategies. Pt. Encouraged to utilize strategies once discharged from the hospital. Pt. Verbalized understanding.  Tip sheep left at bedside.    Problem Solving/Reasoning: -Determining if statements are T/F: 14/15    -Multiple uses for objects:  increased to  with choice of 2     -Deductive Reasonin/15 increased to 15/15 with min-max verbal cues    Plan:  [x] Continue ST services    [] Discharge from ST:      Discharge recommendations: [x]  Further  therapy recommended at discharge.The patient should be able to tolerate at least 3 hours of therapy per day over 5 days or 15 hours over 7 days. [] Further therapy recommended at discharge.   [] No therapy recommended at discharge.      Treatment completed by: JUAN ANTONIO PRESTON, SLP, M.A. MECHE-SLP

## 2024-08-05 NOTE — PROGRESS NOTES
Physical Therapy  Facility/Department: [facility] CAR 2- STEPDOWN  Physical Therapy Daily Treatment Note    Name: John Rivas  : 1975  MRN: [id number]  Date of Service: 2024    Discharge Recommendations:  Patient would benefit from continued therapy after discharge   PT Equipment Recommendations  Equipment Needed: Yes  Mobility Devices: Walker  Walker: Rolling      Patient Diagnosis(es): There were no encounter diagnoses.  Past Medical History:  has a past medical history of Alcohol abuse, CAD (coronary artery disease), Essential hypertension, and Homeless single person.  Past Surgical History:  has a past surgical history that includes Coronary angioplasty with stent.    Assessment   Body Structures, Functions, Activity Limitations Requiring Skilled Therapeutic Intervention: Decreased functional mobility ;Decreased strength;Decreased safe awareness;Decreased endurance;Decreased balance;Decreased high-level IADLs  Assessment: Pt ambulated 300ft with RW and CGA, overall slow and steady with no true LOB. Pt most limited by decreased balance and endurance, recommending continued PT to address deficits and assist in return to prior level of independence with mobility.  Therapy Prognosis: Good  Requires PT Follow-Up: Yes  Activity Tolerance  Activity Tolerance: Patient limited by endurance;Patient tolerated treatment well     Plan   Physical Therapy Plan  General Plan:  (5-6x/week)  Current Treatment Recommendations: Strengthening, Balance training, Functional mobility training, Transfer training, Endurance training, Gait training, Therapeutic activities, Safety education & training, Patient/Caregiver education & training  Safety Devices  Type of Devices: Gait belt, Call light within reach, Left in chair, Chair alarm in place, Nurse notified  Restraints  Restraints Initially in Place: No     Restrictions  Restrictions/Precautions  Restrictions/Precautions: Up as Tolerated, Fall Risk  Required Braces or Orthoses?:

## 2024-08-05 NOTE — CARE COORDINATION
Met with the patient to discuss transitioning to inpatient rehab for continued therapy. Patient states he is not able to go to therapy as he need to return to work. Writer question his ability to work while using a walker, patient states he can.    1919 Donated walker provided to the patient.    120 PerfectServe message Dr Pandey. Questioned if the patient will be discharged today? Informed the patient has been provided a walker. Informed the patient does not want to go to [facility].

## 2024-08-05 NOTE — PROGRESS NOTES
[facility]  Internal Medicine Teaching Residency Program  Inpatient Daily Progress Note  ______________________________________________________________________________    Patient: John Rivas  YOB: 1975   MRN:[id number]    Acct: [id number]     Room: 2010/2010-01  Admit date: 7/27/2024  Today's date: 08/05/24  Number of days in the hospital: 9    SUBJECTIVE   CC: Seizure/ Alcohol withdrawl    Pt examined at bedside. Chart & results reviewed.   -VSS, pt is saturating well   -labs reviewed   -no acute events overnight.       ROS:  General ROS: Completed and except as mentioned above were negative   HEENT ROS: Completed and except as mentioned above were negative   Allergy and Immunology ROS:  Completed and except as mentioned above were negative  Hematological and Lymphatic ROS:  Completed and except as mentioned above were negative  Respiratory ROS:  Completed and except as mentioned above were negative  Cardiovascular ROS:  Completed and except as mentioned above were negative  Gastrointestinal ROS: Completed and except as mentioned above were negative  Genito-Urinary ROS:  Completed and except as mentioned above were negative  Musculoskeletal ROS:  Completed and except as mentioned above were negative  Neurological ROS:  Completed and except as mentioned above were negative  Skin & Dermatological ROS:  Completed and except as mentioned above were negative  Psychological ROS:  Completed and except as mentioned above were negative  BRIEF HISTORY     John Rivas is a 49 y.o. who presented initially to [facility] for management of acute alcohol intoxication.  Patient was in a [employer] when he was found by bystander to have seizure..  Squad was called and patient was taken to [facility].  Patient was actively drinking vodka before that seizure episode.  He was taken to [facility] ER and oxygen saturation was 93% on 2 L nasal cannula.  Serum

## 2024-08-05 NOTE — PROGRESS NOTES
Occupational Therapy  Facility/Department: [facility] CAR 2- STEPDOWN   Daily Treatment Note  Patient Name: John Rivas        MRN: [id number]    : 1975    Date of Service: 2024    Discharge Recommendations  Discharge Recommendations: Patient would benefit from continued therapy after discharge    Assessment  Performance deficits / Impairments: Decreased functional mobility ;Decreased ADL status;Decreased ROM;Decreased strength;Decreased endurance;Decreased balance;Decreased high-level IADLs;Decreased safe awareness  Assessment: Pt would benefit from continued acute care and post acute care OT to address above listed deficits.  Prognosis: Good  Activity Tolerance  Activity Tolerance: Patient Tolerated treatment well;Treatment limited secondary to decreased cognition;Patient limited by fatigue  Safety Devices  Type of Devices: Call light within reach;Left in chair;Chair alarm in place;Nurse notified;Patient at risk for falls  Restraints  Restraints Initially in Place: No    Restrictions/Precautions  Restrictions/Precautions  Restrictions/Precautions: Up as Tolerated;Fall Risk  Required Braces or Orthoses?: No  Position Activity Restriction  Other position/activity restrictions: Up as tolerated    Subjective  General  Patient assessed for rehabilitation services?: Yes    Objective  Orientation  Overall Orientation Status: Within Functional Limits  Orientation Level: Oriented X4  Cognition  Overall Cognitive Status: WFL    Activities of Daily Living  Feeding: Independent  Grooming: Stand by assistance;Setup;Verbal cueing;Increased time to complete (stood at sink to wash face/head, brush hair/beard, complete oral care)  UE Bathing: Stand by assistance;Setup;Verbal cueing;Increased time to complete (able to complete UB bathing)  LE Bathing: Stand by assistance;Setup;Verbal cueing;Increased time to complete (able to reach LB and feet for bathing)  UE Dressing: Stand by assistance;Setup;Verbal cueing;Increased time to

## 2024-08-05 NOTE — PROGRESS NOTES
Writer called to bedside due to patient refusing to stay in bed and wear telemetry/ pulse ox. Patient was redirected to sit in wheelchair with sister at the bedside. Writer notified oncall resident regarding situation, writer received a verbal order to give evening dose of klonopin early, with the rest of even medications with the hopes of patient settling down for the evening. Will continue to monitor and reattempt to get patient to bed and on monitor.  DENNIS Menon RN

## 2024-08-05 NOTE — PROGRESS NOTES
Patient left AMA accompanied by his sister, Dena. Form signed by patient. IV and telemetry removed. MD notified.

## 2024-08-05 NOTE — PLAN OF CARE
Problem: Respiratory - Adult  Goal: Achieves optimal ventilation and oxygenation  7/31/2024 2054 by Jordana Ramirez RCP  Outcome: Progressing   BRONCHOSPASM/BRONCHOCONSTRICTION     [x]         IMPROVE AERATION/BREATH SOUNDS  [x]   ADMINISTER BRONCHODILATOR THERAPY AS APPROPRIATE  [x]   ASSESS BREATH SOUNDS  [x]   IMPLEMENT AEROSOL/MDI PROTOCOL  [x]   PATIENT EDUCATION AS NEEDED  Problem: OXYGENATION/RESPIRATORY FUNCTION  Goal: Patient will maintain patent airway  Outcome: Ongoing  Goal: Patient will achieve/maintain normal respiratory rate/effort  Respiratory rate and effort will be within normal limits for the patient  Outcome: Ongoing    Problem: MECHANICAL VENTILATION  Goal: Patient will maintain patent airway  Outcome: Ongoing  Goal: Oral health is maintained or improved  Outcome: Ongoing  Goal: ET tube will be managed safely  Outcome: Ongoing  Goal: Ability to express needs and understand communication  Outcome: Ongoing  Goal: Mobility/activity is maintained at optimum level for patient  Outcome: Ongoing    Problem: ASPIRATION PRECAUTIONS  Goal: Patient’s risk of aspiration is minimized  Outcome: Ongoing    Problem: SKIN INTEGRITY  Goal: Skin integrity is maintained or improved  Outcome: Ongoing                    
  Problem: Chronic Conditions and Co-morbidities  Goal: Patient's chronic conditions and co-morbidity symptoms are monitored and maintained or improved  7/28/2024 2113 by Korin Robert RN  Outcome: Progressing  7/28/2024 0812 by Alexandra Sutton RN  Outcome: Progressing     Problem: Discharge Planning  Goal: Discharge to home or other facility with appropriate resources  7/28/2024 2113 by Korin Robert RN  Outcome: Progressing  7/28/2024 0812 by Alexandra Sutton RN  Outcome: Progressing     Problem: Safety - Adult  Goal: Free from fall injury  7/28/2024 2113 by Korin Robert RN  Outcome: Progressing  Flowsheets (Taken 7/28/2024 2111)  Free From Fall Injury: Instruct family/caregiver on patient safety  7/28/2024 0812 by Alexandra Sutton RN  Outcome: Progressing     Problem: Respiratory - Adult  Goal: Achieves optimal ventilation and oxygenation  7/28/2024 2113 by Korin Robert RN  Outcome: Progressing  7/28/2024 2002 by Purvi Mccoy RCP  Outcome: Progressing  7/28/2024 0812 by Alexandra Sutton RN  Outcome: Progressing  Flowsheets (Taken 7/28/2024 0749 by Ange Acevedo ANUJA)  Achieves optimal ventilation and oxygenation:   Assess for changes in respiratory status   Assess for changes in mentation and behavior   Oxygen supplementation based on oxygen saturation or arterial blood gases   Position to facilitate oxygenation and minimize respiratory effort   Respiratory therapy support as indicated   Assess and instruct to report shortness of breath or any respiratory difficulty   Assess the need for suctioning and aspirate as needed   Encourage broncho-pulmonary hygiene including cough, deep breathe, incentive spirometry     Problem: Musculoskeletal - Adult  Goal: Return mobility to safest level of function  7/28/2024 2113 by Korin Robert RN  Outcome: Progressing  7/28/2024 0812 by Alexandra Sutton RN  Outcome: Progressing     Problem: Pain  Goal: Verbalizes/displays adequate comfort level or 
  Problem: Chronic Conditions and Co-morbidities  Goal: Patient's chronic conditions and co-morbidity symptoms are monitored and maintained or improved  8/1/2024 2149 by Opal Starks RN  Outcome: Progressing     Problem: Discharge Planning  Goal: Discharge to home or other facility with appropriate resources  8/1/2024 2149 by Opal Starks RN  Outcome: Progressing     Problem: Safety - Adult  Goal: Free from fall injury  8/1/2024 2149 by Opal Starks RN  Outcome: Progressing     Problem: Respiratory - Adult  Goal: Achieves optimal ventilation and oxygenation  8/1/2024 2149 by Opal Starks RN  Outcome: Progressing     Problem: Musculoskeletal - Adult  Goal: Return mobility to safest level of function  8/1/2024 2149 by Opal Starks RN  Outcome: Progressing     Problem: Pain  Goal: Verbalizes/displays adequate comfort level or baseline comfort level  8/1/2024 2149 by Opal Starks RN  Outcome: Progressing     Problem: Confusion  Goal: Confusion, delirium, dementia, or psychosis is improved or at baseline  Description: INTERVENTIONS:  1. Assess for possible contributors to thought disturbance, including medications, impaired vision or hearing, underlying metabolic abnormalities, dehydration, psychiatric diagnoses, and notify attending LIP  2. [place] high risk fall precautions, as indicated  3. Provide frequent short contacts to provide reality reorientation, refocusing and direction  4. Decrease environmental stimuli, including noise as appropriate  5. Monitor and intervene to maintain adequate nutrition, hydration, elimination, sleep and activity  6. If unable to ensure safety without constant attention obtain sitter and review sitter guidelines with assigned personnel  7. Initiate Psychosocial CNS and Spiritual Care consult, as indicated  8/1/2024 2149 by Opal Starks RN  Outcome: Progressing     Problem: Skin/Tissue Integrity  Goal: Absence of new skin breakdown  Description: 1.  Monitor for areas of 
  Problem: Chronic Conditions and Co-morbidities  Goal: Patient's chronic conditions and co-morbidity symptoms are monitored and maintained or improved  8/2/2024 2125 by Liset Velazquez RN  Outcome: Progressing  8/2/2024 1514 by Savannah Ivan RN  Outcome: Progressing     Problem: Discharge Planning  Goal: Discharge to home or other facility with appropriate resources  8/2/2024 2125 by Liset Velazquez RN  Outcome: Progressing  8/2/2024 1514 by Savannah Ivan RN  Outcome: Progressing     Problem: Safety - Adult  Goal: Free from fall injury  8/2/2024 2125 by Liset Velazquez RN  Outcome: Progressing  8/2/2024 1514 by Savannah Ivan RN  Outcome: Progressing     Problem: Respiratory - Adult  Goal: Achieves optimal ventilation and oxygenation  8/2/2024 2125 by Liset Velazquez RN  Outcome: Progressing  8/2/2024 2042 by Ivanna Richards RCP  Flowsheets (Taken 8/2/2024 2042)  Achieves optimal ventilation and oxygenation:   Assess for changes in respiratory status   Assess for changes in mentation and behavior   Position to facilitate oxygenation and minimize respiratory effort   Oxygen supplementation based on oxygen saturation or arterial blood gases   Initiate smoking cessation protocol as indicated   Encourage broncho-pulmonary hygiene including cough, deep breathe, incentive spirometry   Assess the need for suctioning and aspirate as needed   Assess and instruct to report shortness of breath or any respiratory difficulty   Respiratory therapy support as indicated  8/2/2024 1514 by Savannah Ivan RN  Outcome: Progressing  8/2/2024 0846 by Matthew Aguilar RCP  Outcome: Progressing     Problem: Musculoskeletal - Adult  Goal: Return mobility to safest level of function  8/2/2024 2125 by Liset Velazquez RN  Outcome: Progressing  8/2/2024 1514 by Savannah Ivan RN  Outcome: Progressing     Problem: Pain  Goal: Verbalizes/displays adequate comfort level or baseline comfort level  8/2/2024 2125 by Liset Velazquez 
  Problem: Chronic Conditions and Co-morbidities  Goal: Patient's chronic conditions and co-morbidity symptoms are monitored and maintained or improved  8/3/2024 0946 by Korin Espinal RN  Outcome: Progressing  Flowsheets (Taken 8/3/2024 0823)  Care Plan - Patient's Chronic Conditions and Co-Morbidity Symptoms are Monitored and Maintained or Improved:   Monitor and assess patient's chronic conditions and comorbid symptoms for stability, deterioration, or improvement   Collaborate with multidisciplinary team to address chronic and comorbid conditions and prevent exacerbation or deterioration   Update acute care plan with appropriate goals if chronic or comorbid symptoms are exacerbated and prevent overall improvement and discharge  8/2/2024 2125 by Liset Velazquez RN  Outcome: Progressing     Problem: Discharge Planning  Goal: Discharge to home or other facility with appropriate resources  8/3/2024 0946 by Korin Espinal RN  Outcome: Progressing  Flowsheets (Taken 8/3/2024 0823)  Discharge to home or other facility with appropriate resources: Identify barriers to discharge with patient and caregiver  8/2/2024 2125 by Liset Velazquez RN  Outcome: Progressing     Problem: Safety - Adult  Goal: Free from fall injury  8/3/2024 0946 by Korin Espinal RN  Outcome: Progressing  8/2/2024 2125 by Liset Velazquez RN  Outcome: Progressing     Problem: Respiratory - Adult  Goal: Achieves optimal ventilation and oxygenation  8/3/2024 0946 by Korin Espinal RN  Outcome: Progressing  Flowsheets (Taken 8/3/2024 0823)  Achieves optimal ventilation and oxygenation:   Assess for changes in respiratory status   Assess for changes in mentation and behavior   Position to facilitate oxygenation and minimize respiratory effort  8/2/2024 2125 by Liset Velazquez RN  Outcome: Progressing  8/2/2024 2042 by Ivanna Richards RCP  Flowsheets (Taken 8/2/2024 2042)  Achieves optimal ventilation and oxygenation:   Assess for changes in 
  Problem: Chronic Conditions and Co-morbidities  Goal: Patient's chronic conditions and co-morbidity symptoms are monitored and maintained or improved  8/3/2024 2235 by Sheron Payne RN  Outcome: Progressing  8/3/2024 0946 by Korin Espinal RN  Outcome: Progressing  Flowsheets (Taken 8/3/2024 0823)  Care Plan - Patient's Chronic Conditions and Co-Morbidity Symptoms are Monitored and Maintained or Improved:   Monitor and assess patient's chronic conditions and comorbid symptoms for stability, deterioration, or improvement   Collaborate with multidisciplinary team to address chronic and comorbid conditions and prevent exacerbation or deterioration   Update acute care plan with appropriate goals if chronic or comorbid symptoms are exacerbated and prevent overall improvement and discharge     Problem: Discharge Planning  Goal: Discharge to home or other facility with appropriate resources  8/3/2024 2235 by Sheron Payne RN  Outcome: Progressing  8/3/2024 0946 by Korin Espinal RN  Outcome: Progressing  Flowsheets (Taken 8/3/2024 0823)  Discharge to home or other facility with appropriate resources: Identify barriers to discharge with patient and caregiver     Problem: Safety - Adult  Goal: Free from fall injury  8/3/2024 2235 by Sehron Payne RN  Outcome: Progressing  8/3/2024 0946 by Korin Espinal RN  Outcome: Progressing     Problem: Respiratory - Adult  Goal: Achieves optimal ventilation and oxygenation  8/3/2024 2235 by Sheron Payne RN  Outcome: Progressing  8/3/2024 1953 by Leola Lopez RCP  Outcome: Progressing  8/3/2024 0946 by Korin Espinal RN  Outcome: Progressing  Flowsheets (Taken 8/3/2024 0823)  Achieves optimal ventilation and oxygenation:   Assess for changes in respiratory status   Assess for changes in mentation and behavior   Position to facilitate oxygenation and minimize respiratory effort     Problem: Musculoskeletal - Adult  Goal: Return mobility to safest level of 
  Problem: Chronic Conditions and Co-morbidities  Goal: Patient's chronic conditions and co-morbidity symptoms are monitored and maintained or improved  Outcome: Progressing     Problem: Discharge Planning  Goal: Discharge to home or other facility with appropriate resources  Outcome: Progressing     Problem: Safety - Adult  Goal: Free from fall injury  Outcome: Progressing     Problem: Respiratory - Adult  Goal: Achieves optimal ventilation and oxygenation  8/2/2024 1514 by Savannah Ivan RN  Outcome: Progressing  8/2/2024 0846 by Matthew Aguilar RCP  Outcome: Progressing     Problem: Musculoskeletal - Adult  Goal: Return mobility to safest level of function  Outcome: Progressing     Problem: Pain  Goal: Verbalizes/displays adequate comfort level or baseline comfort level  Outcome: Progressing     Problem: Confusion  Goal: Confusion, delirium, dementia, or psychosis is improved or at baseline  Description: INTERVENTIONS:  1. Assess for possible contributors to thought disturbance, including medications, impaired vision or hearing, underlying metabolic abnormalities, dehydration, psychiatric diagnoses, and notify attending LIP  2. [place] high risk fall precautions, as indicated  3. Provide frequent short contacts to provide reality reorientation, refocusing and direction  4. Decrease environmental stimuli, including noise as appropriate  5. Monitor and intervene to maintain adequate nutrition, hydration, elimination, sleep and activity  6. If unable to ensure safety without constant attention obtain sitter and review sitter guidelines with assigned personnel  7. Initiate Psychosocial CNS and Spiritual Care consult, as indicated  Outcome: Progressing     Problem: Skin/Tissue Integrity  Goal: Absence of new skin breakdown  Description: 1.  Monitor for areas of redness and/or skin breakdown  2.  Assess vascular access sites hourly  3.  Every 4-6 hours minimum:  Change oxygen saturation probe site  4.  Every 4-6 
  Problem: Chronic Conditions and Co-morbidities  Goal: Patient's chronic conditions and co-morbidity symptoms are monitored and maintained or improved  Outcome: Progressing     Problem: Discharge Planning  Goal: Discharge to home or other facility with appropriate resources  Outcome: Progressing     Problem: Safety - Adult  Goal: Free from fall injury  Outcome: Progressing     Problem: Respiratory - Adult  Goal: Achieves optimal ventilation and oxygenation  Outcome: Progressing     Problem: Musculoskeletal - Adult  Goal: Return mobility to safest level of function  Outcome: Progressing     Problem: Pain  Goal: Verbalizes/displays adequate comfort level or baseline comfort level  Outcome: Progressing     Problem: Confusion  Goal: Confusion, delirium, dementia, or psychosis is improved or at baseline  Description: INTERVENTIONS:  1. Assess for possible contributors to thought disturbance, including medications, impaired vision or hearing, underlying metabolic abnormalities, dehydration, psychiatric diagnoses, and notify attending LIP  2. [place] high risk fall precautions, as indicated  3. Provide frequent short contacts to provide reality reorientation, refocusing and direction  4. Decrease environmental stimuli, including noise as appropriate  5. Monitor and intervene to maintain adequate nutrition, hydration, elimination, sleep and activity  6. If unable to ensure safety without constant attention obtain sitter and review sitter guidelines with assigned personnel  7. Initiate Psychosocial CNS and Spiritual Care consult, as indicated  Outcome: Progressing     Problem: Skin/Tissue Integrity  Goal: Absence of new skin breakdown  Description: 1.  Monitor for areas of redness and/or skin breakdown  2.  Assess vascular access sites hourly  3.  Every 4-6 hours minimum:  Change oxygen saturation probe site  4.  Every 4-6 hours:  If on nasal continuous positive airway pressure, respiratory therapy assess nares and 
  Problem: Chronic Conditions and Co-morbidities  Goal: Patient's chronic conditions and co-morbidity symptoms are monitored and maintained or improved  Outcome: Progressing     Problem: Discharge Planning  Goal: Discharge to home or other facility with appropriate resources  Outcome: Progressing     Problem: Safety - Adult  Goal: Free from fall injury  Outcome: Progressing     Problem: Respiratory - Adult  Goal: Achieves optimal ventilation and oxygenation  Outcome: Progressing     Problem: Skin/Tissue Integrity  Goal: Absence of new skin breakdown  Description: 1.  Monitor for areas of redness and/or skin breakdown  2.  Assess vascular access sites hourly  3.  Every 4-6 hours minimum:  Change oxygen saturation probe site  4.  Every 4-6 hours:  If on nasal continuous positive airway pressure, respiratory therapy assess nares and determine need for appliance change or resting period.  Outcome: Progressing     
  Problem: Chronic Conditions and Co-morbidities  Goal: Patient's chronic conditions and co-morbidity symptoms are monitored and maintained or improved  Outcome: Progressing     Problem: Discharge Planning  Goal: Discharge to home or other facility with appropriate resources  Outcome: Progressing     Problem: Safety - Adult  Goal: Free from fall injury  Outcome: Progressing  Flowsheets (Taken 7/27/2024 2236)  Free From Fall Injury: Instruct family/caregiver on patient safety     Problem: Respiratory - Adult  Goal: Achieves optimal ventilation and oxygenation  Outcome: Progressing  Flowsheets (Taken 7/27/2024 1810 by Precious Alves RCP)  Achieves optimal ventilation and oxygenation:   Assess for changes in respiratory status   Assess for changes in mentation and behavior   Position to facilitate oxygenation and minimize respiratory effort   Oxygen supplementation based on oxygen saturation or arterial blood gases   Assess the need for suctioning and aspirate as needed   Respiratory therapy support as indicated     Problem: Musculoskeletal - Adult  Goal: Return mobility to safest level of function  Outcome: Progressing     Problem: Pain  Goal: Verbalizes/displays adequate comfort level or baseline comfort level  Outcome: Progressing     Problem: Confusion  Goal: Confusion, delirium, dementia, or psychosis is improved or at baseline  Description: INTERVENTIONS:  1. Assess for possible contributors to thought disturbance, including medications, impaired vision or hearing, underlying metabolic abnormalities, dehydration, psychiatric diagnoses, and notify attending LIP  2. [place] high risk fall precautions, as indicated  3. Provide frequent short contacts to provide reality reorientation, refocusing and direction  4. Decrease environmental stimuli, including noise as appropriate  5. Monitor and intervene to maintain adequate nutrition, hydration, elimination, sleep and activity  6. If unable to ensure safety 
  Problem: Confusion  Goal: Confusion, delirium, dementia, or psychosis is improved or at baseline  Description: INTERVENTIONS:  1. Assess for possible contributors to thought disturbance, including medications, impaired vision or hearing, underlying metabolic abnormalities, dehydration, psychiatric diagnoses, and notify attending LIP  2. [place] high risk fall precautions, as indicated  3. Provide frequent short contacts to provide reality reorientation, refocusing and direction  4. Decrease environmental stimuli, including noise as appropriate  5. Monitor and intervene to maintain adequate nutrition, hydration, elimination, sleep and activity  6. If unable to ensure safety without constant attention obtain sitter and review sitter guidelines with assigned personnel  7. Initiate Psychosocial CNS and Spiritual Care consult, as indicated  8/4/2024 1011 by Korin Espinal RN  Outcome: Not Progressing  8/3/2024 2235 by Sheron Payne RN  Outcome: Progressing     Problem: Confusion  Goal: Confusion, delirium, dementia, or psychosis is improved or at baseline  Description: INTERVENTIONS:  1. Assess for possible contributors to thought disturbance, including medications, impaired vision or hearing, underlying metabolic abnormalities, dehydration, psychiatric diagnoses, and notify attending LIP  2. [place] high risk fall precautions, as indicated  3. Provide frequent short contacts to provide reality reorientation, refocusing and direction  4. Decrease environmental stimuli, including noise as appropriate  5. Monitor and intervene to maintain adequate nutrition, hydration, elimination, sleep and activity  6. If unable to ensure safety without constant attention obtain sitter and review sitter guidelines with assigned personnel  7. Initiate Psychosocial CNS and Spiritual Care consult, as indicated  8/4/2024 1011 by Korin Espinal, RN  Outcome: Not Progressing  8/3/2024 2235 by Sheron Payne, RN  Outcome: 
  Problem: Respiratory - Adult  Goal: Achieves optimal ventilation and oxygenation  7/31/2024 0135 by Dmitri Carson RCP  Outcome: Progressing  Flowsheets (Taken 7/31/2024 0135)  Achieves optimal ventilation and oxygenation:   Assess for changes in respiratory status   Assess the need for suctioning and aspirate as needed   Respiratory therapy support as indicated   Oxygen supplementation based on oxygen saturation or arterial blood gases  7/30/2024 1234 by Marely Trujillo RCP  Outcome: Progressing     
  Problem: Respiratory - Adult  Goal: Achieves optimal ventilation and oxygenation  7/31/2024 1358 by Marely Trujillo RCP  Outcome: Progressing  7/31/2024 0229 by Malvin Price RN  Outcome: Progressing  7/31/2024 0135 by Dmitri Carson RCP  Outcome: Progressing  Flowsheets (Taken 7/31/2024 0135)  Achieves optimal ventilation and oxygenation:   Assess for changes in respiratory status   Assess the need for suctioning and aspirate as needed   Respiratory therapy support as indicated   Oxygen supplementation based on oxygen saturation or arterial blood gases     
  Problem: Respiratory - Adult  Goal: Achieves optimal ventilation and oxygenation  8/2/2024 0846 by Matthew Aguilar RCP  Outcome: Progressing   BRONCHOSPASM/BRONCHOCONSTRICTION     [x]         IMPROVE AERATION/BREATH SOUNDS  [x]   ADMINISTER BRONCHODILATOR THERAPY AS APPROPRIATE  [x]   ASSESS BREATH SOUNDS  [x]   IMPLEMENT AEROSOL/MDI PROTOCOL  [x]   PATIENT EDUCATION AS NEEDED  PROVIDE ADEQUATE OXYGENATION WITH ACCEPTABLE SP02/ABG'S    [x]  IDENTIFY APPROPRIATE OXYGEN THERAPY  [x]   MONITOR SP02/ABG'S AS NEEDED   [x]   PATIENT EDUCATION AS NEEDED    
  Problem: Respiratory - Adult  Goal: Achieves optimal ventilation and oxygenation  8/2/2024 2042 by Ivanna Richards RCP  Flowsheets (Taken 8/2/2024 2042)  Achieves optimal ventilation and oxygenation:   Assess for changes in respiratory status   Assess for changes in mentation and behavior   Position to facilitate oxygenation and minimize respiratory effort   Oxygen supplementation based on oxygen saturation or arterial blood gases   Initiate smoking cessation protocol as indicated   Encourage broncho-pulmonary hygiene including cough, deep breathe, incentive spirometry   Assess the need for suctioning and aspirate as needed   Assess and instruct to report shortness of breath or any respiratory difficulty   Respiratory therapy support as indicated     
  Problem: Respiratory - Adult  Goal: Achieves optimal ventilation and oxygenation  8/3/2024 1953 by Leola Lopez RCP  Outcome: Progressing   BRONCHOSPASM/BRONCHOCONSTRICTION     [x]         IMPROVE AERATION/BREATH SOUNDS  [x]   ADMINISTER BRONCHODILATOR THERAPY AS APPROPRIATE  [x]   ASSESS BREATH SOUNDS  []   IMPLEMENT AEROSOL/MDI PROTOCOL  [x]   PATIENT EDUCATION AS NEEDED  PROVIDE ADEQUATE OXYGENATION WITH ACCEPTABLE SP02/ABG'S    [x]  IDENTIFY APPROPRIATE OXYGEN THERAPY  [x]   MONITOR SP02/ABG'S AS NEEDED   [x]   PATIENT EDUCATION AS NEEDED    
  Problem: Respiratory - Adult  Goal: Achieves optimal ventilation and oxygenation  Outcome: Progressing     
  Problem: Safety - Medical Restraint  Goal: Remains free of injury from restraints (Restraint for Interference with Medical Device)  Description: INTERVENTIONS:  1. Determine that other, less restrictive measures have been tried or would not be effective before applying the restraint  2. Evaluate the patient's condition at the time of restraint application  3. Inform patient/family regarding the reason for restraint  4. Q2H: Monitor safety, psychosocial status, comfort, nutrition and hydration  Outcome: Progressing  Flowsheets  Taken 7/31/2024 0200 by Malvin Price RN  Remains free of injury from restraints (restraint for interference with medical device): Every 2 hours: Monitor safety, psychosocial status, comfort, nutrition and hydration  Taken 7/31/2024 0000 by Malvin Price RN  Remains free of injury from restraints (restraint for interference with medical device): Every 2 hours: Monitor safety, psychosocial status, comfort, nutrition and hydration  Taken 7/30/2024 2200 by Malvin Price RN  Remains free of injury from restraints (restraint for interference with medical device): Every 2 hours: Monitor safety, psychosocial status, comfort, nutrition and hydration  Taken 7/30/2024 2000 by Malvin Price RN  Remains free of injury from restraints (restraint for interference with medical device): Determine that other, less restrictive measures have been tried or would not be effective before applying the restraint  Taken 7/30/2024 1800 by Wendy Cummings RN  Remains free of injury from restraints (restraint for interference with medical device): Determine that other, less restrictive measures have been tried or would not be effective before applying the restraint  Taken 7/30/2024 1600 by Wendy Cummings RN  Remains free of injury from restraints (restraint for interference with medical device): Determine that other, less restrictive measures have been tried or would not be effective before applying the 
PROVIDE ADEQUATE OXYGENATION WITH ACCEPTABLE SP02/ABG'S    [x]  IDENTIFY APPROPRIATE OXYGEN THERAPY  [x]   MONITOR SP02/ABG'S AS NEEDED   [x]   PATIENT EDUCATION AS NEEDED  BRONCHOSPASM/BRONCHOCONSTRICTION     [x]         IMPROVE AERATION/BREATH SOUNDS  [x]   ADMINISTER BRONCHODILATOR THERAPY AS APPROPRIATE  [x]   ASSESS BREATH SOUNDS  []   IMPLEMENT AEROSOL/MDI PROTOCOL  [x]   PATIENT EDUCATION AS NEEDED    
Problem: OXYGENATION/RESPIRATORY FUNCTION  Goal: Patient will maintain patent airway  Outcome: Ongoing  Goal: Patient will achieve/maintain normal respiratory rate/effort  Respiratory rate and effort will be within normal limits for the patient  Outcome: Ongoing    Problem: MECHANICAL VENTILATION  Goal: Patient will maintain patent airway  Outcome: Ongoing  Goal: Oral health is maintained or improved  Outcome: Ongoing  Goal: ET tube will be managed safely  Outcome: Ongoing  Goal: Ability to express needs and understand communication  Outcome: Ongoing  Goal: Mobility/activity is maintained at optimum level for patient  Outcome: Ongoing    Problem: ASPIRATION PRECAUTIONS  Goal: Patient’s risk of aspiration is minimized  Outcome: Ongoing    Problem: SKIN INTEGRITY  Goal: Skin integrity is maintained or improved  Outcome: Ongoing                    
psychosocial status, comfort, nutrition and hydration  7/31/2024 2321 by Opal Starks RN  Outcome: Progressing  Flowsheets  Taken 7/31/2024 1800 by Raffaele East RN  Remains free of injury from restraints (restraint for interference with medical device): Every 2 hours: Monitor safety, psychosocial status, comfort, nutrition and hydration  Taken 7/31/2024 1700 by Raffaele East RN  Remains free of injury from restraints (restraint for interference with medical device): Every 2 hours: Monitor safety, psychosocial status, comfort, nutrition and hydration  7/31/2024 1614 by Raffaele East RN  Outcome: Not Progressing  Flowsheets  Taken 7/31/2024 1600 by Raffaele East RN  Remains free of injury from restraints (restraint for interference with medical device): Every 2 hours: Monitor safety, psychosocial status, comfort, nutrition and hydration  Taken 7/31/2024 1500 by Raffaele East RN  Remains free of injury from restraints (restraint for interference with medical device): Every 2 hours: Monitor safety, psychosocial status, comfort, nutrition and hydration  Taken 7/31/2024 1400 by Raffaele East RN  Remains free of injury from restraints (restraint for interference with medical device): Every 2 hours: Monitor safety, psychosocial status, comfort, nutrition and hydration  Taken 7/31/2024 1300 by Raffaele East RN  Remains free of injury from restraints (restraint for interference with medical device): Every 2 hours: Monitor safety, psychosocial status, comfort, nutrition and hydration  Taken 7/31/2024 1200 by Raffaele East RN  Remains free of injury from restraints (restraint for interference with medical device): Every 2 hours: Monitor safety, psychosocial status, comfort, nutrition and hydration  Taken 7/31/2024 1100 by Raffaele East RN  Remains free of injury from restraints (restraint for interference with medical device): Every 2 hours: Monitor safety, psychosocial status, comfort, 
safety, psychosocial status, comfort, nutrition and hydration  7/29/2024 2231 by Korin Robert RN  Outcome: Not Progressing  Flowsheets  Taken 7/29/2024 2200  Remains free of injury from restraints (restraint for interference with medical device):   Determine that other, less restrictive measures have been tried or would not be effective before applying the restraint   Evaluate the patient's condition at the time of restraint application   Inform patient/family regarding the reason for restraint   Every 2 hours: Monitor safety, psychosocial status, comfort, nutrition and hydration  Taken 7/29/2024 2000  Remains free of injury from restraints (restraint for interference with medical device):   Determine that other, less restrictive measures have been tried or would not be effective before applying the restraint   Evaluate the patient's condition at the time of restraint application   Inform patient/family regarding the reason for restraint   Every 2 hours: Monitor safety, psychosocial status, comfort, nutrition and hydration  7/29/2024 1801 by Wendy Cummings RN  Outcome: Progressing  Flowsheets  Taken 7/29/2024 1800 by Wendy Cummings RN  Remains free of injury from restraints (restraint for interference with medical device): Determine that other, less restrictive measures have been tried or would not be effective before applying the restraint  Taken 7/29/2024 1606 by Wendy Cummings RN  Remains free of injury from restraints (restraint for interference with medical device): Determine that other, less restrictive measures have been tried or would not be effective before applying the restraint  Taken 7/29/2024 1600 by Wendy Cummings RN  Remains free of injury from restraints (restraint for interference with medical device): Determine that other, less restrictive measures have been tried or would not be effective before applying the restraint  Taken 7/29/2024 1400 by Wendy Cummings RN  Remains 
or hearing, underlying metabolic abnormalities, dehydration, psychiatric diagnoses, and notify attending LIP  2. [place] high risk fall precautions, as indicated  3. Provide frequent short contacts to provide reality reorientation, refocusing and direction  4. Decrease environmental stimuli, including noise as appropriate  5. Monitor and intervene to maintain adequate nutrition, hydration, elimination, sleep and activity  6. If unable to ensure safety without constant attention obtain sitter and review sitter guidelines with assigned personnel  7. Initiate Psychosocial CNS and Spiritual Care consult, as indicated  Outcome: Progressing     Problem: Skin/Tissue Integrity  Goal: Absence of new skin breakdown  Description: 1.  Monitor for areas of redness and/or skin breakdown  2.  Assess vascular access sites hourly  3.  Every 4-6 hours minimum:  Change oxygen saturation probe site  4.  Every 4-6 hours:  If on nasal continuous positive airway pressure, respiratory therapy assess nares and determine need for appliance change or resting period.  Outcome: Progressing     Problem: ABCDS Injury Assessment  Goal: Absence of physical injury  Outcome: Progressing     Problem: Nutrition Deficit:  Goal: Optimize nutritional status  Outcome: Progressing     
ADITHYA Coombs  Remains free of injury from restraints (restraint for interference with medical device): Every 2 hours: Monitor safety, psychosocial status, comfort, nutrition and hydration  Taken 7/31/2024 0400 by Malvin Price RN  Remains free of injury from restraints (restraint for interference with medical device): Every 2 hours: Monitor safety, psychosocial status, comfort, nutrition and hydration  7/31/2024 0229 by Malvin Price RN  Outcome: Progressing  Flowsheets  Taken 7/31/2024 0200 by Malvin Price RN  Remains free of injury from restraints (restraint for interference with medical device): Every 2 hours: Monitor safety, psychosocial status, comfort, nutrition and hydration  Taken 7/31/2024 0000 by Malvin Price RN  Remains free of injury from restraints (restraint for interference with medical device): Every 2 hours: Monitor safety, psychosocial status, comfort, nutrition and hydration  Taken 7/30/2024 2200 by Malvin Price RN  Remains free of injury from restraints (restraint for interference with medical device): Every 2 hours: Monitor safety, psychosocial status, comfort, nutrition and hydration  Taken 7/30/2024 2000 by Malvin Price RN  Remains free of injury from restraints (restraint for interference with medical device): Determine that other, less restrictive measures have been tried or would not be effective before applying the restraint  Taken 7/30/2024 1800 by Wendy Cummings RN  Remains free of injury from restraints (restraint for interference with medical device): Determine that other, less restrictive measures have been tried or would not be effective before applying the restraint  Taken 7/30/2024 1600 by Wendy Cummings RN  Remains free of injury from restraints (restraint for interference with medical device): Determine that other, less restrictive measures have been tried or would not be effective before applying the restraint  Taken 7/30/2024 1400 by Wendy Cummings RN  Remains 
restrictive measures have been tried or would not be effective before applying the restraint   Evaluate the patient's condition at the time of restraint application   Inform patient/family regarding the reason for restraint   Every 2 hours: Monitor safety, psychosocial status, comfort, nutrition and hydration  Taken 7/28/2024 0400 by Korin Robert RN  Remains free of injury from restraints (restraint for interference with medical device):   Determine that other, less restrictive measures have been tried or would not be effective before applying the restraint   Evaluate the patient's condition at the time of restraint application   Inform patient/family regarding the reason for restraint   Every 2 hours: Monitor safety, psychosocial status, comfort, nutrition and hydration  Taken 7/28/2024 0200 by Korin Robert RN  Remains free of injury from restraints (restraint for interference with medical device):   Determine that other, less restrictive measures have been tried or would not be effective before applying the restraint   Evaluate the patient's condition at the time of restraint application   Inform patient/family regarding the reason for restraint   Every 2 hours: Monitor safety, psychosocial status, comfort, nutrition and hydration  Taken 7/28/2024 0000 by Korin Robert RN  Remains free of injury from restraints (restraint for interference with medical device):   Determine that other, less restrictive measures have been tried or would not be effective before applying the restraint   Evaluate the patient's condition at the time of restraint application   Inform patient/family regarding the reason for restraint   Every 2 hours: Monitor safety, psychosocial status, comfort, nutrition and hydration  7/27/2024 2241 by Korin Robert RN  Outcome: Not Progressing  Flowsheets  Taken 7/27/2024 2200 by Korin Robert RN  Remains free of injury from restraints (restraint for interference with medical

## 2024-08-05 NOTE — RT PROTOCOL NOTE
RT Inhaler-Nebulizer Bronchodilator Protocol Note    There is a bronchodilator order in the chart from a provider indicating to follow the RT Bronchodilator Protocol and there is an “Initiate RT Inhaler-Nebulizer Bronchodilator Protocol” order as well (see protocol at bottom of note).    CXR Findings:  No results found.    The findings from the last RT Protocol Assessment were as follows:   History Pulmonary Disease: Smoker 15 pack years or more  Respiratory Pattern: Regular pattern and RR 12-20 bpm  Breath Sounds: Slightly diminished and/or crackles  Cough: Strong, productive  Indication for Bronchodilator Therapy: Decreased or absent breath sounds  Bronchodilator Assessment Score: 4    Aerosolized bronchodilator medication orders have been revised according to the RT Inhaler-Nebulizer Bronchodilator Protocol below.    Respiratory Therapist to perform RT Therapy Protocol Assessment initially then follow the protocol.  Repeat RT Therapy Protocol Assessment PRN for score 0-3 or on second treatment, BID, and PRN for scores above 3.    No Indications - adjust the frequency to every 6 hours PRN wheezing or bronchospasm, if no treatments needed after 48 hours then discontinue using Per Protocol order mode.     If indication present, adjust the RT bronchodilator orders based on the Bronchodilator Assessment Score as indicated below.  Use Inhaler orders unless patient has one or more of the following: on home nebulizer, not able to hold breath for 10 seconds, is not alert and oriented, cannot activate and use MDI correctly, or respiratory rate 25 breaths per minute or more, then use the equivalent nebulizer order(s) with same Frequency and PRN reasons based on the score.  If a patient is on this medication at home then do not decrease Frequency below that used at home.    0-3 - enter or revise RT bronchodilator order(s) to equivalent RT Bronchodilator order with Frequency of every 4 hours PRN for wheezing or increased work

## 2024-08-06 LAB
MICROORGANISM SPEC CULT: NORMAL
MICROORGANISM/AGENT SPEC: NORMAL
SPECIMEN DESCRIPTION: NORMAL

## 2024-08-12 LAB
MICROORGANISM SPEC CULT: NORMAL
MICROORGANISM/AGENT SPEC: NORMAL
SPECIMEN DESCRIPTION: NORMAL

## 2024-08-19 LAB
MICROORGANISM SPEC CULT: NORMAL
MICROORGANISM/AGENT SPEC: NORMAL
SPECIMEN DESCRIPTION: NORMAL

## 2024-08-26 LAB
MICROORGANISM SPEC CULT: NORMAL
MICROORGANISM/AGENT SPEC: NORMAL
SPECIMEN DESCRIPTION: NORMAL

## 2024-09-03 LAB
MICROORGANISM SPEC CULT: NORMAL
MICROORGANISM/AGENT SPEC: NORMAL
SPECIMEN DESCRIPTION: NORMAL

## 2024-09-09 LAB
MICROORGANISM SPEC CULT: NORMAL
MICROORGANISM/AGENT SPEC: NORMAL
SPECIMEN DESCRIPTION: NORMAL

## 2024-09-26 NOTE — DISCHARGE SUMMARY
[facility]     Department of Internal Medicine - Staff Internal Medicine Teaching Service    INPATIENT DISCHARGE SUMMARY      Patient Identification:  John Rivas is a 49 y.o. male.  :  1975  MRN: [id number]     Acct: [id number]   PCP: No primary care provider on file.  Admit Date:  2024  Discharge date and time: 2024 12:27 PM   Attending Provider: No att. providers found                                     ACTIVE DISCHARGE DIAGNOSES     Hospital Problem Lists:  Principal Problem (Resolved):    Acute hypoxemic respiratory failure (HCC)  Active Problems:    Encephalopathy    MAIK (acute kidney injury) (HCC)    Essential hypertension    Pneumonia of both lower lobes due to methicillin resistant Staphylococcus aureus (MRSA) (HCC)    Seizure (HCC)    Alcohol withdrawal syndrome, with delirium (HCC)    Alcohol abuse    Cocaine abuse (HCC)    Seizure-like activity (HCC)    Right arm weakness  Resolved Problems:    Acute respiratory failure with hypoxia (HCC)    Hypervolemia    Toxic metabolic encephalopathy    Seizures (HCC)    Acute metabolic encephalopathy      HOSPITAL STAY     Brief Inpatient course:   John Rivas is a 49 y.o. male who was admitted for the management of Acute hypoxemic respiratory failure (HCC), presented to the emergency department with acute alcohol intoxication. Patient was in a [employer] when he was found by bystander to have seizure..  Squad was called and patient was taken to [place] [facility].  Patient was actively drinking vodka before that seizure episode.  He was taken to [facility] ER and oxygen saturation was 93% on 2 L nasal cannula.  Serum ethanol level was 42%, potassium 2.9, sodium 133, U tox was positive for cocaine.  CT chest that was done in [facility] showed dense bilateral pneumonic consolidation with air bronchogram and major portion of the bilateral pulmonary lower lobe showing evidence of dense pneumonia.  Due to